# Patient Record
Sex: FEMALE | Race: BLACK OR AFRICAN AMERICAN | Employment: FULL TIME | URBAN - METROPOLITAN AREA
[De-identification: names, ages, dates, MRNs, and addresses within clinical notes are randomized per-mention and may not be internally consistent; named-entity substitution may affect disease eponyms.]

---

## 2017-02-14 ENCOUNTER — OFFICE VISIT (OUTPATIENT)
Dept: INTERNAL MEDICINE CLINIC | Age: 37
End: 2017-02-14

## 2017-02-14 VITALS
OXYGEN SATURATION: 99 % | BODY MASS INDEX: 28.74 KG/M2 | DIASTOLIC BLOOD PRESSURE: 75 MMHG | TEMPERATURE: 97.5 F | HEIGHT: 62 IN | WEIGHT: 156.2 LBS | RESPIRATION RATE: 18 BRPM | HEART RATE: 73 BPM | SYSTOLIC BLOOD PRESSURE: 111 MMHG

## 2017-02-14 DIAGNOSIS — Z20.7 EXPOSURE TO SCABIES: Primary | ICD-10-CM

## 2017-02-14 DIAGNOSIS — L28.2 PRURITIC RASH: ICD-10-CM

## 2017-02-14 DIAGNOSIS — F34.1 DYSTHYMIA: ICD-10-CM

## 2017-02-14 RX ORDER — NORETHINDRONE 0.35 MG/1
TABLET ORAL
Refills: 2 | COMMUNITY
Start: 2017-02-01 | End: 2017-06-01

## 2017-02-14 RX ORDER — HYDROXYZINE 25 MG/1
25 TABLET, FILM COATED ORAL
Qty: 30 TAB | Refills: 0 | Status: SHIPPED | OUTPATIENT
Start: 2017-02-14 | End: 2017-02-24

## 2017-02-14 RX ORDER — FLUTICASONE PROPIONATE 0.05 MG/G
OINTMENT TOPICAL 2 TIMES DAILY
Qty: 15 G | Refills: 0 | Status: SHIPPED | OUTPATIENT
Start: 2017-02-14 | End: 2017-05-01

## 2017-02-14 RX ORDER — PERMETHRIN 50 MG/G
CREAM TOPICAL
Qty: 60 G | Refills: 0 | Status: SHIPPED | OUTPATIENT
Start: 2017-02-14 | End: 2017-05-01

## 2017-02-14 RX ORDER — NAPROXEN 375 MG/1
TABLET ORAL
Refills: 0 | COMMUNITY
Start: 2016-11-16 | End: 2017-02-14

## 2017-02-14 NOTE — MR AVS SNAPSHOT
Visit Information Date & Time Provider Department Dept. Phone Encounter #  
 2/14/2017  8:40 AM Meryle Cheers, NP [address] [facility] [phone] [id number] Follow-up Instructions Return in about 3 months (around 5/14/2017) for depression f/u with Dr. Jennyfer Paula. Upcoming Health Maintenance Date Due Pneumococcal 19-64 Medium Risk (1 of 1 - PPSV23) 2/9/1997 FOOT EXAM Q1 5/18/2016 EYE EXAM RETINAL OR DILATED Q1 5/18/2016 INFLUENZA AGE 9 TO ADULT 8/1/2016 HEMOGLOBIN A1C Q6M 10/5/2016 MICROALBUMIN Q1 12/17/2016 LIPID PANEL Q1 12/17/2016 MEDICARE YEARLY EXAM 4/6/2017 PAP AKA CERVICAL CYTOLOGY 8/27/2017 DTaP/Tdap/Td series (2 - Td) 2/2/2024 COLONOSCOPY 12/17/2025 Allergies as of 2/14/2017  Review Complete On: 2/14/2017 By: Balbina Gaston. VICKEY Hickey Severity Noted Reaction Type Reactions Amoxicillin  12/29/2010    Rash, Other (comments) Vaginal itching Flagyl [Metronidazole]  11/16/2016   Side Effect Nausea and Vomiting Pcn [Penicillins]  09/20/2014   Intolerance Itching Vaginal itching Current Immunizations  Reviewed on 11/15/2011 Name Date Influenza Vaccine Intradermal PF 11/5/2014 PPD 6/20/2012 Tdap 2/2/2014  4:52 AM  
  
 Not reviewed this visit You Were Diagnosed With   
  
 Codes Comments Exposure to scabies    -  Primary ICD-10-CM: Z20.89 ICD-9-CM: V01.89 Dysthymia     ICD-10-CM: F34.1 ICD-9-CM: 300.4 Pruritic rash     ICD-10-CM: L28.2 ICD-9-CM: 698.2 Vitals BP Pulse Temp Resp Height(growth percentile) Weight(growth percentile) 111/75 (BP 1 Location: Left arm, BP Patient Position: Sitting) 73 97.5 °F (36.4 °C) (Oral) 18 5' 2\" (1.575 m) 156 lb 3.2 oz (70.9 kg) LMP SpO2 BMI OB Status Smoking Status 02/01/2017 (Exact Date) 99% 28.57 kg/m2 Having regular periods Current Some Day Smoker Vitals History BMI and BSA Data Body Mass Index Body Surface Area 28.57 kg/m 2 1.76 m 2 Preferred Pharmacy Pharmacy Name Phone [facility]/PHARMACY #[id number]- [place], [place] - [address] [phone] Your Updated Medication List  
  
   
This list is accurate as of: 2/14/17 10:02 AM.  Always use your most recent med list.  
  
  
  
  
 atorvastatin 10 mg tablet Commonly known as:  LIPITOR Take 1 Tab by mouth daily. LESLI 0.35 mg Tab Generic drug:  norethindrone TAKE 1 TABLET BY MOUTH EVERY DAY  
  
 diclofenac EC 75 mg EC tablet Commonly known as:  VOLTAREN Take 1 Tab by mouth two (2) times a day. fluticasone 0.005 % ointment Commonly known as:  Hussain Pitts Apply  to affected area two (2) times a day. apply thin layer as directed  
  
 hydrOXYzine HCl 25 mg tablet Commonly known as:  ATARAX Take 1 Tab by mouth three (3) times daily as needed for Itching for up to 10 days. Indications: PRURITUS OF SKIN  
  
 lisinopril 10 mg tablet Commonly known as:  Wolf Marychuy Take 1 Tab by mouth daily. metFORMIN 500 mg tablet Commonly known as:  GLUCOPHAGE Take 1 Tab by mouth two (2) times daily (with meals). methocarbamol 500 mg tablet Commonly known as:  ROBAXIN Take 1 Tab by mouth four (4) times daily. naproxen 375 mg tablet Commonly known as:  NAPROSYN  
TAKE 1 TABLET BY MOUTH TWO (2) TIMES DAILY (WITH MEALS). OLANZapine 10 mg tablet Commonly known as:  ZyPREXA  
TAKE 1 TABLET AT BEDTIME  
  
 permethrin 5 % topical cream  
Commonly known as:  Elkin Franz Use from neck down x 1 application ZOLOFT 100 mg tablet Generic drug:  sertraline Take  by mouth daily. Prescriptions Sent to Pharmacy Refills  
 permethrin (ELIMITE) 5 % topical cream 0 Sig: Use from neck down x 1 application Class: Normal  
 Pharmacy: [facility]/pharmacy [id number] [address] [facility] [address]  #: [phone] fluticasone (CUTIVASE) 0.005 % ointment 0 Sig: Apply  to affected area two (2) times a day. apply thin layer as directed Class: Normal  
 Pharmacy: [facility]/pharmacy [address] [place] [address] [address] Ph #: [phone] Route: Topical  
 hydrOXYzine HCl (ATARAX) 25 mg tablet 0 Sig: Take 1 Tab by mouth three (3) times daily as needed for Itching for up to 10 days. Indications: PRURITUS OF SKIN Class: Normal  
 Pharmacy: [facility]/pharmacy [address] [place] [address] [address] Ph #: [phone] Route: Oral  
  
We Performed the Following REFERRAL TO PSYCHIATRY [REF91 Custom] Follow-up Instructions Return in about 3 months (around 5/14/2017) for depression f/u with Dr. Juan Crespo. Referral Information Referral ID Referred By Referred To  
  
 [id number] Breonna Peres, CORTNEY   
   [address] [place] [address], [facility] [address] [facility]  Phone: [phone] Fax: [phone] Visits Status Start Date End Date 1 New Request 2/14/17 2/14/18 If your referral has a status of pending review or denied, additional information will be sent to support the outcome of this decision. Patient Instructions Scabies: Care Instructions Your Care Instructions Scabies is a skin problem that can cause intense itching. It is caused by very tiny bugs called mites that dig just under the skin and lay eggs. An allergic reaction to the mites causes the itching. Scabies is usually spread by person-to-person contact. It is also possible, but not common, for scabies to spread through towels, clothes, and bedding. Everyone in your household should be treated. Scabies is treated with medicine. Itching may last for several weeks after treatment. Follow-up care is a key part of your treatment and safety.  Be sure to make and go to all appointments, and call your doctor if you are having problems. It's also a good idea to know your test results and keep a list of the medicines you take. How can you care for yourself at home? · Use the lotion or cream your doctor recommends or prescribes. One treatment usually cures scabies. Do not use the cream again unless your doctor tells you to. · Wash all clothes, bedding, and towels that you used in the 3 days before you started treatment. Use hot water, and use the hot cycle in the dryer. Another option is to dry-clean these items. Or seal them in a plastic bag for 3 to 7 days. · Take an oral antihistamine, such as loratadine (Claritin) or diphenhydramine (Benadryl), to help stop itching. You also can use a nonprescription anti-itch cream. Read and follow all instructions on the label. · Do not have physical contact with other people or let anyone use your personal items until you have finished treatment. Do not use other people's personal items until your treatment is done. Tell people with whom you have close contact that they will need treatment if they have symptoms. · Take an oatmeal bath to help relieve itching. Add a handful of oatmeal (ground to a powder) to your bath. Or you can try an oatmeal bath product, such as Aveeno. When should you call for help? Call your doctor now or seek immediate medical care if: 
· You have signs of infection, such as: 
¨ Increased pain, swelling, warmth, or redness. ¨ Red streaks leading from the mite bites. ¨ Pus draining from a bite area. ¨ A fever. Watch closely for changes in your health, and be sure to contact your doctor if: · Anyone else in your family has itching. · You do not get better within 2 weeks. Where can you learn more? Go to http://star-arvin.info/. Enter G718 in the search box to learn more about \"Scabies: Care Instructions. \" Current as of: February 5, 2016 Content Version: 11.1 © 3405-7080 [employer].  Care instructions adapted under license by [address] (which disclaims liability or warranty for this information). If you have questions about a medical condition or this instruction, always ask your healthcare professional. [address] any warranty or liability for your use of this information. Introducing [facility] & HEALTH SERVICES! Dear Abdias Tyson: Thank you for requesting a [employer] account. Our records indicate that you already have an active [employer] account. You can access your account anytime at [email or web address] Did you know that you can access your hospital and ER discharge instructions at any time in UC[employer]? You can also review all of your test results from your hospital stay or ER visit. Additional Information If you have questions, please visit the Frequently Asked Questions section of the UC[employer] website at [email or web address] Remember, UC[employer] is NOT to be used for urgent needs. For medical emergencies, dial 911. Now available from your iPhone and Android! Please provide this summary of care documentation to your next provider. Your primary care clinician is listed as George Dao. If you have any questions after today's visit, please call [phone].

## 2017-02-14 NOTE — PATIENT INSTRUCTIONS
Scabies: Care Instructions  Your Care Instructions  Scabies is a skin problem that can cause intense itching. It is caused by very tiny bugs called mites that dig just under the skin and lay eggs. An allergic reaction to the mites causes the itching. Scabies is usually spread by person-to-person contact. It is also possible, but not common, for scabies to spread through towels, clothes, and bedding. Everyone in your household should be treated. Scabies is treated with medicine. Itching may last for several weeks after treatment. Follow-up care is a key part of your treatment and safety. Be sure to make and go to all appointments, and call your doctor if you are having problems. It's also a good idea to know your test results and keep a list of the medicines you take. How can you care for yourself at home? · Use the lotion or cream your doctor recommends or prescribes. One treatment usually cures scabies. Do not use the cream again unless your doctor tells you to. · Wash all clothes, bedding, and towels that you used in the 3 days before you started treatment. Use hot water, and use the hot cycle in the dryer. Another option is to dry-clean these items. Or seal them in a plastic bag for 3 to 7 days. · Take an oral antihistamine, such as loratadine (Claritin) or diphenhydramine (Benadryl), to help stop itching. You also can use a nonprescription anti-itch cream. Read and follow all instructions on the label. · Do not have physical contact with other people or let anyone use your personal items until you have finished treatment. Do not use other people's personal items until your treatment is done. Tell people with whom you have close contact that they will need treatment if they have symptoms. · Take an oatmeal bath to help relieve itching. Add a handful of oatmeal (ground to a powder) to your bath. Or you can try an oatmeal bath product, such as Aveeno. When should you call for help?   Call your doctor now or seek immediate medical care if:  · You have signs of infection, such as:  ¨ Increased pain, swelling, warmth, or redness. ¨ Red streaks leading from the mite bites. ¨ Pus draining from a bite area. ¨ A fever. Watch closely for changes in your health, and be sure to contact your doctor if:  · Anyone else in your family has itching. · You do not get better within 2 weeks. Where can you learn more? Go to http://star-arvin.info/. Enter V093 in the search box to learn more about \"Scabies: Care Instructions. \"  Current as of: February 5, 2016  Content Version: 11.1  © 3898-2444 [employer]. Care instructions adapted under license by [employer] (which disclaims liability or warranty for this information). If you have questions about a medical condition or this instruction, always ask your healthcare professional. [address] any warranty or liability for your use of this information.

## 2017-02-14 NOTE — LETTER
NOTIFICATION RETURN TO WORK / SCHOOL 
 
2/14/2017 10:03 AM 
 
Ms. Naif Pham [address] [place] [id number] To Whom It May Concern: 
 
Naif Pham is currently under the care of Daniela Rubin. She will return to work/school on: 2/15/17 If there are questions or concerns please have the patient contact our office. Sincerely, Chapin Manriquez NP

## 2017-02-14 NOTE — PROGRESS NOTES
1. Have you been to the ER, urgent care clinic since your last visit? Hospitalized since your last visit? No    2. Have you seen or consulted any other health care providers outside of the [address] [place] [address] since your last visit? Include any pap smears or colon screening. No     Pt is here for   Chief Complaint   Patient presents with    Skin Problem     pt states she's been in contact with scabies and would like to be checked. .. pt states he son has it. .. pt states she's itching all over and she has little bumps on her     Pt denies pain at this time. ...

## 2017-02-14 NOTE — PROGRESS NOTES
Viviano Curling is a 42 y.o. female and presents with Skin Problem (pt states she's been in contact with scabies and would like to be checked. .. pt states he son has it. .. pt states she's itching all over and she has little bumps on her)    Subjective:  Pt here suspecting she has scabies. Started with itching all over this weekend. Son recently treated for scabies and would like to have skin checked. Itching to neck, arms, abdomen, and right foot. No treatments tried. Denies change in soap, meds, food, or detergent.      Review of Systems  Constitutional: negative for fevers, chills, anorexia and weight loss  Eyes:   negative for visual disturbance, drainage, and irritation  ENT:   negative for tinnitus,sore throat,nasal congestion,ear pain,and hoarseness  Respiratory:  negative for cough, hemoptysis, dyspnea, and wheezing  CV:   negative for chest pain, palpitations, and lower extremity edema  GI:   negative for nausea, vomiting, diarrhea, abdominal pain, and melena  Endo:               negative for polyuria,polydipsia,polyphagia, and heat intolerance  Genitourinary: negative for frequency, urgency, dysuria, retention, and hematuria  Integument:  negative for rash, ulcerations, and pruritus  Hematologic:  negative for easy bruising and bleeding  Musculoskel: negative for arthralgias, muscle weakness,and joint pain/swelling  Neurological:  negative for headaches, dizziness, vertigo,and memory/gait problems  Behavl/Psych: + depression, not taking meds due to sedating SE. negative for feelings of anxiety, suicide, and mood changes    Past Medical History   Diagnosis Date    Anxiety     Bipolar affective ([address]     Depression     Diabetes ([facility] [address]      diet control    Major depression     Other ill-defined conditions(799.89)      bronchitis    Other ill-defined conditions(799.89)      High Cholesterol     Past Surgical History   Procedure Laterality Date    Hx hernia repair       as child    Hx gi Colonoscopy     Social History     Social History    Marital status: SINGLE     Spouse name: N/A    Number of children: N/A    Years of education: N/A     Social History Main Topics    Smoking status: Current Some Day Smoker    Smokeless tobacco: Never Used      Comment: cigars    Alcohol use Yes      Comment: occasional    Drug use: No    Sexual activity: Yes     Partners: Male     Birth control/ protection: Injection     Other Topics Concern    None     Social History Narrative     Family History   Problem Relation Age of Onset    Diabetes Mother     Depression Mother     Asthma Brother     Stroke Maternal Grandmother      aneurysm    Cancer Maternal Grandmother      kidney    Hypertension Maternal Grandmother     Cancer Paternal Grandmother      lung    Diabetes Brother     Bipolar Disorder Brother     Schizophrenia Brother     Other Brother      paranoia     Current Outpatient Prescriptions   Medication Sig Dispense Refill    LESLI 0.35 mg tab TAKE 1 TABLET BY MOUTH EVERY DAY  2    permethrin (ELIMITE) 5 % topical cream Use from neck down x 1 application 60 g 0    fluticasone (CUTIVASE) 0.005 % ointment Apply  to affected area two (2) times a day. apply thin layer as directed 15 g 0    hydrOXYzine HCl (ATARAX) 25 mg tablet Take 1 Tab by mouth three (3) times daily as needed for Itching for up to 10 days. Indications: PRURITUS OF SKIN 30 Tab 0    methocarbamol (ROBAXIN) 500 mg tablet Take 1 Tab by mouth four (4) times daily. 30 Tab 0    OLANZapine (ZYPREXA) 10 mg tablet TAKE 1 TABLET AT BEDTIME  2    sertraline (ZOLOFT) 100 mg tablet Take  by mouth daily.  naproxen (NAPROSYN) 375 mg tablet TAKE 1 TABLET BY MOUTH TWO (2) TIMES DAILY (WITH MEALS). 0    diclofenac EC (VOLTAREN) 75 mg EC tablet Take 1 Tab by mouth two (2) times a day. 30 Tab 0    metFORMIN (GLUCOPHAGE) 500 mg tablet Take 1 Tab by mouth two (2) times daily (with meals).  60 Tab 4    lisinopril (PRINIVIL, ZESTRIL) 10 mg tablet Take 1 Tab by mouth daily. 30 Tab 4    atorvastatin (LIPITOR) 10 mg tablet Take 1 Tab by mouth daily. 30 Tab 4     Allergies   Allergen Reactions    Amoxicillin Rash and Other (comments)     Vaginal itching    Flagyl [Metronidazole] Nausea and Vomiting    Pcn [Penicillins] Itching     Vaginal itching       Objective:  Visit Vitals    /75 (BP 1 Location: Left arm, BP Patient Position: Sitting)    Pulse 73    Temp 97.5 °F (36.4 °C) (Oral)    Resp 18    Ht 5' 2\" (1.575 m)    Wt 156 lb 3.2 oz (70.9 kg)    LMP 02/01/2017 (Exact Date)    SpO2 99%    BMI 28.57 kg/m2     Wt Readings from Last 3 Encounters:   02/14/17 156 lb 3.2 oz (70.9 kg)   11/27/16 157 lb 6.4 oz (71.4 kg)   11/16/16 158 lb 9.6 oz (71.9 kg)     Physical Exam:   General appearance - alert, well appearing, and in no distress. Mental status - A/O x 4, normal mood and affect. Neck -Supple ,normal CSP. FROM, non-tender. No significant adenopathy/thyromegaly. No JVD. Chest - CTA. Symmetric chest rise. No wheezing, rales or rhonchi. Heart - Normal rate, regular rhythm. Normal S1, S2. No MGR or clicks. Abdomen - Soft,non-distended. Normoactive BS in all quadrants. NT, no mass or HSM. Ext- Radial, DP pulses, 2+ bilaterally. No pedal edema, clubbing, or cyanosis. Skin-Warm and dry. No hyperpigmentation, ulcerations, or suspicious lesions. Neuro - Normal speech, no focal findings or movement disorder. Normal strength, gait, and muscle tone.      Results for orders placed or performed in visit on 11/16/16   CULTURE, URINE   Result Value Ref Range    Urine Culture, Routine No growth    NUSWAB VAGINITIS PLUS   Result Value Ref Range    Atopobium vaginae Low - 0 Score    BVAB 2 Low - 0 Score    Megasphaera 1 Low - 0 Score    C. albicans, ROLANDO Positive (A) Negative    C. glabrata, ROLANDO Negative Negative    T. vaginalis, ROLANDO Negative Negative    C. trachomatis, ROLANDO Negative Negative    N. gonorrhoeae, ROLANDO Negative Negative   AMB POC URINALYSIS DIP STICK AUTO W/O MICRO   Result Value Ref Range    Color (UA POC) Yellow     Clarity (UA POC) Clear     Glucose (UA POC) Negative Negative    Bilirubin (UA POC) Negative Negative    Ketones (UA POC) Negative Negative    Specific gravity (UA POC) 1.025 1.001 - 1.035    Blood (UA POC) 3+ Negative    pH (UA POC) 7.0 4.6 - 8.0    Protein (UA POC) Trace Negative mg/dL    Urobilinogen (UA POC) 0.2 mg/dL 0.2 - 1    Nitrites (UA POC) Negative Negative    Leukocyte esterase (UA POC) 2+ Negative       Assessment/Plan:  Work note x 2 days off. elimite treatment x 1 dose. Atarax and cutivate for pruritus. Referred to psych for depression symptoms. Medication Side Effects and Warnings were discussed with patient: yes  Patient Labs were reviewed: no  Patient Past Records were reviewed: no  See below for other orders   Follow-up Disposition:  Return in about 3 months (around 5/14/2017) for depression f/u with Dr. Julia Baldwin. Pt has given consent verbally while in office for [employer]. ICD-10-CM ICD-9-CM    1. Exposure to scabies Z20.89 V01.89 permethrin (ELIMITE) 5 % topical cream      fluticasone (CUTIVASE) 0.005 % ointment      hydrOXYzine HCl (ATARAX) 25 mg tablet   2. Dysthymia F34.1 300.4 REFERRAL TO PSYCHIATRY   3. Pruritic rash L28.2 698. 2 permethrin (ELIMITE) 5 % topical cream      fluticasone (CUTIVASE) 0.005 % ointment      hydrOXYzine HCl (ATARAX) 25 mg tablet     Orders Placed This Encounter    REFERRAL TO PSYCHIATRY     Referral Priority:   Routine     Referral Type:   Behavioral Health     Referral Reason:   Specialty Services Required     Referred to Provider:   Diane Martines NP    LESLI 0.35 mg tab     Sig: TAKE 1 TABLET BY MOUTH EVERY DAY     Refill:  2    naproxen (NAPROSYN) 375 mg tablet     Sig: TAKE 1 TABLET BY MOUTH TWO (2) TIMES DAILY (WITH MEALS).      Refill:  0    permethrin (ELIMITE) 5 % topical cream     Sig: Use from neck down x 1 application     Dispense:  60 g Refill:  0    fluticasone (CUTIVASE) 0.005 % ointment     Sig: Apply  to affected area two (2) times a day. apply thin layer as directed     Dispense:  15 g     Refill:  0    hydrOXYzine HCl (ATARAX) 25 mg tablet     Sig: Take 1 Tab by mouth three (3) times daily as needed for Itching for up to 10 days. Indications: PRURITUS OF SKIN     Dispense:  30 Tab     Refill:  [address] expressed understanding of plan. An After Visit Summary was offered/printed and given to the patient.

## 2017-03-14 ENCOUNTER — HOSPITAL ENCOUNTER (OUTPATIENT)
Dept: LAB | Age: 37
Discharge: HOME | End: 2017-03-14

## 2017-03-14 ENCOUNTER — OFFICE VISIT (OUTPATIENT)
Dept: INTERNAL MEDICINE CLINIC | Age: 37
End: 2017-03-14

## 2017-03-14 VITALS
DIASTOLIC BLOOD PRESSURE: 82 MMHG | SYSTOLIC BLOOD PRESSURE: 130 MMHG | HEART RATE: 73 BPM | OXYGEN SATURATION: 98 % | HEIGHT: 62 IN | WEIGHT: 159.4 LBS | BODY MASS INDEX: 29.33 KG/M2 | RESPIRATION RATE: 18 BRPM | TEMPERATURE: 98.1 F

## 2017-03-14 DIAGNOSIS — K21.9 GASTROESOPHAGEAL REFLUX DISEASE WITHOUT ESOPHAGITIS: ICD-10-CM

## 2017-03-14 DIAGNOSIS — R11.0 NAUSEA: ICD-10-CM

## 2017-03-14 DIAGNOSIS — E11.69 TYPE 2 DIABETES MELLITUS WITH OTHER SPECIFIED COMPLICATION (HCC): Primary | ICD-10-CM

## 2017-03-14 DIAGNOSIS — D64.9 ANEMIA, UNSPECIFIED TYPE: ICD-10-CM

## 2017-03-14 LAB
ALBUMIN UR QL STRIP: 30 MG/L
CHOLEST SERPL-MCNC: 127 MG/DL
CREATININE, URINE POC: 300 MG/DL
GLUCOSE POC: 123 MG/DL
HDLC SERPL-MCNC: 52 MG/DL
LDL CHOLESTEROL POC: 56
MICROALBUMIN/CREAT RATIO POC: 30 MG/G
NON-HDL GOAL (POC): 74
TCHOL/HDL RATIO (POC): 1.1
TRIGL SERPL-MCNC: 91 MG/DL

## 2017-03-14 PROCEDURE — 99001 SPECIMEN HANDLING PT-LAB: CPT | Performed by: FAMILY MEDICINE

## 2017-03-14 RX ORDER — RANITIDINE 150 MG/1
150 TABLET, FILM COATED ORAL 2 TIMES DAILY
Qty: 60 TAB | Refills: 3 | Status: SHIPPED | OUTPATIENT
Start: 2017-03-14 | End: 2017-05-15

## 2017-03-14 RX ORDER — PROMETHAZINE HYDROCHLORIDE 25 MG/1
25 TABLET ORAL
Qty: 15 TAB | Refills: 0 | Status: SHIPPED | OUTPATIENT
Start: 2017-03-14 | End: 2017-03-16

## 2017-03-14 NOTE — MR AVS SNAPSHOT
Visit Information Date & Time Provider Department Dept. Phone Encounter #  
 3/14/2017  1:45 PM George Dao, [address] [id number] Follow-up Instructions Return if symptoms worsen or fail to improve. Your Appointments 5/1/2017  1:30 PM  
New Patient with Alvarado Corners, NP [facility] ([address] [facility]) Appt Note: refer by Dr. iSmi Gonsalves for Major depression and bipolar; refer by Dr. Simi Gonsalves for Major depression and bipolar [address] [facility] [address] [facility] [address]  
  
   
 [address] [facility] [address] [facility] [address] [address] 5/15/2017  9:15 AM  
ROUTINE CARE with George Dao MD  
[address] [facility] [address] [facility]) Appt Note: fu depression [place] [address] [address]  
[phone]  
  
   
 [place] [address] [place] Upcoming Health Maintenance Date Due Pneumococcal 19-64 Medium Risk (1 of 1 - PPSV23) 2/9/1997 FOOT EXAM Q1 5/18/2016 EYE EXAM RETINAL OR DILATED Q1 5/18/2016 HEMOGLOBIN A1C Q6M 10/5/2016 MICROALBUMIN Q1 12/17/2016 LIPID PANEL Q1 12/17/2016 MEDICARE YEARLY EXAM 4/6/2017 PAP AKA CERVICAL CYTOLOGY 8/27/2017 DTaP/Tdap/Td series (2 - Td) 2/2/2024 COLONOSCOPY 12/17/2025 Allergies as of 3/14/2017  Review Complete On: 3/14/2017 By: Geroge Dao MD  
  
 Severity Noted Reaction Type Reactions Amoxicillin  12/29/2010    Rash, Other (comments) Vaginal itching Flagyl [Metronidazole]  11/16/2016   Side Effect Nausea and Vomiting Pcn [Penicillins]  09/20/2014   Intolerance Itching Vaginal itching Current Immunizations  Reviewed on 11/15/2011 Name Date Influenza Vaccine Intradermal PF 11/5/2014 PPD 6/20/2012 Tdap 2/2/2014  4:52 AM  
  
 Not reviewed this visit You Were Diagnosed With   
  
 Codes Comments Type 2 diabetes mellitus with other specified complication (HCC)    -  Primary ICD-10-CM: E11.69 ICD-9-CM: 250.80 Nausea     ICD-10-CM: R11.0 ICD-9-CM: 787.02 Anemia, unspecified type     ICD-10-CM: D64.9 ICD-9-CM: 285.9 Gastroesophageal reflux disease without esophagitis     ICD-10-CM: K21.9 ICD-9-CM: 530.81 Vitals BP Pulse Temp Resp Height(growth percentile) Weight(growth percentile) 130/82 (BP 1 Location: Left arm, BP Patient Position: Sitting) 73 98.1 °F (36.7 °C) (Oral) 18 5' 2\" (1.575 m) 159 lb 6.4 oz (72.3 kg) LMP SpO2 BMI OB Status Smoking Status 02/01/2017 (Exact Date) 98% 29.15 kg/m2 Having regular periods Current Some Day Smoker Vitals History BMI and BSA Data Body Mass Index Body Surface Area  
 29.15 kg/m 2 1.78 m 2 Preferred Pharmacy Pharmacy Name Phone [id number], [place] - [address] [phone] Your Updated Medication List  
  
   
This list is accurate as of: 3/14/17  3:05 PM.  Always use your most recent med list.  
  
  
  
  
 LESLI 0.35 mg Tab Generic drug:  norethindrone TAKE 1 TABLET BY MOUTH EVERY DAY  
  
 fluticasone 0.005 % ointment Commonly known as:  Derenda Remedies Apply  to affected area two (2) times a day. apply thin layer as directed  
  
 methocarbamol 500 mg tablet Commonly known as:  ROBAXIN Take 1 Tab by mouth four (4) times daily. permethrin 5 % topical cream  
Commonly known as:  Mary Fabiana Use from neck down x 1 application  
  
 promethazine 25 mg tablet Commonly known as:  PHENERGAN Take 1 Tab by mouth every eight (8) hours as needed for Nausea. raNITIdine 150 mg tablet Commonly known as:  ZANTAC Take 1 Tab by mouth two (2) times a day. Indications: GASTROESOPHAGEAL REFLUX Prescriptions Sent to Pharmacy  Refills  
 raNITIdine (ZANTAC) 150 mg tablet 3  
 Sig: Take 1 Tab by mouth two (2) times a day. Indications: GASTROESOPHAGEAL REFLUX Class: Normal  
 Pharmacy: [facility] [id number] [facility] [address] Ph #: [phone] Route: Oral  
 promethazine (PHENERGAN) 25 mg tablet 0 Sig: Take 1 Tab by mouth every eight (8) hours as needed for Nausea. Class: Normal  
 Pharmacy: [facility] [id number] [facility] [address] Ph #: [phone] Route: Oral  
  
We Performed the Following AMB POC GLUCOSE BLOOD, BY GLUCOSE MONITORING DEVICE [71690 CPT(R)] AMB POC LIPID PROFILE [00005 CPT(R)] AMB POC URINE PREGNANCY TEST, VISUAL COLOR COMPARISON [59564 CPT(R)] AMB POC URINE, MICROALBUMIN, SEMIQUANT (3 RESULTS) [33290 CPT(R)] HEMOGLOBIN A1C WITH EAG [89533 CPT(R)] VITAMIN B12 & FOLATE [53234 CPT(R)] Follow-up Instructions Return if symptoms worsen or fail to improve. Introducing [facility]! Dear Vicente [place]: Thank you for requesting a Neptune account. Our records indicate that you already have an active Neptune account. You can access your account anytime at [email or web address] Did you know that you can access your hospital and ER discharge instructions at any time in Neptune? You can also review all of your test results from your hospital stay or ER visit. Additional Information If you have questions, please visit the Frequently Asked Questions section of the Neptune website at [email or web address] Remember, Neptune is NOT to be used for urgent needs. For medical emergencies, dial 911. Now available from your iPhone and Android! Please provide this summary of care documentation to your next provider. Your primary care clinician is listed as [place] [facility]. If you have any questions after today's visit, please call [phone].

## 2017-03-14 NOTE — PROGRESS NOTES
Shine Cotto is a 42 y.o. female and presents with Vomiting (B12 check)  . Pt wants her B12 checked b/c \"my complexion has gotten darker\". Been having N/V x 2 wks. Tend to occur mainly at night. No heartburn or increased burping. No increased stress. No h/o PUD or GERD. No diarrhea. Last vomit was Sat am. Worse after eating lunch. Allev saltines and ginger ale. LMP 3/1/2017 and was normal. Stopped OCPs since that period. No BTL. +unprotected sex. Not Compliant with DM diet. No CP, SOB, or edema. Does not check BS. Review of Systems  Constitutional: negative for fevers, chills, anorexia and weight loss  Eyes:   negative for visual disturbance and irritation  ENT:   negative for tinnitus,sore throat,nasal congestion,ear pains. hoarseness  Respiratory:  negative for cough, hemoptysis, dyspnea,wheezing  CV:   negative for chest pain, palpitations, lower extremity edema  GI:   negative for nausea, vomiting, diarrhea, abdominal pain,melena  Endo:               negative for polyuria,polydipsia,polyphagia,heat intolerance  Genitourinary: negative for frequency, dysuria and hematuria  Integument:  negative for rash and pruritus  Hematologic:  negative for easy bruising and gum/nose bleeding  Musculoskel: negative for myalgias, arthralgias, back pain, muscle weakness, joint pain  Neurological:  negative for headaches, dizziness, vertigo, memory problems and gait   Behavl/Psych: negative for feelings of anxiety, depression, mood changes    Past Medical History:   Diagnosis Date    Anxiety     B[facility] affective ([facility]     Depression     Diabetes ([facility]     diet control    Major depression     Other ill-defined conditions(799.89)     bronchitis    Other ill-defined conditions(799.89)     High Cholesterol     Past Surgical History:   Procedure Laterality Date    HX GI      Colonoscopy    HX HERNIA REPAIR      as child     Social History     Social History    Marital status: SINGLE     Spouse name: N/A   [address] [place] [address] Number of children: N/A    Years of education: N/A     Social History Main Topics    Smoking status: Current Some Day Smoker    Smokeless tobacco: Never Used      Comment: cigars    Alcohol use Yes      Comment: occasional    Drug use: No    Sexual activity: Yes     Partners: Male     Birth control/ protection: Injection     Other Topics Concern    None     Social History Narrative     Current Outpatient Prescriptions   Medication Sig Dispense Refill    raNITIdine (ZANTAC) 150 mg tablet Take 1 Tab by mouth two (2) times a day. Indications: GASTROESOPHAGEAL REFLUX 60 Tab 3    promethazine (PHENERGAN) 25 mg tablet Take 1 Tab by mouth every eight (8) hours as needed for Nausea. 15 Tab 0    permethrin (ELIMITE) 5 % topical cream Use from neck down x 1 application 60 g 0    LESLI 0.35 mg tab TAKE 1 TABLET BY MOUTH EVERY DAY  2    fluticasone (CUTIVASE) 0.005 % ointment Apply  to affected area two (2) times a day. apply thin layer as directed 15 g 0    methocarbamol (ROBAXIN) 500 mg tablet Take 1 Tab by mouth four (4) times daily.  30 Tab 0     Allergies   Allergen Reactions    Amoxicillin Rash and Other (comments)     Vaginal itching    Flagyl [Metronidazole] Nausea and Vomiting    Pcn [Penicillins] Itching     Vaginal itching       Objective:  Visit Vitals    /82 (BP 1 Location: Left arm, BP Patient Position: Sitting)    Pulse 73    Temp 98.1 °F (36.7 °C) (Oral)    Resp 18    Ht 5' 2\" (1.575 m)    Wt 159 lb 6.4 oz (72.3 kg)    LMP 02/01/2017 (Exact Date)    SpO2 98%    BMI 29.15 kg/m2     Physical Exam:   General appearance - alert, well appearing, and in no distress  Mental status - alert, oriented to person, place, and time  Chest - clear to auscultation, no wheezes, rales or rhonchi, symmetric air entry   Heart - normal rate, regular rhythm, normal S1, S2, no murmurs, rubs, clicks or gallops   Abdomen - soft, nontender, nondistended, no masses or organomegaly  Lymph- no adenopathy palpable  Ext-peripheral pulses normal, no pedal edema, no clubbing or cyanosis  Skin-Warm and dry. no hyperpigmentation, vitiligo, or suspicious lesions  Neuro -alert, oriented, normal speech, no focal findings or movement disorder noted    Assessment/Plan:    ICD-10-CM ICD-9-CM    1. Type 2 diabetes mellitus with other specified complication ([facility]) G91.74 250.80 AMB POC GLUCOSE BLOOD, BY GLUCOSE MONITORING DEVICE      AMB POC LIPID PROFILE      AMB POC URINE, MICROALBUMIN, SEMIQUANT (3 RESULTS)      HEMOGLOBIN A1C WITH EAG   2. Nausea R11.0 787.02 AMB POC URINE PREGNANCY TEST, VISUAL COLOR COMPARISON   3. Anemia, unspecified type D64.9 285.9 VITAMIN B12 & FOLATE   4. Gastroesophageal reflux disease without esophagitis K21.9 530.81      Orders Placed This Encounter    VITAMIN B12 & FOLATE    HEMOGLOBIN A1C WITH EAG    AMB POC GLUCOSE BLOOD, BY GLUCOSE MONITORING DEVICE    AMB POC LIPID PROFILE    AMB POC URINE, MICROALBUMIN, SEMIQUANT (3 RESULTS)    AMB POC URINE PREGNANCY TEST, VISUAL COLOR COMPARISON    raNITIdine (ZANTAC) 150 mg tablet     Sig: Take 1 Tab by mouth two (2) times a day. Indications: GASTROESOPHAGEAL REFLUX     Dispense:  60 Tab     Refill:  3    promethazine (PHENERGAN) 25 mg tablet     Sig: Take 1 Tab by mouth every eight (8) hours as needed for Nausea. Dispense:  15 Tab     Refill:  0     DM- diet controlled  N/V- GERD? Zantac and phenergan  Vit B12 checked per pt request.  H/o anemia- CBC    Follow-up Disposition:  Return if symptoms worsen or fail to improve.

## 2017-03-14 NOTE — PROGRESS NOTES
1. Have you been to the ER, urgent care clinic since your last visit? Hospitalized since your last visit? No.    2. Have you seen or consulted any other health care providers outside of the [facility] since your last visit? Include any pap smears or colon screening. No.  Blood glucose /lipids verbal order DR. Chacon/ADRIANA Acevedo.

## 2017-03-15 LAB
EST. AVERAGE GLUCOSE BLD GHB EST-MCNC: 117 MG/DL
FOLATE SERPL-MCNC: 16.8 NG/ML
HBA1C MFR BLD: 5.7 % (ref 4.8–5.6)
VIT B12 SERPL-MCNC: 277 PG/ML (ref 211–946)

## 2017-03-16 ENCOUNTER — HOSPITAL ENCOUNTER (EMERGENCY)
Age: 37
Discharge: HOME | End: 2017-03-16
Attending: EMERGENCY MEDICINE
Payer: MEDICARE

## 2017-03-16 VITALS
RESPIRATION RATE: 18 BRPM | TEMPERATURE: 98.5 F | DIASTOLIC BLOOD PRESSURE: 80 MMHG | SYSTOLIC BLOOD PRESSURE: 122 MMHG | WEIGHT: 159 LBS | OXYGEN SATURATION: 100 % | HEIGHT: 62 IN | HEART RATE: 82 BPM | BODY MASS INDEX: 29.26 KG/M2

## 2017-03-16 DIAGNOSIS — S39.012A BACK STRAIN, INITIAL ENCOUNTER: Primary | ICD-10-CM
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
## 2017-03-16 PROCEDURE — 99282 EMERGENCY DEPT VISIT SF MDM: CPT

## 2017-03-16 PROCEDURE — 96372 THER/PROPH/DIAG INJ SC/IM: CPT

## 2017-03-16 PROCEDURE — 74011250636 HC RX REV CODE- 250/636: Performed by: PHYSICIAN ASSISTANT

## 2017-03-16 RX ORDER — KETOROLAC TROMETHAMINE 30 MG/ML
30 INJECTION, SOLUTION INTRAMUSCULAR; INTRAVENOUS
Status: COMPLETED | OUTPATIENT
Start: 2017-03-16 | End: 2017-03-16

## 2017-03-16 RX ORDER — CYCLOBENZAPRINE HCL 10 MG
10 TABLET ORAL
Qty: 15 TAB | Refills: 0 | Status: SHIPPED | OUTPATIENT
Start: 2017-03-16 | End: 2017-05-15

## 2017-03-16 RX ORDER — NAPROXEN 500 MG/1
500 TABLET ORAL
Qty: 20 TAB | Refills: 0 | Status: SHIPPED | OUTPATIENT
Start: 2017-03-16 | End: 2017-03-26

## 2017-03-16 RX ADMIN — KETOROLAC TROMETHAMINE 30 MG: 30 INJECTION, SOLUTION INTRAMUSCULAR at 17:56
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
## 2017-03-16 NOTE — ED NOTES
Emergency Department Nursing Plan of Care       The Nursing Plan of Care is developed from the Nursing assessment and Emergency Department Attending provider initial evaluation. The plan of care may be reviewed in the ED Provider note.     The Plan of Care was developed with the following considerations:   Patient / Family readiness to learn indicated by:verbalized understanding  Persons(s) to be included in education: patient  Barriers to Learning/Limitations:No    [facility]    3/16/2017   5:33 PM

## 2017-03-16 NOTE — ED NOTES
Patient (s) was given copy of dc instructions and no paper script(s) and 2 electronic scripts. Patient (s)  verbalized understanding of instructions and script (s). Patient given a current medication reconciliation form and verbalized understanding of their medications. Patient (s) verbalized understanding of the importance of discussing medications with  his or her physician or clinic they will be following up with. Patient alert and oriented and in no acute distress. Patient offered wheelchair from treatment area to hospital entrance, patient declined wheelchair.
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
## 2017-03-16 NOTE — LETTER
[facility] EMERGENCY DEPT 
[address] [place] [address] 
[phone] Work/School Note Date: 3/16/2017 To Whom It May concern: 
 
Aliyah Alejandre was seen and treated today in the emergency room by the following provider(s): 
Attending Provider: Elio Gar MD 
Physician Assistant: Caity Barnes PA-C. Aliyah Alejandre may return to work on 3/17/17. Sincerely, Caity Barnes PA-C

## 2017-04-04 ENCOUNTER — OFFICE VISIT (OUTPATIENT)
Dept: INTERNAL MEDICINE CLINIC | Age: 37
End: 2017-04-04

## 2017-04-04 VITALS
SYSTOLIC BLOOD PRESSURE: 100 MMHG | HEART RATE: 78 BPM | HEIGHT: 62 IN | RESPIRATION RATE: 16 BRPM | OXYGEN SATURATION: 98 % | BODY MASS INDEX: 29.04 KG/M2 | WEIGHT: 157.8 LBS | TEMPERATURE: 97.9 F | DIASTOLIC BLOOD PRESSURE: 76 MMHG
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
## 2017-04-04 DIAGNOSIS — T14.8XXA MUSCLE STRAIN: ICD-10-CM

## 2017-04-04 RX ORDER — PROMETHAZINE HYDROCHLORIDE 25 MG/1
TABLET ORAL
Refills: 0 | COMMUNITY
Start: 2017-03-14 | End: 2017-05-15

## 2017-04-04 NOTE — PATIENT INSTRUCTIONS
1. Heat for 15 minutes 4 x daily followed by asper cream or bengay  2. Continue naprosyn if needed    Back Stretches: Exercises  Your Care Instructions  Here are some examples of exercises for stretching your back. Start each exercise slowly. Ease off the exercise if you start to have pain. Your doctor or physical therapist will tell you when you can start these exercises and which ones will work best for you. How to do the exercises  Overhead stretch    1. Stand comfortably with your feet shoulder-width apart. 2. Looking straight ahead, raise both arms over your head and reach toward the ceiling. Do not allow your head to tilt back. 3. Hold for 15 to 30 seconds, then lower your arms to your sides. 4. Repeat 2 to 4 times. Side stretch    1. Stand comfortably with your feet shoulder-width apart. 2. Raise one arm over your head, and then lean to the other side. 3. Slide your hand down your leg as you let the weight of your arm gently stretch your side muscles. Hold for 15 to 30 seconds. 4. Repeat 2 to 4 times on each side. Press-up    1. Lie on your stomach, supporting your body with your forearms. 2. Press your elbows down into the floor to raise your upper back. As you do this, relax your stomach muscles and allow your back to arch without using your back muscles. As your press up, do not let your hips or pelvis come off the floor. 3. Hold for 15 to 30 seconds, then relax. 4. Repeat 2 to 4 times. Relax and rest    1. Lie on your back with a rolled towel under your neck and a pillow under your knees. Extend your arms comfortably to your sides. 2. Relax and breathe normally. 3. Remain in this position for about 10 minutes. 4. If you can, do this 2 or 3 times each day. Follow-up care is a key part of your treatment and safety. Be sure to make and go to all appointments, and call your doctor if you are having problems.  It's also a good idea to know your test results and keep a list of the medicines you take. Where can you learn more? Go to http://star-arvin.info/. Enter R252 in the search box to learn more about \"Back Stretches: Exercises. \"  Current as of: May 23, 2016  Content Version: 11.2  © 9085-8353 [employer]. Care instructions adapted under license by [employer] (which disclaims liability or warranty for this information). If you have questions about a medical condition or this instruction, always ask your healthcare professional. [address] any warranty or liability for your use of this information.
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
## 2017-04-12 ENCOUNTER — HOSPITAL ENCOUNTER (OUTPATIENT)
Dept: PHYSICAL THERAPY | Age: 37
Discharge: HOME | End: 2017-04-12
Payer: MEDICARE

## 2017-04-12 PROCEDURE — 97161 PT EVAL LOW COMPLEX 20 MIN: CPT | Performed by: PHYSICAL THERAPIST

## 2017-04-12 PROCEDURE — 97140 MANUAL THERAPY 1/> REGIONS: CPT | Performed by: PHYSICAL THERAPIST

## 2017-04-12 PROCEDURE — 97014 ELECTRIC STIMULATION THERAPY: CPT | Performed by: PHYSICAL THERAPIST

## 2017-04-12 PROCEDURE — G8982 BODY POS GOAL STATUS: HCPCS | Performed by: PHYSICAL THERAPIST

## 2017-04-12 PROCEDURE — 97110 THERAPEUTIC EXERCISES: CPT | Performed by: PHYSICAL THERAPIST

## 2017-04-12 PROCEDURE — G8981 BODY POS CURRENT STATUS: HCPCS | Performed by: PHYSICAL THERAPIST
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
## 2017-04-17 ENCOUNTER — HOSPITAL ENCOUNTER (EMERGENCY)
Age: 37
Discharge: HOME | End: 2017-04-17
Attending: EMERGENCY MEDICINE
Payer: MEDICARE

## 2017-04-17 VITALS
OXYGEN SATURATION: 100 % | BODY MASS INDEX: 29.44 KG/M2 | DIASTOLIC BLOOD PRESSURE: 93 MMHG | HEIGHT: 62 IN | HEART RATE: 91 BPM | TEMPERATURE: 98.3 F | SYSTOLIC BLOOD PRESSURE: 127 MMHG | WEIGHT: 160 LBS | RESPIRATION RATE: 18 BRPM

## 2017-04-17 DIAGNOSIS — R10.13 ABDOMINAL PAIN, EPIGASTRIC: Primary | ICD-10-CM

## 2017-04-17 PROCEDURE — 74011250637 HC RX REV CODE- 250/637: Performed by: EMERGENCY MEDICINE

## 2017-04-17 PROCEDURE — 99283 EMERGENCY DEPT VISIT LOW MDM: CPT

## 2017-04-17 RX ORDER — PANTOPRAZOLE SODIUM 40 MG/1
40 TABLET, DELAYED RELEASE ORAL
Status: COMPLETED | OUTPATIENT
Start: 2017-04-17 | End: 2017-04-17

## 2017-04-17 RX ORDER — MAG HYDROX/ALUMINUM HYD/SIMETH 200-200-20
30 SUSPENSION, ORAL (FINAL DOSE FORM) ORAL
Status: COMPLETED | OUTPATIENT
Start: 2017-04-17 | End: 2017-04-17

## 2017-04-17 RX ADMIN — PANTOPRAZOLE SODIUM 40 MG: 40 TABLET, DELAYED RELEASE ORAL at 22:06

## 2017-04-17 RX ADMIN — ALUMINUM HYDROXIDE, MAGNESIUM HYDROXIDE, AND SIMETHICONE 30 ML: 200; 200; 20 SUSPENSION ORAL at 22:07

## 2017-04-17 NOTE — LETTER
[facility] EMERGENCY DEPT 
[address] [facility] [address] [id number] [phone] Work/School Note Date: 4/17/2017 To Whom It May concern: 
 
Radha Reynoso was seen and treated today in the emergency room by the following provider(s): 
Attending Provider: Tiff Stark MD. Radha Reynoso may return to work on Tuesday (4/18/17).  
 
Sincerely, 
 
 
 
 
Tiff Stark MD

## 2017-04-18 ENCOUNTER — HOSPITAL ENCOUNTER (OUTPATIENT)
Dept: PHYSICAL THERAPY | Age: 37
Discharge: HOME | End: 2017-04-18
Payer: MEDICARE

## 2017-04-18 PROCEDURE — 97014 ELECTRIC STIMULATION THERAPY: CPT

## 2017-04-18 PROCEDURE — 97110 THERAPEUTIC EXERCISES: CPT

## 2017-04-18 NOTE — PROGRESS NOTES
PT DAILY TREATMENT NOTE - [facility] 2-15    Patient Name: Denzel Lesches  Date:2017  : 1980  [x]  Patient  Verified  [place] / Plan: [place] MED[place]ARE PART A & B / Product Type: Medicare /    In time:9:30A  Out time:10:30A  Total Treatment Time (min): 60  Total Timed Codes (min): 45  1:1 Treatment Time ([address] only): 39   Visit #: 2     Treatment Area: Low back pain [M54.5]    SUBJECTIVE  Pain Level (0-10 scale): 6/10  Any medication changes, allergies to medications, adverse drug reactions, diagnosis change, or new procedure performed?: [x] No    [] Yes (see summary sheet for update)  Subjective functional status/changes:   [] No changes reported  \"I felt good after I left here last time. I am doing the HEP and it seems to be helping some. \"    OBJECTIVE    Modality rationale: decrease inflammation, decrease pain and increase tissue extensibility to improve the patients ability to decrease LBP   Min Type Additional Details   15 post [x] Estim: []Att   [x]Unatt        []TENS instruct                  [x]IFC  []Premod   []NMES                     []Other:  []w/US   []w/ice   [x]w/heat  Position:supine  Location: low back    []  Traction: [] Cervical       []Lumbar                       [] Prone          []Supine                       []Intermittent   []Continuous Lbs:  [] before manual  [] after manual  []w/heat    []  Ultrasound: []Continuous   [] Pulsed at:                           []1MHz   []3MHz Location:  W/cm2:    [] Paraffin         Location:   []w/heat    []  Ice     []  Heat  []  Ice massage Position:  Location:    []  Laser  []  Other: Position:  Location:      []  Vasopneumatic Device Pressure:       [] lo [] med [] hi   Temperature:      [x] Skin assessment post-treatment:  [x]intact []redness- no adverse reaction    []redness  adverse reaction:     45 min Therapeutic Exercise:  [x] See flow sheet :   Rationale: increase ROM, increase strength and improve coordination to improve the patients ability to increase pelvic stabilization. With   [] TE   [] TA   [] neuro   [] other: Patient Education: [x] Review HEP    [] Progressed/Changed HEP based on:   [] positioning   [] body mechanics   [] transfers   [] heat/ice application    [] other:      Other Objective/Functional Measures: --     Pain Level (0-10 scale) post treatment: 3/10    ASSESSMENT/Changes in Function:   Pt reported increase in HS pain and tightness with S/L clamshells. Pt reported an overall decrease in pain at the end of today's session. Patient will continue to benefit from skilled PT services to modify and progress therapeutic interventions, address functional mobility deficits, address ROM deficits, address strength deficits, analyze and address soft tissue restrictions and analyze and cue movement patterns to attain remaining goals. [x]  See Plan of Care  []  See progress note/recertification  []  See Discharge Summary         Progress towards goals / Updated goals:     Long Term Goals:  To be accomplished in 4 weeks:  1) Pt will be able to Sit greater than 60 minutes without pain  2) Pt will be able to Ambulate greater than 4 blocks without increase of pain  4) Pt will be able to retrieve item form ground without pain  5) Pt will be able to carry >/= 20 lbs without pain     PLAN  [x]  Upgrade activities as tolerated     [x]  Continue plan of care  []  Update interventions per flow sheet       []  Discharge due to:_  []  Other:_      Christie Palacios, IFEANYI 4/18/2017  9:36 AM

## 2017-04-18 NOTE — ED PROVIDER NOTES
HPI Comments: Koko Brady is a 42 y.o. female with PMhx significant for DM who presents ambulatory to the ED with cc of gradually worsening nausea and vomiting. She also c/o associated epigastric abdominal pain with eating. The pt reports that she was \"recently diagnosed with acid reflux a few weeks ago,\" noting that her current symptoms are similar to a flare up. She notes that her symptoms started after she ate something. The pt notes that she has been taking zantac for her symptoms with no relief. She states that she consumed alcohol Friday night (4/14/17). She reports that she was taken off of DM medication ~3 years ago. The pt denies any h/o HTN, and she specifically denies vaginal discharge or difficulty urinating at this time. SHx: +tobacco, +EtOH, -illicit drug use    PCP: Jaskaran Man MD    There are no other complaints, changes or physical findings at this time. The history is provided by the patient. No  was used. Past Medical History:   Diagnosis Date    Anxiety     [facility] affective [facility]     Depression     Diabetes ([facility] [facility]     diet control    Major depression     Other ill-defined conditions     bronchitis    Other ill-defined conditions     High Cholesterol       Past Surgical History:   Procedure Laterality Date    HX GI      Colonoscopy    HX HERNIA REPAIR      as child         Family History:   Problem Relation Age of Onset    Diabetes Mother     Depression Mother     Asthma Brother     Stroke Maternal Grandmother      aneurysm    Cancer Maternal Grandmother      kidney    Hypertension Maternal Grandmother     Cancer Paternal Grandmother      lung    Diabetes Brother     Bipolar Disorder Brother     Schizophrenia Brother     Other Brother      paranoia       Social History     Social History    Marital status: SINGLE     Spouse name: N/A    Number of children: N/A    Years of education: N/A     Occupational History    Not on file. Social History Main Topics    Smoking status: Former Smoker    Smokeless tobacco: Never Used      Comment: cigars    Alcohol use Yes      Comment: occasional    Drug use: No    Sexual activity: Yes     Partners: Male     Birth control/ protection: Injection     Other Topics Concern    Not on file     Social History Narrative         ALLERGIES: Amoxicillin; Flagyl [metronidazole]; and Pcn [penicillins]    Review of Systems   Constitutional: Negative for appetite change, chills, diaphoresis, fatigue and fever. HENT: Negative for sore throat and trouble swallowing. Respiratory: Negative for cough and shortness of breath. Cardiovascular: Negative for chest pain. Gastrointestinal: Positive for abdominal pain (epigastric), nausea and vomiting. Negative for diarrhea. Genitourinary: Negative for difficulty urinating, dysuria, frequency and vaginal discharge. Musculoskeletal: Negative for arthralgias, back pain and myalgias. Skin: Negative for color change and rash. Neurological: Negative for weakness and numbness. Hematological: Does not bruise/bleed easily. All other systems reviewed and are negative. Patient Vitals for the past 12 hrs:   Temp Pulse Resp BP SpO2   04/17/17 2134 98.3 °F (36.8 °C) 91 18 (!) 127/93 100 %            Physical Exam   Constitutional: She is oriented to person, place, and time. She appears well-developed and well-nourished. No distress. HENT:   Head: Normocephalic and atraumatic. Mouth/Throat: Oropharynx is clear and moist. No oropharyngeal exudate or posterior oropharyngeal erythema. Neck: Normal range of motion and full passive range of motion without pain. Neck supple. Cardiovascular: Normal rate, regular rhythm, normal heart sounds, intact distal pulses and normal pulses. Exam reveals no gallop and no friction rub. No murmur heard. Pulmonary/Chest: Effort normal and breath sounds normal. No accessory muscle usage. No respiratory distress.  She has no decreased breath sounds. She has no wheezes. She has no rhonchi. She has no rales. Abdominal: Soft. Bowel sounds are normal. She exhibits no distension. There is tenderness in the epigastric area. There is no rebound, no guarding and no CVA tenderness. Musculoskeletal: Normal range of motion. She exhibits no edema or tenderness. Thoracic back: She exhibits no tenderness and no bony tenderness. Lumbar back: She exhibits no tenderness and no bony tenderness. Lymphadenopathy:     She has no cervical adenopathy. Neurological: She is alert and oriented to person, place, and time. She has normal strength. She is not disoriented. No cranial nerve deficit or sensory deficit. No focal deficits; 5/5 muscle strength in all extremities   Skin: Skin is warm. No lesion and no rash noted. Rash is not nodular. She is not diaphoretic. Nursing note and vitals reviewed. MDM  Number of Diagnoses or Management Options  Diagnosis management comments:   DDx: gastritis, GERD, gastric ulcers, duodenal ulcers, hiatal hernia. Amount and/or Complexity of Data Reviewed  Review and summarize past medical records: yes    Patient Progress  Patient progress: stable    ED Course       Procedures    MEDICATIONS GIVEN:  Medications   pantoprazole (PROTONIX) tablet 40 mg (40 mg Oral Given 4/17/17 2206)   alum-mag hydroxide-simeth (MYLANTA) oral suspension 30 mL (30 mL Oral Given 4/17/17 2207)       IMPRESSION:  1. Abdominal pain, epigastric        PLAN:  1. Follow-up Information     Follow up With Details Comments 5010 Raya Rojas MD   [address]  [phone]          Return to ED if worse       DISCHARGE NOTE:  10:02 PM  The patient has been re-evaluated and is ready for discharge. Reviewed available results with patient. Counseled patient on diagnosis and care plan. Patient has expressed understanding, and all questions have been answered. Patient agrees with plan and agrees to follow up as recommended, or return to the ED if their symptoms worsen. Discharge instructions have been provided and explained to the patient, along with reasons to return to the ED. This note is prepared by Lilian Ricardo, acting as Scribe for Michelle Kwon MD.    Michelle Kwon MD: The scribe's documentation has been prepared under my direction and personally reviewed by me in its entirety. I confirm that the note above accurately reflects all work, treatment, procedures, and medical decision making performed by me.

## 2017-04-18 NOTE — ED NOTES
Patient educated on discharge instructions  . Patient verbalized understanding of eduction. Patient given discharge instructions and excuse note. Patient ambulated out of ED. No acute distress noted. Emergency Department Nursing Plan of Care       The Nursing Plan of Care is developed from the Nursing assessment and Emergency Department Attending provider initial evaluation. The plan of care may be reviewed in the ED Provider note.     The Plan of Care was developed with the following considerations:   Patient / Family readiness to learn indicated by:verbalized understanding  Persons(s) to be included in education: patient  Barriers to Learning/Limitations:No    Signed     Aniyah Mathias RN    4/17/2017   10:53 PM\

## 2017-04-18 NOTE — ED NOTES
Patient presented to the ED today for complaints of vomiting. Respirations are even and unlabored. Skin is dry,warm, and intact. Patient says her zantac is not working. Patient says she believes her acid reflux is bothering her.

## 2017-04-21 ENCOUNTER — OFFICE VISIT (OUTPATIENT)
Dept: INTERNAL MEDICINE CLINIC | Age: 37
End: 2017-04-21

## 2017-04-21 ENCOUNTER — APPOINTMENT (OUTPATIENT)
Dept: PHYSICAL THERAPY | Age: 37
End: 2017-04-21
Payer: MEDICARE

## 2017-04-21 VITALS
TEMPERATURE: 98 F | RESPIRATION RATE: 16 BRPM | HEIGHT: 62 IN | DIASTOLIC BLOOD PRESSURE: 77 MMHG | BODY MASS INDEX: 28.93 KG/M2 | WEIGHT: 157.2 LBS | HEART RATE: 89 BPM | SYSTOLIC BLOOD PRESSURE: 113 MMHG

## 2017-04-21 DIAGNOSIS — N76.0 ACUTE VAGINITIS: Primary | ICD-10-CM

## 2017-04-21 RX ORDER — FLUCONAZOLE 150 MG/1
150 TABLET ORAL DAILY
Qty: 1 TAB | Refills: 0 | Status: SHIPPED | OUTPATIENT
Start: 2017-04-21 | End: 2017-04-22

## 2017-04-21 NOTE — PROGRESS NOTES
Jonnie Alonzo is a 42 y.o. female and presents with Vaginal Itching and Vaginal Discharge (white cottes cheese looking )  . C/o white cottage cheese vaginal discharge x 5 days. +itchy. Has used rx yeast cream. No odor. Review of Systems  Constitutional: negative for fevers, chills, anorexia and weight loss  Eyes:   negative for visual disturbance and irritation  ENT:   negative for tinnitus,sore throat,nasal congestion,ear pains. hoarseness  Respiratory:  negative for cough, hemoptysis, dyspnea,wheezing  CV:   negative for chest pain, palpitations, lower extremity edema  GI:   negative for nausea, vomiting, diarrhea, abdominal pain,melena  Endo:               negative for polyuria,polydipsia,polyphagia,heat intolerance  Genitourinary: negative for frequency, dysuria and hematuria  Integument:  negative for rash and pruritus  Hematologic:  negative for easy bruising and gum/nose bleeding  Musculoskel: negative for myalgias, arthralgias, back pain, muscle weakness, joint pain  Neurological:  negative for headaches, dizziness, vertigo, memory problems and gait   Behavl/Psych: negative for feelings of anxiety, depression, mood changes    Past Medical History:   Diagnosis Date    Anxiety     Bipolar affective ([facility] [address] [facility]     Depression     Diabetes ([facility] [address]     diet control    Major depression     Other ill-defined conditions     bronchitis    Other ill-defined conditions     High Cholesterol     Past Surgical History:   Procedure Laterality Date    HX GI      Colonoscopy    HX HERNIA REPAIR      as child     Social History     Social History    Marital status: SINGLE     Spouse name: N/A    Number of children: N/A    Years of education: N/A     Social History Main Topics    Smoking status: Former Smoker    Smokeless tobacco: Never Used      Comment: cigars    Alcohol use Yes      Comment: occasional    Drug use: No    Sexual activity: Yes     Partners: Male     Birth control/ protection: Injection Other Topics Concern    None     Social History Narrative     Current Outpatient Prescriptions   Medication Sig Dispense Refill    fluconazole (DIFLUCAN) 150 mg tablet Take 1 Tab by mouth daily for 1 day. FDA advises cautious prescribing of oral fluconazole in pregnancy. 1 Tab 0    NAPROXEN PO Take  by mouth.  promethazine (PHENERGAN) 25 mg tablet TAKE 1 TABLET BY MOUTH EVERY 8 HOURS AS NEEDED FOR NAUSEA  0    cyclobenzaprine (FLEXERIL) 10 mg tablet Take 1 Tab by mouth three (3) times daily as needed for Muscle Spasm(s). 15 Tab 0    raNITIdine (ZANTAC) 150 mg tablet Take 1 Tab by mouth two (2) times a day. Indications: GASTROESOPHAGEAL REFLUX 60 Tab 3    LESLI 0.35 mg tab TAKE 1 TABLET BY MOUTH EVERY DAY  2    permethrin (ELIMITE) 5 % topical cream Use from neck down x 1 application 60 g 0    fluticasone (CUTIVASE) 0.005 % ointment Apply  to affected area two (2) times a day.  apply thin layer as directed 15 g 0     Allergies   Allergen Reactions    Amoxicillin Rash and Other (comments)     Vaginal itching    Flagyl [Metronidazole] Nausea and Vomiting    Pcn [Penicillins] Itching     Vaginal itching       Objective:  Visit Vitals    /77 (BP 1 Location: Left arm, BP Patient Position: Sitting)    Pulse 89    Temp 98 °F (36.7 °C)    Resp 16    Ht 5' 2\" (1.575 m)    Wt 157 lb 3.2 oz (71.3 kg)    LMP 03/28/2017 (Exact Date)    BMI 28.75 kg/m2     Physical Exam:   General appearance - alert, well appearing, and in no distress  Mental status - alert, oriented to person, place, and time  Chest - clear to auscultation, no wheezes, rales or rhonchi, symmetric air entry   Heart - normal rate, regular rhythm, normal S1, S2, no murmurs, rubs, clicks or gallops   Abdomen - soft, nontender, nondistended, no masses or organomegaly  Lymph- no adenopathy palpable  Ext-peripheral pulses normal, no pedal edema, no clubbing or cyanosis  Gyn- Nl ext fem genitalia, mod amt of thick white vaginal discharge    Assessment/Plan:    ICD-10-CM ICD-9-CM    1. Acute vaginitis N76.0 616.10 NUSWAB VAGINITIS     Orders Placed This Encounter    NUSWAB VAGINITIS    fluconazole (DIFLUCAN) 150 mg tablet     Sig: Take 1 Tab by mouth daily for 1 day. FDA advises cautious prescribing of oral fluconazole in pregnancy.      Dispense:  1 Tab     Refill:  0     Vaginitis- Nuswab, Diflucan    Follow-up Disposition: Not on File

## 2017-04-21 NOTE — MR AVS SNAPSHOT
Visit Information Date & Time Provider Department Dept. Phone Encounter #  
 4/21/2017  3:15 PM Olga Chin, [address] [id number] Your Appointments 5/1/2017  1:30 PM  
New Patient with Margot Ureña NP [facility] Appt Note: refer by Dr. Dejuan Tyson for Major depression and bipolar; refer by Dr. Dejuan Tyson for Major depression and bipolar [address] [facility] [address] [facility] [address]  
  
   
 [address] [facility] [address] [address] 5/15/2017  2:45 PM  
ROUTINE CARE with Olga Chin MD  
[address] [facility] Appt Note: fu depression; NEED MEDICARE WELLNESS; left voicemail to r/s appointment 4/11/17 ; resched per provider [facility] 4/21  
 [place] [address] [address]  
[phone]  
  
   
 [place] [address] Upcoming Health Maintenance Date Due Pneumococcal 19-64 Medium Risk (1 of 1 - PPSV23) 2/9/1997 FOOT EXAM Q1 5/18/2016 EYE EXAM RETINAL OR DILATED Q1 5/18/2016 MEDICARE YEARLY EXAM 4/6/2017 PAP AKA CERVICAL CYTOLOGY 8/27/2017 HEMOGLOBIN A1C Q6M 9/14/2017 MICROALBUMIN Q1 3/14/2018 LIPID PANEL Q1 3/14/2018 DTaP/Tdap/Td series (2 - Td) 2/2/2024 COLONOSCOPY 12/17/2025 Allergies as of 4/21/2017  Review Complete On: 4/21/2017 By: Olga Chin MD  
  
 Severity Noted Reaction Type Reactions Amoxicillin  12/29/2010    Rash, Other (comments) Vaginal itching Flagyl [Metronidazole]  11/16/2016   Side Effect Nausea and Vomiting Pcn [Penicillins]  09/20/2014   Intolerance Itching Vaginal itching Current Immunizations  Reviewed on 11/15/2011 Name Date Influenza Vaccine Intradermal PF 11/5/2014 PPD 6/20/2012 Tdap 2/2/2014  4:52 AM  
  
 Not reviewed this visit You Were Diagnosed With   
  
 Codes Comments Acute vaginitis    -  Primary ICD-10-CM: N76.0 ICD-9-CM: 616.10 Vitals BP Pulse Temp Resp Height(growth percentile) Weight(growth percentile) 113/77 (BP 1 Location: Left arm, BP Patient Position: Sitting) 89 98 °F (36.7 °C) 16 5' 2\" (1.575 m) 157 lb 3.2 oz (71.3 kg) LMP BMI OB Status Smoking Status 03/28/2017 (Exact Date) 28.75 kg/m2 Having regular periods Former Smoker BMI and BSA Data Body Mass Index Body Surface Area 28.75 kg/m 2 1.77 m 2 Preferred Pharmacy Pharmacy Name Phone [id number]- [place] - [address] [phone] Your Updated Medication List  
  
   
This list is accurate as of: 4/21/17  3:31 PM.  Always use your most recent med list.  
  
  
  
  
 LESLI 0.35 mg Tab Generic drug:  norethindrone TAKE 1 TABLET BY MOUTH EVERY DAY  
  
 cyclobenzaprine 10 mg tablet Commonly known as:  FLEXERIL Take 1 Tab by mouth three (3) times daily as needed for Muscle Spasm(s). fluconazole 150 mg tablet Commonly known as:  DIFLUCAN Take 1 Tab by mouth daily for 1 day. FDA advises cautious prescribing of oral fluconazole in pregnancy. fluticasone 0.005 % ointment Commonly known as:  Ezra Porch Apply  to affected area two (2) times a day. apply thin layer as directed NAPROXEN PO Take  by mouth.  
  
 permethrin 5 % topical cream  
Commonly known as:  Laila Nickels Use from neck down x 1 application  
  
 promethazine 25 mg tablet Commonly known as:  PHENERGAN  
TAKE 1 TABLET BY MOUTH EVERY 8 HOURS AS NEEDED FOR NAUSEA  
  
 raNITIdine 150 mg tablet Commonly known as:  ZANTAC Take 1 Tab by mouth two (2) times a day. Indications: GASTROESOPHAGEAL REFLUX Prescriptions Sent to Pharmacy Refills  
 fluconazole (DIFLUCAN) 150 mg tablet 0 Sig: Take 1 Tab by mouth daily for 1 day. FDA advises cautious prescribing of oral fluconazole in pregnancy. Class: Normal  
 Pharmacy: [facility]/pharmacy [id number] [facility] [address]  #: [phone] Route: Oral  
  
We Performed the Following NUSWAB VAGINITIS [[id number] Custom] To-Do List   
 04/26/2017 12:00 PM  
  Appointment with Zuleika Abraham at [facility] [address] ([phone]) Please remember to arrive at the hospital at least 30 minutes prior to your scheduled appointment time. When you come in for your appointment, please be sure to bring the therapy prescription the doctor gave you, your insurance card, and a list of the medicines you are taking. Also, please remember to wear comfortable, loose- fitting clothes. We look forward to seeing you. 04/28/2017 9:30 AM  
  Appointment with Zuleika Abraham at [facility] [address] ([phone]) Please remember to arrive at the hospital at least 30 minutes prior to your scheduled appointment time. When you come in for your appointment, please be sure to bring the therapy prescription the doctor gave you, your insurance card, and a list of the medicines you are taking. Also, please remember to wear comfortable, loose- fitting clothes. We look forward to seeing you. Introducing \[facility] [place] [facility]\"" & HEALTH SERVICES! Dear Belem Butler: Thank you for requesting a [employer] account. Our records indicate that you already have an active [employer] account. You can access your account anytime at [email or web address] [email or web address] Desktop/[employer] Clear Did you know that you can access your hospital and ER discharge instructions at any time in [employer]? You can also review all of your test results from your hospital stay or ER visit. Additional Information If you have questions, please visit the Frequently Asked Questions section of the [employer] website at [email or web address] [email or web address] Remember, [employer] is NOT to be used for urgent needs. For medical emergencies, dial 911. Now available from your iPhone and Android! Please provide this summary of care documentation to your next provider. Your primary care clinician is listed as Edith Simon. If you have any questions after today's visit, please call [phone].

## 2017-04-21 NOTE — PROGRESS NOTES
Pt here for   Chief Complaint   Patient presents with    Vaginal Itching    Vaginal Discharge     white cottes cheese looking      1. Have you been to the ER, urgent care clinic since your last visit? Hospitalized since your last visit? Yes When: [facility] Monday acid reflux    2. Have you seen or consulted any other health care providers outside of the [address] [place] [address] since your last visit? Include any pap smears or colon screening.  No     Pt denies pain at this time

## 2017-04-24 ENCOUNTER — TELEPHONE (OUTPATIENT)
Dept: INTERNAL MEDICINE CLINIC | Age: 37
End: 2017-04-24

## 2017-04-24 RX ORDER — METRONIDAZOLE 500 MG/1
500 TABLET ORAL 2 TIMES DAILY
Qty: 14 TAB | Refills: 0 | Status: SHIPPED | OUTPATIENT
Start: 2017-04-24 | End: 2017-05-01 | Stop reason: ALTCHOICE

## 2017-04-24 NOTE — TELEPHONE ENCOUNTER
Pt was seen 4/21/17 and states you gve her rx for vaginal itching and discharge. The itching is really bad.  Pt # [id number]

## 2017-04-25 LAB
A VAGINAE DNA VAG QL NAA+PROBE: NORMAL SCORE
BVAB2 DNA VAG QL NAA+PROBE: NORMAL SCORE
C ALBICANS DNA VAG QL NAA+PROBE: NEGATIVE
C GLABRATA DNA VAG QL NAA+PROBE: NEGATIVE
MEGA1 DNA VAG QL NAA+PROBE: NORMAL SCORE
T VAGINALIS RRNA SPEC QL NAA+PROBE: NEGATIVE

## 2017-04-26 ENCOUNTER — APPOINTMENT (OUTPATIENT)
Dept: PHYSICAL THERAPY | Age: 37
End: 2017-04-26
Payer: MEDICARE

## 2017-05-01 ENCOUNTER — OFFICE VISIT (OUTPATIENT)
Dept: BEHAVIORAL/MENTAL HEALTH CLINIC | Age: 37
End: 2017-05-01

## 2017-05-01 VITALS
WEIGHT: 156 LBS | SYSTOLIC BLOOD PRESSURE: 126 MMHG | HEART RATE: 73 BPM | OXYGEN SATURATION: 98 % | BODY MASS INDEX: 28.71 KG/M2 | HEIGHT: 62 IN | DIASTOLIC BLOOD PRESSURE: 86 MMHG

## 2017-05-01 DIAGNOSIS — F32.1 MODERATE SINGLE CURRENT EPISODE OF MAJOR DEPRESSIVE DISORDER (HCC): Primary | ICD-10-CM

## 2017-05-01 DIAGNOSIS — F41.9 ANXIETY: ICD-10-CM

## 2017-05-01 RX ORDER — MIRTAZAPINE 7.5 MG/1
7.5 TABLET, FILM COATED ORAL
Qty: 30 TAB | Refills: 0 | Status: SHIPPED | OUTPATIENT
Start: 2017-05-01 | End: 2017-05-24 | Stop reason: SDUPTHER

## 2017-05-01 RX ORDER — BUSPIRONE HYDROCHLORIDE 7.5 MG/1
7.5 TABLET ORAL 2 TIMES DAILY
Qty: 60 TAB | Refills: 0 | Status: SHIPPED | OUTPATIENT
Start: 2017-05-01 | End: 2017-05-24 | Stop reason: SDUPTHER
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
## 2017-05-01 NOTE — PATIENT INSTRUCTIONS
Sleep Tips    What to avoid    · Do not have drinks with caffeine, such as coffee or black tea, for 8 hours before bed. · Do not smoke or use other types of tobacco near bedtime. Nicotine is a stimulant and can keep you awake. · Avoid drinking alcohol late in the evening, because it can cause you to wake in the middle of the night. · Do not eat a big meal close to bedtime. If you are hungry, eat a light snack. · Do not drink a lot of water close to bedtime, because the need to urinate may wake you up during the night. · Do not read or watch TV in bed. Use the bed only for sleeping and sexual activity. What to try    · Go to bed at the same time every night, and wake up at the same time every morning. Do not take naps during the day. · Keep your bedroom quiet, dark, and cool. · Get regular exercise, but not within 3 to 4 hours of your bedtime. · Sleep on a comfortable pillow and mattress. · If watching the clock makes you anxious, turn it facing away from you so you cannot see the time. · If you worry when you lie down, start a worry book. Well before bedtime, write down your worries, and then set the book and your concerns aside. · Try meditation or other relaxation techniques before you go to bed. · If you cannot fall asleep, get up and go to another room until you feel sleepy. Do something relaxing. Repeat your bedtime routine before you go to bed again. Make your house quiet and calm about an hour before bedtime. Turn down the lights, turn off the TV, log off the computer, and turn down the volume on music. This can help you relax after a busy day. For reliable dietary information, go to www. EATRIGHT.org.      Recovering From Depression: Care Instructions  Your Care Instructions  Taking good care of yourself is important as you recover from depression. In time, your symptoms will fade as your treatment takes hold. Do not give up. Instead, focus your energy on getting better. Your mood will improve. It just takes some time. Focus on things that can help you feel better, such as being with friends and family, eating well, and getting enough rest. But take things slowly. Do not do too much too soon. You will begin to feel better gradually. Follow-up care is a key part of your treatment and safety. Be sure to make and go to all appointments, and call your doctor if you are having problems. It's also a good idea to know your test results and keep a list of the medicines you take. How can you care for yourself at home? Be realistic  If you have a large task to do, break it up into smaller steps you can handle, and just do what you can. You may want to put off important decisions until your depression has lifted. If you have plans that will have a major impact on your life, such as marriage, divorce, or a job change, try to wait a bit. Talk it over with friends and loved ones who can help you look at the overall picture first.  Reaching out to people for help is important. Do not isolate yourself. Let your family and friends help you. Find someone you can trust and confide in, and talk to that person. Be patient, and be kind to yourself. Remember that depression is not your fault and is not something you can overcome with willpower alone. Treatment is necessary for depression, just like for any other illness. Feeling better takes time, and your mood will improve little by little. Stay active  Stay busy and get outside. Take a walk, or try some other light exercise. Talk with your doctor about an exercise program. Exercise can help with mild depression. Go to a movie or concert. Take part in a Denominational activity or other social gathering. Go to a ball game. Ask a friend to have dinner with you. Take care of yourself  Eat a balanced diet with plenty of fresh fruits and vegetables, whole grains, and lean protein. If you have lost your appetite, eat small snacks rather than large meals.   Avoid drinking alcohol or using illegal drugs. Do not take medicines that have not been prescribed for you. They may interfere with medicines you may be taking for depression, or they may make your depression worse. Take your medicines exactly as they are prescribed. You may start to feel better within 1 to 3 weeks of taking antidepressant medicine. But it can take as many as 6 to 8 weeks to see more improvement. If you have questions or concerns about your medicines, or if you do not notice any improvement by 3 weeks, talk to your doctor. If you have any side effects from your medicine, tell your doctor. Antidepressants can make you feel tired, dizzy, or nervous. Some people have dry mouth, constipation, headaches, sexual problems, or diarrhea. Many of these side effects are mild and will go away on their own after you have been taking the medicine for a few weeks. Some may last longer. Talk to your doctor if side effects are bothering you too much. You might be able to try a different medicine. Get enough sleep. If you have problems sleeping:  Go to bed at the same time every night, and get up at the same time every morning. Keep your bedroom dark and quiet. Do not exercise after 5:00 p.m. Avoid drinks with caffeine after 5:00 p.m. Avoid sleeping pills unless they are prescribed by the doctor treating your depression. Sleeping pills may make you groggy during the day, and they may interact with other medicine you are taking. If you have any other illnesses, such as diabetes, heart disease, or high blood pressure, make sure to continue with your treatment. Tell your doctor about all of the medicines you take, including those with or without a prescription. Keep the numbers for these national suicide hotlines: 9-481-923-TALK (4-575.528.8595) and 5-210-XESFDHW (5-294.409.5064). If you or someone you know talks about suicide or feeling hopeless, get help right away. When should you call for help?   Call 911 anytime you think you may need emergency care. For example, call if:  You feel like hurting yourself or someone else. Someone you know has depression and is about to attempt or is attempting suicide. Call your doctor now or seek immediate medical care if:  You hear voices. Someone you know has depression and:  Starts to give away his or her possessions. Uses illegal drugs or drinks alcohol heavily. Talks or writes about death, including writing suicide notes or talking about guns, knives, or pills. Starts to spend a lot of time alone. Acts very aggressively or suddenly appears calm. Watch closely for changes in your health, and be sure to contact your doctor if:  You do not get better as expected. Where can you learn more? Go to http://star-arvin.info/. Enter V745 in the search box to learn more about \"Recovering From Depression: Care Instructions. \"  Current as of: July 26, 2016  Content Version: 11.2  © 9067-3298 [employer], [email or web address]. Care instructions adapted under license by [employer] (which disclaims liability or warranty for this information). If you have questions about a medical condition or this instruction, always ask your healthcare professional. [place] [address] any warranty or liability for your use of this information.   ·

## 2017-05-01 NOTE — MR AVS SNAPSHOT
Visit Information Date & Time Provider Department Dept. Phone Encounter #  
 5/1/2017  1:30 PM Barbara Bruno, [address] [facility] [phone] [id number] Follow-up Instructions Return in about 4 weeks (around 5/29/2017) for med check and follow up. Your Appointments 5/15/2017  2:45 PM  
ROUTINE CARE with Nesha Islas MD  
[address] [facility] [address] [place] [address]) Appt Note: fu depression; NEED MEDICARE WELLNESS; left voicemail to r/s appointment 4/11/17 ; resched per provider [facility] 4/21  
 [place] [address] [facility] [address] [address] [phone]  
  
   
 [place] [address] [facility] [address] Upcoming Health Maintenance Date Due Pneumococcal 19-64 Medium Risk (1 of 1 - PPSV23) 2/9/1997 FOOT EXAM Q1 5/18/2016 EYE EXAM RETINAL OR DILATED Q1 5/18/2016 MEDICARE YEARLY EXAM 4/6/2017 PAP AKA CERVICAL CYTOLOGY 8/27/2017 INFLUENZA AGE 9 TO ADULT 8/1/2017 HEMOGLOBIN A1C Q6M 9/14/2017 MICROALBUMIN Q1 3/14/2018 LIPID PANEL Q1 3/14/2018 DTaP/Tdap/Td series (2 - Td) 2/2/2024 COLONOSCOPY 12/17/2025 Allergies as of 5/1/2017  Review Complete On: 5/1/2017 By: Barbara Bruno NP Severity Noted Reaction Type Reactions Amoxicillin  12/29/2010    Rash, Other (comments) Vaginal itching Flagyl [Metronidazole]  11/16/2016   Side Effect Nausea and Vomiting Pcn [Penicillins]  09/20/2014   Intolerance Itching Vaginal itching Current Immunizations  Reviewed on 11/15/2011 Name Date Influenza Vaccine Intradermal PF 11/5/2014 PPD 6/20/2012 Tdap 2/2/2014  4:52 AM  
  
 Not reviewed this visit You Were Diagnosed With   
  
 Codes Comments Moderate single current episode of major depressive disorder ([facility] [address]    -  Primary ICD-10-CM: F32.1 ICD-9-CM: 296.22 Anxiety     ICD-10-CM: F41.9 ICD-9-CM: 300.00 Vitals BP Pulse Height(growth percentile) Weight(growth percentile) LMP SpO2  
 126/86 (BP 1 Location: Left arm, BP Patient Position: Sitting) 73 5' 2\" (1.575 m) 156 lb (70.8 kg) 04/16/2017 98% BMI OB Status Smoking Status 28.53 kg/m2 Having regular periods Former Smoker Vitals History BMI and BSA Data Body Mass Index Body Surface Area 28.53 kg/m 2 1.76 m 2 Preferred Pharmacy Pharmacy Name Phone [id number]- [place], [place] - [address] [phone] Your Updated Medication List  
  
   
This list is accurate as of: 5/1/17  2:29 PM.  Always use your most recent med list.  
  
  
  
  
 busPIRone 7.5 mg tablet Commonly known as:  BUSPAR Take 1 Tab by mouth two (2) times a day. LESLI 0.35 mg Tab Generic drug:  norethindrone TAKE 1 TABLET BY MOUTH EVERY DAY  
  
 cyclobenzaprine 10 mg tablet Commonly known as:  FLEXERIL Take 1 Tab by mouth three (3) times daily as needed for Muscle Spasm(s). mirtazapine 7.5 mg tablet Commonly known as:  Lafrances Escobar Take 1 Tab by mouth nightly. NAPROXEN PO Take  by mouth.  
  
 promethazine 25 mg tablet Commonly known as:  PHENERGAN  
TAKE 1 TABLET BY MOUTH EVERY 8 HOURS AS NEEDED FOR NAUSEA  
  
 raNITIdine 150 mg tablet Commonly known as:  ZANTAC Take 1 Tab by mouth two (2) times a day. Indications: GASTROESOPHAGEAL REFLUX Prescriptions Sent to Pharmacy Refills  
 busPIRone (BUSPAR) 7.5 mg tablet 0 Sig: Take 1 Tab by mouth two (2) times a day. Class: Normal  
 Pharmacy: [facility]  [employer] [address] Ph #: [phone] Route: Oral  
 mirtazapine (REMERON) 7.5 mg tablet 0 Sig: Take 1 Tab by mouth nightly. Class: Normal  
 Pharmacy: [facility] [id number] [email or web address]  [facility] [address] Ph #: [phone]  Route: Oral  
  
 Follow-up Instructions Return in about 4 weeks (around 5/29/2017) for med check and follow up. Patient Instructions Sleep Tips What to avoid   
· Do not have drinks with caffeine, such as coffee or black tea, for 8 hours before bed. · Do not smoke or use other types of tobacco near bedtime. Nicotine is a stimulant and can keep you awake. · Avoid drinking alcohol late in the evening, because it can cause you to wake in the middle of the night. · Do not eat a big meal close to bedtime. If you are hungry, eat a light snack. · Do not drink a lot of water close to bedtime, because the need to urinate may wake you up during the night. · Do not read or watch TV in bed. Use the bed only for sleeping and sexual activity. What to try   
· Go to bed at the same time every night, and wake up at the same time every morning. Do not take naps during the day. · Keep your bedroom quiet, dark, and cool. · Get regular exercise, but not within 3 to 4 hours of your bedtime. · Sleep on a comfortable pillow and mattress. · If watching the clock makes you anxious, turn it facing away from you so you cannot see the time. · If you worry when you lie down, start a worry book. Well before bedtime, write down your worries, and then set the book and your concerns aside. · Try meditation or other relaxation techniques before you go to bed. · If you cannot fall asleep, get up and go to another room until you feel sleepy. Do something relaxing. Repeat your bedtime routine before you go to bed again. Make your house quiet and calm about an hour before bedtime. Turn down the lights, turn off the TV, log off the computer, and turn down the volume on music. This can help you relax after a busy day. For reliable dietary information, go to www. EATRIGHT.org.  
  
Recovering From Depression: Care Instructions Your Care Instructions Taking good care of yourself is important as you recover from depression. In time, your symptoms will fade as your treatment takes hold. Do not give up. Instead, focus your energy on getting better. Your mood will improve. It just takes some time. Focus on things that can help you feel better, such as being with friends and family, eating well, and getting enough rest. But take things slowly. Do not do too much too soon. You will begin to feel better gradually. Follow-up care is a key part of your treatment and safety. Be sure to make and go to all appointments, and call your doctor if you are having problems. It's also a good idea to know your test results and keep a list of the medicines you take. How can you care for yourself at home? Be realistic If you have a large task to do, break it up into smaller steps you can handle, and just do what you can. You may want to put off important decisions until your depression has lifted. If you have plans that will have a major impact on your life, such as marriage, divorce, or a job change, try to wait a bit. Talk it over with friends and loved ones who can help you look at the overall picture first. 
Reaching out to people for help is important. Do not isolate yourself. Let your family and friends help you. Find someone you can trust and confide in, and talk to that person. Be patient, and be kind to yourself. Remember that depression is not your fault and is not something you can overcome with willpower alone. Treatment is necessary for depression, just like for any other illness. Feeling better takes time, and your mood will improve little by little. Stay active Stay busy and get outside. Take a walk, or try some other light exercise. Talk with your doctor about an exercise program. Exercise can help with mild depression. Go to a movie or concert. Take part in a Islam activity or other social gathering. Go to a ball game. Ask a friend to have dinner with you. Take care of yourself Eat a balanced diet with plenty of fresh fruits and vegetables, whole grains, and lean protein. If you have lost your appetite, eat small snacks rather than large meals. Avoid drinking alcohol or using illegal drugs. Do not take medicines that have not been prescribed for you. They may interfere with medicines you may be taking for depression, or they may make your depression worse. Take your medicines exactly as they are prescribed. You may start to feel better within 1 to 3 weeks of taking antidepressant medicine. But it can take as many as 6 to 8 weeks to see more improvement. If you have questions or concerns about your medicines, or if you do not notice any improvement by 3 weeks, talk to your doctor. If you have any side effects from your medicine, tell your doctor. Antidepressants can make you feel tired, dizzy, or nervous. Some people have dry mouth, constipation, headaches, sexual problems, or diarrhea. Many of these side effects are mild and will go away on their own after you have been taking the medicine for a few weeks. Some may last longer. Talk to your doctor if side effects are bothering you too much. You might be able to try a different medicine. Get enough sleep. If you have problems sleeping: Go to bed at the same time every night, and get up at the same time every morning. Keep your bedroom dark and quiet. Do not exercise after 5:00 p.m. Avoid drinks with caffeine after 5:00 p.m. Avoid sleeping pills unless they are prescribed by the doctor treating your depression. Sleeping pills may make you groggy during the day, and they may interact with other medicine you are taking. If you have any other illnesses, such as diabetes, heart disease, or high blood pressure, make sure to continue with your treatment. Tell your doctor about all of the medicines you take, including those with or without a prescription.  
Keep the numbers for these national suicide hotlines: 0-269-635-TALK (7-584.175.6316) and 7-444-QNCMCQY (3-760-216-231-228-1803). If you or someone you know talks about suicide or feeling hopeless, get help right away. When should you call for help? Call 911 anytime you think you may need emergency care. For example, call if: You feel like hurting yourself or someone else. Someone you know has depression and is about to attempt or is attempting suicide. Call your doctor now or seek immediate medical care if: 
You hear voices. Someone you know has depression and: 
Starts to give away his or her possessions. Uses illegal drugs or drinks alcohol heavily. Talks or writes about death, including writing suicide notes or talking about guns, knives, or pills. Starts to spend a lot of time alone. Acts very aggressively or suddenly appears calm. Watch closely for changes in your health, and be sure to contact your doctor if: You do not get better as expected. Where can you learn more? Go to http://star-arvin.info/. Enter T122 in the search box to learn more about \"Recovering From Depression: Care Instructions. \" Current as of: July 26, 2016 Content Version: 11.2 © 2263-7850 [employer], [employer]. Care instructions adapted under license by [employer] (which disclaims liability or warranty for this information). If you have questions about a medical condition or this instruction, always ask your healthcare professional. [place] [address] any warranty or liability for your use of this information. · Introducing [facility]! Dear Liliana Gordon: Thank you for requesting a [employer] account. Our records indicate that you already have an active [employer] account. You can access your account anytime at [email or web address] Friendly Score/Smart Museum Did you know that you can access your hospital and ER discharge instructions at any time in [employer]? You can also review all of your test results from your hospital stay or ER visit. Additional Information If you have questions, please visit the Frequently Asked Questions section of the [employer] website at [email or web address] Remember, Belly is NOT to be used for urgent needs. For medical emergencies, dial 911. Now available from your iPhone and Android! Please provide this summary of care documentation to your next provider. Your primary care clinician is listed as [address]. If you have any questions after today's visit, please call [phone].

## 2017-05-15 ENCOUNTER — HOSPITAL ENCOUNTER (EMERGENCY)
Age: 37
Discharge: HOME | End: 2017-05-16
Attending: EMERGENCY MEDICINE
Payer: MEDICARE

## 2017-05-15 VITALS
RESPIRATION RATE: 17 BRPM | BODY MASS INDEX: 28.36 KG/M2 | SYSTOLIC BLOOD PRESSURE: 127 MMHG | HEART RATE: 92 BPM | WEIGHT: 154.13 LBS | HEIGHT: 62 IN | OXYGEN SATURATION: 100 % | TEMPERATURE: 98.3 F | DIASTOLIC BLOOD PRESSURE: 96 MMHG

## 2017-05-15 DIAGNOSIS — K59.00 CONSTIPATION, UNSPECIFIED CONSTIPATION TYPE: Primary | ICD-10-CM

## 2017-05-15 PROCEDURE — 99282 EMERGENCY DEPT VISIT SF MDM: CPT

## 2017-05-15 NOTE — LETTER
[facility] EMERGENCY DEPT 
[address] [facility] [address] [address] [phone] Work/School Note Date: 5/15/2017 To Whom It May concern: 
 
Andres Cortes was seen and treated today in the emergency room by the following provider(s): 
Attending Provider: Sabina Lo MD. Andres Cortes may return to work on 5/16/2017.  
 
Sincerely, 
 
 
 
 
Sabina Lo MD

## 2017-05-16 RX ORDER — DOCUSATE SODIUM 100 MG/1
100 CAPSULE, LIQUID FILLED ORAL 2 TIMES DAILY
Qty: 30 CAP | Refills: 2 | Status: SHIPPED | OUTPATIENT
Start: 2017-05-16 | End: 2017-08-14

## 2017-05-16 NOTE — ED PROVIDER NOTES
HPI Comments: Vikas Hoffman is a 42 y.o. female with hx DM, constipation, depression, who presents ambulatory to the ED c/o B/L lower abdominal pain x 1 day. She notes hx of similar pain when she had a vaginal \"bacteria infection\". She reports a 5 month hx of intermittent constipation, primarily while at work, and states her last bowel movement was one hour ago. Her LMP was 4/23/2017. She admits to light pink vaginal discharge upon wiping in the bathroom. She specifically denies urinary frequency, nausea, vomiting, diarrhea. PCP: Rhiannon Arroyo MD  Soc Hx: +tobacco, +EtOH, +marijuana    There are no other complaints, changes or physical findings at this time. The history is provided by the patient. Past Medical History:   Diagnosis Date    Anxiety     Bipolar affective ([address]     Depression     Diabetes ([facility] [address]     diet control    Major depression     Other ill-defined conditions     bronchitis    Other ill-defined conditions     High Cholesterol       Past Surgical History:   Procedure Laterality Date    HX GI      Colonoscopy    HX HERNIA REPAIR      as child         Family History:   Problem Relation Age of Onset    Diabetes Mother     Depression Mother     Asthma Brother     Stroke Maternal Grandmother      aneurysm    Cancer Maternal Grandmother      kidney    Hypertension Maternal Grandmother     Cancer Paternal Grandmother      lung    Diabetes Brother     Bipolar Disorder Brother     Schizophrenia Brother     Other Brother      paranoia       Social History     Social History    Marital status: SINGLE     Spouse name: N/A    Number of children: N/A    Years of education: N/A     Occupational History    Not on file.      Social History Main Topics    Smoking status: Former Smoker    Smokeless tobacco: Never Used      Comment: cigars    Alcohol use Yes      Comment: occasional    Drug use: No    Sexual activity: Yes     Partners: Male     Birth control/ protection: Injection     Other Topics Concern    Not on file     Social History Narrative         ALLERGIES: Amoxicillin; Flagyl [metronidazole]; and Pcn [penicillins]    Review of Systems    Gen: Feeling well, No fevers, chills, diaphoresis  HEENT: No sore throat or difficulty swallowing  Cardiac: No chest pain or MEZA  Pulm: No SOB or coughing  GI: +Abdominal pain, no nausea/vomiting/diarrhea  : +Vaginal discharge. No urinary frequency or dysuria  Back: No back pain  Musc: No myalgia or arthralgia  Neuro: No generalized weakness or focal weakness   Heme: No easy bruising or bleeding    Vitals:    05/15/17 2329   BP: (!) 127/96   Pulse: 92   Resp: 17   Temp: 98.3 °F (36.8 °C)   SpO2: 100%   Weight: 69.9 kg (154 lb 2 oz)   Height: 5' 2\" (1.575 m)            Physical Exam     Gen: Well in NAD, no diaphoresis  HEENT: Posterior oropharynx normal, no exudate  Neck: Supple, no LAD  Heart: RRR, no murmurs/gallops/rubs  Lungs: CTA, no wheezes/rhonchi/rales  Abdomen: Soft, NT/ND, BS normal  Back: No bony tenderness or point tenderness  : No CVAT  Ext: Warm, no edema  Skin: No rash, lesions, or notable nodules  Neuro: No focal deficits, strength 5/5 in all extremities    MDM  Number of Diagnoses or Management Options  Constipation, unspecified constipation type:   Diagnosis management comments: Constipation    ED Course       Procedures    IMPRESSION:  1. Constipation, unspecified constipation type        PLAN:  1. Discharge Medication List as of 5/16/2017 12:07 AM      START taking these medications    Details   docusate sodium (DULCOLAX STOOL SOFTENER, DSS,) 100 mg capsule Take 1 Cap by mouth two (2) times a day for 90 days. , Print, Disp-30 Cap, R-2         CONTINUE these medications which have NOT CHANGED    Details   busPIRone (BUSPAR) 7.5 mg tablet Take 1 Tab by mouth two (2) times a day., Normal, Disp-60 Tab, R-0      mirtazapine (REMERON) 7.5 mg tablet Take 1 Tab by mouth nightly., Normal, Disp-30 Tab, R-0      LESLI 0.35 mg tab TAKE 1 TABLET BY MOUTH EVERY DAY, Historical Med, R-2, MICHELLE           2. Follow-up Information     Follow up With Details Comments [id number] Raya Rojas MD   [facility] [place]  [address]  [address] [place] [address]  [phone]          Return to ED if worse     DISCHARGE NOTE  12:10 AM  The patient has been re-evaluated and is ready for discharge. Reviewed available results, diagnosis, and discharge instructions with patient. Pt has conveyed understanding and agreement with the diagnosis and plan. Pt agrees to F/U as recommended, or return to the ED if their sxs worsen. Written by Khari Ferrer, ED Scribe, as dictated by Arabella Lezama MD.    This note is prepared by Khari Ferrer acting as Scribe for Arabella Lezama MD.    Arabella Lezama MD: The scribe's documentation has been prepared under my direction and personally reviewed by me in its entirety. I confirm that the note above accurately reflects all work, treatment, procedures, and medical decision making performed by me.

## 2017-05-16 NOTE — DISCHARGE INSTRUCTIONS
Constipation: Care Instructions  Your Care Instructions  Constipation means that you have a hard time passing stools (bowel movements). People pass stools from 3 times a day to once every 3 days. What is normal for you may be different. Constipation may occur with pain in the rectum and cramping. The pain may get worse when you try to pass stools. Sometimes there are small amounts of bright red blood on toilet paper or the surface of stools. This is because of enlarged veins near the rectum (hemorrhoids). A few changes in your diet and lifestyle may help you avoid ongoing constipation. Your doctor may also prescribe medicine to help loosen your stool. Some medicines can cause constipation. These include pain medicines and antidepressants. Tell your doctor about all the medicines you take. Your doctor may want to make a medicine change to ease your symptoms. Follow-up care is a key part of your treatment and safety. Be sure to make and go to all appointments, and call your doctor if you are having problems. It's also a good idea to know your test results and keep a list of the medicines you take. How can you care for yourself at home? · Drink plenty of fluids, enough so that your urine is light yellow or clear like water. If you have kidney, heart, or liver disease and have to limit fluids, talk with your doctor before you increase the amount of fluids you drink. · Include high-fiber foods in your diet each day. These include fruits, vegetables, beans, and whole grains. · Get at least 30 minutes of exercise on most days of the week. Walking is a good choice. You also may want to do other activities, such as running, swimming, cycling, or playing tennis or team sports. · Take a fiber supplement, such as Citrucel or Metamucil, every day. Read and follow all instructions on the label. · Schedule time each day for a bowel movement. A daily routine may help.  Take your time having your bowel movement. · Support your feet with a small step stool when you sit on the toilet. This helps flex your hips and places your pelvis in a squatting position. · Your doctor may recommend an over-the-counter laxative to relieve your constipation. Examples are Milk of Magnesia and MiraLax. Read and follow all instructions on the label. Do not use laxatives on a long-term basis. When should you call for help? Call your doctor now or seek immediate medical care if:  · You have new or worse belly pain. · You have new or worse nausea or vomiting. · You have blood in your stools. Watch closely for changes in your health, and be sure to contact your doctor if:  · Your constipation is getting worse. · You do not get better as expected. Where can you learn more? Go to http://star-arvin.info/. Enter 21 [phone] in the search box to learn more about \"Constipation: Care Instructions. \"  Current as of: May 27, 2016  Content Version: 11.2  © 2994-6413 [employer]. Care instructions adapted under license by [employer] (which disclaims liability or warranty for this information). If you have questions about a medical condition or this instruction, always ask your healthcare professional. [place] [address] any warranty or liability for your use of this information.

## 2017-05-16 NOTE — ED NOTES
Discharge instructions were given to the patient by Beto oSng RN. The patient left the Emergency Department ambulatory, alert and oriented and in no acute distress with 1 prescription. The patient was encouraged to call or return to the ED for worsening issues or problems and was encouraged to schedule a follow up appointment for continuing care. The patient verbalized understanding of discharge instructions and prescriptions, all questions were answered. The patient has no further concerns at this time.

## 2017-05-24 ENCOUNTER — OFFICE VISIT (OUTPATIENT)
Dept: INTERNAL MEDICINE CLINIC | Age: 37
End: 2017-05-24

## 2017-05-24 VITALS
DIASTOLIC BLOOD PRESSURE: 84 MMHG | HEIGHT: 62 IN | HEART RATE: 85 BPM | TEMPERATURE: 98.9 F | RESPIRATION RATE: 18 BRPM | BODY MASS INDEX: 27.81 KG/M2 | SYSTOLIC BLOOD PRESSURE: 120 MMHG | OXYGEN SATURATION: 100 % | WEIGHT: 151.1 LBS

## 2017-05-24 DIAGNOSIS — F32.1 MODERATE SINGLE CURRENT EPISODE OF MAJOR DEPRESSIVE DISORDER (HCC): ICD-10-CM

## 2017-05-24 DIAGNOSIS — F41.9 ANXIETY: ICD-10-CM

## 2017-05-24 DIAGNOSIS — K21.9 GERD WITHOUT ESOPHAGITIS: Primary | ICD-10-CM

## 2017-05-24 RX ORDER — MIRTAZAPINE 7.5 MG/1
7.5 TABLET, FILM COATED ORAL
Qty: 30 TAB | Refills: 0 | Status: SHIPPED | OUTPATIENT
Start: 2017-05-24 | End: 2017-05-30 | Stop reason: SDUPTHER

## 2017-05-24 RX ORDER — RANITIDINE 150 MG/1
150 TABLET, FILM COATED ORAL 2 TIMES DAILY
Qty: 60 TAB | Refills: 11 | Status: SHIPPED | OUTPATIENT
Start: 2017-05-24 | End: 2018-05-30

## 2017-05-24 RX ORDER — BUSPIRONE HYDROCHLORIDE 7.5 MG/1
7.5 TABLET ORAL 2 TIMES DAILY
Qty: 60 TAB | Refills: 0 | Status: SHIPPED | OUTPATIENT
Start: 2017-05-24 | End: 2017-05-30 | Stop reason: SDUPTHER

## 2017-05-24 NOTE — PROGRESS NOTES
1. Have you been to the ER, urgent care clinic since your last visit? Hospitalized since your last visit? Yes When: 5/15/17 [facility] abdominal pain. 2. Have you seen or consulted any other health care providers outside of the [facility] since your last visit? Include any pap smears or colon screening. No.  Have not had anything for pain.

## 2017-05-24 NOTE — PROGRESS NOTES
Rayna Gomez is a 42 y.o. female and presents with Abdominal Pain and Follow-up (mental health )  . Pt c/o sharp burning in pain in her epigastric area that started last night and has decreased since then. No N/V/diarrhea. +Chronic constipation. Allev with burping. No OTC meds. +h/o GERD but not on meds currently. No increased stress. Next psych appt is on 5/30. Review of Systems  Constitutional: negative for fevers, chills, anorexia and weight loss  Eyes:   negative for visual disturbance and irritation  ENT:   negative for tinnitus,sore throat,nasal congestion,ear pains. hoarseness  Respiratory:  negative for cough, hemoptysis, dyspnea,wheezing  CV:   negative for chest pain, palpitations, lower extremity edema  GI:   negative for nausea, vomiting, diarrhea, melena  Endo:               negative for polyuria,polydipsia,polyphagia,heat intolerance  Genitourinary: negative for frequency, dysuria and hematuria  Integument:  negative for rash and pruritus  Hematologic:  negative for easy bruising and gum/nose bleeding  Musculoskel: negative for myalgias, arthralgias, back pain, muscle weakness, joint pain  Neurological:  negative for headaches, dizziness, vertigo, memory problems and gait   Behavl/Psych: negative for feelings of anxiety, depression, mood changes    Past Medical History:   Diagnosis Date    Anxiety     Bipolar affective ([facility] [address] [facility]     Depression     Diabetes ([facility] [address] [facility]     diet control    Hyperlipidemia     High Cholesterol    Hyperlipidemia     Major depression      Past Surgical History:   Procedure Laterality Date    HX GI      Colonoscopy    HX HERNIA REPAIR      as child     Social History     Social History    Marital status: SINGLE     Spouse name: N/A    Number of children: N/A    Years of education: N/A     Social History Main Topics    Smoking status: Former Smoker    Smokeless tobacco: Never Used      Comment: cigars    Alcohol use Yes      Comment: occasional    Drug use: No    Sexual activity: Yes     Partners: Male     Birth control/ protection: Injection     Other Topics Concern    None     Social History Narrative     Current Outpatient Prescriptions   Medication Sig Dispense Refill    busPIRone (BUSPAR) 7.5 mg tablet Take 1 Tab by mouth two (2) times a day. 60 Tab 0    mirtazapine (REMERON) 7.5 mg tablet Take 1 Tab by mouth nightly. 30 Tab 0    raNITIdine (ZANTAC) 150 mg tablet Take 1 Tab by mouth two (2) times a day. Indications: HEARTBURN 60 Tab 11    docusate sodium (DULCOLAX STOOL SOFTENER, DSS,) 100 mg capsule Take 1 Cap by mouth two (2) times a day for 90 days. 30 Cap 2    LESLI 0.35 mg tab TAKE 1 TABLET BY MOUTH EVERY DAY  2     Allergies   Allergen Reactions    Amoxicillin Rash and Other (comments)     Vaginal itching    Flagyl [Metronidazole] Nausea and Vomiting    Pcn [Penicillins] Itching     Vaginal itching       Objective:  Visit Vitals    /84 (BP 1 Location: Left arm, BP Patient Position: Sitting)    Pulse 85    Temp 98.9 °F (37.2 °C) (Oral)    Resp 18    Ht 5' 2\" (1.575 m)    Wt 151 lb 1.6 oz (68.5 kg)    LMP 04/16/2017    SpO2 100%    BMI 27.64 kg/m2     Physical Exam:   General appearance - alert, well appearing, and in no distress  Mental status - alert, oriented to person, place, and time  Chest - clear to auscultation, no wheezes, rales or rhonchi, symmetric air entry   Heart - normal rate, regular rhythm, normal S1, S2, no murmurs, rubs, clicks or gallops   Abdomen - soft,mild TTP in epigastric area, nondistended, no masses or organomegaly  Lymph- no adenopathy palpable  Ext-peripheral pulses normal, no pedal edema, no clubbing or cyanosis  Skin-Warm and dry. no hyperpigmentation, vitiligo, or suspicious lesions  Neuro -alert, oriented, normal speech, no focal findings or movement disorder noted    Assessment/Plan:    ICD-10-CM ICD-9-CM    1. GERD without esophagitis K21.9 530.81    2.  Moderate single current episode of major depressive disorder (HCC) F32.1 296.22 busPIRone (BUSPAR) 7.5 mg tablet      mirtazapine (REMERON) 7.5 mg tablet   3. Anxiety F41.9 300.00 busPIRone (BUSPAR) 7.5 mg tablet     Orders Placed This Encounter    busPIRone (BUSPAR) 7.5 mg tablet     Sig: Take 1 Tab by mouth two (2) times a day. Dispense:  60 Tab     Refill:  0    mirtazapine (REMERON) 7.5 mg tablet     Sig: Take 1 Tab by mouth nightly. Dispense:  30 Tab     Refill:  0    raNITIdine (ZANTAC) 150 mg tablet     Sig: Take 1 Tab by mouth two (2) times a day. Indications: HEARTBURN     Dispense:  60 Tab     Refill:  11     GERD w/o esophagitis- Zantac 150mg bid  Bipolar affective disorder-refilled psych meds x 1 month. Pt is to f/u w/ psych      Follow-up Disposition:  Return if symptoms worsen or fail to improve.

## 2017-05-24 NOTE — MR AVS SNAPSHOT
Visit Information Date & Time Provider Department Dept. Phone Encounter #  
 5/24/2017 11:00 AM Lindsey Hadley, [address] [id number] Follow-up Instructions Return if symptoms worsen or fail to improve. Your Appointments 5/30/2017  1:00 PM  
ESTABLISHED PATIENT with Yoselin Anders NP [facility] ([facility]) Appt Note: 4 week follow-up [address] [facility] [address]  
  
   
 [address] [facility] [address] [id number] Upcoming Health Maintenance Date Due Pneumococcal 19-64 Medium Risk (1 of 1 - PPSV23) 2/9/1997 FOOT EXAM Q1 5/18/2016 EYE EXAM RETINAL OR DILATED Q1 5/18/2016 MEDICARE YEARLY EXAM 4/6/2017 PAP AKA CERVICAL CYTOLOGY 8/27/2017 INFLUENZA AGE 9 TO ADULT 8/1/2017 HEMOGLOBIN A1C Q6M 9/14/2017 MICROALBUMIN Q1 3/14/2018 LIPID PANEL Q1 3/14/2018 DTaP/Tdap/Td series (2 - Td) 2/2/2024 COLONOSCOPY 12/17/2025 Allergies as of 5/24/2017  Review Complete On: 5/24/2017 By: Lindsey Hadley MD  
  
 Severity Noted Reaction Type Reactions Amoxicillin  12/29/2010    Rash, Other (comments) Vaginal itching Flagyl [Metronidazole]  11/16/2016   Side Effect Nausea and Vomiting Pcn [Penicillins]  09/20/2014   Intolerance Itching Vaginal itching Current Immunizations  Reviewed on 11/15/2011 Name Date Influenza Vaccine Intradermal PF 11/5/2014 PPD 6/20/2012 Tdap 2/2/2014  4:52 AM  
  
 Not reviewed this visit You Were Diagnosed With   
  
 Codes Comments GERD without esophagitis    -  Primary ICD-10-CM: K21.9 ICD-9-CM: 530.81 Moderate single current episode of major depressive disorder (HCC)     ICD-10-CM: F32.1 ICD-9-CM: 296.22 Anxiety     ICD-10-CM: F41.9 ICD-9-CM: 300.00 Vitals BP Pulse Temp Resp Height(growth percentile) Weight(growth percentile) 120/84 (BP 1 Location: Left arm, BP Patient Position: Sitting) 85 98.9 °F (37.2 °C) (Oral) 18 5' 2\" (1.575 m) 151 lb 1.6 oz (68.5 kg) LMP SpO2 BMI OB Status Smoking Status 04/16/2017 100% 27.64 kg/m2 Having regular periods Former Smoker Vitals History BMI and BSA Data Body Mass Index Body Surface Area  
 27.64 kg/m 2 1.73 m 2 Preferred Pharmacy Pharmacy Name Phone [id number]- [facility] [address] [phone] Your Updated Medication List  
  
   
This list is accurate as of: 5/24/17 12:04 PM.  Always use your most recent med list.  
  
  
  
  
 busPIRone 7.5 mg tablet Commonly known as:  BUSPAR Take 1 Tab by mouth two (2) times a day. LESLI 0.35 mg Tab Generic drug:  norethindrone TAKE 1 TABLET BY MOUTH EVERY DAY  
  
 docusate sodium 100 mg capsule Commonly known as:  DULCOLAX STOOL SOFTENER (DSS) Take 1 Cap by mouth two (2) times a day for 90 days. mirtazapine 7.5 mg tablet Commonly known as:  Cathy Sly Take 1 Tab by mouth nightly. raNITIdine 150 mg tablet Commonly known as:  ZANTAC Take 1 Tab by mouth two (2) times a day. Indications: HEARTBURN Prescriptions Sent to Pharmacy Refills  
 busPIRone (BUSPAR) 7.5 mg tablet 0 Sig: Take 1 Tab by mouth two (2) times a day. Class: Normal  
 Pharmacy: [facility]/pharmacy [id number] [place] [address] [facility] [address] Ph #: [phone] Route: Oral  
 mirtazapine (REMERON) 7.5 mg tablet 0 Sig: Take 1 Tab by mouth nightly. Class: Normal  
 Pharmacy: [facility]/pharmacy [id number] [place] [address] [facility] [address] Ph #: [phone] Route: Oral  
 raNITIdine (ZANTAC) 150 mg tablet 11 Sig: Take 1 Tab by mouth two (2) times a day. Indications: HEARTBURN Class: Normal  
 Pharmacy: [facility]/pharmacy [address] [place] [address]  Laurent Rocha AT [address] #: [phone] Route: Oral  
  
Follow-up Instructions Return if symptoms worsen or fail to improve. Introducing [facility] SERVICES! Dear Kahlil Cotto: Thank you for requesting a [employer] account. Our records indicate that you already have an active [employer] account. You can access your account anytime at [email or web address]. [email or web address] Did you know that you can access your hospital and ER discharge instructions at any time in [employer]? You can also review all of your test results from your hospital stay or ER visit. Additional Information If you have questions, please visit the Frequently Asked Questions section of the [employer] website at [email or web address] Remember, [employer] is NOT to be used for urgent needs. For medical emergencies, dial 911. Now available from your iPhone and Android! Please provide this summary of care documentation to your next provider. Your primary care clinician is listed as Yajaira Chin. If you have any questions after today's visit, please call [phone].

## 2017-05-30 ENCOUNTER — OFFICE VISIT (OUTPATIENT)
Dept: BEHAVIORAL/MENTAL HEALTH CLINIC | Age: 37
End: 2017-05-30

## 2017-05-30 VITALS
HEART RATE: 77 BPM | DIASTOLIC BLOOD PRESSURE: 77 MMHG | OXYGEN SATURATION: 99 % | WEIGHT: 155.6 LBS | SYSTOLIC BLOOD PRESSURE: 121 MMHG | HEIGHT: 62 IN | BODY MASS INDEX: 28.63 KG/M2

## 2017-05-30 DIAGNOSIS — F32.1 MODERATE SINGLE CURRENT EPISODE OF MAJOR DEPRESSIVE DISORDER (HCC): ICD-10-CM

## 2017-05-30 DIAGNOSIS — F41.9 ANXIETY: ICD-10-CM

## 2017-05-30 RX ORDER — MIRTAZAPINE 15 MG/1
7.5 TABLET, FILM COATED ORAL
Qty: 30 TAB | Refills: 0 | Status: SHIPPED | OUTPATIENT
Start: 2017-05-30 | End: 2017-08-10 | Stop reason: SDDI

## 2017-05-30 RX ORDER — BUSPIRONE HYDROCHLORIDE 10 MG/1
10 TABLET ORAL 2 TIMES DAILY
Qty: 60 TAB | Refills: 0 | Status: SHIPPED | OUTPATIENT
Start: 2017-05-30 | End: 2017-08-10 | Stop reason: SDUPTHER

## 2017-05-30 NOTE — PATIENT INSTRUCTIONS
For reliable dietary information, go to www. EATRIGHT.org. Sleep Tips    What to avoid    · Do not have drinks with caffeine, such as coffee or black tea, for 8 hours before bed. · Do not smoke or use other types of tobacco near bedtime. Nicotine is a stimulant and can keep you awake. · Avoid drinking alcohol late in the evening, because it can cause you to wake in the middle of the night. · Do not eat a big meal close to bedtime. If you are hungry, eat a light snack. · Do not drink a lot of water close to bedtime, because the need to urinate may wake you up during the night. · Do not read or watch TV in bed. Use the bed only for sleeping and sexual activity. What to try    · Go to bed at the same time every night, and wake up at the same time every morning. Do not take naps during the day. · Keep your bedroom quiet, dark, and cool. · Get regular exercise, but not within 3 to 4 hours of your bedtime. · Sleep on a comfortable pillow and mattress. · If watching the clock makes you anxious, turn it facing away from you so you cannot see the time. · If you worry when you lie down, start a worry book. Well before bedtime, write down your worries, and then set the book and your concerns aside. · Try meditation or other relaxation techniques before you go to bed. · If you cannot fall asleep, get up and go to another room until you feel sleepy. Do something relaxing. Repeat your bedtime routine before you go to bed again. Make your house quiet and calm about an hour before bedtime. Turn down the lights, turn off the TV, log off the computer, and turn down the volume on music. This can help you relax after a busy day. Depression and Chronic Disease: Care Instructions  Your Care Instructions  A chronic disease is one that you have for a long time. Some chronic diseases can be controlled, but they usually cannot be cured. Depression is common in people with chronic diseases, but it often goes unnoticed.   Many people have concerns about seeking treatment for a mental health problem. You may think it's a sign of weakness, or you don't want people to know about it. It's important to overcome these reasons for not seeking treatment. Treating depression or anxiety is good for your health. Follow-up care is a key part of your treatment and safety. Be sure to make and go to all appointments, and call your doctor if you are having problems. It's also a good idea to know your test results and keep a list of the medicines you take. How can you care for yourself at home? Watch for symptoms of depression  The symptoms of depression are often subtle at first. You may think they are caused by your disease rather than depression. Or you may think it is normal to be depressed when you have a chronic disease. If you are depressed you may:  · Feel sad or hopeless. · Feel guilty or worthless. · Not enjoy the things you used to enjoy. · Feel hopeless, as though life is not worth living. · Have trouble thinking or remembering. · Have low energy, and you may not eat or sleep well. · Pull away from others. · Think often about death or killing yourself. (Keep the numbers for these national suicide hotlines: 5-547-284-TALK [1-217.210.8584] and 1-638-EMTZGCY [1-402.363.6052]. )  Get treatment  By treating your depression, you can feel more hopeful and have more energy. If you feel better, you may take better care of yourself, so your health may improve. · Talk to your doctor if you have any changes in mood during treatment for your disease. · Ask your doctor for help. Counseling, antidepressant medicine, or a combination of the two can help most people with depression. Often a combination works best. Counseling can also help you cope with having a chronic disease. When should you call for help? Call 911 anytime you think you may need emergency care. For example, call if:  · You feel like hurting yourself or someone else.   · Someone you know has depression and is about to attempt or is attempting suicide. Call your doctor now or seek immediate medical care if:  · You hear voices. · Someone you know has depression and:  ¨ Starts to give away his or her possessions. ¨ Uses illegal drugs or drinks alcohol heavily. ¨ Talks or writes about death, including writing suicide notes or talking about guns, knives, or pills. ¨ Starts to spend a lot of time alone. ¨ Acts very aggressively or suddenly appears calm. Watch closely for changes in your health, and be sure to contact your doctor if:  · You do not get better as expected. Where can you learn more? Go to http://starRedbootharvin.info/. Enter I711 in the search box to learn more about \"Depression and Chronic Disease: Care Instructions. \"  Current as of: July 26, 2016  Content Version: 11.2  © 5582-0301 [employer]. Care instructions adapted under license by [employer] (which disclaims liability or warranty for this information). If you have questions about a medical condition or this instruction, always ask your healthcare professional. [address] any warranty or liability for your use of this information. Depression and Chronic Disease: Care Instructions  Your Care Instructions  A chronic disease is one that you have for a long time. Some chronic diseases can be controlled, but they usually cannot be cured. Depression is common in people with chronic diseases, but it often goes unnoticed. Many people have concerns about seeking treatment for a mental health problem. You may think it's a sign of weakness, or you don't want people to know about it. It's important to overcome these reasons for not seeking treatment. Treating depression or anxiety is good for your health. Follow-up care is a key part of your treatment and safety. Be sure to make and go to all appointments, and call your doctor if you are having problems. It's also a good idea to know your test results and keep a list of the medicines you take. How can you care for yourself at home? Watch for symptoms of depression  The symptoms of depression are often subtle at first. You may think they are caused by your disease rather than depression. Or you may think it is normal to be depressed when you have a chronic disease. If you are depressed you may:  · Feel sad or hopeless. · Feel guilty or worthless. · Not enjoy the things you used to enjoy. · Feel hopeless, as though life is not worth living. · Have trouble thinking or remembering. · Have low energy, and you may not eat or sleep well. · Pull away from others. · Think often about death or killing yourself. (Keep the numbers for these national suicide hotlines: 4-173-684-TALK [1-374.813.6121] and 9-714-FVVPIBN [1-180.454.6961]. )  Get treatment  By treating your depression, you can feel more hopeful and have more energy. If you feel better, you may take better care of yourself, so your health may improve. · Talk to your doctor if you have any changes in mood during treatment for your disease. · Ask your doctor for help. Counseling, antidepressant medicine, or a combination of the two can help most people with depression. Often a combination works best. Counseling can also help you cope with having a chronic disease. When should you call for help? Call 911 anytime you think you may need emergency care. For example, call if:  · You feel like hurting yourself or someone else. · Someone you know has depression and is about to attempt or is attempting suicide. Call your doctor now or seek immediate medical care if:  · You hear voices. · Someone you know has depression and:  ¨ Starts to give away his or her possessions. ¨ Uses illegal drugs or drinks alcohol heavily. ¨ Talks or writes about death, including writing suicide notes or talking about guns, knives, or pills.   ¨ Starts to spend a lot of time alone.  ¨ Acts very aggressively or suddenly appears calm. Watch closely for changes in your health, and be sure to contact your doctor if:  · You do not get better as expected. Where can you learn more? Go to http://star-arvin.info/. Enter A378 in the search box to learn more about \"Depression and Chronic Disease: Care Instructions. \"  Current as of: July 26, 2016  Content Version: 11.2  © 9182-8262 [employer]. Care instructions adapted under license by [employer] (which disclaims liability or warranty for this information). If you have questions about a medical condition or this instruction, always ask your healthcare professional. [place] [address] any warranty or liability for your use of this information.
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
## 2017-06-01 ENCOUNTER — OFFICE VISIT (OUTPATIENT)
Dept: INTERNAL MEDICINE CLINIC | Age: 37
End: 2017-06-01

## 2017-06-01 ENCOUNTER — HOSPITAL ENCOUNTER (OUTPATIENT)
Dept: LAB | Age: 37
Discharge: HOME | End: 2017-06-01
Payer: MEDICARE

## 2017-06-01 VITALS
WEIGHT: 155.8 LBS | DIASTOLIC BLOOD PRESSURE: 75 MMHG | RESPIRATION RATE: 20 BRPM | HEART RATE: 81 BPM | SYSTOLIC BLOOD PRESSURE: 106 MMHG | OXYGEN SATURATION: 99 % | TEMPERATURE: 98.6 F | BODY MASS INDEX: 28.67 KG/M2 | HEIGHT: 62 IN

## 2017-06-01 DIAGNOSIS — Z12.4 CERVICAL CANCER SCREENING: ICD-10-CM

## 2017-06-01 DIAGNOSIS — N76.0 ACUTE VAGINITIS: ICD-10-CM

## 2017-06-01 DIAGNOSIS — N89.8 VAGINAL DISCHARGE: Primary | ICD-10-CM

## 2017-06-01 PROCEDURE — 88175 CYTOPATH C/V AUTO FLUID REDO: CPT | Performed by: NURSE PRACTITIONER

## 2017-06-01 PROCEDURE — 87624 HPV HI-RISK TYP POOLED RSLT: CPT | Performed by: NURSE PRACTITIONER

## 2017-06-01 NOTE — MR AVS SNAPSHOT
Visit Information Date & Time Provider Department Dept. Phone Encounter #  
 6/1/2017  3:30 PM Emerald Phelan, [address] [id number] Follow-up Instructions Return if symptoms worsen or fail to improve. Your Appointments 6/27/2017  2:00 PM  
ESTABLISHED PATIENT with [facility] ([address] [facility]) Appt Note: 4 week follow-up [address] [facility] [address] [facility] [address]  
  
   
 [address] [facility] [address] [facility] [address] [address] Upcoming Health Maintenance Date Due Pneumococcal 19-64 Medium Risk (1 of 1 - PPSV23) 2/9/1997 FOOT EXAM Q1 5/18/2016 EYE EXAM RETINAL OR DILATED Q1 5/18/2016 MEDICARE YEARLY EXAM 4/6/2017 PAP AKA CERVICAL CYTOLOGY 8/27/2017 INFLUENZA AGE 9 TO ADULT 8/1/2017 HEMOGLOBIN A1C Q6M 9/14/2017 MICROALBUMIN Q1 3/14/2018 LIPID PANEL Q1 3/14/2018 DTaP/Tdap/Td series (2 - Td) 2/2/2024 COLONOSCOPY 12/17/2025 Allergies as of 6/1/2017  Review Complete On: 6/1/2017 By: Fani Stephens LPN Severity Noted Reaction Type Reactions Amoxicillin  12/29/2010    Rash, Other (comments) Vaginal itching Flagyl [Metronidazole]  11/16/2016   Side Effect Nausea and Vomiting Pcn [Penicillins]  09/20/2014   Intolerance Itching Vaginal itching Current Immunizations  Reviewed on 11/15/2011 Name Date Influenza Vaccine Intradermal PF 11/5/2014 PPD 6/20/2012 Tdap 2/2/2014  4:52 AM  
  
 Not reviewed this visit You Were Diagnosed With   
  
 Codes Comments Vaginal discharge    -  Primary ICD-10-CM: N89.8 ICD-9-CM: 623.5 Acute vaginitis     ICD-10-CM: N76.0 ICD-9-CM: 616.10 Cervical cancer screening     ICD-10-CM: Z12.4 ICD-9-CM: V76.2 Vitals BP Pulse Temp Resp Height(growth percentile) Weight(growth percentile) 106/75 (BP 1 Location: Left arm, BP Patient Position: Sitting) 81 98.6 °F (37 °C) (Oral) 20 5' 2\" (1.575 m) 155 lb 12.8 oz (70.7 kg) LMP SpO2 BMI OB Status Smoking Status 05/15/2017 99% 28.5 kg/m2 Having regular periods Former Smoker Vitals History BMI and BSA Data Body Mass Index Body Surface Area 28.5 kg/m 2 1.76 m 2 Preferred Pharmacy Pharmacy Name Phone [id number]- [place], [place] - [address] [phone] Your Updated Medication List  
  
   
This list is accurate as of: 6/1/17  3:36 PM.  Always use your most recent med list.  
  
  
  
  
 busPIRone 10 mg tablet Commonly known as:  BUSPAR Take 1 Tab by mouth two (2) times a day. docusate sodium 100 mg capsule Commonly known as:  DULCOLAX STOOL SOFTENER (DSS) Take 1 Cap by mouth two (2) times a day for 90 days. mirtazapine 15 mg tablet Commonly known as:  Jordon Jenny Take 0.5 Tabs by mouth nightly. raNITIdine 150 mg tablet Commonly known as:  ZANTAC Take 1 Tab by mouth two (2) times a day. Indications: HEARTBURN We Performed the Following [address] [id number] Custom] Follow-up Instructions Return if symptoms worsen or fail to improve. Introducing [facility]! Dear Rebecca Catherine: Thank you for requesting a "[employer] account. Our records indicate that you already have an active "[employer] account. You can access your account anytime at [email or web address] Did you know that you can access your hospital and ER discharge instructions at any time in "[employer]? You can also review all of your test results from your hospital stay or ER visit. Additional Information If you have questions, please visit the Frequently Asked Questions section of the "[employer] website at [email or web address] Remember, "[employer] is NOT to be used for urgent needs.  For medical emergencies, dial 911. Now available from your iPhone and Android! Please provide this summary of care documentation to your next provider. Your primary care clinician is listed as Rhiannon Arroyo. If you have any questions after today's visit, please call [phone].

## 2017-06-01 NOTE — PROGRESS NOTES
1. Have you been to the ER, urgent care clinic since your last visit? Hospitalized since your last visit? No.    2. Have you seen or consulted any other health care providers outside of the [address] [place] [address] since your last visit? Include any pap smears or colon screening.  No.

## 2017-06-05 ENCOUNTER — TELEPHONE (OUTPATIENT)
Dept: INTERNAL MEDICINE CLINIC | Age: 37
End: 2017-06-05

## 2017-06-08 NOTE — TELEPHONE ENCOUNTER
Spoke to patient she had a questions regarding lab results she wanted to know what was the BV reading , I advise her that her new swab was normal no BV or yeast was present on swab. Pt understood instructions.  Katharina Taylor LPN

## 2017-06-09 NOTE — ANCILLARY DISCHARGE INSTRUCTIONS
[facility]   [address] [address], [address] [place] [address]  Phone: [phone] Fax: [phone]      Discharge Summary 2-15      Patient name: Kishan Quiroga  : 1980  Provider#: [id number]  Referral source: Doris Boucher, *      Medical/Treatment Diagnosis: Low back pain [M54.5]     Prior Hospitalization: see medical history     Comorbidities: DM  Prior Level of Function:Pt does not exercise  Medications: Verified on Patient Summary List    Start of Care: 17      Onset Date:3/16/17  Visits from Start of Care: 2     Missed Visits: 4  Reporting Period : 17 to 17    Long Term Goals: To be accomplished in 4 weeks: NOT MET  1) Pt will be able to Sit greater than 60 minutes without pain  2) Pt will be able to Ambulate greater than 4 blocks without increase of pain  4) Pt will be able to retrieve item form ground without pain  5) Pt will be able to carry >/= 20 lbs without pain      Assessment/Summary of care: The pt was seen for 2 sessions at [place] [address]. She cancelled/no showed to several appointments following her last one on 17 and therefore will be discharged at this time. No goals were MET. G-Codes (GP)  Mobility  [id number] Current  CJ= 20-39%   Goal  CJ= 20-39%  D/C  CJ= 20-39%      The severity rating is based on clinical judgment and the FOTO Score.     RECOMMENDATIONS:  [x]Discontinue therapy: []Patient has reached or is progressing toward set goals     [x]Patient is non-compliant or has abdicated     []Due to lack of appreciable progress towards set goals    Kassie Solorio 2017 7:18 AM

## 2017-07-10 ENCOUNTER — HOSPITAL ENCOUNTER (EMERGENCY)
Age: 37
Discharge: HOME | End: 2017-07-10
Attending: INTERNAL MEDICINE
Payer: MEDICARE

## 2017-07-10 VITALS
HEIGHT: 62 IN | TEMPERATURE: 98.2 F | RESPIRATION RATE: 18 BRPM | DIASTOLIC BLOOD PRESSURE: 108 MMHG | OXYGEN SATURATION: 100 % | HEART RATE: 74 BPM | SYSTOLIC BLOOD PRESSURE: 136 MMHG

## 2017-07-10 DIAGNOSIS — N76.0 BV (BACTERIAL VAGINOSIS): ICD-10-CM

## 2017-07-10 DIAGNOSIS — N73.0 PID (ACUTE PELVIC INFLAMMATORY DISEASE): Primary | ICD-10-CM

## 2017-07-10 DIAGNOSIS — B96.89 BV (BACTERIAL VAGINOSIS): ICD-10-CM

## 2017-07-10 LAB
APPEARANCE UR: CLEAR
BACTERIA URNS QL MICRO: ABNORMAL /HPF
BILIRUB UR QL: NEGATIVE
CLUE CELLS VAG QL WET PREP: NORMAL
COLOR UR: ABNORMAL
EPITH CASTS URNS QL MICRO: ABNORMAL /LPF
GLUCOSE BLD STRIP.AUTO-MCNC: 80 MG/DL (ref 65–100)
GLUCOSE UR STRIP.AUTO-MCNC: NEGATIVE MG/DL
HCG UR QL: NEGATIVE
HGB UR QL STRIP: ABNORMAL
KETONES UR QL STRIP.AUTO: NEGATIVE MG/DL
KOH PREP SPEC: NORMAL
LEUKOCYTE ESTERASE UR QL STRIP.AUTO: NEGATIVE
NITRITE UR QL STRIP.AUTO: NEGATIVE
PH UR STRIP: 6 [PH] (ref 5–8)
PROT UR STRIP-MCNC: NEGATIVE MG/DL
RBC #/AREA URNS HPF: ABNORMAL /HPF (ref 0–5)
SERVICE CMNT-IMP: NORMAL
SERVICE CMNT-IMP: NORMAL
SP GR UR REFRACTOMETRY: 1.02 (ref 1–1.03)
T VAGINALIS VAG QL WET PREP: NORMAL
UA: UC IF INDICATED,UAUC: ABNORMAL
UROBILINOGEN UR QL STRIP.AUTO: 0.2 EU/DL (ref 0.2–1)
WBC URNS QL MICRO: ABNORMAL /HPF (ref 0–4)

## 2017-07-10 PROCEDURE — 81025 URINE PREGNANCY TEST: CPT

## 2017-07-10 PROCEDURE — 74011250636 HC RX REV CODE- 250/636: Performed by: INTERNAL MEDICINE

## 2017-07-10 PROCEDURE — 87491 CHLMYD TRACH DNA AMP PROBE: CPT | Performed by: INTERNAL MEDICINE

## 2017-07-10 PROCEDURE — 99284 EMERGENCY DEPT VISIT MOD MDM: CPT

## 2017-07-10 PROCEDURE — 87210 SMEAR WET MOUNT SALINE/INK: CPT | Performed by: INTERNAL MEDICINE

## 2017-07-10 PROCEDURE — 74011000250 HC RX REV CODE- 250: Performed by: INTERNAL MEDICINE

## 2017-07-10 PROCEDURE — 96372 THER/PROPH/DIAG INJ SC/IM: CPT

## 2017-07-10 PROCEDURE — 82962 GLUCOSE BLOOD TEST: CPT

## 2017-07-10 PROCEDURE — 81001 URINALYSIS AUTO W/SCOPE: CPT | Performed by: INTERNAL MEDICINE

## 2017-07-10 PROCEDURE — 74011250637 HC RX REV CODE- 250/637: Performed by: INTERNAL MEDICINE

## 2017-07-10 PROCEDURE — 87086 URINE CULTURE/COLONY COUNT: CPT | Performed by: INTERNAL MEDICINE

## 2017-07-10 RX ORDER — ETODOLAC 300 MG/1
300 CAPSULE ORAL 2 TIMES DAILY
Qty: 6 CAP | Refills: 0 | Status: SHIPPED | OUTPATIENT
Start: 2017-07-10 | End: 2017-07-13 | Stop reason: SDUPTHER

## 2017-07-10 RX ORDER — METRONIDAZOLE 7.5 MG/G
1 GEL VAGINAL 2 TIMES DAILY
Qty: 5 TUBE | Refills: 0 | Status: SHIPPED | OUTPATIENT
Start: 2017-07-10 | End: 2017-07-13

## 2017-07-10 RX ORDER — AZITHROMYCIN 250 MG/1
1000 TABLET, FILM COATED ORAL
Status: COMPLETED | OUTPATIENT
Start: 2017-07-10 | End: 2017-07-10

## 2017-07-10 RX ORDER — KETOROLAC TROMETHAMINE 30 MG/ML
30 INJECTION, SOLUTION INTRAMUSCULAR; INTRAVENOUS
Status: COMPLETED | OUTPATIENT
Start: 2017-07-10 | End: 2017-07-10

## 2017-07-10 RX ADMIN — AZITHROMYCIN 1000 MG: 250 TABLET, FILM COATED ORAL at 22:51

## 2017-07-10 RX ADMIN — LIDOCAINE HYDROCHLORIDE 250 MG: 10 INJECTION, SOLUTION EPIDURAL; INFILTRATION; INTRACAUDAL; PERINEURAL at 22:50

## 2017-07-10 RX ADMIN — KETOROLAC TROMETHAMINE 30 MG: 30 INJECTION, SOLUTION INTRAMUSCULAR at 22:50

## 2017-07-10 NOTE — LETTER
[facility] EMERGENCY DEPT 
[address] [facility] [address] [id number] [phone] Work/School Note Date: 7/10/2017 To Whom It May concern: 
 
Bladimir Rdz was seen and treated today in the emergency room by the following provider(s): 
No providers found. Bladimir Rdz may return to work on 7/11/17. Sincerely, Sonal Cee RN

## 2017-07-11 NOTE — ED PROVIDER NOTES
HPI Comments: Jennifer Maharaj is a 42 y.o. female with PMHx of DM, and PSHx of hernia repair and colonoscopy, presenting ambulatory to ED c/o intermittent sharp LLQ abdominal pain radiating to her lower mid abdomen since 2000 this evening. Pt rates current pain 10/10 and denies modifying factors. She notes associated nausea, urinary frequency, and urinary urgency. Pt reports she has been vomiting off and on for the past few weeks, denies emesis today. She notes her most recent menstrual period ended 6/30/17. Pt states her menstrual period was late and lighter than baseline. She reports she is unsure whether she could be pregnant. Pt notes she has been having abnormal vaginal discharge prior to her menstrual cycle, denies current. She specifically denies current vaginal discharge, vaginal bleeding, fever, chills, and current vomiting. PCP: Amie Nelson MD  Social Hx: former smoker; + EtOH (occasional); - drug use. There are no other complaints, changes, or physical findings at this time. Written by Rizwana Fleming ED Scribe, as dictated by Sharri Brito MD.          The history is provided by the patient.         Past Medical History:   Diagnosis Date    Anxiety     Bipolar affective ([address]     Depression     Diabetes ([address]     diet control    Hyperlipidemia     High Cholesterol    Hyperlipidemia     Major depression        Past Surgical History:   Procedure Laterality Date    HX GI      Colonoscopy    HX HERNIA REPAIR      as child         Family History:   Problem Relation Age of Onset    Diabetes Mother     Depression Mother     Asthma Brother     Stroke Maternal Grandmother      aneurysm    Cancer Maternal Grandmother      kidney    Hypertension Maternal Grandmother     Cancer Paternal Grandmother      lung    Diabetes Brother     Bipolar Disorder Brother     Schizophrenia Brother     Other Brother      paranoia       Social History     Social History    Marital status: SINGLE Spouse name: N/A    Number of children: N/A    Years of education: N/A     Occupational History    Not on file. Social History Main Topics    Smoking status: Former Smoker    Smokeless tobacco: Never Used      Comment: cigars    Alcohol use Yes      Comment: occasional    Drug use: No    Sexual activity: Yes     Partners: Male     Birth control/ protection: Injection     Other Topics Concern    Not on file     Social History Narrative         ALLERGIES: Amoxicillin; Flagyl [metronidazole]; and Pcn [penicillins]    Review of Systems   Constitutional: Negative for chills and fever. HENT: Negative for congestion and rhinorrhea. Eyes: Negative for visual disturbance. Respiratory: Negative for cough and shortness of breath. Cardiovascular: Negative for chest pain. Gastrointestinal: Positive for abdominal pain (LLQ, radiating to mid lower) and nausea. Negative for vomiting. Genitourinary: Positive for frequency and urgency. Negative for difficulty urinating, dysuria, vaginal bleeding and vaginal discharge. Musculoskeletal: Negative for arthralgias, back pain and neck pain. Skin: Negative for color change and rash. Neurological: Negative for dizziness, weakness and headaches. Vitals:    07/10/17 2159   BP: (!) 136/108   Pulse: 74   Resp: 18   Temp: 98.2 °F (36.8 °C)   SpO2: 100%   Height: 5' 2\" (1.575 m)            Physical Exam   Constitutional: She is oriented to person, place, and time. She appears well-developed and well-nourished. Neck: Normal range of motion. Cardiovascular: Normal rate, regular rhythm, normal heart sounds and intact distal pulses. Pulmonary/Chest: Effort normal and breath sounds normal.   Abdominal: Soft. Bowel sounds are normal.   Genitourinary:   Genitourinary Comments: Pelvic: copious white discharge; marked CMT and biadnexal tenderness; no masses; Neurological: She is alert and oriented to person, place, and time. Skin: Skin is warm and dry. Nursing note and vitals reviewed. MDM  Number of Diagnoses or Management Options  BV (bacterial vaginosis):   PID (acute pelvic inflammatory disease):   Diagnosis management comments: DDx: PID, vaginitis, ovarian cyst.       Amount and/or Complexity of Data Reviewed  Clinical lab tests: ordered and reviewed  Review and summarize past medical records: yes    Patient Progress  Patient progress: stable    Procedures    Procedure Note - Pelvic Exam:    10:37 PM  Performed by: Arlyne Jeans, MD  Chaperoned by: ED Tech  Pelvic exam was performed using bimanual and speculum. Further findings noted in physical exam.   The procedure took 1-15 minutes, and pt tolerated well.   Written by Willian Peña ED Scribe, as dictated by Arlyne Jeans, MD.     LABORATORY TESTS:  Recent Results (from the past 12 hour(s))   URINALYSIS W/ REFLEX CULTURE    Collection Time: 07/10/17 10:21 PM   Result Value Ref Range    Color YELLOW/STRAW      Appearance CLEAR CLEAR      Specific gravity 1.025 1.003 - 1.030      pH (UA) 6.0 5.0 - 8.0      Protein NEGATIVE  NEG mg/dL    Glucose NEGATIVE  NEG mg/dL    Ketone NEGATIVE  NEG mg/dL    Bilirubin NEGATIVE  NEG      Blood MODERATE (A) NEG      Urobilinogen 0.2 0.2 - 1.0 EU/dL    Nitrites NEGATIVE  NEG      Leukocyte Esterase NEGATIVE  NEG      WBC 0-4 0 - 4 /hpf    RBC 0-5 0 - 5 /hpf    Epithelial cells FEW FEW /lpf    Bacteria 1+ (A) NEG /hpf    UA:UC IF INDICATED URINE CULTURE ORDERED (A) CNI     HCG URINE, QL. - POC    Collection Time: 07/10/17 10:21 PM   Result Value Ref Range    Pregnancy test,urine (POC) NEGATIVE  NEG     KOH, OTHER SOURCES    Collection Time: 07/10/17 10:28 PM   Result Value Ref Range    Special Requests: NO SPECIAL REQUESTS      KOH NO YEAST SEEN     WET PREP    Collection Time: 07/10/17 10:28 PM   Result Value Ref Range    Clue cells CLUE CELLS PRESENT      Wet prep NO TRICHOMONAS SEEN     GLUCOSE, POC    Collection Time: 07/10/17 10:34 PM   Result Value Ref Range Glucose (POC) 80 65 - 100 mg/dL    Performed by [address] [place] [address]:  Medications   cefTRIAXone (ROCEPHIN) 250 mg in lidocaine (PF) (XYLOCAINE) 10 mg/mL (1 %) IM injection (250 mg IntraMUSCular Given 7/10/17 2250)   ketorolac (TORADOL) injection 30 mg (30 mg IntraMUSCular Given 7/10/17 2250)   azithromycin (ZITHROMAX) tablet 1,000 mg (1,000 mg Oral Given 7/10/17 2251)       IMPRESSION:  1. PID (acute pelvic inflammatory disease)    2. BV (bacterial vaginosis)        PLAN:  1. Current Discharge Medication List        2. Follow-up Information     Follow up With Details Comments [address] Raya Rojas MD In 2 days return to ED if more ill [place]  [address]  [phone]          Return to ED if worse     DISCHARGE NOTE  11:19 PM  The patient has been re-evaluated and is ready for discharge. Reviewed available results with patient. Counseled pt on diagnosis and care plan. Pt has expressed understanding, and all questions have been answered. Pt agrees with plan and agrees to F/U as recommended, or return to the ED if their sxs worsen. Discharge instructions have been provided and explained to the pt, along with reasons to return to the ED. Written by Liam Beckwith, ED Scribe, as dictated by Koko Diaz MD.    This note is prepared by Liam Beckwith, acting as Scribe for Koko Diaz MD.    Koko Diaz MD: The scribe's documentation has been prepared under my direction and personally reviewed by me in its entirety. I confirm that the note above accurately reflects all work, treatment, procedures, and medical decision making performed by me.

## 2017-07-11 NOTE — DISCHARGE INSTRUCTIONS
Bacterial Vaginosis: Care Instructions  Your Care Instructions    Bacterial vaginosis is a type of vaginal infection. It is caused by excess growth of certain bacteria that are normally found in the vagina. Symptoms can include itching, swelling, pain when you urinate or have sex, and a gray or yellow discharge with a \"fishy\" odor. It is not considered an infection that is spread through sexual contact. Although symptoms can be annoying and uncomfortable, bacterial vaginosis does not usually cause other health problems. However, if you have it while you are pregnant, it can cause complications. While the infection may go away on its own, most doctors use antibiotics to treat it. You may have been prescribed pills or vaginal cream. With treatment, bacterial vaginosis usually clears up in 5 to 7 days. Follow-up care is a key part of your treatment and safety. Be sure to make and go to all appointments, and call your doctor if you are having problems. It's also a good idea to know your test results and keep a list of the medicines you take. How can you care for yourself at home? · Take your antibiotics as directed. Do not stop taking them just because you feel better. You need to take the full course of antibiotics. · Do not eat or drink anything that contains alcohol if you are taking metronidazole (Flagyl). · Keep using your medicine if you start your period. Use pads instead of tampons while using a vaginal cream or suppository. Tampons can absorb the medicine. · Wear loose cotton clothing. Do not wear nylon and other materials that hold body heat and moisture close to the skin. · Do not scratch. Relieve itching with a cold pack or a cool bath. · Do not wash your vaginal area more than once a day. Use plain water or a mild, unscented soap. Do not douche. When should you call for help?   Watch closely for changes in your health, and be sure to contact your doctor if:  · You have unexpected vaginal bleeding. · You have a fever. · You have new or increased pain in your vagina or pelvis. · You are not getting better after 1 week. · Your symptoms return after you finish the course of your medicine. Where can you learn more? Go to http://star-arvin.info/. Moises Hernandez in the search box to learn more about \"Bacterial Vaginosis: Care Instructions. \"  Current as of: October 13, 2016  Content Version: 11.3  © 4075-8771 [employer]. Care instructions adapted under license by [employer] (which disclaims liability or warranty for this information). If you have questions about a medical condition or this instruction, always ask your healthcare professional. [address] any warranty or liability for your use of this information. Bacterial Vaginosis: Care Instructions  Your Care Instructions    Bacterial vaginosis is a type of vaginal infection. It is caused by excess growth of certain bacteria that are normally found in the vagina. Symptoms can include itching, swelling, pain when you urinate or have sex, and a gray or yellow discharge with a \"fishy\" odor. It is not considered an infection that is spread through sexual contact. Although symptoms can be annoying and uncomfortable, bacterial vaginosis does not usually cause other health problems. However, if you have it while you are pregnant, it can cause complications. While the infection may go away on its own, most doctors use antibiotics to treat it. You may have been prescribed pills or vaginal cream. With treatment, bacterial vaginosis usually clears up in 5 to 7 days. Follow-up care is a key part of your treatment and safety. Be sure to make and go to all appointments, and call your doctor if you are having problems. It's also a good idea to know your test results and keep a list of the medicines you take. How can you care for yourself at home? · Take your antibiotics as directed. Do not stop taking them just because you feel better. You need to take the full course of antibiotics. · Do not eat or drink anything that contains alcohol if you are taking metronidazole (Flagyl). · Keep using your medicine if you start your period. Use pads instead of tampons while using a vaginal cream or suppository. Tampons can absorb the medicine. · Wear loose cotton clothing. Do not wear nylon and other materials that hold body heat and moisture close to the skin. · Do not scratch. Relieve itching with a cold pack or a cool bath. · Do not wash your vaginal area more than once a day. Use plain water or a mild, unscented soap. Do not douche. When should you call for help? Watch closely for changes in your health, and be sure to contact your doctor if:  · You have unexpected vaginal bleeding. · You have a fever. · You have new or increased pain in your vagina or pelvis. · You are not getting better after 1 week. · Your symptoms return after you finish the course of your medicine. Where can you learn more? Go to http://star-arvin.info/. Tammi Brittle in the search box to learn more about \"Bacterial Vaginosis: Care Instructions. \"  Current as of: October 13, 2016  Content Version: 11.3  © 2441-1135 [employer]. Care instructions adapted under license by [employer] (which disclaims liability or warranty for this information). If you have questions about a medical condition or this instruction, always ask your healthcare professional. [place] [address] any warranty or liability for your use of this information. Bacterial Vaginosis: Care Instructions  Your Care Instructions    Bacterial vaginosis is a type of vaginal infection. It is caused by excess growth of certain bacteria that are normally found in the vagina.  Symptoms can include itching, swelling, pain when you urinate or have sex, and a gray or yellow discharge with a \"fishy\" odor. It is not considered an infection that is spread through sexual contact. Although symptoms can be annoying and uncomfortable, bacterial vaginosis does not usually cause other health problems. However, if you have it while you are pregnant, it can cause complications. While the infection may go away on its own, most doctors use antibiotics to treat it. You may have been prescribed pills or vaginal cream. With treatment, bacterial vaginosis usually clears up in 5 to 7 days. Follow-up care is a key part of your treatment and safety. Be sure to make and go to all appointments, and call your doctor if you are having problems. It's also a good idea to know your test results and keep a list of the medicines you take. How can you care for yourself at home? · Take your antibiotics as directed. Do not stop taking them just because you feel better. You need to take the full course of antibiotics. · Do not eat or drink anything that contains alcohol if you are taking metronidazole (Flagyl). · Keep using your medicine if you start your period. Use pads instead of tampons while using a vaginal cream or suppository. Tampons can absorb the medicine. · Wear loose cotton clothing. Do not wear nylon and other materials that hold body heat and moisture close to the skin. · Do not scratch. Relieve itching with a cold pack or a cool bath. · Do not wash your vaginal area more than once a day. Use plain water or a mild, unscented soap. Do not douche. When should you call for help? Watch closely for changes in your health, and be sure to contact your doctor if:  · You have unexpected vaginal bleeding. · You have a fever. · You have new or increased pain in your vagina or pelvis. · You are not getting better after 1 week. · Your symptoms return after you finish the course of your medicine. Where can you learn more? Go to http://star-arvin.info/.   Jerrye Every in the search box to learn more about \"Bacterial Vaginosis: Care Instructions. \"  Current as of: October 13, 2016  Content Version: 11.3  © 6120-9134 [employer]. Care instructions adapted under license by [employer] (which disclaims liability or warranty for this information). If you have questions about a medical condition or this instruction, always ask your healthcare professional. [address] any warranty or liability for your use of this information. Bacterial Vaginosis: Care Instructions  Your Care Instructions    Bacterial vaginosis is a type of vaginal infection. It is caused by excess growth of certain bacteria that are normally found in the vagina. Symptoms can include itching, swelling, pain when you urinate or have sex, and a gray or yellow discharge with a \"fishy\" odor. It is not considered an infection that is spread through sexual contact. Although symptoms can be annoying and uncomfortable, bacterial vaginosis does not usually cause other health problems. However, if you have it while you are pregnant, it can cause complications. While the infection may go away on its own, most doctors use antibiotics to treat it. You may have been prescribed pills or vaginal cream. With treatment, bacterial vaginosis usually clears up in 5 to 7 days. Follow-up care is a key part of your treatment and safety. Be sure to make and go to all appointments, and call your doctor if you are having problems. It's also a good idea to know your test results and keep a list of the medicines you take. How can you care for yourself at home? · Take your antibiotics as directed. Do not stop taking them just because you feel better. You need to take the full course of antibiotics. · Do not eat or drink anything that contains alcohol if you are taking metronidazole (Flagyl). · Keep using your medicine if you start your period. Use pads instead of tampons while using a vaginal cream or suppository.  Tampons can absorb the medicine. · Wear loose cotton clothing. Do not wear nylon and other materials that hold body heat and moisture close to the skin. · Do not scratch. Relieve itching with a cold pack or a cool bath. · Do not wash your vaginal area more than once a day. Use plain water or a mild, unscented soap. Do not douche. When should you call for help? Watch closely for changes in your health, and be sure to contact your doctor if:  · You have unexpected vaginal bleeding. · You have a fever. · You have new or increased pain in your vagina or pelvis. · You are not getting better after 1 week. · Your symptoms return after you finish the course of your medicine. Where can you learn more? Go to http://star-arvin.info/. Rivas Galloway in the search box to learn more about \"Bacterial Vaginosis: Care Instructions. \"  Current as of: October 13, 2016  Content Version: 11.3  © 6630-4946 [employer]. Care instructions adapted under license by [employer] (which disclaims liability or warranty for this information). If you have questions about a medical condition or this instruction, always ask your healthcare professional. [address] any warranty or liability for your use of this information.

## 2017-07-12 LAB
BACTERIA SPEC CULT: NORMAL
C TRACH DNA SPEC QL NAA+PROBE: NEGATIVE
CC UR VC: NORMAL
N GONORRHOEA DNA SPEC QL NAA+PROBE: NEGATIVE
SAMPLE TYPE: NORMAL
SERVICE CMNT-IMP: NORMAL
SERVICE CMNT-IMP: NORMAL
SPECIMEN SOURCE: NORMAL

## 2017-07-13 ENCOUNTER — TELEPHONE (OUTPATIENT)
Dept: INTERNAL MEDICINE CLINIC | Age: 37
End: 2017-07-13

## 2017-07-13 LAB
A VAGINAE DNA VAG QL NAA+PROBE: NORMAL SCORE
BVAB2 DNA VAG QL NAA+PROBE: NORMAL SCORE
C ALBICANS DNA VAG QL NAA+PROBE: NEGATIVE
C GLABRATA DNA VAG QL NAA+PROBE: NEGATIVE
C TRACH RRNA SPEC QL NAA+PROBE: NEGATIVE
MEGA1 DNA VAG QL NAA+PROBE: NORMAL SCORE
N GONORRHOEA RRNA SPEC QL NAA+PROBE: NEGATIVE
T VAGINALIS RRNA SPEC QL NAA+PROBE: NEGATIVE

## 2017-07-13 RX ORDER — ETODOLAC 300 MG/1
300 CAPSULE ORAL 2 TIMES DAILY
Qty: 4 CAP | Refills: 0 | Status: SHIPPED | OUTPATIENT
Start: 2017-07-13 | End: 2017-07-15

## 2017-07-13 NOTE — TELEPHONE ENCOUNTER
Pt informed not to take anti inflammatory drugs with the medication you sent to the pharmacy and she understood

## 2017-07-13 NOTE — TELEPHONE ENCOUNTER
Pt states she was seen in [facility] 7/10/17 and was given rx for etodolac (Lodine) 6 pills. She had taken 2 pills and accidentally threw the pills away. She wants to know if she can get rx for the other 4 pills sent to the pharmacy please.  Pt # [id number]

## 2017-07-19 DIAGNOSIS — B37.9 ANTIBIOTIC-INDUCED YEAST INFECTION: Primary | ICD-10-CM

## 2017-07-19 DIAGNOSIS — T36.95XA ANTIBIOTIC-INDUCED YEAST INFECTION: Primary | ICD-10-CM

## 2017-07-19 RX ORDER — FLUCONAZOLE 150 MG/1
150 TABLET ORAL DAILY
Qty: 1 TAB | Refills: 0 | Status: SHIPPED | OUTPATIENT
Start: 2017-07-19 | End: 2017-07-20

## 2017-08-10 ENCOUNTER — OFFICE VISIT (OUTPATIENT)
Dept: BEHAVIORAL/MENTAL HEALTH CLINIC | Age: 37
End: 2017-08-10

## 2017-08-10 VITALS
BODY MASS INDEX: 27.6 KG/M2 | HEART RATE: 68 BPM | DIASTOLIC BLOOD PRESSURE: 100 MMHG | OXYGEN SATURATION: 98 % | SYSTOLIC BLOOD PRESSURE: 144 MMHG | WEIGHT: 150 LBS | HEIGHT: 62 IN

## 2017-08-10 DIAGNOSIS — F32.1 MODERATE SINGLE CURRENT EPISODE OF MAJOR DEPRESSIVE DISORDER (HCC): ICD-10-CM

## 2017-08-10 DIAGNOSIS — F44.9: ICD-10-CM

## 2017-08-10 DIAGNOSIS — Z91.14 NON COMPLIANCE W MEDICATION REGIMEN: ICD-10-CM

## 2017-08-10 DIAGNOSIS — F60.3 BORDERLINE PERSONALITY DISORDER (HCC): Primary | ICD-10-CM

## 2017-08-10 DIAGNOSIS — F41.9 ANXIETY: ICD-10-CM

## 2017-08-10 DIAGNOSIS — F43.10 PTSD (POST-TRAUMATIC STRESS DISORDER): ICD-10-CM

## 2017-08-10 RX ORDER — OLANZAPINE 5 MG/1
TABLET ORAL
Qty: 30 TAB | Refills: 0 | Status: SHIPPED | OUTPATIENT
Start: 2017-08-10 | End: 2017-09-14 | Stop reason: SDUPTHER

## 2017-08-10 RX ORDER — BUSPIRONE HYDROCHLORIDE 10 MG/1
10 TABLET ORAL 2 TIMES DAILY
Qty: 60 TAB | Refills: 0 | Status: SHIPPED | OUTPATIENT
Start: 2017-08-10 | End: 2017-09-14 | Stop reason: SDUPTHER

## 2017-08-10 RX ORDER — SERTRALINE HYDROCHLORIDE 100 MG/1
TABLET, FILM COATED ORAL
Qty: 30 TAB | Refills: 0 | Status: SHIPPED | OUTPATIENT
Start: 2017-08-10 | End: 2017-09-14 | Stop reason: SDUPTHER
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
## 2017-08-10 NOTE — PATIENT INSTRUCTIONS
For reliable dietary information, go to www. EATRIGHT.org. Sleep Tips    What to avoid    · Do not have drinks with caffeine, such as coffee or black tea, for 8 hours before bed. · Do not smoke or use other types of tobacco near bedtime. Nicotine is a stimulant and can keep you awake. · Avoid drinking alcohol late in the evening, because it can cause you to wake in the middle of the night. · Do not eat a big meal close to bedtime. If you are hungry, eat a light snack. · Do not drink a lot of water close to bedtime, because the need to urinate may wake you up during the night. · Do not read or watch TV in bed. Use the bed only for sleeping and sexual activity. What to try    · Go to bed at the same time every night, and wake up at the same time every morning. Do not take naps during the day. · Keep your bedroom quiet, dark, and cool. · Get regular exercise, but not within 3 to 4 hours of your bedtime. · Sleep on a comfortable pillow and mattress. · If watching the clock makes you anxious, turn it facing away from you so you cannot see the time. · If you worry when you lie down, start a worry book. Well before bedtime, write down your worries, and then set the book and your concerns aside. · Try meditation or other relaxation techniques before you go to bed. · If you cannot fall asleep, get up and go to another room until you feel sleepy. Do something relaxing. Repeat your bedtime routine before you go to bed again. Make your house quiet and calm about an hour before bedtime. Turn down the lights, turn off the TV, log off the computer, and turn down the volume on music. This can help you relax after a busy day. Learning About Borderline Personality Disorder  What is borderline personality disorder? Borderline personality disorder is a mental health condition. It causes intense mood swings, impulsive behaviors, and severe problems with self-worth.  It can lead to troubled relationships in every area of your life. Experts don't know exactly what causes the disorder. It may be linked to a problem with the parts of the brain that control reactions to emotions. The disorder also seems to run in families. Often, people who get it faced some kind of childhood trauma such as abuse, neglect, or the death of a parent. Most of the time, signs of the disorder first appear in childhood. But problems often don't start until the early adult years. It's important to know that the disorder can be treated. Treatment can be hard, and getting better can take years. But with treatment, most people with borderline personality disorder do get better over time. What are the symptoms? Everyone has problems with emotions or behaviors sometimes. But if you have borderline personality disorder, the problems are severe. They repeat over a long time and disrupt your life. The most common symptoms include:  · Intense emotions and mood swings. · Harmful, impulsive behaviors. These may include substance abuse, binge eating, out-of-control spending, risky sexual behavior, and reckless driving. · Hurting yourself. This may include cutting or burning yourself or attempting suicide. · Trouble with relationships. You may see others as either \"good\" or \"bad. \" And your view may suddenly shift from one to the other, for minor reasons. · Feeling worthless or empty inside. · A frantic fear of being left alone (abandoned). This fear may lead to desperate attempts to hold on to those around you. Or it may cause you to reject others before they can reject you. · Problems with anger. These may include violent temper tantrums and aggressive behavior. How is it treated? It may seem that there is no one on your side. You might have been told that there is no hope. But just because you've heard this, that doesn't mean it's true. Getting medical treatment and taking care of yourself can really help.   Medical treatment  When you start treatment, you will find health professionals who will help you. You will also meet others who have gone through what you are going through. Long-term treatment can reduce symptoms and harmful behaviors. It can also help you manage your emotions better. Treatment may include:  · Counseling and therapy. It's important to find a counselor you can build a stable relationship with. This can be hard. Your condition may cause you to see your counselor as caring one minute and cruel the next. This can happen especially when he or she asks you to try to change a behavior. Try to find a counselor who has special training in dialectical behavioral therapy. It's a type of therapy that is often used to treat people with this disorder. · Medicines. Examples are antidepressants, mood stabilizers, and antipsychotics. They may be helpful when you use them along with therapy. Self-care  There are things you can do to help yourself. Here are some tips:  · Keep a regular daily schedule. · Do not drink alcohol or use drugs. If your doctor prescribed medicines for you, take them exactly as directed. · Get a healthy amount of sleep. Try to be active during daylight hours, and go to sleep and wake up at the same time each day. · Eat healthy foods like fruits and vegetables; whole-grain breads and cereals; and lean meats, fish, and poultry. · Practice mindfulness or other meditation. To be mindful means to pay attention to and accept the things that are happening right now, in the present moment. · Keep to your treatment plan, even when it's hard. Keep the numbers for these national suicide hotlines: 9-292-600-TALK (0-421.704.8663) and 4-720-DHPSADJ (2-255.670.5979). If you or someone you know talks about suicide or about feeling hopeless, get help right away. Follow-up care is a key part of your treatment and safety. Be sure to make and go to all appointments, and call your doctor if you are having problems.  It's also a good idea to know your test results and keep a list of the medicines you take. When should you call for help? Call 911 anytime you think you may need emergency care. For example, call if:  · You feel you cannot stop from hurting yourself or someone else. Call your doctor now or seek immediate medical care if:  · You feel much more depressed. · You hear voices. Watch closely for changes in your health, and be sure to contact your doctor if:  · You have a new crisis you can't handle. Where can you learn more? Go to http://star-arvin.info/. Enter W215 in the search box to learn more about \"Learning About Borderline Personality Disorder. \"  Current as of: February 17, 2017  Content Version: 11.3  © 7443-2401 [employer]. Care instructions adapted under license by [employer] (which disclaims liability or warranty for this information). If you have questions about a medical condition or this instruction, always ask your healthcare professional. Dialectical Behavior Therapy ([address])    [facility]  Renita Cohen, [facility]  [phone]  [email or web address]    Therapist:  Titus Young      [facility]  (Dr Farooq Miramontes, Dr Sharilyn Merlin)  [place] [address], [facility] [employer] disclaims any warranty or liability for your use of this information.

## 2017-09-08 ENCOUNTER — OFFICE VISIT (OUTPATIENT)
Dept: INTERNAL MEDICINE CLINIC | Age: 37
End: 2017-09-08

## 2017-09-08 VITALS
RESPIRATION RATE: 18 BRPM | HEART RATE: 86 BPM | TEMPERATURE: 98 F | SYSTOLIC BLOOD PRESSURE: 133 MMHG | OXYGEN SATURATION: 98 % | WEIGHT: 153.3 LBS | BODY MASS INDEX: 28.21 KG/M2 | HEIGHT: 62 IN | DIASTOLIC BLOOD PRESSURE: 85 MMHG

## 2017-09-08 DIAGNOSIS — Z23 ENCOUNTER FOR IMMUNIZATION: Primary | ICD-10-CM

## 2017-09-08 DIAGNOSIS — N64.4 BILATERAL MASTODYNIA: ICD-10-CM

## 2017-09-08 DIAGNOSIS — N76.1 SUBACUTE VAGINITIS: ICD-10-CM

## 2017-09-08 PROBLEM — R73.03 PREDIABETES: Status: ACTIVE | Noted: 2017-09-08

## 2017-09-08 RX ORDER — VITAMIN A ACETATE, BETA CAROTENE, ASCORBIC ACID, CHOLECALCIFEROL, .ALPHA.-TOCOPHEROL ACETATE, DL-, THIAMINE MONONITRATE, RIBOFLAVIN, NIACINAMIDE, PYRIDOXINE HYDROCHLORIDE, FOLIC ACID, CYANOCOBALAMIN, CALCIUM CARBONATE, FERROUS FUMARATE, ZINC OXIDE, CUPRIC OXIDE 3080; 12; 120; 400; 1; 1.84; 3; 20; 22; 920; 25; 200; 27; 10; 2 [IU]/1; UG/1; MG/1; [IU]/1; MG/1; MG/1; MG/1; MG/1; MG/1; [IU]/1; MG/1; MG/1; MG/1; MG/1; MG/1
TABLET, FILM COATED ORAL
Refills: 4 | COMMUNITY
Start: 2017-08-23 | End: 2018-05-30

## 2017-09-08 RX ORDER — MIRTAZAPINE 15 MG/1
TABLET, FILM COATED ORAL
Refills: 0 | COMMUNITY
Start: 2017-06-28 | End: 2017-09-08 | Stop reason: SDUPTHER

## 2017-09-08 NOTE — MR AVS SNAPSHOT
Visit Information Date & Time Provider Department Dept. Phone Encounter #  
 9/8/2017  8:30 AM Boby Hernandez, [address]  [id number] Your Appointments 9/14/2017  1:00 PM  
ESTABLISHED PATIENT with Bong Taylor [facility] ([address] [facility]) Appt Note: 4 week follow-up; 4 week follow-up [address] [facility] [address] [place] [address] [address] [facility] [address]  
  
   
 [address] [facility] [address] [facility] [address] [id number] Upcoming Health Maintenance Date Due  
 FOOT EXAM Q1 5/18/2016 MEDICARE YEARLY EXAM 4/6/2017 HEMOGLOBIN A1C Q6M 9/14/2017 MICROALBUMIN Q1 3/14/2018 LIPID PANEL Q1 3/14/2018 EYE EXAM RETINAL OR DILATED Q1 4/12/2018 PAP AKA CERVICAL CYTOLOGY 6/1/2022 DTaP/Tdap/Td series (2 - Td) 2/2/2024 COLONOSCOPY 12/17/2025 Allergies as of 9/8/2017  Review Complete On: 9/8/2017 By: Boby Hernandez MD  
  
 Severity Noted Reaction Type Reactions Amoxicillin  12/29/2010    Rash, Other (comments) Vaginal itching Flagyl [Metronidazole]  11/16/2016   Side Effect Nausea and Vomiting Pcn [Penicillins]  09/20/2014   Intolerance Itching Vaginal itching Current Immunizations  Reviewed on 11/15/2011 Name Date Influenza Vaccine (Quad) PF 9/8/2017 Influenza Vaccine Intradermal PF 11/5/2014 PPD 6/20/2012 Tdap 2/2/2014  4:52 AM  
  
 Not reviewed this visit You Were Diagnosed With   
  
 Codes Comments Encounter for immunization    -  Primary ICD-10-CM: Q54 ICD-9-CM: V03.89 Bilateral mastodynia     ICD-10-CM: N64.4 ICD-9-CM: 611.71 Subacute vaginitis     ICD-10-CM: N76.1 ICD-9-CM: 616.10 Vitals BP Pulse Temp Resp Height(growth percentile) Weight(growth percentile) 133/85 (BP 1 Location: Right arm, BP Patient Position: Sitting) 86 98 °F (36.7 °C) (Oral) 18 5' 2\" (1.575 m) 153 lb 4.8 oz (69.5 kg) LMP SpO2 BMI OB Status Smoking Status 08/20/2017 98% 28.04 kg/m2 Having regular periods Former Smoker Vitals History BMI and BSA Data Body Mass Index Body Surface Area 28.04 kg/m 2 1.74 m 2 Preferred Pharmacy Pharmacy Name Phone [id number]- [place] - [address] [phone] Your Updated Medication List  
  
   
This list is accurate as of: 9/8/17  9:19 AM.  Always use your most recent med list.  
  
  
  
  
 busPIRone 10 mg tablet Commonly known as:  BUSPAR Take 1 Tab by mouth two (2) times a day. OLANZapine 5 mg tablet Commonly known as:  ZyPREXA Please take half tablet at bed time x 5 days and then take one tablet at bed time PRENATAL PLUS (CALCIUM CARB) 27 mg iron- 1 mg Tab Generic drug:  prenatal vit-calcium-iron-fa TAKE 1 TABLET EVERY DAY AS DIRECTED  
  
 raNITIdine 150 mg tablet Commonly known as:  ZANTAC Take 1 Tab by mouth two (2) times a day. Indications: HEARTBURN  
  
 sertraline 100 mg tablet Commonly known as:  ZOLOFT Please take half tablet daily for 5 days and then take one tablet daily. We Performed the Following ADMIN INFLUENZA VIRUS VAC [ [facility]] INFLUENZA VIRUS VAC QUAD,SPLIT,PRESV FREE SYRINGE IM [id number] CPT(R)] [address] [id number] Custom] Patient Instructions Breast Pain: Care Instructions Your Care Instructions Breast tenderness and pain may come and go with your monthly periods (cyclic), or it may not follow any pattern (noncyclic). Breast pain is rarely caused by a serious health problem. You may need tests to find the cause. Follow-up care is a key part of your treatment and safety. Be sure to make and go to all appointments, and call your doctor if you are having problems. Its also a good idea to know your test results and keep a list of the medicines you take. How can you care for yourself at home? · If your doctor gave you medicine, take it exactly as prescribed. Call your doctor if you think you are having a problem with your medicine. · Take an over-the-counter pain medicine, such as acetaminophen (Tylenol), ibuprofen (Advil, Motrin), or naproxen (Aleve), to relieve pain and swelling. Read and follow all instructions on the label. · Do not take two or more pain medicines at the same time unless the doctor told you to. Many pain medicines have acetaminophen, which is Tylenol. Too much acetaminophen (Tylenol) can be harmful. · Wear a supportive bra, such as a sports bra or a jog bra. · Cut down on the amount of fat in your diet. If you need help planning healthy meals, see a dietitian. · Get at least 30 minutes of exercise on most days of the week. Walking is a good choice. You also may want to do other activities, such as running, swimming, cycling, or playing tennis or team sports. · Keep a healthy sleep pattern. Go to bed at the same time every night, and get up at the same time every day. When should you call for help? Call your doctor now or seek immediate medical care if: 
· You have new changes in a breast, such as: ¨ A lump or thickening in your breast or armpit. ¨ A change in the breast's size or shape. ¨ Skin changes, such as dimples or puckers. ¨ Nipple discharge. ¨ A change in the color or feel of the skin of your breast or the darker area around the nipple (areola). ¨ A change in the shape of the nipple (it may look like it's being pulled into the breast). · You have symptoms of a breast infection, such as: 
¨ Increased pain, swelling, redness, or warmth around a breast. 
¨ Red streaks extending from the breast. 
¨ Pus draining from a breast. 
¨ A fever. Watch closely for changes in your health, and be sure to contact your doctor if: 
· Your breast pain does not get better after 1 week. · You have a lump or thickening in your breast or armpit. Where can you learn more? Go to http://star-arvin.info/. Enter Q665 in the search box to learn more about \"Breast Pain: Care Instructions. \" Current as of: March 20, 2017 Content Version: 11.3 © 9181-6488 [employer]m. Care instructions adapted under license by [employer] (which disclaims liability or warranty for this information). If you have questions about a medical condition or this instruction, always ask your healthcare professional. [address] any warranty or liability for your use of this information. Introducing [facility]! Dear Minh Canales: Thank you for requesting a Ember account. Our records indicate that you already have an active Ember account. You can access your account anytime at [email or web address] [employer] Magic Did you know that you can access your hospital and ER discharge instructions at any time in Ember? You can also review all of your test results from your hospital stay or ER visit. Additional Information If you have questions, please visit the Frequently Asked Questions section of the Ember website at [email or web address] Remember, Ember is NOT to be used for urgent needs. For medical emergencies, dial 911. Now available from your iPhone and Android! Please provide this summary of care documentation to your next provider. Your primary care clinician is listed as Frances Bobby. If you have any questions after today's visit, please call [phone].

## 2017-09-08 NOTE — PATIENT INSTRUCTIONS
Breast Pain: Care Instructions  Your Care Instructions  Breast tenderness and pain may come and go with your monthly periods (cyclic), or it may not follow any pattern (noncyclic). Breast pain is rarely caused by a serious health problem. You may need tests to find the cause. Follow-up care is a key part of your treatment and safety. Be sure to make and go to all appointments, and call your doctor if you are having problems. Its also a good idea to know your test results and keep a list of the medicines you take. How can you care for yourself at home? · If your doctor gave you medicine, take it exactly as prescribed. Call your doctor if you think you are having a problem with your medicine. · Take an over-the-counter pain medicine, such as acetaminophen (Tylenol), ibuprofen (Advil, Motrin), or naproxen (Aleve), to relieve pain and swelling. Read and follow all instructions on the label. · Do not take two or more pain medicines at the same time unless the doctor told you to. Many pain medicines have acetaminophen, which is Tylenol. Too much acetaminophen (Tylenol) can be harmful. · Wear a supportive bra, such as a sports bra or a jog bra. · Cut down on the amount of fat in your diet. If you need help planning healthy meals, see a dietitian. · Get at least 30 minutes of exercise on most days of the week. Walking is a good choice. You also may want to do other activities, such as running, swimming, cycling, or playing tennis or team sports. · Keep a healthy sleep pattern. Go to bed at the same time every night, and get up at the same time every day. When should you call for help? Call your doctor now or seek immediate medical care if:  · You have new changes in a breast, such as:  ¨ A lump or thickening in your breast or armpit. ¨ A change in the breast's size or shape. ¨ Skin changes, such as dimples or puckers. ¨ Nipple discharge.   ¨ A change in the color or feel of the skin of your breast or the darker area around the nipple (areola). ¨ A change in the shape of the nipple (it may look like it's being pulled into the breast). · You have symptoms of a breast infection, such as:  ¨ Increased pain, swelling, redness, or warmth around a breast.  ¨ Red streaks extending from the breast.  ¨ Pus draining from a breast.  ¨ A fever. Watch closely for changes in your health, and be sure to contact your doctor if:  · Your breast pain does not get better after 1 week. · You have a lump or thickening in your breast or armpit. Where can you learn more? Go to http://star-arvin.info/. Enter H095 in the search box to learn more about \"Breast Pain: Care Instructions. \"  Current as of: March 20, 2017  Content Version: 11.3  © 0930-8530 Quick[employer]. Care instructions adapted under license by [employer] (which disclaims liability or warranty for this information). If you have questions about a medical condition or this instruction, always ask your healthcare professional. [address] any warranty or liability for your use of this information.

## 2017-09-08 NOTE — PROGRESS NOTES
1. Have you been to the ER, urgent care clinic since your last visit? Hospitalized since your last visit? Yes When: 7/10/2017 [facility]    2. Have you seen or consulted any other health care providers outside of the [address] [place] [address] since your last visit? Include any pap smears or colon screening. Yes When: GYN Dr. Gertrude Mercado      Patient denies pain at this time. Patient c/o breast soreness since last menstrual cycle. C/o vaginal discharge about 1 week with no odor. PHQ over the last two weeks 9/8/2017   PHQ Not Done -   Little interest or pleasure in doing things Not at all   Feeling down, depressed or hopeless Several days   Total Score PHQ 2 1   Trouble falling or staying asleep, or sleeping too much -   Feeling tired or having little energy -   Poor appetite or overeating -   Feeling bad about yourself - or that you are a failure or have let yourself or your family down -   Trouble concentrating on things such as school, work, reading or watching TV -   Moving or speaking so slowly that other people could have noticed; or the opposite being so fidgety that others notice -   Thoughts of being better off dead, or hurting yourself in some way -   PHQ 9 Score -   How difficult have these problems made it for you to do your work, take care of your home and get along with others -         Erich Moe is a 42 y.o. female who presents for routine immunizations. She denies any symptoms , reactions or allergies that would exclude them from being immunized today. Risks and adverse reactions were discussed and the VIS was given to them. All questions were addressed. She was observed for 15 min post injection. There were no reactions observed.     Caroline Cruz LPN

## 2017-09-08 NOTE — PROGRESS NOTES
Kacy Prado is a 42 y.o. female and presents with Breast pain and Vaginal Discharge  . Subjective:    Paul breast tenderness x 2 weeks. Pt denies excess caffeine consumption, denies change/start of OCPs. Pt has a h/o same in the past, was evaluated by her gyn MD w breast exam and told she was nml. Pt comes-in to be reexamined. No new meds/trauma. Pt denies discrete masses or nipple d/c. Lmp ~ 2 weeks ago    Abn vaginal d/c-pt relays she normally has no vaginal d/c and recently has scanty amt. No abn smell or color. Non-copious. No new sexually partners. No abd pain or discomfort currently. No fever. Pt was dx'ed w BV @ ED visit in July and wishes to be rechecked.       Review of Systems  Constitutional: negative for fevers, chills, anorexia and weight loss  CV:   negative for chest pain, palpitations, lower extremity edema  GI:   negative for nausea, vomiting, diarrhea, abdominal pain,melena  Endo:               negative for polyuria,polydipsia,polyphagia,heat intolerance  Genitourinary: negative for frequency, dysuria and hematuria  Musculoskel: negative for myalgias, arthralgias, back pain, muscle weakness, joint pain  Neurological:  negative for headaches, dizziness, vertigo, memory problems and gait   Behavl/Psych: negative for feelings of anxiety, depression, mood changes    Past Medical History:   Diagnosis Date    Anxiety     Bipolar affective ([facility] [address]     Depression     Diabetes ([facility] [address]     diet control    Hyperlipidemia     High Cholesterol    Hyperlipidemia     Major depression      Past Surgical History:   Procedure Laterality Date    HX GI      Colonoscopy    HX HERNIA REPAIR      as child     Social History     Social History    Marital status: SINGLE     Spouse name: N/A    Number of children: N/A    Years of education: N/A     Social History Main Topics    Smoking status: Former Smoker    Smokeless tobacco: Never Used      Comment: cigars    Alcohol use Yes      Comment: occasional  Drug use: No    Sexual activity: Yes     Partners: Male     Birth control/ protection: Injection     Other Topics Concern    None     Social History Narrative     Family History   Problem Relation Age of Onset    Diabetes Mother     Depression Mother     Asthma Brother     Stroke Maternal Grandmother      aneurysm    Cancer Maternal Grandmother      kidney    Hypertension Maternal Grandmother     Cancer Paternal Grandmother      lung    Diabetes Brother     Bipolar Disorder Brother     Schizophrenia Brother     Other Brother      paranoia     Current Outpatient Prescriptions   Medication Sig Dispense Refill    PRENATAL PLUS, CALCIUM CARB, 27 mg iron- 1 mg tab TAKE 1 TABLET EVERY DAY AS DIRECTED  4    busPIRone (BUSPAR) 10 mg tablet Take 1 Tab by mouth two (2) times a day. 60 Tab 0    OLANZapine (ZYPREXA) 5 mg tablet Please take half tablet at bed time x 5 days and then take one tablet at bed time 30 Tab 0    sertraline (ZOLOFT) 100 mg tablet Please take half tablet daily for 5 days and then take one tablet daily. 30 Tab 0    raNITIdine (ZANTAC) 150 mg tablet Take 1 Tab by mouth two (2) times a day.  Indications: HEARTBURN 60 Tab 11     Allergies   Allergen Reactions    Amoxicillin Rash and Other (comments)     Vaginal itching    Flagyl [Metronidazole] Nausea and Vomiting    Pcn [Penicillins] Itching     Vaginal itching       Objective:  Visit Vitals    /85 (BP 1 Location: Right arm, BP Patient Position: Sitting)    Pulse 86    Temp 98 °F (36.7 °C) (Oral)    Resp 18    Ht 5' 2\" (1.575 m)    Wt 153 lb 4.8 oz (69.5 kg)    LMP 08/20/2017    SpO2 98%    BMI 28.04 kg/m2     Physical Exam:   General appearance - alert, well appearing, and in no distress  Mental status - alert, oriented to person, place, and time  Neck - supple, no significant adenopathy   Chest - clear to auscultation, no wheezes, rales or rhonchi, symmetric air entry   Breast- no architectural distortion +kevin nipple pierced, no discrete masses appreciated  Heart - normal rate, regular rhythm, normal S1, S2, no murmurs, rubs, clicks or gallops   Abdomen - soft, nontender, nondistended, no masses or organomegaly  Ext-peripheral pulses normal, no pedal edema, no clubbing or cyanosis  Skin-Warm and dry. no hyperpigmentation, vitiligo, or suspicious lesions  Neuro -alert, oriented, normal speech, no focal findings or movement disorder noted      Results for orders placed or performed during the hospital encounter of 07/10/17   AURELIO, OTHER SOURCES   Result Value Ref Range    Special Requests: NO SPECIAL REQUESTS      KOH NO YEAST SEEN     WET PREP   Result Value Ref Range    Clue cells CLUE CELLS PRESENT      Wet prep NO TRICHOMONAS SEEN     CULTURE, URINE   Result Value Ref Range    Special Requests: NO SPECIAL REQUESTS  Reflexed from [id number]        [place] Count [id number]  COLONIES/mL        Culture result: MIXED UROGENITAL ADDY ISOLATED     URINALYSIS W/ REFLEX CULTURE   Result Value Ref Range    Color YELLOW/STRAW      Appearance CLEAR CLEAR      Specific gravity 1.025 1.003 - 1.030      pH (UA) 6.0 5.0 - 8.0      Protein NEGATIVE  NEG mg/dL    Glucose NEGATIVE  NEG mg/dL    Ketone NEGATIVE  NEG mg/dL    Bilirubin NEGATIVE  NEG      Blood MODERATE (A) NEG      Urobilinogen 0.2 0.2 - 1.0 EU/dL    Nitrites NEGATIVE  NEG      Leukocyte Esterase NEGATIVE  NEG      WBC 0-4 0 - 4 /hpf    RBC 0-5 0 - 5 /hpf    Epithelial cells FEW FEW /lpf    Bacteria 1+ (A) NEG /hpf    UA:UC IF INDICATED URINE CULTURE ORDERED (A) CNI     CHLAMYDIA/GC PCR   Result Value Ref Range    Sample type SWAB      Source ENDOCERVICAL      Chlamydia amplified NEGATIVE  NEG      N. gonorrhea, amplified NEGATIVE  NEG      Comment        Testing performed by the [employer] [place] CT/NG method, utilizing PCR amplification to identify DNA of the pathogens.   This method is not recommended as the sole method of evaluation of cases of sexual abuse nor for other medico-legal indications. HCG URINE, QL. - POC   Result Value Ref Range    Pregnancy test,urine (POC) NEGATIVE  NEG     GLUCOSE, POC   Result Value Ref Range    Glucose (POC) 80 65 - 100 mg/dL    Performed by Amy Hatfield        Assessment/Plan:    ICD-10-CM ICD-9-CM    1. Encounter for immunization Z23 V03.89 ADMIN INFLUENZA VIRUS VAC      INFLUENZA VIRUS VAC QUAD,SPLIT,PRESV FREE SYRINGE IM   2. Bilateral mastodynia N64.4 611.71    3. Subacute vaginitis N76.1 616.10 NUSWAB VAGINITIS PLUS     Orders Placed This Encounter    Influenza virus vaccine (QUADRIVALENT PRES FREE SYRINGE) IM ([id number])  [address] fee () for Medicare insured patients    DISCONTD: mirtazapine (REMERON) 15 mg tablet     Sig: TAKE 1/2 TABLET BY MOUTH NIGHTLY. Refill:  0    PRENATAL PLUS, CALCIUM CARB, 27 mg iron- 1 mg tab     Sig: TAKE 1 TABLET EVERY DAY AS DIRECTED     Refill:  4     nml exam  Reassurance  Nuswab sample sent  F/u routin eprn    There are no Patient Instructions on file for this visit. Follow-up Disposition: Not on File      I have reviewed with the patient details of the assessment and plan and all questions were answered. Relevent patient education was performed. The most recent lab findings were reviewed with the patient. An After Visit Summary was printed and given to the patient.

## 2017-09-10 DIAGNOSIS — F60.3 BORDERLINE PERSONALITY DISORDER (HCC): ICD-10-CM

## 2017-09-10 DIAGNOSIS — F41.9 ANXIETY: ICD-10-CM

## 2017-09-10 DIAGNOSIS — F32.1 MODERATE SINGLE CURRENT EPISODE OF MAJOR DEPRESSIVE DISORDER (HCC): ICD-10-CM

## 2017-09-10 DIAGNOSIS — F44.9: ICD-10-CM

## 2017-09-10 DIAGNOSIS — F43.10 PTSD (POST-TRAUMATIC STRESS DISORDER): ICD-10-CM

## 2017-09-11 RX ORDER — OLANZAPINE 5 MG/1
TABLET ORAL
Qty: 30 TAB | Refills: 0 | OUTPATIENT
Start: 2017-09-11

## 2017-09-11 RX ORDER — BUSPIRONE HYDROCHLORIDE 10 MG/1
TABLET ORAL
Qty: 60 TAB | Refills: 0 | OUTPATIENT
Start: 2017-09-11

## 2017-09-11 RX ORDER — SERTRALINE HYDROCHLORIDE 100 MG/1
TABLET, FILM COATED ORAL
Qty: 30 TAB | Refills: 0 | OUTPATIENT
Start: 2017-09-11

## 2017-09-13 LAB
A VAGINAE DNA VAG QL NAA+PROBE: ABNORMAL SCORE
BVAB2 DNA VAG QL NAA+PROBE: ABNORMAL SCORE
C ALBICANS DNA VAG QL NAA+PROBE: POSITIVE
C GLABRATA DNA VAG QL NAA+PROBE: NEGATIVE
C TRACH RRNA SPEC QL NAA+PROBE: NEGATIVE
MEGA1 DNA VAG QL NAA+PROBE: ABNORMAL SCORE
N GONORRHOEA RRNA SPEC QL NAA+PROBE: NEGATIVE
T VAGINALIS RRNA SPEC QL NAA+PROBE: NEGATIVE

## 2017-09-14 ENCOUNTER — OFFICE VISIT (OUTPATIENT)
Dept: BEHAVIORAL/MENTAL HEALTH CLINIC | Age: 37
End: 2017-09-14

## 2017-09-14 VITALS
HEIGHT: 62 IN | OXYGEN SATURATION: 98 % | BODY MASS INDEX: 28.16 KG/M2 | DIASTOLIC BLOOD PRESSURE: 83 MMHG | WEIGHT: 153 LBS | HEART RATE: 70 BPM | SYSTOLIC BLOOD PRESSURE: 120 MMHG

## 2017-09-14 DIAGNOSIS — F43.10 PTSD (POST-TRAUMATIC STRESS DISORDER): ICD-10-CM

## 2017-09-14 DIAGNOSIS — F44.9: ICD-10-CM

## 2017-09-14 DIAGNOSIS — F60.3 BORDERLINE PERSONALITY DISORDER (HCC): ICD-10-CM

## 2017-09-14 DIAGNOSIS — F41.9 ANXIETY: ICD-10-CM

## 2017-09-14 DIAGNOSIS — F32.1 MODERATE SINGLE CURRENT EPISODE OF MAJOR DEPRESSIVE DISORDER (HCC): ICD-10-CM

## 2017-09-14 RX ORDER — OLANZAPINE 5 MG/1
5 TABLET ORAL
Qty: 30 TAB | Refills: 2 | Status: SHIPPED | OUTPATIENT
Start: 2017-09-14 | End: 2018-01-03 | Stop reason: SDUPTHER

## 2017-09-14 RX ORDER — BUSPIRONE HYDROCHLORIDE 10 MG/1
10 TABLET ORAL 2 TIMES DAILY
Qty: 60 TAB | Refills: 2 | Status: SHIPPED | OUTPATIENT
Start: 2017-09-14 | End: 2018-01-03 | Stop reason: SDUPTHER

## 2017-09-14 RX ORDER — SERTRALINE HYDROCHLORIDE 50 MG/1
50 TABLET, FILM COATED ORAL DAILY
Qty: 30 TAB | Refills: 2 | Status: SHIPPED | OUTPATIENT
Start: 2017-09-14 | End: 2018-01-03 | Stop reason: SDUPTHER
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
## 2017-09-14 NOTE — PATIENT INSTRUCTIONS
For reliable dietary information, go to www. EATRIGHT.org. For reliable dietary information, go to www. EATRIGHT.org. Sleep Tips    What to avoid    · Do not have drinks with caffeine, such as coffee or black tea, for 8 hours before bed. · Do not smoke or use other types of tobacco near bedtime. Nicotine is a stimulant and can keep you awake. · Avoid drinking alcohol late in the evening, because it can cause you to wake in the middle of the night. · Do not eat a big meal close to bedtime. If you are hungry, eat a light snack. · Do not drink a lot of water close to bedtime, because the need to urinate may wake you up during the night. · Do not read or watch TV in bed. Use the bed only for sleeping and sexual activity. What to try    · Go to bed at the same time every night, and wake up at the same time every morning. Do not take naps during the day. · Keep your bedroom quiet, dark, and cool. · Get regular exercise, but not within 3 to 4 hours of your bedtime. · Sleep on a comfortable pillow and mattress. · If watching the clock makes you anxious, turn it facing away from you so you cannot see the time. · If you worry when you lie down, start a worry book. Well before bedtime, write down your worries, and then set the book and your concerns aside. · Try meditation or other relaxation techniques before you go to bed. · If you cannot fall asleep, get up and go to another room until you feel sleepy. Do something relaxing. Repeat your bedtime routine before you go to bed again. Make your house quiet and calm about an hour before bedtime. Turn down the lights, turn off the TV, log off the computer, and turn down the volume on music. This can help you relax after a busy day. Learning About Borderline Personality Disorder  What is borderline personality disorder? Borderline personality disorder is a mental health condition.  It causes intense mood swings, impulsive behaviors, and severe problems with self-worth. It can lead to troubled relationships in every area of your life. Experts don't know exactly what causes the disorder. It may be linked to a problem with the parts of the brain that control reactions to emotions. The disorder also seems to run in families. Often, people who get it faced some kind of childhood trauma such as abuse, neglect, or the death of a parent. Most of the time, signs of the disorder first appear in childhood. But problems often don't start until the early adult years. It's important to know that the disorder can be treated. Treatment can be hard, and getting better can take years. But with treatment, most people with borderline personality disorder do get better over time. What are the symptoms? Everyone has problems with emotions or behaviors sometimes. But if you have borderline personality disorder, the problems are severe. They repeat over a long time and disrupt your life. The most common symptoms include:  · Intense emotions and mood swings. · Harmful, impulsive behaviors. These may include substance abuse, binge eating, out-of-control spending, risky sexual behavior, and reckless driving. · Hurting yourself. This may include cutting or burning yourself or attempting suicide. · Trouble with relationships. You may see others as either \"good\" or \"bad. \" And your view may suddenly shift from one to the other, for minor reasons. · Feeling worthless or empty inside. · A frantic fear of being left alone (abandoned). This fear may lead to desperate attempts to hold on to those around you. Or it may cause you to reject others before they can reject you. · Problems with anger. These may include violent temper tantrums and aggressive behavior. How is it treated? It may seem that there is no one on your side. You might have been told that there is no hope. But just because you've heard this, that doesn't mean it's true.  Getting medical treatment and taking care of yourself can really help. Medical treatment  When you start treatment, you will find health professionals who will help you. You will also meet others who have gone through what you are going through. Long-term treatment can reduce symptoms and harmful behaviors. It can also help you manage your emotions better. Treatment may include:  · Counseling and therapy. It's important to find a counselor you can build a stable relationship with. This can be hard. Your condition may cause you to see your counselor as caring one minute and cruel the next. This can happen especially when he or she asks you to try to change a behavior. Try to find a counselor who has special training in dialectical behavioral therapy. It's a type of therapy that is often used to treat people with this disorder. · Medicines. Examples are antidepressants, mood stabilizers, and antipsychotics. They may be helpful when you use them along with therapy. Self-care  There are things you can do to help yourself. Here are some tips:  · Keep a regular daily schedule. · Do not drink alcohol or use drugs. If your doctor prescribed medicines for you, take them exactly as directed. · Get a healthy amount of sleep. Try to be active during daylight hours, and go to sleep and wake up at the same time each day. · Eat healthy foods like fruits and vegetables; whole-grain breads and cereals; and lean meats, fish, and poultry. · Practice mindfulness or other meditation. To be mindful means to pay attention to and accept the things that are happening right now, in the present moment. · Keep to your treatment plan, even when it's hard. Keep the numbers for these national suicide hotlines: 5-814-899-TALK (4-314.949.2534) and 8-168-UDHZBEO (8-838.904.6408). If you or someone you know talks about suicide or about feeling hopeless, get help right away. Follow-up care is a key part of your treatment and safety.  Be sure to make and go to all appointments, and call your doctor if you are having problems. It's also a good idea to know your test results and keep a list of the medicines you take. When should you call for help? Call 911 anytime you think you may need emergency care. For example, call if:  · You feel you cannot stop from hurting yourself or someone else. Call your doctor now or seek immediate medical care if:  · You feel much more depressed. · You hear voices. Watch closely for changes in your health, and be sure to contact your doctor if:  · You have a new crisis you can't handle. Where can you learn more? Go to http://star-arvin.info/. Enter V141 in the search box to learn more about \"Learning About Borderline Personality Disorder. \"  Current as of: February 17, 2017  Content Version: 11.3  © 1146-7765 [employer]. Care instructions adapted under license by [employer] (which disclaims liability or warranty for this information). If you have questions about a medical condition or this instruction, always ask your healthcare professional. [address] any warranty or liability for your use of this information.

## 2017-09-28 ENCOUNTER — OFFICE VISIT (OUTPATIENT)
Dept: INTERNAL MEDICINE CLINIC | Age: 37
End: 2017-09-28

## 2017-09-28 VITALS
OXYGEN SATURATION: 100 % | BODY MASS INDEX: 28.23 KG/M2 | SYSTOLIC BLOOD PRESSURE: 128 MMHG | TEMPERATURE: 97.5 F | RESPIRATION RATE: 18 BRPM | HEART RATE: 69 BPM | DIASTOLIC BLOOD PRESSURE: 83 MMHG | HEIGHT: 62 IN | WEIGHT: 153.4 LBS

## 2017-09-28 DIAGNOSIS — N89.8 VAGINAL DISCHARGE: ICD-10-CM

## 2017-09-28 DIAGNOSIS — Z00.00 MEDICARE ANNUAL WELLNESS VISIT, SUBSEQUENT: ICD-10-CM

## 2017-09-28 DIAGNOSIS — N34.1 NONSPECIFIC URETHRITIS: ICD-10-CM

## 2017-09-28 DIAGNOSIS — R30.9 URINARY PAIN: Primary | ICD-10-CM

## 2017-09-28 DIAGNOSIS — R31.29 MICROSCOPIC HEMATURIA: ICD-10-CM

## 2017-09-28 DIAGNOSIS — R10.9 LEFT FLANK PAIN: ICD-10-CM

## 2017-09-28 DIAGNOSIS — Z71.89 ADVANCE CARE PLANNING: ICD-10-CM

## 2017-09-28 LAB
BILIRUB UR QL STRIP: NEGATIVE
GLUCOSE UR-MCNC: NEGATIVE MG/DL
KETONES P FAST UR STRIP-MCNC: NEGATIVE MG/DL
PH UR STRIP: 7 [PH] (ref 4.6–8)
PROT UR QL STRIP: NEGATIVE MG/DL
SP GR UR STRIP: 1.02 (ref 1–1.03)
UA UROBILINOGEN AMB POC: NORMAL (ref 0.2–1)
URINALYSIS CLARITY POC: CLEAR
URINALYSIS COLOR POC: YELLOW
URINE BLOOD POC: NORMAL
URINE LEUKOCYTES POC: NEGATIVE
URINE NITRITES POC: NEGATIVE

## 2017-09-28 RX ORDER — FLUCONAZOLE 150 MG/1
150 TABLET ORAL AS NEEDED
Qty: 2 TAB | Refills: 0 | Status: SHIPPED | OUTPATIENT
Start: 2017-09-28 | End: 2018-01-31 | Stop reason: ALTCHOICE

## 2017-09-28 RX ORDER — SERTRALINE HYDROCHLORIDE 100 MG/1
TABLET, FILM COATED ORAL
Refills: 0 | COMMUNITY
Start: 2017-08-10 | End: 2018-01-03 | Stop reason: SDUPTHER

## 2017-09-28 NOTE — ACP (ADVANCE CARE PLANNING)
Advanced care planning- discussed Advance directive, Medical POA, and life sustaining options. Given/Mailing honoring [employer] paper work and contact info for [place] to complete paperwork in office. Advance Care Planning (ACP) Provider Conversation Snapshot    Date of ACP Conversation: 09/28/17  Persons included in Conversation:  Patient/family  Length of ACP Conversation in minutes:  5-10 minutes    Authorized Decision Maker (if patient is incapable of making informed decisions): This person is:   Healthcare Agent/Medical Power of  under Advance Directive  Amanda Fitzpatrick, primary  Jef Ramos, christina          For Patients with Decision Making Capacity:   Values/Goals: Exploration of values, goals, and preferences if recovery is not expected, even with continued medical treatment in the event of:  Severe, permanent brain injury  \"In these circumstances, what matters most to you? \"  Care focused more on comfort or quality of life.     Conversation Outcomes / Follow-Up Plan: FULL CODE  Recommended completion of Advance Directive form after review of ACP materials and conversation with prospective healthcare agent   Referral made for ACP follow-up assistance to:  Nurse navigator

## 2017-09-28 NOTE — PROGRESS NOTES
Bladimir Rdz is a 42 y.o. female and presents with Annual Wellness Visit (medicare wellness); Abdominal Pain (pt states started on Sunday after eating [employer]); and Vaginal Itching (pt states the irritation started Sunday, no odor, no discharge, no color)    Subjective:  Patient presents with a complaint of vaginal discharge. Onset of symptoms was 3 days ago and has been unchanged since that time. The patient describes the discharge as white and odorless and does not experience abdominal discomfort with the discharge. She does not complain of burning with urination. She denies genital lesions at this time. The patient does not  have a history of STD's or PID and is sexually active, with last sexual intercourse 1 week  ago.         Review of Systems  Constitutional: negative for fevers, chills, anorexia and weight loss  Eyes:   negative for visual disturbance, drainage, and irritation  ENT:   negative for tinnitus,sore throat,nasal congestion,ear pain,and hoarseness  Respiratory:  negative for cough, hemoptysis, dyspnea, and wheezing  CV:   negative for chest pain, palpitations, and lower extremity edema  GI:   negative for nausea, vomiting, diarrhea, abdominal pain, and melena  Endo:               negative for polyuria,polydipsia,polyphagia, and heat intolerance  Genitourinary: negative for frequency, urgency, dysuria, retention, and hematuria  Integument:  negative for rash, ulcerations, and pruritus  Hematologic:  negative for easy bruising and bleeding  Musculoskel: negative for arthralgias, muscle weakness,and joint pain/swelling  Neurological:  negative for headaches, dizziness, vertigo,and memory/gait problems  Behavl/Psych: negative for feelings of anxiety, depression, suicide, and mood changes    Past Medical History:   Diagnosis Date    Anxiety     [facility] affective [facility]     Depression     Diabetes ([facility]     diet control    Hyperlipidemia     High Cholesterol    Hyperlipidemia     Major depression      Past Surgical History:   Procedure Laterality Date    HX GI      Colonoscopy    HX HERNIA REPAIR      as child     Social History     Social History    Marital status: SINGLE     Spouse name: N/A    Number of children: N/A    Years of education: N/A     Social History Main Topics    Smoking status: Former Smoker    Smokeless tobacco: Never Used      Comment: cigars    Alcohol use Yes      Comment: occasional    Drug use: No    Sexual activity: Yes     Partners: Male     Birth control/ protection: Injection     Other Topics Concern    None     Social History Narrative     Family History   Problem Relation Age of Onset    Diabetes Mother     Depression Mother     Asthma Brother     Stroke Maternal Grandmother      aneurysm    Cancer Maternal Grandmother      kidney    Hypertension Maternal Grandmother     Cancer Paternal Grandmother      lung    Diabetes Brother     Bipolar Disorder Brother     Schizophrenia Brother     Other Brother      paranoia     Current Outpatient Prescriptions   Medication Sig Dispense Refill    fluconazole (DIFLUCAN) 150 mg tablet Take 1 Tab by mouth as needed (vaginal discharge and itching). 2 Tab 0    busPIRone (BUSPAR) 10 mg tablet Take 1 Tab by mouth two (2) times a day. 60 Tab 2    OLANZapine (ZYPREXA) 5 mg tablet Take 1 Tab by mouth nightly. 30 Tab 2    sertraline (ZOLOFT) 50 mg tablet Take 1 Tab by mouth daily. 30 Tab 2    PRENATAL PLUS, CALCIUM CARB, 27 mg iron- 1 mg tab TAKE 1 TABLET EVERY DAY AS DIRECTED  4    raNITIdine (ZANTAC) 150 mg tablet Take 1 Tab by mouth two (2) times a day. Indications: HEARTBURN 60 Tab 11    sertraline (ZOLOFT) 100 mg tablet PLEASE TAKE 1/2 TABLET DAILY FOR 5 DAYS AND THEN TAKE ONE TABLET DAILY.   0     Allergies   Allergen Reactions    Amoxicillin Rash and Other (comments)     Vaginal itching    Flagyl [Metronidazole] Nausea and Vomiting    Pcn [Penicillins] Itching     Vaginal itching       Objective:  Visit Vitals    /83 (BP 1 Location: Left arm, BP Patient Position: Sitting)    Pulse 69    Temp 97.5 °F (36.4 °C) (Oral)    Resp 18    Ht 5' 2\" (1.575 m)    Wt 153 lb 6.4 oz (69.6 kg)    LMP 09/13/2017    SpO2 100%    BMI 28.06 kg/m2     Wt Readings from Last 3 Encounters:   09/28/17 153 lb 6.4 oz (69.6 kg)   09/14/17 153 lb (69.4 kg)   09/08/17 153 lb 4.8 oz (69.5 kg)     Physical Exam:   General appearance - alert, well appearing, and in no distress. Mental status - A/O x 4, normal mood and affect. Neck -Supple ,normal CSP. FROM, non-tender. No significant adenopathy/thyromegaly. No JVD. Chest - CTA. Symmetric chest rise. No wheezing, rales or rhonchi. Heart - Normal rate, regular rhythm. Normal S1, S2. No MGR or clicks. Abdomen - Soft,non-distended. Normoactive BS in all quadrants. NT, no mass or HSM. No CVAT. Female Genitalia: normal, no lesions; urethra, nontender. Vagina: healthy pink mucosa without any lesions, + abnormal discharge. Cervix: normal appearance, no lesions or bleeding; abnormal vaginal discharge present: grey-tinged. Ext- Radial, DP pulses, 2+ bilaterally. No pedal edema, clubbing, or cyanosis. Skin-Warm and dry. No hyperpigmentation, ulcerations, or suspicious lesions. Neuro - Normal speech, no focal findings or movement disorder. Normal strength, gait, and muscle tone.      Assessment of cognitive impairment: Alert and oriented x 4    Depression Screen:   PHQ over the last two weeks 9/28/2017   PHQ Not Done Active Diagnosis of Depression or Bipolar Disorder   Little interest or pleasure in doing things Not at all   Feeling down, depressed or hopeless Several days   Total Score PHQ 2 1   Trouble falling or staying asleep, or sleeping too much -   Feeling tired or having little energy -   Poor appetite or overeating -   Feeling bad about yourself - or that you are a failure or have let yourself or your family down -   Trouble concentrating on things such as school, work, reading or watching TV -   Moving or speaking so slowly that other people could have noticed; or the opposite being so fidgety that others notice -   Thoughts of being better off dead, or hurting yourself in some way -   PHQ 9 Score -   How difficult have these problems made it for you to do your work, take care of your home and get along with others -       Fall Risk Assessment:    Fall Risk Assessment, last 12 mths 4/21/2017   Able to walk? Yes   Fall in past 12 months? No       Abuse Assessment: Patient NOT domesticly abuse (verbal, physical, and financial)    Current Alcohol use: 5 times monthly when out, about 2-3 per sitting   reports that she drinks alcohol. Functional Ability:   Does the patient exhibit a steady gait? Yes   How long did it take the patient to get up and walk from a sitting position? 4 seconds   Is the patient self reliant?  (ie can do own laundry, meals, household chores) yes     Does the patient handle his/her own medications? yes     Does the patient handle his/her own money? Yes     Is the patients home safe (ie good lighting, handrails on stairs and bath, etc.)? Yes   Did you notice or did patient express any hearing difficulties? no   Did you notice of did patient express any vision difficulties?  no   Were distance and reading eye charts used? n/a     Advance Care Planning:   Patient was offered the opportunity to discuss advance care planning:  Yes   Does patient have an Advance Directive: No   If no, did you provide information and forms?   yes     Health Maintenance   Topic Date Due    FOOT EXAM Q1  05/18/2016    MEDICARE YEARLY EXAM  04/06/2017    HEMOGLOBIN A1C Q6M  09/14/2017    MICROALBUMIN Q1  03/14/2018    LIPID PANEL Q1  03/14/2018    EYE EXAM RETINAL OR DILATED Q1  04/12/2018    PAP AKA CERVICAL CYTOLOGY  06/01/2022    DTaP/Tdap/Td series (2 - Td) 02/02/2024    COLONOSCOPY  12/17/2025    Pneumococcal 19-64 Medium Risk  Addressed    INFLUENZA AGE 9 TO ADULT Addressed     Assessment/Plan:  Diflucan prescribed since with yeast on last labs not treated and still symptomatic. Medication Side Effects and Warnings were discussed with patient: yes   Patient Labs were reviewed: yes  Patient Past Records were reviewed: yes    See below for other orders   Follow-up Disposition:  Return if symptoms worsen or fail to improve. ICD-10-CM ICD-9-CM    1. Urinary pain R30.9 788.1 sertraline (ZOLOFT) 100 mg tablet      AMB POC URINALYSIS DIP STICK AUTO W/O MICRO      CULTURE, URINE      REFERRAL TO UROLOGY   2. Left flank pain R10.9 789.09 sertraline (ZOLOFT) 100 mg tablet      AMB POC URINALYSIS DIP STICK AUTO W/O MICRO      CULTURE, URINE      REFERRAL TO UROLOGY   3. Microscopic hematuria R31.29 599.72 sertraline (ZOLOFT) 100 mg tablet      AMB POC URINALYSIS DIP STICK AUTO W/O MICRO      CULTURE, URINE      REFERRAL TO UROLOGY   4. Vaginal discharge N89.8 623.5 fluconazole (DIFLUCAN) 150 mg tablet      NUSWAB VAGINITIS PLUS   5. Nonspecific urethritis  N34.1 099.40 NUSWAB VAGINITIS PLUS   6. Advance care planning Z71.89 V65.49    7. Medicare annual wellness visit, subsequent Z00.00 V70.0      Orders Placed This Encounter    CULTURE, URINE    [facility]     Referral Priority:   Routine     Referral Type:   Consultation     Referral Reason:   Specialty Services Required     Referral Location:   [facility]     Referred to Provider:   Maricarmen Gotti MD    AMB POC URINALYSIS DIP STICK AUTO W/O MICRO    sertraline (ZOLOFT) 100 mg tablet     Sig: PLEASE TAKE 1/2 TABLET DAILY FOR 5 DAYS AND THEN TAKE ONE TABLET DAILY. Refill:  0    fluconazole (DIFLUCAN) 150 mg tablet     Sig: Take 1 Tab by mouth as needed (vaginal discharge and itching). Dispense:  2 Tab     Refill:  [facility] [address] expressed understanding of plan. An After Visit Summary was offered/printed and given to the patient.

## 2017-09-28 NOTE — PROGRESS NOTES
Pt states that she's been having lower back pain, pt states started for the last 2 days, pt states that it hurts when she goes to the restroom. Pt is here for   Chief Complaint   Patient presents with    Annual Wellness Visit     medicare wellness    Abdominal Pain     pt states started on Sunday after eating [employer]    Vaginal Itching     pt states the irritation started Sunday, no odor, no discharge, no color     Pt denies pain at this time. 1. Have you been to the ER, urgent care clinic since your last visit? Hospitalized since your last visit? No    2. Have you seen or consulted any other health care providers outside of the [address] [place] [address] since your last visit? Include any pap smears or colon screening.  No    Verbal Order given for UA per RAFAEL Phan

## 2017-09-28 NOTE — MR AVS SNAPSHOT
Visit Information Date & Time Provider Department Dept. Phone Encounter #  
 9/28/2017  8:00 AM Elida Villanueva NP [address] [facility] at [phone] [id number] Follow-up Instructions Return if symptoms worsen or fail to improve. Your Appointments 12/18/2017  8:30 AM  
ESTABLISHED PATIENT with Jarocho Billy NP [facility] ([facility]) Appt Note: 3m f.u  
 [address] [facility] [place] [address]  
  
   
 [address] [facility] [place] [address] [address] Upcoming Health Maintenance Date Due  
 FOOT EXAM Q1 5/18/2016 MEDICARE YEARLY EXAM 4/6/2017 HEMOGLOBIN A1C Q6M 9/14/2017 MICROALBUMIN Q1 3/14/2018 LIPID PANEL Q1 3/14/2018 EYE EXAM RETINAL OR DILATED Q1 4/12/2018 PAP AKA CERVICAL CYTOLOGY 6/1/2022 DTaP/Tdap/Td series (2 - Td) 2/2/2024 COLONOSCOPY 12/17/2025 Allergies as of 9/28/2017  Review Complete On: 9/28/2017 By: Laura Hickey LPN Severity Noted Reaction Type Reactions Amoxicillin  12/29/2010    Rash, Other (comments) Vaginal itching Flagyl [Metronidazole]  11/16/2016   Side Effect Nausea and Vomiting Pcn [Penicillins]  09/20/2014   Intolerance Itching Vaginal itching Current Immunizations  Reviewed on 11/15/2011 Name Date Influenza Vaccine (Quad) PF 9/8/2017 Influenza Vaccine Intradermal PF 11/5/2014 PPD 6/20/2012 Tdap 2/2/2014  4:52 AM  
  
 Not reviewed this visit You Were Diagnosed With   
  
 Codes Comments Urinary pain    -  Primary ICD-10-CM: R30.9 ICD-9-CM: 746. 1 Left flank pain     ICD-10-CM: R10.9 ICD-9-CM: 789.09 Microscopic hematuria     ICD-10-CM: R31.29 ICD-9-CM: 599.72 Vaginal discharge     ICD-10-CM: N89.8 ICD-9-CM: 623.5 Nonspecific urethritis     ICD-10-CM: N34.1 ICD-9-CM: 099.40 Advance care planning     ICD-10-CM: Z71.89 ICD-9-CM: V65.49 Medicare annual wellness visit, subsequent     ICD-10-CM: Z00.00 ICD-9-CM: V70.0 Vitals BP Pulse Temp Resp Height(growth percentile) Weight(growth percentile) 128/83 (BP 1 Location: Left arm, BP Patient Position: Sitting) 69 97.5 °F (36.4 °C) (Oral) 18 5' 2\" (1.575 m) 153 lb 6.4 oz (69.6 kg) LMP SpO2 BMI OB Status Smoking Status 09/13/2017 100% 28.06 kg/m2 Having regular periods Former Smoker Vitals History BMI and BSA Data Body Mass Index Body Surface Area 28.06 kg/m 2 1.74 m 2 Preferred Pharmacy Pharmacy Name Phone [facility]/PH[facility]Y #[id number]- [facility] [address] [phone] Your Updated Medication List  
  
   
This list is accurate as of: 9/28/17 10:04 AM.  Always use your most recent med list.  
  
  
  
  
 busPIRone 10 mg tablet Commonly known as:  BUSPAR Take 1 Tab by mouth two (2) times a day. fluconazole 150 mg tablet Commonly known as:  DIFLUCAN Take 1 Tab by mouth as needed (vaginal discharge and itching). OLANZapine 5 mg tablet Commonly known as:  ZyPREXA Take 1 Tab by mouth nightly. PRENATAL PLUS (CALCIUM CARB) 27 mg iron- 1 mg Tab Generic drug:  prenatal vit-calcium-iron-fa TAKE 1 TABLET EVERY DAY AS DIRECTED  
  
 raNITIdine 150 mg tablet Commonly known as:  ZANTAC Take 1 Tab by mouth two (2) times a day. Indications: HEARTBURN  
  
 * sertraline 100 mg tablet Commonly known as:  ZOLOFT  
PLEASE TAKE 1/2 TABLET DAILY FOR 5 DAYS AND THEN TAKE ONE TABLET DAILY. * sertraline 50 mg tablet Commonly known as:  ZOLOFT Take 1 Tab by mouth daily. * Notice: This list has 2 medication(s) that are the same as other medications prescribed for you. Read the directions carefully, and ask your doctor or other care provider to review them with you. Prescriptions Sent to Pharmacy  Refills  
 fluconazole (DIFLUCAN) 150 mg tablet 0  
 Sig: Take 1 Tab by mouth as needed (vaginal discharge and itching). Class: Normal  
 Pharmacy: [facility] [address]  #: [phone] Route: Oral  
  
We Performed the Following AMB POC URINALYSIS DIP STICK AUTO W/O MICRO [00763 CPT(R)] CULTURE, URINE [id number] CPT(R)] [address] [id number] Custom] REFERRAL TO UROLOGY [[id number] Custom] Comments:  
 Please evaluate patient for persistent microscopic hematuria and possible renal colic. Follow-up Instructions Return if symptoms worsen or fail to improve. Referral Information Referral ID Referred By Referred To  
  
 [id number] [address], [address] [place] [address]   
   [place], [address] Visits Status Start Date End Date 1 New Request 9/28/17 9/28/18 If your referral has a status of pending review or denied, additional information will be sent to support the outcome of this decision. Patient Instructions Blood in the Urine: Care Instructions Your Care Instructions Blood in the urine, or hematuria, may make the urine look red, brown, or pink. There may be blood every time you urinate or just from time to time. You cannot always see blood in the urine, but it will show up in a urine test. 
Blood in the urine may be serious. It should always be checked by a doctor. Your doctor may recommend more tests, including an X-ray, a CT scan, or a cystoscopy (which lets a doctor look inside the urethra and bladder). Blood in the urine can be a sign of another problem. Common causes are bladder infections and kidney stones. An injury to your groin or your genital area can also cause bleeding in the urinary tract. Very hard exercisesuch as running a marathoncan cause blood in the urine.  Blood in the urine can also be a sign of kidney disease or cancer in the bladder or kidney. Many cases of blood in the urine are caused by a harmless condition that runs in families. This is called benign familial hematuria. It does not need any treatment. Sometimes your urine may look red or brown even though it does not contain blood. For example, not getting enough fluids (dehydration), taking certain medicines, or having a liver problem can change the color of your urine. Eating foods such as beets, rhubarb, or blackberries or foods with red food coloring can make your urine look red or pink. Follow-up care is a key part of your treatment and safety. Be sure to make and go to all appointments, and call your doctor if you are having problems. It's also a good idea to know your test results and keep a list of the medicines you take. When should you call for help? Call your doctor now or seek immediate medical care if: 
· You have symptoms of a urinary infection. For example: ¨ You have pus in your urine. ¨ You have pain in your back just below your rib cage. This is called flank pain. ¨ You have a fever, chills, or body aches. ¨ It hurts to urinate. ¨ You have groin or belly pain. · You have more blood in your urine. Watch closely for changes in your health, and be sure to contact your doctor if: 
· You have new urination problems. · You do not get better as expected. Where can you learn more? Go to http://star-arvin.info/. Enter S330 in the search box to learn more about \"Blood in the Urine: Care Instructions. \" Current as of: March 20, 2017 Content Version: 11.3 © 0376-4187 Mandoyo. Care instructions adapted under license by [employer] (which disclaims liability or warranty for this information). If you have questions about a medical condition or this instruction, always ask your healthcare professional. [address] any warranty or liability for your use of this information. Cystoscopy: Before Your Procedure What is a cystoscopy? A cystoscopy is a procedure that lets a doctor look inside your bladder and urethra. The urethra is the tube that carries urine from the bladder to outside the body. The doctor uses a thin, lighted tool called a cystoscope. With this tool, he or she can look for kidney or bladder stones. The doctor can also look for tumors, bleeding, or infection. If you are in a clinic and you are awake, you will get gel to numb your urethra. This makes the procedure more comfortable. Then the doctor puts the tube into your urethra and moves it into your bladder. Next, the doctor fills your bladder with liquid. This helps him or her see better. It may cause you to feel pressure in your bladder area for a short time. If you are in the hospital, you may get medicine to make you sleep during the procedure. While you are asleep, the doctor can take samples of tissue. These will be checked for cancer and other problems. This is called a biopsy. If you have a biopsy, you may have a small amount of blood in your urine for several days. You may also need a catheter. It's a tube that drains urine from your bladder. Your doctor will take it out at your follow-up visit. Follow-up care is a key part of your treatment and safety. Be sure to make and go to all appointments, and call your doctor if you are having problems. It's also a good idea to know your test results and keep a list of the medicines you take. What happens before the procedure? Procedures can be stressful. This information will help you understand what you can expect. And it will help you safely prepare for your procedure. Preparing for the procedure · Understand exactly what procedure is planned, along with the risks, benefits, and other options. · Tell your doctors ALL the medicines, vitamins, supplements, and herbal remedies you take.  Some of these can increase the risk of bleeding or interact with anesthesia. · If you take blood thinners, such as warfarin (Coumadin), clopidogrel (Plavix), or aspirin, be sure to talk to your doctor. He or she will tell you if you should stop taking these medicines before your procedure. Make sure that you understand exactly what your doctor wants you to do. · Your doctor will tell you which medicines to take or stop before your procedure. You may need to stop taking certain medicines a week or more before the procedure. So talk to your doctor as soon as you can. · If you have an advance directive, let your doctor know. It may include a living will and a durable power of  for health care. Bring a copy to the hospital. If you don't have one, you may want to prepare one. It lets your doctor and loved ones know your health care wishes. Doctors advise that everyone prepare these papers before any type of surgery or procedure. What happens on the day of the procedure? · Follow the instructions exactly about when to stop eating and drinking. If you don't, your procedure may be canceled. If your doctor told you to take your medicines on the day of your procedure, take them with only a sip of water. · Take a bath or shower before you come in for your procedure. Do not apply lotions, perfumes, deodorants, or nail polish. · Take off all jewelry and piercings. And take out contact lenses, if you wear them. At the hospital or surgery center · Bring a picture ID. · You will be asked to empty your bladder just before the procedure. · You will be kept comfortable and safe by your anesthesia provider. The anesthesia may make you sleep. Or it may just numb the area being worked on. 
· In most cases, the cystoscope is in the bladder for less than 10 minutes. But the entire test may take up to 45 minutes or longer. Going home · Be sure you have someone to drive you home. Anesthesia and pain medicine make it unsafe for you to drive. · You will be given more specific instructions about recovering from your procedure. They will cover things like diet, wound care, follow-up care, driving, and getting back to your normal routine. When should you call your doctor? · You have questions or concerns. · You don't understand how to prepare for your procedure. · You become ill before the procedure (such as fever, flu, or a cold). · You need to reschedule or have changed your mind about having the procedure. Where can you learn more? Go to http://star-arvin.info/. Enter K245 in the search box to learn more about \"Cystoscopy: Before Your Procedure. \" Current as of: November 11, 2016 Content Version: 11.3 © 4849-0597 NexSteppe. Care instructions adapted under license by bMobilized (which disclaims liability or warranty for this information). If you have questions about a medical condition or this instruction, always ask your healthcare professional. [place] [address] any warranty or liability for your use of this information. Learning About Durable Power of  for Health Care What is a durable power of  for health care? A durable power of  for health care is one type of the legal forms called advance directives. It lets you decide who you want to make treatment decisions for you if you cannot speak or decide for yourself. The person you choose is called your health care agent. Another type of advance directive is a living will. It lets you write down what kinds of treatment or life support you want or do not want. What should you think about when choosing a health care agent? Choose your health care agent carefully. This person may or may not be a family member. Talk to the person before you make your final decision. Make sure he or she is comfortable with this responsibility. It's a good idea to choose someone who: · Is at least 25years old. · Knows you well and understands what makes life meaningful for you. · Understands your Shinto and moral values. · Will do what you want, not what he or she wants. · Will be able to make difficult choices at a stressful time. · Will be able to refuse or stop treatment, if that is what you would want, even if you could die. · Will be firm and confident with health professionals if needed. · Will ask questions to get necessary information. · Lives near you or agrees to travel to you if needed. Your family may help you make medical decisions while you can still be part of that process. But it is important to choose one person to be your health care agent in case you are not able to make decisions for yourself. If you don't fill out the legal form and name a health care agent, the decisions your family can make may be limited. Who will make decisions for you if you do not have a health care agent? If you don't have a health care agent or a living will, your family members may disagree about your medical care. And then some medical professionals who may not know you as well might have to make decisions for you. In some cases, a  makes the decisions. When you name a health care agent, it is very clear who has the power to make health decisions for you. How do you name a health care agent? You name your health care agent on a legal form. It is usually called a durable power of  for health care. Ask your hospital, state bar association, or office on aging where to find these forms. You must sign the form to make it legal. Some states require you to get the form notarized. This means that a person called a  watches you sign the form and then he or she signs the form. Some states also require that two or more witnesses sign the form. Be sure to tell your family members and doctors who your health care agent is. Keep your forms in a safe place. But make sure that your loved ones know where the forms are. This could be in your desk where you keep other important papers. Make sure your doctor has a copy of your forms. Where can you learn more? Go to http://star-arvin.info/. Enter [id number] in the search box to learn more about \"Learning About Durable Power of  for [facility]. \" Current as of: November 17, 2016 Content Version: 11.3 © 6503-2625 EstatesDirect.com. Care instructions adapted under license by FREEjit (which disclaims liability or warranty for this information). If you have questions about a medical condition or this instruction, always ask your healthcare professional. [address] any warranty or liability for your use of this information. Advance Directives: Care Instructions Your Care Instructions An advance directive is a legal way to state your wishes at the end of your life. It tells your family and your doctor what to do if you can no longer say what you want. There are two main types of advance directives. You can change them any time that your wishes change. · A living will tells your family and your doctor your wishes about life support and other treatment. · A durable power of  for health care lets you name a person to make treatment decisions for you when you can't speak for yourself. This person is called a health care agent. If you do not have an advance directive, decisions about your medical care may be made by a doctor or a  who doesn't know you. It may help to think of an advance directive as a gift to the people who care for you. If you have one, they won't have to make tough decisions by themselves. Follow-up care is a key part of your treatment and safety.  Be sure to make and go to all appointments, and call your doctor if you are having problems. It's also a good idea to know your test results and keep a list of the medicines you take. How can you care for yourself at home? · Discuss your wishes with your loved ones and your doctor. This way, there are no surprises. · Many states have a unique form. Or you might use a universal form that has been approved by many states. This kind of form can sometimes be completed and stored online. Your electronic copy will then be available wherever you have a connection to the Internet. In most cases, doctors will respect your wishes even if you have a form from a different state. · You don't need a  to do an advance directive. But you may want to get legal advice. · Think about these questions when you prepare an advance directive: ¨ Who do you want to make decisions about your medical care if you are not able to? Many people choose a family member or close friend. ¨ Do you know enough about life support methods that might be used? If not, talk to your doctor so you understand. ¨ What are you most afraid of that might happen? You might be afraid of having pain, losing your independence, or being kept alive by machines. ¨ Where would you prefer to die? Choices include your home, a hospital, or a nursing home. ¨ Would you like to have information about hospice care to support you and your family? ¨ Do you want to donate organs when you die? ¨ Do you want certain Adventism practices performed before you die? If so, put your wishes in the advance directive. · Read your advance directive every year, and make changes as needed. When should you call for help? Be sure to contact your doctor if you have any questions. Where can you learn more? Go to http://star-arvin.info/. Enter R264 in the search box to learn more about \"Advance Directives: Care Instructions. \" Current as of: November 17, 2016 Content Version: 11.3 © 4973-5692 Healthwise, Incorporated. Care instructions adapted under license by [employer] Experience (which disclaims liability or warranty for this information). If you have questions about a medical condition or this instruction, always ask your healthcare professional. [address] any warranty or liability for your use of this information. Introducing [facility] & HEALTH SERVICES! Dear Ant Cronin: Thank you for requesting a Toutiao account. Our records indicate that you already have an active Toutiao account. You can access your account anytime at [email or web address] Did you know that you can access your hospital and ER discharge instructions at any time in Toutiao? You can also review all of your test results from your hospital stay or ER visit. Additional Information If you have questions, please visit the Frequently Asked Questions section of the Toutiao website at [email or web address] Remember, Toutiao is NOT to be used for urgent needs. For medical emergencies, dial 911. Now available from your iPhone and Android! Please provide this summary of care documentation to your next provider. Your primary care clinician is listed as Trever Borrego. If you have any questions after today's visit, please call [phone].

## 2017-09-28 NOTE — PATIENT INSTRUCTIONS
Blood in the Urine: Care Instructions  Your Care Instructions  Blood in the urine, or hematuria, may make the urine look red, brown, or pink. There may be blood every time you urinate or just from time to time. You cannot always see blood in the urine, but it will show up in a urine test.  Blood in the urine may be serious. It should always be checked by a doctor. Your doctor may recommend more tests, including an X-ray, a CT scan, or a cystoscopy (which lets a doctor look inside the urethra and bladder). Blood in the urine can be a sign of another problem. Common causes are bladder infections and kidney stones. An injury to your groin or your genital area can also cause bleeding in the urinary tract. Very hard exercisesuch as running a marathoncan cause blood in the urine. Blood in the urine can also be a sign of kidney disease or cancer in the bladder or kidney. Many cases of blood in the urine are caused by a harmless condition that runs in families. This is called benign familial hematuria. It does not need any treatment. Sometimes your urine may look red or brown even though it does not contain blood. For example, not getting enough fluids (dehydration), taking certain medicines, or having a liver problem can change the color of your urine. Eating foods such as beets, rhubarb, or blackberries or foods with red food coloring can make your urine look red or pink. Follow-up care is a key part of your treatment and safety. Be sure to make and go to all appointments, and call your doctor if you are having problems. It's also a good idea to know your test results and keep a list of the medicines you take. When should you call for help? Call your doctor now or seek immediate medical care if:  · You have symptoms of a urinary infection. For example:  ¨ You have pus in your urine. ¨ You have pain in your back just below your rib cage. This is called flank pain.   ¨ You have a fever, chills, or body aches.  ¨ It hurts to urinate. ¨ You have groin or belly pain. · You have more blood in your urine. Watch closely for changes in your health, and be sure to contact your doctor if:  · You have new urination problems. · You do not get better as expected. Where can you learn more? Go to http://star-arvin.info/. Enter J644 in the search box to learn more about \"Blood in the Urine: Care Instructions. \"  Current as of: March 20, 2017  Content Version: 11.3  © 4680-6360 [employer]. Care instructions adapted under license by [employer] (which disclaims liability or warranty for this information). If you have questions about a medical condition or this instruction, always ask your healthcare professional. [address] any warranty or liability for your use of this information. Cystoscopy: Before Your Procedure  What is a cystoscopy? A cystoscopy is a procedure that lets a doctor look inside your bladder and urethra. The urethra is the tube that carries urine from the bladder to outside the body. The doctor uses a thin, lighted tool called a cystoscope. With this tool, he or she can look for kidney or bladder stones. The doctor can also look for tumors, bleeding, or infection. If you are in a clinic and you are awake, you will get gel to numb your urethra. This makes the procedure more comfortable. Then the doctor puts the tube into your urethra and moves it into your bladder. Next, the doctor fills your bladder with liquid. This helps him or her see better. It may cause you to feel pressure in your bladder area for a short time. If you are in the hospital, you may get medicine to make you sleep during the procedure. While you are asleep, the doctor can take samples of tissue. These will be checked for cancer and other problems. This is called a biopsy.  If you have a biopsy, you may have a small amount of blood in your urine for several days. You may also need a catheter. It's a tube that drains urine from your bladder. Your doctor will take it out at your follow-up visit. Follow-up care is a key part of your treatment and safety. Be sure to make and go to all appointments, and call your doctor if you are having problems. It's also a good idea to know your test results and keep a list of the medicines you take. What happens before the procedure? Procedures can be stressful. This information will help you understand what you can expect. And it will help you safely prepare for your procedure. Preparing for the procedure  · Understand exactly what procedure is planned, along with the risks, benefits, and other options. · Tell your doctors ALL the medicines, vitamins, supplements, and herbal remedies you take. Some of these can increase the risk of bleeding or interact with anesthesia. · If you take blood thinners, such as warfarin (Coumadin), clopidogrel (Plavix), or aspirin, be sure to talk to your doctor. He or she will tell you if you should stop taking these medicines before your procedure. Make sure that you understand exactly what your doctor wants you to do. · Your doctor will tell you which medicines to take or stop before your procedure. You may need to stop taking certain medicines a week or more before the procedure. So talk to your doctor as soon as you can. · If you have an advance directive, let your doctor know. It may include a living will and a durable power of  for health care. Bring a copy to the hospital. If you don't have one, you may want to prepare one. It lets your doctor and loved ones know your health care wishes. Doctors advise that everyone prepare these papers before any type of surgery or procedure. What happens on the day of the procedure? · Follow the instructions exactly about when to stop eating and drinking. If you don't, your procedure may be canceled.  If your doctor told you to take your medicines on the day of your procedure, take them with only a sip of water. · Take a bath or shower before you come in for your procedure. Do not apply lotions, perfumes, deodorants, or nail polish. · Take off all jewelry and piercings. And take out contact lenses, if you wear them. At the hospital or surgery center  · Bring a picture ID. · You will be asked to empty your bladder just before the procedure. · You will be kept comfortable and safe by your anesthesia provider. The anesthesia may make you sleep. Or it may just numb the area being worked on.  · In most cases, the cystoscope is in the bladder for less than 10 minutes. But the entire test may take up to 45 minutes or longer. Going home  · Be sure you have someone to drive you home. Anesthesia and pain medicine make it unsafe for you to drive. · You will be given more specific instructions about recovering from your procedure. They will cover things like diet, wound care, follow-up care, driving, and getting back to your normal routine. When should you call your doctor? · You have questions or concerns. · You don't understand how to prepare for your procedure. · You become ill before the procedure (such as fever, flu, or a cold). · You need to reschedule or have changed your mind about having the procedure. Where can you learn more? Go to http://star-arvin.info/. Enter B694 in the search box to learn more about \"Cystoscopy: Before Your Procedure. \"  Current as of: November 11, 2016  Content Version: 11.3  © 6533-3220 [employer]. Care instructions adapted under license by [employer] (which disclaims liability or warranty for this information). If you have questions about a medical condition or this instruction, always ask your healthcare professional. [place] [address] any warranty or liability for your use of this information.        Learning About Durable Power of  for [facility]  What is a durable power of  for health care? A durable power of  for health care is one type of the legal forms called advance directives. It lets you decide who you want to make treatment decisions for you if you cannot speak or decide for yourself. The person you choose is called your health care agent. Another type of advance directive is a living will. It lets you write down what kinds of treatment or life support you want or do not want. What should you think about when choosing a health care agent? Choose your health care agent carefully. This person may or may not be a family member. Talk to the person before you make your final decision. Make sure he or she is comfortable with this responsibility. It's a good idea to choose someone who:  · Is at least 25years old. · Knows you well and understands what makes life meaningful for you. · Understands your Moravian and moral values. · Will do what you want, not what he or she wants. · Will be able to make difficult choices at a stressful time. · Will be able to refuse or stop treatment, if that is what you would want, even if you could die. · Will be firm and confident with health professionals if needed. · Will ask questions to get necessary information. · Lives near you or agrees to travel to you if needed. Your family may help you make medical decisions while you can still be part of that process. But it is important to choose one person to be your health care agent in case you are not able to make decisions for yourself. If you don't fill out the legal form and name a health care agent, the decisions your family can make may be limited. Who will make decisions for you if you do not have a health care agent? If you don't have a health care agent or a living will, your family members may disagree about your medical care.  And then some medical professionals who may not know you as well might have to make decisions for you. In some cases, a  makes the decisions. When you name a health care agent, it is very clear who has the power to make health decisions for you. How do you name a health care agent? You name your health care agent on a legal form. It is usually called a durable power of  for health care. Ask your hospital, state bar association, or office on aging where to find these forms. You must sign the form to make it legal. Some states require you to get the form notarized. This means that a person called a  watches you sign the form and then he or she signs the form. Some states also require that two or more witnesses sign the form. Be sure to tell your family members and doctors who your health care agent is. Keep your forms in a safe place. But make sure that your loved ones know where the forms are. This could be in your desk where you keep other important papers. Make sure your doctor has a copy of your forms. Where can you learn more? Go to http://star-arvin.info/. Enter [id number] in the search box to learn more about \"Learning About Durable Power of  for [facility]. \"  Current as of: November 17, 2016  Content Version: 11.3  © 2615-8108 [employer]. Care instructions adapted under license by [employer] (which disclaims liability or warranty for this information). If you have questions about a medical condition or this instruction, always ask your healthcare professional. [place] [address] any warranty or liability for your use of this information. Advance Directives: Care Instructions  Your Care Instructions  An advance directive is a legal way to state your wishes at the end of your life. It tells your family and your doctor what to do if you can no longer say what you want. There are two main types of advance directives. You can change them any time that your wishes change.   · A living will tells your family and your doctor your wishes about life support and other treatment. · A durable power of  for health care lets you name a person to make treatment decisions for you when you can't speak for yourself. This person is called a health care agent. If you do not have an advance directive, decisions about your medical care may be made by a doctor or a  who doesn't know you. It may help to think of an advance directive as a gift to the people who care for you. If you have one, they won't have to make tough decisions by themselves. Follow-up care is a key part of your treatment and safety. Be sure to make and go to all appointments, and call your doctor if you are having problems. It's also a good idea to know your test results and keep a list of the medicines you take. How can you care for yourself at home? · Discuss your wishes with your loved ones and your doctor. This way, there are no surprises. · Many states have a unique form. Or you might use a universal form that has been approved by many states. This kind of form can sometimes be completed and stored online. Your electronic copy will then be available wherever you have a connection to the Internet. In most cases, doctors will respect your wishes even if you have a form from a different state. · You don't need a  to do an advance directive. But you may want to get legal advice. · Think about these questions when you prepare an advance directive:  ¨ Who do you want to make decisions about your medical care if you are not able to? Many people choose a family member or close friend. ¨ Do you know enough about life support methods that might be used? If not, talk to your doctor so you understand. ¨ What are you most afraid of that might happen? You might be afraid of having pain, losing your independence, or being kept alive by machines. ¨ Where would you prefer to die? Choices include your home, a hospital, or a nursing home.   ¨ Would you like to have information about hospice care to support you and your family? ¨ Do you want to donate organs when you die? ¨ Do you want certain Voodoo practices performed before you die? If so, put your wishes in the advance directive. · Read your advance directive every year, and make changes as needed. When should you call for help? Be sure to contact your doctor if you have any questions. Where can you learn more? Go to http://star-arvin.info/. Enter R264 in the search box to learn more about \"Advance Directives: Care Instructions. \"  Current as of: November 17, 2016  Content Version: 11.3  © 9596-2208 [employer]. Care instructions adapted under license by [employer] (which disclaims liability or warranty for this information). If you have questions about a medical condition or this instruction, always ask your healthcare professional. [address] any warranty or liability for your use of this information.

## 2017-09-30 LAB — BACTERIA UR CULT: NORMAL

## 2017-10-03 ENCOUNTER — HOSPITAL ENCOUNTER (EMERGENCY)
Age: 37
Discharge: HOME | End: 2017-10-03
Attending: EMERGENCY MEDICINE
Payer: MEDICARE

## 2017-10-03 VITALS
DIASTOLIC BLOOD PRESSURE: 68 MMHG | SYSTOLIC BLOOD PRESSURE: 128 MMHG | OXYGEN SATURATION: 99 % | RESPIRATION RATE: 20 BRPM | TEMPERATURE: 98.1 F | HEART RATE: 87 BPM

## 2017-10-03 DIAGNOSIS — R19.7 NAUSEA VOMITING AND DIARRHEA: Primary | ICD-10-CM

## 2017-10-03 DIAGNOSIS — R11.2 NAUSEA VOMITING AND DIARRHEA: Primary | ICD-10-CM

## 2017-10-03 PROCEDURE — 74011250637 HC RX REV CODE- 250/637: Performed by: EMERGENCY MEDICINE

## 2017-10-03 PROCEDURE — 99283 EMERGENCY DEPT VISIT LOW MDM: CPT

## 2017-10-03 RX ORDER — ONDANSETRON 8 MG/1
8 TABLET, ORALLY DISINTEGRATING ORAL
Qty: 20 TAB | Refills: 0 | Status: SHIPPED | OUTPATIENT
Start: 2017-10-03 | End: 2018-05-30 | Stop reason: SDUPTHER

## 2017-10-03 RX ORDER — LOPERAMIDE HYDROCHLORIDE 2 MG/1
2 CAPSULE ORAL
Qty: 15 CAP | Refills: 0 | Status: SHIPPED | OUTPATIENT
Start: 2017-10-03 | End: 2018-01-31

## 2017-10-03 RX ORDER — ONDANSETRON 4 MG/1
8 TABLET, ORALLY DISINTEGRATING ORAL
Status: COMPLETED | OUTPATIENT
Start: 2017-10-03 | End: 2017-10-03

## 2017-10-03 RX ORDER — LOPERAMIDE HYDROCHLORIDE 2 MG/1
4 CAPSULE ORAL
Status: COMPLETED | OUTPATIENT
Start: 2017-10-03 | End: 2017-10-03

## 2017-10-03 RX ADMIN — LOPERAMIDE HYDROCHLORIDE 4 MG: 2 CAPSULE ORAL at 03:45

## 2017-10-03 RX ADMIN — ONDANSETRON 8 MG: 4 TABLET, ORALLY DISINTEGRATING ORAL at 03:45

## 2017-10-03 NOTE — LETTER
[facility] [place] EMERGENCY DEPT 
[address] [id number] [phone] Work/School Note Date: 10/3/2017 To Whom It May concern: 
 
Kana Abrams was seen and treated today in the emergency room by the following provider(s): 
Attending Provider: Alvino Arnold MD. Kana Abrams may return to work on 10/5/17. Sincerely, Alvino Arnold MD

## 2017-10-03 NOTE — ED NOTES
Pt in ED w/ complaint of N/V/D and \"a burning sensation in abdomen\" X 1 day. Pt is A&O X 4 and appears in no distress. Emergency Department Nursing Plan of Care       The Nursing Plan of Care is developed from the Nursing assessment and Emergency Department Attending provider initial evaluation. The plan of care may be reviewed in the ED Provider note.     The Plan of Care was developed with the following considerations:   Patient / Family readiness to learn indicated by:verbalized understanding  Persons(s) to be included in education: patient  Barriers to Learning/Limitations:No    Signed     Sweta Carrillo RN    10/3/2017   4:02 AM

## 2017-10-03 NOTE — DISCHARGE INSTRUCTIONS
Diarrhea: Care Instructions  Your Care Instructions    Diarrhea is loose, watery stools (bowel movements). The exact cause is often hard to find. Sometimes diarrhea is your body's way of getting rid of what caused an upset stomach. Viruses, food poisoning, and many medicines can cause diarrhea. Some people get diarrhea in response to emotional stress, anxiety, or certain foods. Almost everyone has diarrhea now and then. It usually isn't serious, and your stools will return to normal soon. The important thing to do is replace the fluids you have lost, so you can prevent dehydration. The doctor has checked you carefully, but problems can develop later. If you notice any problems or new symptoms, get medical treatment right away. Follow-up care is a key part of your treatment and safety. Be sure to make and go to all appointments, and call your doctor if you are having problems. It's also a good idea to know your test results and keep a list of the medicines you take. How can you care for yourself at home? · Watch for signs of dehydration, which means your body has lost too much water. Dehydration is a serious condition and should be treated right away. Signs of dehydration are:  ¨ Increasing thirst and dry eyes and mouth. ¨ Feeling faint or lightheaded. ¨ Darker urine, and a smaller amount of urine than normal.  · To prevent dehydration, drink plenty of fluids, enough so that your urine is light yellow or clear like water. Choose water and other caffeine-free clear liquids until you feel better. If you have kidney, heart, or liver disease and have to limit fluids, talk with your doctor before you increase the amount of fluids you drink. · Begin eating small amounts of mild foods the next day, if you feel like it. ¨ Try yogurt that has live cultures of Lactobacillus. (Check the label.)  ¨ Avoid spicy foods, fruits, alcohol, and caffeine until 48 hours after all symptoms are gone.   ¨ Avoid chewing gum that contains sorbitol. ¨ Avoid dairy products (except for yogurt with Lactobacillus) while you have diarrhea and for 3 days after symptoms are gone. · The doctor may recommend that you take over-the-counter medicine, such as loperamide (Imodium), if you still have diarrhea after 6 hours. Read and follow all instructions on the label. Do not use this medicine if you have bloody diarrhea, a high fever, or other signs of serious illness. Call your doctor if you think you are having a problem with your medicine. When should you call for help? Call 911 anytime you think you may need emergency care. For example, call if:  · You passed out (lost consciousness). · Your stools are maroon or very bloody. Call your doctor now or seek immediate medical care if:  · You are dizzy or lightheaded, or you feel like you may faint. · Your stools are black and look like tar, or they have streaks of blood. · You have new or worse belly pain. · You have symptoms of dehydration, such as:  ¨ Dry eyes and a dry mouth. ¨ Passing only a little dark urine. ¨ Feeling thirstier than usual.  · You have a new or higher fever. Watch closely for changes in your health, and be sure to contact your doctor if:  · Your diarrhea is getting worse. · You see pus in the diarrhea. · You are not getting better after 2 days (48 hours). Where can you learn more? Go to http://star-arvin.info/. Enter I319 in the search box to learn more about \"Diarrhea: Care Instructions. \"  Current as of: March 20, 2017  Content Version: 11.3  © 6394-9259 [employer]. Care instructions adapted under license by [employer] (which disclaims liability or warranty for this information). If you have questions about a medical condition or this instruction, always ask your healthcare professional. [address] any warranty or liability for your use of this information.        Gastroenteritis: Care Instructions  Your Care Instructions  Gastroenteritis is an illness that may cause nausea, vomiting, and diarrhea. It is sometimes called \"stomach flu. \" It can be caused by bacteria or a virus. You will probably begin to feel better in 1 to 2 days. In the meantime, get plenty of rest and make sure you do not become dehydrated. Dehydration occurs when your body loses too much fluid. Follow-up care is a key part of your treatment and safety. Be sure to make and go to all appointments, and call your doctor if you are having problems. Its also a good idea to know your test results and keep a list of the medicines you take. How can you care for yourself at home? · If your doctor prescribed antibiotics, take them as directed. Do not stop taking them just because you feel better. You need to take the full course of antibiotics. · Drink plenty of fluids to prevent dehydration, enough so that your urine is light yellow or clear like water. Choose water and other caffeine-free clear liquids until you feel better. If you have kidney, heart, or liver disease and have to limit fluids, talk with your doctor before you increase your fluid intake. · Drink fluids slowly, in frequent, small amounts, because drinking too much too fast can cause vomiting. · Begin eating mild foods, such as dry toast, yogurt, applesauce, bananas, and rice. Avoid spicy, hot, or high-fat foods, and do not drink alcohol or caffeine for a day or two. Do not drink milk or eat ice cream until you are feeling better. How to prevent gastroenteritis  · Keep hot foods hot and cold foods cold. · Do not eat meats, dressings, salads, or other foods that have been kept at room temperature for more than 2 hours. · Use a thermometer to check your refrigerator. It should be between 34°F and 40°F.  · Defrost meats in the refrigerator or microwave, not on the kitchen counter. · Keep your hands and your kitchen clean.  Wash your hands, cutting boards, and countertops with hot soapy water frequently. · Cook meat until it is well done. · Do not eat raw eggs or uncooked sauces made with raw eggs. · Do not take chances. If food looks or tastes spoiled, throw it out. When should you call for help? Call 911 anytime you think you may need emergency care. For example, call if:  · You vomit blood or what looks like coffee grounds. · You passed out (lost consciousness). · You pass maroon or very bloody stools. Call your doctor now or seek immediate medical care if:  · You have severe belly pain. · You have signs of needing more fluids. You have sunken eyes, a dry mouth, and pass only a little dark urine. · You feel like you are going to faint. · You have increased belly pain that does not go away in 1 to 2 days. · You have new or increased nausea, or you are vomiting. · You have a new or higher fever. · Your stools are black and tarlike or have streaks of blood. Watch closely for changes in your health, and be sure to contact your doctor if:  · You are dizzy or lightheaded. · You urinate less than usual, or your urine is dark yellow or brown. · You do not feel better with each day that goes by. Where can you learn more? Go to http://star-arvin.info/. Enter N142 in the search box to learn more about \"Gastroenteritis: Care Instructions. \"  Current as of: March 3, 2017  Content Version: 11.3  © 6808-8465 [employer]. Care instructions adapted under license by [employer] (which disclaims liability or warranty for this information). If you have questions about a medical condition or this instruction, always ask your healthcare professional. [address] any warranty or liability for your use of this information. Nausea and Vomiting: Care Instructions  Your Care Instructions    When you are nauseated, you may feel weak and sweaty and notice a lot of saliva in your mouth.  Nausea often leads to vomiting. Most of the time you do not need to worry about nausea and vomiting, but they can be signs of other illnesses. Two common causes of nausea and vomiting are stomach flu and food poisoning. Nausea and vomiting from viral stomach flu will usually start to improve within 24 hours. Nausea and vomiting from food poisoning may last from 12 to 48 hours. The doctor has checked you carefully, but problems can develop later. If you notice any problems or new symptoms, get medical treatment right away. Follow-up care is a key part of your treatment and safety. Be sure to make and go to all appointments, and call your doctor if you are having problems. It's also a good idea to know your test results and keep a list of the medicines you take. How can you care for yourself at home? · To prevent dehydration, drink plenty of fluids, enough so that your urine is light yellow or clear like water. Choose water and other caffeine-free clear liquids until you feel better. If you have kidney, heart, or liver disease and have to limit fluids, talk with your doctor before you increase the amount of fluids you drink. · Rest in bed until you feel better. · When you are able to eat, try clear soups, mild foods, and liquids until all symptoms are gone for 12 to 48 hours. Other good choices include dry toast, crackers, cooked cereal, and gelatin dessert, such as Jell-O. When should you call for help? Call 911 anytime you think you may need emergency care. For example, call if:  · You passed out (lost consciousness). Call your doctor now or seek immediate medical care if:  · You have symptoms of dehydration, such as:  ¨ Dry eyes and a dry mouth. ¨ Passing only a little dark urine. ¨ Feeling thirstier than usual.  · You have new or worsening belly pain. · You have a new or higher fever. · You vomit blood or what looks like coffee grounds.   Watch closely for changes in your health, and be sure to contact your doctor if:  · You have ongoing nausea and vomiting. · Your vomiting is getting worse. · Your vomiting lasts longer than 2 days. · You are not getting better as expected. Where can you learn more? Go to http://star-arvin.info/. Enter 25 [id number] in the search box to learn more about \"Nausea and Vomiting: Care Instructions. \"  Current as of: March 20, 2017  Content Version: 11.3  © 9215-4644 [employer]. Care instructions adapted under license by [employer] (which disclaims liability or warranty for this information). If you have questions about a medical condition or this instruction, always ask your healthcare professional. [address] any warranty or liability for your use of this information.

## 2017-11-04 ENCOUNTER — HOSPITAL ENCOUNTER (EMERGENCY)
Age: 37
Discharge: HOME | End: 2017-11-04
Attending: EMERGENCY MEDICINE
Payer: MEDICARE

## 2017-11-04 VITALS
HEART RATE: 102 BPM | TEMPERATURE: 99.1 F | OXYGEN SATURATION: 100 % | RESPIRATION RATE: 18 BRPM | BODY MASS INDEX: 28.71 KG/M2 | HEIGHT: 62 IN | WEIGHT: 156 LBS | SYSTOLIC BLOOD PRESSURE: 126 MMHG | DIASTOLIC BLOOD PRESSURE: 80 MMHG

## 2017-11-04 DIAGNOSIS — R31.9 URINARY TRACT INFECTION WITH HEMATURIA, SITE UNSPECIFIED: Primary | ICD-10-CM

## 2017-11-04 DIAGNOSIS — N39.0 URINARY TRACT INFECTION WITH HEMATURIA, SITE UNSPECIFIED: Primary | ICD-10-CM

## 2017-11-04 LAB
APPEARANCE UR: ABNORMAL
BACTERIA URNS QL MICRO: ABNORMAL /HPF
BILIRUB UR QL: NEGATIVE
COLOR UR: ABNORMAL
EPITH CASTS URNS QL MICRO: ABNORMAL /LPF
GLUCOSE UR STRIP.AUTO-MCNC: NEGATIVE MG/DL
HCG UR QL: NEGATIVE
HGB UR QL STRIP: ABNORMAL
KETONES UR QL STRIP.AUTO: ABNORMAL MG/DL
LEUKOCYTE ESTERASE UR QL STRIP.AUTO: ABNORMAL
NITRITE UR QL STRIP.AUTO: POSITIVE
PH UR STRIP: 6 [PH] (ref 5–8)
PROT UR STRIP-MCNC: >300 MG/DL
RBC #/AREA URNS HPF: >100 /HPF (ref 0–5)
SP GR UR REFRACTOMETRY: 1.02 (ref 1–1.03)
UA: UC IF INDICATED,UAUC: ABNORMAL
UROBILINOGEN UR QL STRIP.AUTO: 1 EU/DL (ref 0.2–1)
WBC URNS QL MICRO: >100 /HPF (ref 0–4)

## 2017-11-04 PROCEDURE — 87086 URINE CULTURE/COLONY COUNT: CPT | Performed by: PHYSICIAN ASSISTANT

## 2017-11-04 PROCEDURE — 87186 SC STD MICRODIL/AGAR DIL: CPT | Performed by: PHYSICIAN ASSISTANT

## 2017-11-04 PROCEDURE — 81025 URINE PREGNANCY TEST: CPT

## 2017-11-04 PROCEDURE — 81001 URINALYSIS AUTO W/SCOPE: CPT | Performed by: PHYSICIAN ASSISTANT

## 2017-11-04 PROCEDURE — 87077 CULTURE AEROBIC IDENTIFY: CPT | Performed by: PHYSICIAN ASSISTANT

## 2017-11-04 PROCEDURE — 99283 EMERGENCY DEPT VISIT LOW MDM: CPT

## 2017-11-04 RX ORDER — ACETAMINOPHEN 325 MG/1
650 TABLET ORAL
Qty: 20 TAB | Refills: 0 | Status: SHIPPED | OUTPATIENT
Start: 2017-11-04 | End: 2018-01-31

## 2017-11-04 RX ORDER — NITROFURANTOIN 25; 75 MG/1; MG/1
100 CAPSULE ORAL 2 TIMES DAILY
Qty: 14 CAP | Refills: 0 | Status: SHIPPED | OUTPATIENT
Start: 2017-11-04 | End: 2017-11-11

## 2017-11-04 NOTE — ED PROVIDER NOTES
Patient is a 42 y.o. female presenting with hematuria. The history is provided by the patient. Blood in Urine    This is a new problem. The current episode started yesterday. The problem occurs every urination. The problem has been gradually worsening. The quality of the pain is described as burning. The pain is at a severity of 10/10. The pain is moderate. There has been no fever. She is sexually active. Associated symptoms include hematuria, urgency and abdominal pain (suprapubic pain). Pertinent negatives include no chills, no sweats, no nausea, no vomiting, no discharge, no frequency, no hesitancy, no flank pain, no vaginal discharge and no back pain. It is unknown if the patient is pregnant. She has tried nothing for the symptoms. Past Medical History:   Diagnosis Date    Anxiety     Bipolar affective ([facility] [address]     Depression     Diabetes ([facility] [address]     diet control    Hyperlipidemia     High Cholesterol    Hyperlipidemia     Major depression        Past Surgical History:   Procedure Laterality Date    HX GI      Colonoscopy    HX HERNIA REPAIR      as child         Family History:   Problem Relation Age of Onset    Diabetes Mother     Depression Mother     Asthma Brother     Stroke Maternal Grandmother      aneurysm    Cancer Maternal Grandmother      kidney    Hypertension Maternal Grandmother     Cancer Paternal Grandmother      lung    Diabetes Brother     Bipolar Disorder Brother     Schizophrenia Brother     Other Brother      paranoia       Social History     Social History    Marital status: SINGLE     Spouse name: N/A    Number of children: N/A    Years of education: N/A     Occupational History    Not on file.      Social History Main Topics    Smoking status: Former Smoker    Smokeless tobacco: Never Used      Comment: cigars    Alcohol use Yes      Comment: occasional    Drug use: No    Sexual activity: Yes     Partners: Male     Birth control/ protection: Injection Other Topics Concern    Not on file     Social History Narrative         ALLERGIES: Amoxicillin; Flagyl [metronidazole]; and Pcn [penicillins]    Review of Systems   Constitutional: Negative for activity change, chills, fatigue and fever. HENT: Negative. Respiratory: Negative. Negative for cough and shortness of breath. Cardiovascular: Negative. Negative for chest pain. Gastrointestinal: Positive for abdominal pain (suprapubic pain). Negative for constipation, diarrhea, nausea and vomiting. Genitourinary: Positive for dysuria, hematuria and urgency. Negative for difficulty urinating, flank pain, frequency, hesitancy, pelvic pain, vaginal bleeding, vaginal discharge and vaginal pain. Musculoskeletal: Negative. Negative for back pain. Skin: Negative. Negative for rash. Neurological: Negative. Negative for headaches. Psychiatric/Behavioral: Negative. Vitals:    11/04/17 1432   BP: 126/80   Pulse: (!) 102   Resp: 18   Temp: 99.1 °F (37.3 °C)   SpO2: 100%   Weight: 70.8 kg (156 lb)   Height: 5' 2\" (1.575 m)            Physical Exam   Constitutional: She is oriented to person, place, and time. She appears well-developed and well-nourished. No distress. HENT:   Head: Normocephalic and atraumatic. Right Ear: Hearing and external ear normal.   Left Ear: Hearing and external ear normal.   Nose: Nose normal.   Eyes: Conjunctivae and EOM are normal. Pupils are equal, round, and reactive to light. Neck: Normal range of motion. Cardiovascular: Intact distal pulses. Pulmonary/Chest: Effort normal. No respiratory distress. Abdominal: Soft. Normal appearance and bowel sounds are normal. She exhibits no distension and no mass. There is tenderness (\"pressure\") in the suprapubic area. There is no rigidity, no rebound, no guarding, no CVA tenderness, no tenderness at McBurney's point and negative Burnette's sign. Musculoskeletal: Normal range of motion.    Neurological: She is alert and oriented to person, place, and time. Skin: Skin is warm and dry. She is not diaphoretic. Psychiatric: She has a normal mood and affect. Her behavior is normal. Judgment and thought content normal.   Nursing note and vitals reviewed.        MDM  Number of Diagnoses or Management Options  Urinary tract infection with hematuria, site unspecified:   Diagnosis management comments: DDx: uti, pregnancy, pyelo, nephrolithiasis, urolithiasis    LABORATORY TESTS:  Recent Results (from the past 12 hour(s))  -URINALYSIS W/ REFLEX CULTURE  Collection Time: 11/04/17  2:59 PM       Result                                            Value                         Ref Range                       Color                                             DARK YELLOW                                                   Appearance                                        TURBID (A)                    CLEAR                           Specific gravity                                  1.025                         1.003 - 1.030                   pH (UA)                                           6.0                           5.0 - 8.0                       Protein                                           >300 (A)                      NEG mg/dL                       Glucose                                           NEGATIVE                      NEG mg/dL                       Ketone                                            TRACE (A)                     NEG mg/dL                       Bilirubin                                         NEGATIVE                      NEG                             Blood                                             LARGE (A)                     NEG                             Urobilinogen                                      1.0                           0.2 - 1.0 EU/dL                 Nitrites                                          POSITIVE (A)                  NEG                             Leukocyte Esterase LARGE (A)                     NEG                             WBC                                               >100 (H)                      0 - 4 /hpf                      RBC                                               >100 (H)                      0 - 5 /hpf                      Epithelial cells                                  FEW                           FEW /lpf                        Bacteria                                          4+ (A)                        NEG /hpf                        UA:UC IF INDICATED                                URINE CULTURE ORDERED (A)     CNI                        -HCG URINE, QL. - POC  Collection Time: 11/04/17  3:02 PM       Result                                            Value                         Ref Range                       Pregnancy test,urine (POC)                        NEGATIVE                      NEG                          IMAGING RESULTS:  No orders to display    MEDICATIONS GIVEN:  Medications - No data to display    IMPRESSION:  Urinary tract infection with hematuria, site unspecified  (primary encounter diagnosis)    PLAN:  1. Current Discharge Medication List    START taking these medications    nitrofurantoin, macrocrystal-monohydrate, (MACROBID) 100 mg capsule  Take 1 Cap by mouth two (2) times a day for 7 days. Qty: 14 Cap Refills: 0    acetaminophen (TYLENOL) 325 mg tablet  Take 2 Tabs by mouth every four (4) hours as needed for Pain. Qty: 20 Tab Refills: 0      CONTINUE these medications which have NOT CHANGED    ondansetron (ZOFRAN ODT) 8 mg disintegrating tablet  Take 1 Tab by mouth every eight (8) hours as needed for Nausea. Qty: 20 Tab Refills: 0    loperamide (IMODIUM) 2 mg capsule  Take 1 Cap by mouth four (4) times daily as needed for Diarrhea. Qty: 15 Cap Refills: 0    !! sertraline (ZOLOFT) 100 mg tablet  PLEASE TAKE 1/2 TABLET DAILY FOR 5 DAYS AND THEN TAKE ONE TABLET DAILY.   Refills: 0  Associated Diagnoses:Urinary pain; Left flank pain; Microscopic hematuria    fluconazole (DIFLUCAN) 150 mg tablet  Take 1 Tab by mouth as needed (vaginal discharge and itching). Qty: 2 Tab Refills: 0  Associated Diagnoses:Vaginal discharge    busPIRone (BUSPAR) 10 mg tablet  Take 1 Tab by mouth two (2) times a day. Qty: 60 Tab Refills: 2  Associated Diagnoses: Moderate single current episode of major depressive disorder ([facility] [address] Anxiety; PTSD (post-traumatic stress disorder)    OLANZapine (ZYPREXA) 5 mg tablet  Take 1 Tab by mouth nightly. Qty: 30 Tab Refills: 2  Associated Diagnoses:Borderline personality disorder; Dissociative disorder or reaction, unspecified    !! sertraline (ZOLOFT) 50 mg tablet  Take 1 Tab by mouth daily. Qty: 30 Tab Refills: 2  Associated Diagnoses:PTSD (post-traumatic stress disorder); Moderate single current episode of major depressive disorder ([facility] [address] Dissociative disorder or reaction, unspecified    PRENATAL PLUS, CALCIUM CARB, 27 mg iron- 1 mg tab  TAKE 1 TABLET EVERY DAY AS DIRECTED  Refills: 4    raNITIdine (ZANTAC) 150 mg tablet  Take 1 Tab by mouth two (2) times a day. Indications: HEARTBURN  Qty: 60 Tab Refills: 11    !! - Potential duplicate medications found. Please discuss with provider. 2. Follow-up Information     Follow up With Details Comments [address], [place] Schedule an appointment as soon as possible for a   visit in 1 week As needed, If symptoms worsen [address] [place] [address] Andreas Lowevelt  [phone]        Return to ED if worse                  Amount and/or Complexity of Data Reviewed  Clinical lab tests: ordered and reviewed    Patient Progress  Patient progress: stable    ED Course       Procedures    3:19 PM  I have discussed with patient their diagnosis, treatment, and follow up plan. The patient agrees to follow up as outlined in discharge paperwork and also to return to the ED with any worsening.  Shayy Broderick PA-C

## 2017-11-04 NOTE — ED NOTES
Emergency Department Nursing Plan of Care       The Nursing Plan of Care is developed from the Nursing assessment and Emergency Department Attending provider initial evaluation. The plan of care may be reviewed in the ED Provider note.     The Plan of Care was developed with the following considerations:   Patient / Family readiness to learn indicated by:verbalized understanding  Persons(s) to be included in education: patient  Barriers to Learning/Limitations:No    [address], RN    11/4/2017   2:50 PM    See Assessment

## 2017-11-04 NOTE — DISCHARGE INSTRUCTIONS

## 2017-11-04 NOTE — LETTER
[facility] EMERGENCY DEPT 
[address] [facility] [address] [id number] 
[phone] Work/School Note Date: 11/4/2017 To Whom It May concern: 
 
Yesica Mariscal was seen and treated today in the emergency room by the following provider(s): 
Attending Provider: Ronaldo Castellon MD 
Physician Assistant: Janell Leyden, PA-C. Yesica Mariscal may return to work on 11/6/2017. Sincerely, Janell Leyden, PA-C

## 2017-11-06 LAB
BACTERIA SPEC CULT: ABNORMAL
CC UR VC: ABNORMAL
SERVICE CMNT-IMP: ABNORMAL

## 2017-12-20 ENCOUNTER — HOSPITAL ENCOUNTER (EMERGENCY)
Age: 37
Discharge: HOME | End: 2017-12-20
Attending: EMERGENCY MEDICINE
Payer: MEDICARE

## 2017-12-20 DIAGNOSIS — K21.9 GASTROESOPHAGEAL REFLUX DISEASE WITHOUT ESOPHAGITIS: Primary | ICD-10-CM

## 2017-12-20 PROCEDURE — 99282 EMERGENCY DEPT VISIT SF MDM: CPT

## 2017-12-20 NOTE — ED TRIAGE NOTES
The documentation for this period is being entered following the guidelines as defined in the [facility] downtime policy by Tete Prakash RN. Please see paper chart and paper MAR.

## 2017-12-20 NOTE — LETTER
[facility] EMERGENCY DEPT 
[address] [facility] [address] [id number] [phone] Work/School Note Date: 12/20/2017 To Whom It May concern: 
 
Juwan Bowles was seen and treated today in the emergency room by the following provider(s): 
No providers found. Juwan Bowles may return to work on 12/21/17. Sincerely, Estrellita Martinez RN

## 2017-12-20 NOTE — ED PROVIDER NOTES
EMERGENCY DEPARTMENT HISTORY AND PHYSICAL EXAM      Date: 12/20/2017  Patient Name: Mary Zepeda    History of Presenting Illness     Chief Complaint   Patient presents with    Abdominal Pain     upper with nausea, reports \"It feels like burning. \"       History Provided By: Patient    HPI: Mary Zepeda, 42 y.o. female with PMHx significant for Bipolar affective, DM, depression, anxiety, HLD who presents ambulatory to the ED with cc of sudden onset of 7-8/10 burning epigastric abdominal pain that onset 2 hours PTA. She reports associated nausea and vomiting as well as mild dizziness. Pt denies use of medication to modify her symptoms. She reports her symptoms onset after she had dinner noting that she had friend chicken with ketchup and hot sauce. Pt states that her symptoms are exacerbated with supine position. She reports a hx of acid reflux and states that she is rx'd zantac but is not compliant secondary to needing a refill. Pt denies any fevers, chills, diarrhea, CP, SOB, weakness, fevers, or chills. PCP: Cory Delgado MD    There are no other complaints, changes, or physical findings at this time. Current Outpatient Prescriptions   Medication Sig Dispense Refill    acetaminophen (TYLENOL) 325 mg tablet Take 2 Tabs by mouth every four (4) hours as needed for Pain. 20 Tab 0    ondansetron (ZOFRAN ODT) 8 mg disintegrating tablet Take 1 Tab by mouth every eight (8) hours as needed for Nausea. 20 Tab 0    loperamide (IMODIUM) 2 mg capsule Take 1 Cap by mouth four (4) times daily as needed for Diarrhea. 15 Cap 0    sertraline (ZOLOFT) 100 mg tablet PLEASE TAKE 1/2 TABLET DAILY FOR 5 DAYS AND THEN TAKE ONE TABLET DAILY. 0    fluconazole (DIFLUCAN) 150 mg tablet Take 1 Tab by mouth as needed (vaginal discharge and itching). 2 Tab 0    busPIRone (BUSPAR) 10 mg tablet Take 1 Tab by mouth two (2) times a day. 60 Tab 2    OLANZapine (ZYPREXA) 5 mg tablet Take 1 Tab by mouth nightly.  30 Tab 2  sertraline (ZOLOFT) 50 mg tablet Take 1 Tab by mouth daily. 30 Tab 2    PRENATAL PLUS, CALCIUM CARB, 27 mg iron- 1 mg tab TAKE 1 TABLET EVERY DAY AS DIRECTED  4    raNITIdine (ZANTAC) 150 mg tablet Take 1 Tab by mouth two (2) times a day. Indications: HEARTBURN 61 Tab 11       Past History     Past Medical History:  Past Medical History:   Diagnosis Date    Anxiety     Bipolar affective ([facility]     Depression     Diabetes ([facility]     diet control    Hyperlipidemia     High Cholesterol    Hyperlipidemia     Major depression        Past Surgical History:  Past Surgical History:   Procedure Laterality Date    HX GI      Colonoscopy    HX HERNIA REPAIR      as child       Family History:  Family History   Problem Relation Age of Onset    Diabetes Mother     Depression Mother     Asthma Brother     Stroke Maternal Grandmother      aneurysm    Cancer Maternal Grandmother      kidney    Hypertension Maternal Grandmother     Cancer Paternal Grandmother      lung    Diabetes Brother     Bipolar Disorder Brother     Schizophrenia Brother     Other Brother      paranoia       Social History:  Social History   Substance Use Topics    Smoking status: Former Smoker    Smokeless tobacco: Never Used      Comment: cigars    Alcohol use Yes      Comment: occasional       Allergies: Allergies   Allergen Reactions    Amoxicillin Rash and Other (comments)     Vaginal itching    Flagyl [Metronidazole] Nausea and Vomiting    Pcn [Penicillins] Itching     Vaginal itching         Review of Systems   Review of Systems   Constitutional: Negative. Negative for chills, fever and unexpected weight change. HENT: Negative. Negative for congestion and trouble swallowing. Eyes: Negative for discharge. Respiratory: Negative. Negative for cough, chest tightness and shortness of breath. Cardiovascular: Negative. Negative for chest pain.    Gastrointestinal: Positive for abdominal pain (epigastric), nausea and vomiting. Negative for abdominal distention, constipation and diarrhea. Endocrine: Negative. Genitourinary: Negative. Negative for difficulty urinating, dysuria, frequency and urgency. Musculoskeletal: Negative. Negative for arthralgias and myalgias. Skin: Negative. Negative for color change. Allergic/Immunologic: Negative. Neurological: Positive for dizziness. Negative for speech difficulty, weakness and headaches. Hematological: Negative. Psychiatric/Behavioral: Negative. Negative for agitation and confusion. All other systems reviewed and are negative. Physical Exam   Physical Exam   Constitutional: She is oriented to person, place, and time. She appears well-developed and well-nourished. HENT:   Head: Normocephalic and atraumatic. Eyes: Conjunctivae and EOM are normal.   Neck: Neck supple. Cardiovascular: Normal rate, regular rhythm and intact distal pulses. Pulmonary/Chest: Effort normal. No respiratory distress. Abdominal: Soft. There is no tenderness. Musculoskeletal: Normal range of motion. She exhibits no deformity. Neurological: She is alert and oriented to person, place, and time. Skin: Skin is warm and dry. Psychiatric: She has a normal mood and affect. Her behavior is normal. Thought content normal.   Vitals reviewed. Medical Decision Making   I am the first provider for this patient. I reviewed the vital signs, available nursing notes, past medical history, past surgical history, family history and social history. Vital Signs-Reviewed the patient's vital signs. No data found. Records Reviewed: Nursing Notes and Old Medical Records    Provider Notes (Medical Decision Making):     Acid reflux, gastritis, epigastric pain, pancreatitis, biliary colic, PUD, ACS. Pt denies CP. Pt states that she has a hx of reflux and that she thinks her symptoms are reflux. Will treat accordingly.  If GI cocktail improves pt's symptoms we have discussed discharge and resupply rx for zantac. Will not pursue work up for ACS at this time as I do not believe pt's symptoms are cardiac. Pt educated on signs that should prompt return visit. ED Course:   Initial assessment performed. The patients presenting problems have been discussed, and they are in agreement with the care plan formulated and outlined with them. I have encouraged them to ask questions as they arise throughout their visit. Disposition:    DISCHARGE NOTE  3:36 AM  The patient has been re-evaluated and is ready for discharge. Reviewed available results with patient. Counseled patient on diagnosis and care plan. Patient has expressed understanding, and all questions have been answered. Patient agrees with plan and agrees to follow up as recommended, or return to the ED if their symptoms worsen. Discharge instructions have been provided and explained to the patient, along with reasons to return to the ED. PLAN:  1. Current Discharge Medication List        2. Follow-up Information     None        Return to ED if worse     Diagnosis     Clinical Impression:   1. Gastroesophageal reflux disease without esophagitis        Attestations: This note is prepared by Mirta Rebolledo, acting as Scribe for Luisa Marrero MD.    Luisa Marrero MD: The scribe's documentation has been prepared under my direction and personally reviewed by me in its entirety. I confirm that the note above accurately reflects all work, treatment, procedures, and medical decision making performed by me.

## 2018-01-03 ENCOUNTER — OFFICE VISIT (OUTPATIENT)
Dept: BEHAVIORAL/MENTAL HEALTH CLINIC | Age: 38
End: 2018-01-03

## 2018-01-03 VITALS
OXYGEN SATURATION: 98 % | WEIGHT: 161 LBS | SYSTOLIC BLOOD PRESSURE: 133 MMHG | BODY MASS INDEX: 29.63 KG/M2 | HEART RATE: 68 BPM | HEIGHT: 62 IN | DIASTOLIC BLOOD PRESSURE: 74 MMHG

## 2018-01-03 DIAGNOSIS — F41.9 ANXIETY: ICD-10-CM

## 2018-01-03 DIAGNOSIS — F44.9 DISSOCIATIVE DISORDER OR REACTION: ICD-10-CM

## 2018-01-03 DIAGNOSIS — F32.1 MODERATE SINGLE CURRENT EPISODE OF MAJOR DEPRESSIVE DISORDER (HCC): ICD-10-CM

## 2018-01-03 DIAGNOSIS — F43.10 PTSD (POST-TRAUMATIC STRESS DISORDER): ICD-10-CM

## 2018-01-03 DIAGNOSIS — F60.3 BORDERLINE PERSONALITY DISORDER (HCC): ICD-10-CM

## 2018-01-03 RX ORDER — SERTRALINE HYDROCHLORIDE 50 MG/1
50 TABLET, FILM COATED ORAL DAILY
Qty: 30 TAB | Refills: 1 | Status: SHIPPED | OUTPATIENT
Start: 2018-01-03 | End: 2018-03-02 | Stop reason: SDUPTHER

## 2018-01-03 RX ORDER — BUSPIRONE HYDROCHLORIDE 10 MG/1
10 TABLET ORAL 2 TIMES DAILY
Qty: 60 TAB | Refills: 1 | Status: SHIPPED | OUTPATIENT
Start: 2018-01-03 | End: 2018-03-02 | Stop reason: SDUPTHER

## 2018-01-03 RX ORDER — OLANZAPINE 5 MG/1
5 TABLET ORAL
Qty: 30 TAB | Refills: 1 | Status: SHIPPED | OUTPATIENT
Start: 2018-01-03 | End: 2018-03-02 | Stop reason: SDUPTHER

## 2018-01-03 NOTE — MR AVS SNAPSHOT
Visit Information Date & Time Provider Department Dept. Phone Encounter #  
 1/3/2018  9:30 AM Eliana Weaver, [facility] [phone] [id number] Follow-up Instructions Return in about 2 months (around 3/3/2018) for med check and follow up. Follow-up and Disposition History Your Appointments 3/2/2018  9:00 AM  
ESTABLISHED PATIENT with Eliana Weaver NP [facility] Appt Note: 2 month follow-up [address] [facility] [address] [facility] [address] [address]  
  
   
 [address] [facility] [address] [facility] [phone] Upcoming Health Maintenance Date Due  
 FOOT EXAM Q1 5/18/2016 HEMOGLOBIN A1C Q6M 9/14/2017 MICROALBUMIN Q1 3/14/2018 LIPID PANEL Q1 3/14/2018 EYE EXAM RETINAL OR DILATED Q1 4/12/2018 MEDICARE YEARLY EXAM 9/29/2018 PAP AKA CERVICAL CYTOLOGY 6/1/2022 DTaP/Tdap/Td series (2 - Td) 2/2/2024 COLONOSCOPY 12/17/2025 Allergies as of 1/3/2018  Review Complete On: 1/3/2018 By: Eliana Weaver NP Severity Noted Reaction Type Reactions Amoxicillin  12/29/2010    Rash, Other (comments) Vaginal itching Flagyl [Metronidazole]  11/16/2016   Side Effect Nausea and Vomiting Pcn [Penicillins]  09/20/2014   Intolerance Itching Vaginal itching Current Immunizations  Reviewed on 11/15/2011 Name Date Influenza Vaccine (Quad) PF 9/8/2017 Influenza Vaccine Intradermal PF 11/5/2014 PPD 6/20/2012 Tdap 2/2/2014  4:52 AM  
  
 Not reviewed this visit You Were Diagnosed With   
  
 Codes Comments Moderate single current episode of major depressive disorder (HCC)     ICD-10-CM: F32.1 ICD-9-CM: 296.22 Anxiety     ICD-10-CM: F41.9 ICD-9-CM: 300.00 PTSD (post-traumatic stress disorder)     ICD-10-CM: F43.10 ICD-9-CM: 309.81 Borderline personality disorder     ICD-10-CM: F60.3 ICD-9-CM: 429.65   
 Dissociative disorder or reaction     ICD-10-CM: F44.9 ICD-9-CM: 300.15 Vitals BP Pulse Height(growth percentile) Weight(growth percentile) SpO2 BMI  
 133/74 (BP 1 Location: Left arm, BP Patient Position: Sitting) 68 5' 2\" (1.575 m) 161 lb (73 kg) 98% 29.45 kg/m2 OB Status Smoking Status Having regular periods Former Smoker Vitals History BMI and BSA Data Body Mass Index Body Surface Area  
 29.45 kg/m 2 1.79 m 2 Preferred Pharmacy Pharmacy Name Phone [facility]Y #[id number], [place] - [address] [phone] Your Updated Medication List  
  
   
This list is accurate as of: 1/3/18 10:17 AM.  Always use your most recent med list.  
  
  
  
  
 acetaminophen 325 mg tablet Commonly known as:  TYLENOL Take 2 Tabs by mouth every four (4) hours as needed for Pain. busPIRone 10 mg tablet Commonly known as:  BUSPAR Take 1 Tab by mouth two (2) times a day. fluconazole 150 mg tablet Commonly known as:  DIFLUCAN Take 1 Tab by mouth as needed (vaginal discharge and itching). loperamide 2 mg capsule Commonly known as:  IMODIUM Take 1 Cap by mouth four (4) times daily as needed for Diarrhea. OLANZapine 5 mg tablet Commonly known as:  ZyPREXA Take 1 Tab by mouth nightly. ondansetron 8 mg disintegrating tablet Commonly known as:  ZOFRAN ODT Take 1 Tab by mouth every eight (8) hours as needed for Nausea. PRENATAL PLUS (CALCIUM CARB) 27 mg iron- 1 mg Tab Generic drug:  prenatal vit-calcium-iron-fa TAKE 1 TABLET EVERY DAY AS DIRECTED  
  
 raNITIdine 150 mg tablet Commonly known as:  ZANTAC Take 1 Tab by mouth two (2) times a day. Indications: HEARTBURN  
  
 sertraline 50 mg tablet Commonly known as:  ZOLOFT Take 1 Tab by mouth daily. Prescriptions Sent to Pharmacy  Refills  
 busPIRone (BUSPAR) 10 mg tablet 1  
 Sig: Take 1 Tab by mouth two (2) times a day. Class: Normal  
 Pharmacy: [facility]/pharmacy [address] [facility] [address] Ph #: [phone] Route: Oral  
 OLANZapine (ZYPREXA) 5 mg tablet 1 Sig: Take 1 Tab by mouth nightly. Class: Normal  
 Pharmacy: [facility]/pharmacy [address] [facility] [address] Ph #: [phone] Route: Oral  
 sertraline (ZOLOFT) 50 mg tablet 1 Sig: Take 1 Tab by mouth daily. Class: Normal  
 Pharmacy: [facility]/pharmacy [address] [place] [address] [address] Ph #: [phone] Route: Oral  
  
We Performed the Following REFERRAL TO PSYCHOLOGY [IYQ10 Custom] Follow-up Instructions Return in about 2 months (around 3/3/2018) for med check and follow up. Referral Information Referral ID Referred By Referred To  
  
 [id number] [facility], [address] [facility], [address] [address] [address] [facility], [address] [facility] Phone: [phone] Fax: [phone] Visits Status Start Date End Date 1 New Request 1/3/18 1/3/19 If your referral has a status of pending review or denied, additional information will be sent to support the outcome of this decision. Patient Instructions Sleep Tips What to avoid   
· Do not have drinks with caffeine, such as coffee or black tea, for 8 hours before bed. · Do not smoke or use other types of tobacco near bedtime. Nicotine is a stimulant and can keep you awake. · Avoid drinking alcohol late in the evening, because it can cause you to wake in the middle of the night. · Do not eat a big meal close to bedtime. If you are hungry, eat a light snack. · Do not drink a lot of water close to bedtime, because the need to urinate may wake you up during the night. · Do not read or watch TV in bed. Use the bed only for sleeping and sexual activity. What to try   
· Go to bed at the same time every night, and wake up at the same time every morning. Do not take naps during the day. · Keep your bedroom quiet, dark, and cool. · Get regular exercise, but not within 3 to 4 hours of your bedtime. · Sleep on a comfortable pillow and mattress. · If watching the clock makes you anxious, turn it facing away from you so you cannot see the time. · If you worry when you lie down, start a worry book. Well before bedtime, write down your worries, and then set the book and your concerns aside. · Try meditation or other relaxation techniques before you go to bed. · If you cannot fall asleep, get up and go to another room until you feel sleepy. Do something relaxing. Repeat your bedtime routine before you go to bed again. Make your house quiet and calm about an hour before bedtime. Turn down the lights, turn off the TV, log off the computer, and turn down the volume on music. This can help you relax after a busy day. Learning About Borderline Personality Disorder What is borderline personality disorder? Borderline personality disorder is a mental health condition. It causes intense mood swings, impulsive behaviors, and severe problems with self-worth. It can lead to troubled relationships in every area of your life. Experts don't know exactly what causes the disorder. It may be linked to a problem with the parts of the brain that control reactions to emotions. The disorder also seems to run in families. Often, people who get it faced some kind of childhood trauma such as abuse, neglect, or the death of a parent. Most of the time, signs of the disorder first appear in childhood. But problems often don't start until the early adult years. It's important to know that the disorder can be treated.  Treatment can be hard, and getting better can take years. But with treatment, most people with borderline personality disorder do get better over time. What are the symptoms? Everyone has problems with emotions or behaviors sometimes. But if you have borderline personality disorder, the problems are severe. They repeat over a long time and disrupt your life. The most common symptoms include: 
Intense emotions and mood swings. Harmful, impulsive behaviors. These may include substance abuse, binge eating, out-of-control spending, risky sexual behavior, and reckless driving. Hurting yourself. This may include cutting or burning yourself or attempting suicide. Trouble with relationships. You may see others as either \"good\" or \"bad. \" And your view may suddenly shift from one to the other, for minor reasons. Feeling worthless or empty inside. A frantic fear of being left alone (abandoned). This fear may lead to desperate attempts to hold on to those around you. Or it may cause you to reject others before they can reject you. Problems with anger. These may include violent temper tantrums and aggressive behavior. How is it treated? It may seem that there is no one on your side. You might have been told that there is no hope. But just because you've heard this, that doesn't mean it's true. Getting medical treatment and taking care of yourself can really help. Medical treatment When you start treatment, you will find health professionals who will help you. You will also meet others who have gone through what you are going through. Long-term treatment can reduce symptoms and harmful behaviors. It can also help you manage your emotions better. Treatment may include: 
Counseling and therapy. It's important to find a counselor you can build a stable relationship with. This can be hard. Your condition may cause you to see your counselor as caring one minute and cruel the next.  This can happen especially when he or she asks you to try to change a behavior. Try to find a counselor who has special training in dialectical behavioral therapy. It's a type of therapy that is often used to treat people with this disorder. Medicines. Examples are antidepressants, mood stabilizers, and antipsychotics. They may be helpful when you use them along with therapy. Self-care There are things you can do to help yourself. Here are some tips: 
Keep a regular daily schedule. Do not drink alcohol or use drugs. If your doctor prescribed medicines for you, take them exactly as directed. Get a healthy amount of sleep. Try to be active during daylight hours, and go to sleep and wake up at the same time each day. Eat healthy foods like fruits and vegetables; whole-grain breads and cereals; and lean meats, fish, and poultry. Practice mindfulness or other meditation. To be mindful means to pay attention to and accept the things that are happening right now, in the present moment. Keep to your treatment plan, even when it's hard. Keep the numbers for these national suicide hotlines: 4-098-583-TALK (0-507.565.2557) and 0-911-FZLLBMA (8-189.140.3261). If you or someone you know talks about suicide or about feeling hopeless, get help right away. When should you call for help? Call 911 anytime you think you may need emergency care. For example, call if: You feel you cannot stop from hurting yourself or someone else. Call your doctor now or seek immediate medical care if: You feel much more depressed. You hear voices. Watch closely for changes in your health, and be sure to contact your doctor if: 
You have a new crisis you can't handle. Follow-up care is a key part of your treatment and safety. Be sure to make and go to all appointments, and call your doctor if you are having problems. It's also a good idea to know your test results and keep a list of the medicines you take. Where can you learn more? Go to http://star-arvin.info/. Enter F029 in the search box to learn more about \"Learning About Borderline Personality Disorder. \" Current as of: May 12, 2017 Content Version: 11.4 © 7029-5287 Healthwise, [employer]. Care instructions adapted under license by [employer] (which disclaims liability or warranty for this information). If you have questions about a medical condition or this instruction, always ask your healthcare professional. [address] any warranty or liability for your use of this information. ·  
 
 
 Patient Instructions History Introducing [facility] HEALTH SERVICES! Dear ELVIE: Thank you for requesting a [employer] account. Our records indicate that you already have an active [employer] account. You can access your account anytime at [email or web address] Did you know that you can access your hospital and ER discharge instructions at any time in [employer]? You can also review all of your test results from your hospital stay or ER visit. Additional Information If you have questions, please visit the Frequently Asked Questions section of the [employer] website at [email or web address] Remember, [employer] is NOT to be used for urgent needs. For medical emergencies, dial 911. Now available from your iPhone and Android! Please provide this summary of care documentation to your next provider. Your primary care clinician is listed as Jimy Jordan. If you have any questions after today's visit, please call [phone].

## 2018-01-03 NOTE — PATIENT INSTRUCTIONS
Sleep Tips    What to avoid    · Do not have drinks with caffeine, such as coffee or black tea, for 8 hours before bed. · Do not smoke or use other types of tobacco near bedtime. Nicotine is a stimulant and can keep you awake. · Avoid drinking alcohol late in the evening, because it can cause you to wake in the middle of the night. · Do not eat a big meal close to bedtime. If you are hungry, eat a light snack. · Do not drink a lot of water close to bedtime, because the need to urinate may wake you up during the night. · Do not read or watch TV in bed. Use the bed only for sleeping and sexual activity. What to try    · Go to bed at the same time every night, and wake up at the same time every morning. Do not take naps during the day. · Keep your bedroom quiet, dark, and cool. · Get regular exercise, but not within 3 to 4 hours of your bedtime. · Sleep on a comfortable pillow and mattress. · If watching the clock makes you anxious, turn it facing away from you so you cannot see the time. · If you worry when you lie down, start a worry book. Well before bedtime, write down your worries, and then set the book and your concerns aside. · Try meditation or other relaxation techniques before you go to bed. · If you cannot fall asleep, get up and go to another room until you feel sleepy. Do something relaxing. Repeat your bedtime routine before you go to bed again. Make your house quiet and calm about an hour before bedtime. Turn down the lights, turn off the TV, log off the computer, and turn down the volume on music. This can help you relax after a busy day. Learning About Borderline Personality Disorder  What is borderline personality disorder? Borderline personality disorder is a mental health condition. It causes intense mood swings, impulsive behaviors, and severe problems with self-worth. It can lead to troubled relationships in every area of your life.   Experts don't know exactly what causes the disorder. It may be linked to a problem with the parts of the brain that control reactions to emotions. The disorder also seems to run in families. Often, people who get it faced some kind of childhood trauma such as abuse, neglect, or the death of a parent. Most of the time, signs of the disorder first appear in childhood. But problems often don't start until the early adult years. It's important to know that the disorder can be treated. Treatment can be hard, and getting better can take years. But with treatment, most people with borderline personality disorder do get better over time. What are the symptoms? Everyone has problems with emotions or behaviors sometimes. But if you have borderline personality disorder, the problems are severe. They repeat over a long time and disrupt your life. The most common symptoms include:  Intense emotions and mood swings. Harmful, impulsive behaviors. These may include substance abuse, binge eating, out-of-control spending, risky sexual behavior, and reckless driving. Hurting yourself. This may include cutting or burning yourself or attempting suicide. Trouble with relationships. You may see others as either \"good\" or \"bad. \" And your view may suddenly shift from one to the other, for minor reasons. Feeling worthless or empty inside. A frantic fear of being left alone (abandoned). This fear may lead to desperate attempts to hold on to those around you. Or it may cause you to reject others before they can reject you. Problems with anger. These may include violent temper tantrums and aggressive behavior. How is it treated? It may seem that there is no one on your side. You might have been told that there is no hope. But just because you've heard this, that doesn't mean it's true. Getting medical treatment and taking care of yourself can really help. Medical treatment  When you start treatment, you will find health professionals who will help you.  You will also meet others who have gone through what you are going through. Long-term treatment can reduce symptoms and harmful behaviors. It can also help you manage your emotions better. Treatment may include:  Counseling and therapy. It's important to find a counselor you can build a stable relationship with. This can be hard. Your condition may cause you to see your counselor as caring one minute and cruel the next. This can happen especially when he or she asks you to try to change a behavior. Try to find a counselor who has special training in dialectical behavioral therapy. It's a type of therapy that is often used to treat people with this disorder. Medicines. Examples are antidepressants, mood stabilizers, and antipsychotics. They may be helpful when you use them along with therapy. Self-care  There are things you can do to help yourself. Here are some tips:  Keep a regular daily schedule. Do not drink alcohol or use drugs. If your doctor prescribed medicines for you, take them exactly as directed. Get a healthy amount of sleep. Try to be active during daylight hours, and go to sleep and wake up at the same time each day. Eat healthy foods like fruits and vegetables; whole-grain breads and cereals; and lean meats, fish, and poultry. Practice mindfulness or other meditation. To be mindful means to pay attention to and accept the things that are happening right now, in the present moment. Keep to your treatment plan, even when it's hard. Keep the numbers for these national suicide hotlines: 2-817-455-TALK (6-484.408.7919) and 6-874-CVOJARI (0-954.169.9574). If you or someone you know talks about suicide or about feeling hopeless, get help right away. When should you call for help? Call 911 anytime you think you may need emergency care. For example, call if:  You feel you cannot stop from hurting yourself or someone else. Call your doctor now or seek immediate medical care if:  You feel much more depressed.   You hear voices. Watch closely for changes in your health, and be sure to contact your doctor if:  You have a new crisis you can't handle. Follow-up care is a key part of your treatment and safety. Be sure to make and go to all appointments, and call your doctor if you are having problems. It's also a good idea to know your test results and keep a list of the medicines you take. Where can you learn more? Go to http://star-arvin.info/. Enter O193 in the search box to learn more about \"Learning About Borderline Personality Disorder. \"  Current as of: May 12, 2017  Content Version: 11.4  © 5492-0874 Healthwise, [facility]. Care instructions adapted under license by [employer] (which disclaims liability or warranty for this information). If you have questions about a medical condition or this instruction, always ask your healthcare professional. [place] [address] any warranty or liability for your use of this information.   ·
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
## 2018-01-31 ENCOUNTER — OFFICE VISIT (OUTPATIENT)
Dept: INTERNAL MEDICINE CLINIC | Age: 38
End: 2018-01-31

## 2018-01-31 VITALS
DIASTOLIC BLOOD PRESSURE: 88 MMHG | BODY MASS INDEX: 30.18 KG/M2 | TEMPERATURE: 97 F | HEIGHT: 62 IN | HEART RATE: 88 BPM | SYSTOLIC BLOOD PRESSURE: 127 MMHG | RESPIRATION RATE: 18 BRPM | WEIGHT: 164 LBS | OXYGEN SATURATION: 100 %

## 2018-01-31 DIAGNOSIS — R10.31 RLQ ABDOMINAL PAIN: Primary | ICD-10-CM

## 2018-01-31 DIAGNOSIS — N92.6 IRREGULAR MENSES: ICD-10-CM

## 2018-01-31 LAB
BILIRUB UR QL STRIP: NEGATIVE
GLUCOSE UR-MCNC: NEGATIVE MG/DL
HCG URINE, QL. (POC): NEGATIVE
KETONES P FAST UR STRIP-MCNC: NEGATIVE MG/DL
PH UR STRIP: 5 [PH] (ref 4.6–8)
PROT UR QL STRIP: NEGATIVE
SP GR UR STRIP: 1.02 (ref 1–1.03)
UA UROBILINOGEN AMB POC: NORMAL (ref 0.2–1)
URINALYSIS CLARITY POC: CLEAR
URINALYSIS COLOR POC: YELLOW
URINE BLOOD POC: NORMAL
URINE LEUKOCYTES POC: NEGATIVE
URINE NITRITES POC: NEGATIVE
VALID INTERNAL CONTROL?: YES

## 2018-01-31 RX ORDER — CIPROFLOXACIN 500 MG/1
500 TABLET ORAL 2 TIMES DAILY
Qty: 6 TAB | Refills: 0 | Status: SHIPPED | OUTPATIENT
Start: 2018-01-31 | End: 2018-02-03

## 2018-01-31 RX ORDER — NAPROXEN 500 MG/1
500 TABLET ORAL
Qty: 20 TAB | Refills: 0 | Status: SHIPPED | OUTPATIENT
Start: 2018-01-31 | End: 2018-02-28 | Stop reason: SDUPTHER

## 2018-01-31 NOTE — PROGRESS NOTES
HISTORY  Minal Cool is a 42 y.o. female. Abdominal Pain   The history is provided by the patient. This is a new problem. The current episode started 2 days ago. The problem occurs constantly. The problem has not changed since onset. Associated symptoms include abdominal pain (right lower). Pertinent negatives include no headaches. She has tried nothing for the symptoms. Review of Systems   Constitutional: Negative for chills, fever and weight loss. Gastrointestinal: Positive for abdominal pain (right lower). Negative for constipation, diarrhea, nausea and vomiting. Genitourinary: Negative for dysuria, flank pain, hematuria and urgency. Musculoskeletal: Negative for back pain. Neurological: Negative for headaches. Physical Exam   Constitutional: She is oriented to person, place, and time. She appears well-developed and well-nourished. No distress. HENT:   Head: Normocephalic and atraumatic. Right Ear: External ear normal.   Left Ear: External ear normal.   Eyes: Pupils are equal, round, and reactive to light. Cardiovascular: Normal rate and intact distal pulses. Pulmonary/Chest: Effort normal. No respiratory distress. Abdominal: Soft. Bowel sounds are normal. She exhibits no distension. There is no tenderness. There is no rebound and no CVA tenderness. Musculoskeletal: She exhibits no edema. Neurological: She is alert and oriented to person, place, and time. Skin: Skin is warm and dry. She is not diaphoretic. Psychiatric: She has a normal mood and affect. Her behavior is normal. Judgment normal.   Nursing note and vitals reviewed. ASSESSMENT and PLAN  UA + BLOOD, sent for culture. Cipro x 3 days and NSAIDs for probable ovarian cyst. Referred to GYN for irregular menses for past year and difficulty conceiving. ICD-10-CM ICD-9-CM    1.  RLQ abdominal pain R10.31 789.03 AMB POC URINALYSIS DIP STICK AUTO W/O MICRO      CULTURE, URINE ciprofloxacin HCl (CIPRO) 500 mg tablet      naproxen (NAPROSYN) 500 mg tablet      AMB POC URINE PREGNANCY TEST, VISUAL COLOR COMPARISON      REFERRAL TO GYNECOLOGY   2.  Irregular menses N92.6 626.4 AMB POC URINALYSIS DIP STICK AUTO W/O MICRO      CULTURE, URINE      ciprofloxacin HCl (CIPRO) 500 mg tablet      naproxen (NAPROSYN) 500 mg tablet      AMB POC URINE PREGNANCY TEST, VISUAL COLOR COMPARISON      REFERRAL TO GYNECOLOGY

## 2018-01-31 NOTE — PROGRESS NOTES
Pt is here for   Chief Complaint   Patient presents with    Abdominal Pain     pt states x 2 days         Pt states pain level is a 6 abdomen    1. Have you been to the ER, urgent care clinic since your last visit? Hospitalized since your last visit? No    2. Have you seen or consulted any other health care providers outside of the [facility] since your last visit? Include any pap smears or colon screening.  No

## 2018-01-31 NOTE — PATIENT INSTRUCTIONS
Possible Appendicitis: Care Instructions  Your Care Instructions    Your doctor thinks you may have appendicitis. This means that your appendix may be infected. The appendix is a small sac that is shaped like a finger. It's attached to your large intestine. Sometimes it's hard to tell if a person has appendicitis. It is especially hard to tell in children, pregnant women, and older adults. If your doctor thinks it's possible that you have appendicitis, he or she may want to order more tests. Or your doctor may want to wait to see if your symptoms change. Your doctor thinks it's okay for you to go home right now. But you will need to watch for symptoms of appendicitis at home. If your symptoms continue or get worse, it's important to call your doctor or get medical care right away. Appendicitis can get serious very quickly. The main treatment is surgery to remove your appendix. Follow-up care is a key part of your treatment and safety. Be sure to make and go to all appointments, and call your doctor if you are having problems. It's also a good idea to know your test results and keep a list of the medicines you take. How can you care for yourself at home? · Do not eat or drink, unless your doctor says it is okay. If you need surgery, it's best to have an empty stomach. If you're thirsty, you can rinse your mouth with water. Or you can suck on hard candy. · Do not take laxatives. If you have appendicitis, they can make the appendix burst.  · Follow your doctor's instructions about taking medicines. Your doctor may tell you not to take antibiotics or pain pills. These medicines can make it harder to tell if you have appendicitis. · Watch for symptoms of appendicitis. See the When should you call for help section below. It is very important to follow your doctor's instructions about getting treatment if you have these symptoms. When should you call for help?   Call your doctor now or seek immediate medical care if:  ? · You have pain that becomes focused on one area of your belly. ? · You have new or worse belly pain. ? · You are vomiting. ? · You have a fever. ? · You cannot pass stools or gas. ? Watch closely for changes in your health, and be sure to contact your doctor if:  ? · You do not get better as expected. Where can you learn more? Go to http://star-arvin.info/. Enter P790 in the search box to learn more about \"Possible Appendicitis: Care Instructions. \"  Current as of: May 12, 2017  Content Version: 11.4  © 2602-0970 [employer]. Care instructions adapted under license by [employer] (which disclaims liability or warranty for this information). If you have questions about a medical condition or this instruction, always ask your healthcare professional. [address] any warranty or liability for your use of this information. Functional Ovarian Cyst: Care Instructions  Your Care Instructions    A functional ovarian cyst is a sac that forms on the surface of a woman's ovary during ovulation. The sac holds a maturing egg. Usually the sac goes away after the egg is released. But if the egg is not released, or if the sac closes up after the egg is released, the sac can swell up with fluid and form a cyst.  Functional ovarian cysts are different than ovarian growths caused by other problems, such as cancer. Most functional ovarian cysts cause no symptoms and go away on their own. Some cause mild pain. Others can cause severe pain when they rupture or bleed. Follow-up care is a key part of your treatment and safety. Be sure to make and go to all appointments, and call your doctor if you are having problems. It's also a good idea to know your test results and keep a list of the medicines you take. How can you care for yourself at home?   · Use heat, such as a hot water bottle, a heating pad set on low, or a warm bath, to relax tense muscles and relieve cramping. · Be safe with medicines. Take pain medicines exactly as directed. ¨ If the doctor gave you a prescription medicine for pain, take it as prescribed. ¨ If you are not taking a prescription pain medicine, ask your doctor if you can take an over-the-counter medicine. · Avoid constipation. Make sure you drink enough fluids and include fruits, vegetables, and fiber in your diet each day. Constipation does not cause ovarian cysts, but it may make your pelvic pain worse. When should you call for help? Call your doctor now or seek immediate medical care if:  ? · You have severe vaginal bleeding. ? · You have new or worse belly or pelvic pain. ? Watch closely for changes in your health, and be sure to contact your doctor if:  ? · You have unusual vaginal bleeding. ? · You do not get better as expected. Where can you learn more? Go to http://star-arvin.info/. Enter T159 in the search box to learn more about \"Functional Ovarian Cyst: Care Instructions. \"  Current as of: October 13, 2016  Content Version: 11.4  © 0784-7624 [employer]. Care instructions adapted under license by [employer] (which disclaims liability or warranty for this information). If you have questions about a medical condition or this instruction, always ask your healthcare professional. [address] any warranty or liability for your use of this information.

## 2018-01-31 NOTE — MR AVS SNAPSHOT
[address] [address] 
[phone] Patient: Shiloh Sanchez MRN: [id number] XFB:7/7/2009 Visit Information Date & Time Provider Department Dept. Phone Encounter #  
 1/31/2018  9:20 AM Teresa Jose NP 2796 [facility] [phone] [id number] Follow-up Instructions Return in about 4 weeks (around 2/28/2018) for annual with labs. Your Appointments 3/2/2018  9:00 AM  
ESTABLISHED PATIENT with Alyx Wolf NP [facility] ([address]) Appt Note: 2 month follow-up [address] [facility] [address] [facility] [address]  
  
   
 [address] [facility] 316 [facility] [address] [place], [place] [address] [address] Upcoming Health Maintenance Date Due  
 FOOT EXAM Q1 5/18/2016 HEMOGLOBIN A1C Q6M 9/14/2017 MICROALBUMIN Q1 3/14/2018 LIPID PANEL Q1 3/14/2018 EYE EXAM RETINAL OR DILATED Q1 4/12/2018 MEDICARE YEARLY EXAM 9/29/2018 PAP AKA CERVICAL CYTOLOGY 6/1/2022 DTaP/Tdap/Td series (2 - Td) 2/2/2024 COLONOSCOPY 12/17/2025 Allergies as of 1/31/2018  Review Complete On: 1/31/2018 By: Teresa Jose NP Severity Noted Reaction Type Reactions Amoxicillin  12/29/2010    Rash, Other (comments) Vaginal itching Flagyl [Metronidazole]  11/16/2016   Side Effect Nausea and Vomiting Pcn [Penicillins]  09/20/2014   Intolerance Itching Vaginal itching Current Immunizations  Reviewed on 11/15/2011 Name Date Influenza Vaccine (Quad) PF 9/8/2017 Influenza Vaccine Intradermal PF 11/5/2014 PPD 6/20/2012 Tdap 2/2/2014  4:52 AM  
  
 Not reviewed this visit You Were Diagnosed With   
  
 Codes Comments RLQ abdominal pain    -  Primary ICD-10-CM: R10.31 ICD-9-CM: 789.03 Irregular menses     ICD-10-CM: N92.6 ICD-9-CM: 626.4 Vitals BP Pulse Temp Resp Height(growth percentile) Weight(growth percentile) 127/88 (BP 1 Location: Left arm, BP Patient Position: Sitting) 88 97 °F (36.1 °C) (Oral) 18 5' 2\" (1.575 m) 164 lb (74.4 kg) LMP SpO2 BMI OB Status Smoking Status 01/22/2018 100% 30 kg/m2 Having regular periods Former Smoker Vitals History BMI and BSA Data Body Mass Index Body Surface Area  
 30 kg/m 2 1.8 m 2 Preferred Pharmacy Pharmacy Name Phone [id number] [address] [phone] Your Updated Medication List  
  
   
This list is accurate as of: 1/31/18 10:46 AM.  Always use your most recent med list.  
  
  
  
  
 busPIRone 10 mg tablet Commonly known as:  BUSPAR Take 1 Tab by mouth two (2) times a day. ciprofloxacin HCl 500 mg tablet Commonly known as:  CIPRO Take 1 Tab by mouth two (2) times a day for 3 days. naproxen 500 mg tablet Commonly known as:  NAPROSYN Take 1 Tab by mouth two (2) times daily as needed for Pain for up to 10 days. With food OLANZapine 5 mg tablet Commonly known as:  ZyPREXA Take 1 Tab by mouth nightly. ondansetron 8 mg disintegrating tablet Commonly known as:  ZOFRAN ODT Take 1 Tab by mouth every eight (8) hours as needed for Nausea. PRENATAL PLUS (CALCIUM CARB) 27 mg iron- 1 mg Tab Generic drug:  prenatal vit-calcium-iron-fa TAKE 1 TABLET EVERY DAY AS DIRECTED  
  
 raNITIdine 150 mg tablet Commonly known as:  ZANTAC Take 1 Tab by mouth two (2) times a day. Indications: HEARTBURN  
  
 sertraline 50 mg tablet Commonly known as:  ZOLOFT Take 1 Tab by mouth daily. Prescriptions Sent to Pharmacy Refills  
 ciprofloxacin HCl (CIPRO) 500 mg tablet 0 Sig: Take 1 Tab by mouth two (2) times a day for 3 days. Class: Normal  
 Pharmacy: [facility]pharmacy [address] [facility] [address]  #: [phone]  Route: Oral  
 naproxen (NAPROSYN) 500 mg tablet 0  
 Sig: Take 1 Tab by mouth two (2) times daily as needed for Pain for up to 10 days. With food Class: Normal  
 Pharmacy: [facility] [address] [facility] [address]  #: [phone] Route: Oral  
  
We Performed the Following AMB POC URINALYSIS DIP STICK AUTO W/O MICRO [85616 CPT(R)] AMB POC URINE PREGNANCY TEST, VISUAL COLOR COMPARISON [72507 CPT(R)] CULTURE, URINE [id number] CPT(R)] REFERRAL TO GYNECOLOGY [REF30 Custom] Comments:  
 Please evaluate pt for irregular menses Follow-up Instructions Return in about 4 weeks (around 2/28/2018) for annual with labs. Referral Information Referral ID Referred By Referred To [id number] Jennifer Herndon, [address], NP   
   [address] [address], [facility] [address] [place] Phone: [phone] Fax: [phone] Visits Status Start Date End Date 1 New Request 1/31/18 1/31/19 If your referral has a status of pending review or denied, additional information will be sent to support the outcome of this decision. Patient Instructions Possible Appendicitis: Care Instructions Your Care Instructions Your doctor thinks you may have appendicitis. This means that your appendix may be infected. The appendix is a small sac that is shaped like a finger. It's attached to your large intestine. Sometimes it's hard to tell if a person has appendicitis. It is especially hard to tell in children, pregnant women, and older adults. If your doctor thinks it's possible that you have appendicitis, he or she may want to order more tests. Or your doctor may want to wait to see if your symptoms change. Your doctor thinks it's okay for you to go home right now. But you will need to watch for symptoms of appendicitis at home.  If your symptoms continue or get worse, it's important to call your doctor or get medical care right away. Appendicitis can get serious very quickly. The main treatment is surgery to remove your appendix. Follow-up care is a key part of your treatment and safety. Be sure to make and go to all appointments, and call your doctor if you are having problems. It's also a good idea to know your test results and keep a list of the medicines you take. How can you care for yourself at home? · Do not eat or drink, unless your doctor says it is okay. If you need surgery, it's best to have an empty stomach. If you're thirsty, you can rinse your mouth with water. Or you can suck on hard candy. · Do not take laxatives. If you have appendicitis, they can make the appendix burst. 
· Follow your doctor's instructions about taking medicines. Your doctor may tell you not to take antibiotics or pain pills. These medicines can make it harder to tell if you have appendicitis. · Watch for symptoms of appendicitis. See the When should you call for help section below. It is very important to follow your doctor's instructions about getting treatment if you have these symptoms. When should you call for help? Call your doctor now or seek immediate medical care if: 
? · You have pain that becomes focused on one area of your belly. ? · You have new or worse belly pain. ? · You are vomiting. ? · You have a fever. ? · You cannot pass stools or gas. ? Watch closely for changes in your health, and be sure to contact your doctor if: 
? · You do not get better as expected. Where can you learn more? Go to http://star-arvin.info/. Enter R103 in the search box to learn more about \"Possible Appendicitis: Care Instructions. \" Current as of: May 12, 2017 Content Version: 11.4 © 7212-1315 [employer]. Care instructions adapted under license by [employer] (which disclaims liability or warranty for this information).  If you have questions about a medical condition or this instruction, always ask your healthcare professional. [address] any warranty or liability for your use of this information. Functional Ovarian Cyst: Care Instructions Your Care Instructions A functional ovarian cyst is a sac that forms on the surface of a woman's ovary during ovulation. The sac holds a maturing egg. Usually the sac goes away after the egg is released. But if the egg is not released, or if the sac closes up after the egg is released, the sac can swell up with fluid and form a cyst. 
Functional ovarian cysts are different than ovarian growths caused by other problems, such as cancer. Most functional ovarian cysts cause no symptoms and go away on their own. Some cause mild pain. Others can cause severe pain when they rupture or bleed. Follow-up care is a key part of your treatment and safety. Be sure to make and go to all appointments, and call your doctor if you are having problems. It's also a good idea to know your test results and keep a list of the medicines you take. How can you care for yourself at home? · Use heat, such as a hot water bottle, a heating pad set on low, or a warm bath, to relax tense muscles and relieve cramping. · Be safe with medicines. Take pain medicines exactly as directed. ¨ If the doctor gave you a prescription medicine for pain, take it as prescribed. ¨ If you are not taking a prescription pain medicine, ask your doctor if you can take an over-the-counter medicine. · Avoid constipation. Make sure you drink enough fluids and include fruits, vegetables, and fiber in your diet each day. Constipation does not cause ovarian cysts, but it may make your pelvic pain worse. When should you call for help? Call your doctor now or seek immediate medical care if: 
? · You have severe vaginal bleeding. ? · You have new or worse belly or pelvic pain. ? Watch closely for changes in your health, and be sure to contact your doctor if: ? · You have unusual vaginal bleeding. ? · You do not get better as expected. Where can you learn more? Go to http://star-arvin.info/. Enter O268 in the search box to learn more about \"Functional Ovarian Cyst: Care Instructions. \" Current as of: October 13, 2016 Content Version: 11.4 © 1624-0299 [employer]. Care instructions adapted under license by [employer] (which disclaims liability or warranty for this information). If you have questions about a medical condition or this instruction, always ask your healthcare professional. [address] any warranty or liability for your use of this information. Introducing [facility] & HEALTH SERVICES! Dear Chitra Doing: Thank you for requesting a "[employer]" account. Our records indicate that you already have an active "[employer]" account. You can access your account anytime at [email or web address]. [employer] Did you know that you can access your hospital and ER discharge instructions at any time in "[employer]"? You can also review all of your test results from your hospital stay or ER visit. Additional Information If you have questions, please visit the Frequently Asked Questions section of the "[employer]" website at [email or web address] [employer]/. Remember, "[employer]" is NOT to be used for urgent needs. For medical emergencies, dial 911. Now available from your iPhone and Android! Please provide this summary of care documentation to your next provider. Your primary care clinician is listed as PIPO Moya. If you have any questions after today's visit, please call [phone].

## 2018-02-01 LAB — BACTERIA UR CULT: NORMAL

## 2018-02-05 ENCOUNTER — OFFICE VISIT (OUTPATIENT)
Dept: OBGYN CLINIC | Age: 38
End: 2018-02-05

## 2018-02-05 VITALS
DIASTOLIC BLOOD PRESSURE: 90 MMHG | HEART RATE: 78 BPM | WEIGHT: 161.3 LBS | HEIGHT: 62 IN | RESPIRATION RATE: 18 BRPM | BODY MASS INDEX: 29.68 KG/M2 | SYSTOLIC BLOOD PRESSURE: 126 MMHG | TEMPERATURE: 97 F

## 2018-02-05 DIAGNOSIS — N91.2 AMENORRHEA: Primary | ICD-10-CM

## 2018-02-05 DIAGNOSIS — N89.8 VAGINAL DISCHARGE: ICD-10-CM

## 2018-02-05 DIAGNOSIS — R10.2 PELVIC PAIN: ICD-10-CM

## 2018-02-05 RX ORDER — ACETAMINOPHEN 325 MG/1
TABLET ORAL
Refills: 0 | COMMUNITY
Start: 2017-11-04 | End: 2018-05-30

## 2018-02-05 NOTE — PROGRESS NOTES
42 y.o. with recent irregular menses. Started becoming irregular around last June/July. Initially coming about twice/month, has slowed down since then. In December, patient bled for 1.5 days then spotted. This also occurred in January. Prior to this, patient was on Depo, which she has been off for about 1.5 years. Prior to Depo, her menses were regular. Occasional pain with periods. Has premenstrual symptoms, about a week prior to period. Weight fluctuates. No thyroid symptoms. No nipple discharge. Trying to get pregnant for about a year. [place] 2-3x/week. Hard to time due to irregular periods. [id number], vaginal delivery x3, tested to [id number]. Current partner does not have any children. Hx of GC, Chl, and trichamonas (all prior to her pregnancies). Patient is smoking a lot recently due to stress. Patient with recent right sided pain. Crampy pain, similar to coming on period. Patient also with recent discharge. Was diagnosed with infection and started on Cipro. Stopped symptoms for a while until Saturday. +odor  No itching. Past Medical History:   Diagnosis Date    Anxiety     Bipolar affective ([facility] [facility]     Depression     Diabetes ([facility] [facility]     diet control    Hyperlipidemia     High Cholesterol    Hyperlipidemia     Major depression      Current Outpatient Prescriptions on File Prior to Visit   Medication Sig Dispense Refill    acetaminophen (TYLENOL) 325 mg tablet TAKE 2 TABS BY MOUTH EVERY FOUR (4) HOURS AS NEEDED FOR PAIN.  0    naproxen (NAPROSYN) 500 mg tablet Take 1 Tab by mouth two (2) times daily as needed for Pain for up to 10 days. With food 20 Tab 0    busPIRone (BUSPAR) 10 mg tablet Take 1 Tab by mouth two (2) times a day. 60 Tab 1    OLANZapine (ZYPREXA) 5 mg tablet Take 1 Tab by mouth nightly. 30 Tab 1    sertraline (ZOLOFT) 50 mg tablet Take 1 Tab by mouth daily.  30 Tab 1    ondansetron (ZOFRAN ODT) 8 mg disintegrating tablet Take 1 Tab by mouth every eight (8) hours as needed for Nausea. 20 Tab 0    PRENATAL PLUS, CALCIUM CARB, 27 mg iron- 1 mg tab TAKE 1 TABLET EVERY DAY AS DIRECTED  4    raNITIdine (ZANTAC) 150 mg tablet Take 1 Tab by mouth two (2) times a day. Indications: HEARTBURN 60 Tab 11     No current facility-administered medications on file prior to visit. Social History     Social History    Marital status: SINGLE     Spouse name: N/A    Number of children: N/A    Years of education: N/A     Occupational History    Not on file. Social History Main Topics    Smoking status: Current Every Day Smoker    Smokeless tobacco: Never Used      Comment: cigars    Alcohol use Yes      Comment: occasional    Drug use: No    Sexual activity: Yes     Partners: Male     Birth control/ protection: None     Other Topics Concern    Not on file     Social History Narrative       Review of symptoms:  Constitutional: negative  Urinary: negative  CV: negative    Neuro: negative  Resp: negative   Psych: negative  GI: negative    Musculoskeletal: negative  GYN: per HPI    Integumentary: negative    Physical exam:  General: AOx3, NAD  Respiratory: No respiratory distress  Abd: Soft, mildly tender to palpation in lower quadrants  GYN:   Normal external genitalia  No vaginal lesions. Moderate amount of whitish discharge in vaginal vault. No blood  No cervicitis or CMT  Uterus small nontender mobile  No adnexal masses or tenderness    A/P  42 y.o. with irregular menses, pelvic pain and vaginal discharge  - Will get cultures and OneSwab for discharge  - Will get ultrasound for pelvic pain  - Will check TSH/free T4, LH/FSH, estradiol, prolactin for irregular menses  - Discussed the possibility of semen analysis. Will discuss with her partner. - will consider clomid pending labs and ultrasound results. Return to clinic in 2 weeks to discuss results.     Spent over 25 minutes with the patient, over 50% of which was spent counselling

## 2018-02-06 LAB
ESTRADIOL SERPL-MCNC: 132.8 PG/ML
FSH SERPL-ACNC: 4.3 MIU/ML
LH SERPL-ACNC: 9.4 MIU/ML
PROLACTIN SERPL-MCNC: 14 NG/ML (ref 4.8–23.3)
T4 FREE SERPL-MCNC: 0.92 NG/DL (ref 0.82–1.77)
TESTOST FREE SERPL-MCNC: 3.7 PG/ML (ref 0–4.2)
TESTOST SERPL-MCNC: 77 NG/DL (ref 8–48)
TSH SERPL DL<=0.005 MIU/L-ACNC: 0.42 UIU/ML (ref 0.45–4.5)

## 2018-02-08 LAB
A VAGINAE DNA VAG QL NAA+PROBE: ABNORMAL SCORE
BVAB2 DNA VAG QL NAA+PROBE: ABNORMAL SCORE
C ALBICANS DNA VAG QL NAA+PROBE: NEGATIVE
C GLABRATA DNA VAG QL NAA+PROBE: NEGATIVE
C TRACH RRNA SPEC QL NAA+PROBE: ABNORMAL
MEGA1 DNA VAG QL NAA+PROBE: ABNORMAL SCORE
N GONORRHOEA RRNA SPEC QL NAA+PROBE: NEGATIVE
T VAGINALIS RRNA SPEC QL NAA+PROBE: NEGATIVE

## 2018-02-12 ENCOUNTER — OFFICE VISIT (OUTPATIENT)
Dept: OBGYN CLINIC | Age: 38
End: 2018-02-12

## 2018-02-12 VITALS
DIASTOLIC BLOOD PRESSURE: 82 MMHG | RESPIRATION RATE: 18 BRPM | TEMPERATURE: 97.6 F | HEART RATE: 91 BPM | SYSTOLIC BLOOD PRESSURE: 123 MMHG | BODY MASS INDEX: 29.89 KG/M2 | WEIGHT: 163.4 LBS

## 2018-02-12 DIAGNOSIS — E28.2 PCOS (POLYCYSTIC OVARIAN SYNDROME): Primary | ICD-10-CM

## 2018-02-12 DIAGNOSIS — N76.0 BV (BACTERIAL VAGINOSIS): ICD-10-CM

## 2018-02-12 DIAGNOSIS — Z12.31 SCREENING MAMMOGRAM, ENCOUNTER FOR: ICD-10-CM

## 2018-02-12 DIAGNOSIS — R10.2 PELVIC PAIN: ICD-10-CM

## 2018-02-12 DIAGNOSIS — B96.89 BV (BACTERIAL VAGINOSIS): ICD-10-CM

## 2018-02-12 DIAGNOSIS — N97.0 INFERTILITY ASSOCIATED WITH ANOVULATION: ICD-10-CM

## 2018-02-12 RX ORDER — CLOMIPHENE CITRATE 50 MG/1
50 TABLET ORAL DAILY
Qty: 5 TAB | Refills: 3 | Status: SHIPPED | OUTPATIENT
Start: 2018-02-12 | End: 2018-02-17

## 2018-02-12 RX ORDER — CLINDAMYCIN PHOSPHATE 20 MG/G
1 CREAM VAGINAL
Qty: 40 G | Refills: 0 | Status: SHIPPED | OUTPATIENT
Start: 2018-02-12 | End: 2018-02-19

## 2018-02-12 NOTE — PROGRESS NOTES
Chief Complaint   Patient presents with    Follow-up     Patient presents in stable condition for a follow up; patient currently complains of vaginal discharge with a foul odor, denies itching.

## 2018-02-12 NOTE — PROGRESS NOTES
34 y.o. who presents for follow up of multiple complaints. Pt with infertility. Last menstrual period was end of January.    - Blood testing showed slightly low TSH with normal T4  - Elevated testosterone  - normal prolactin, estradiol, FSH, LH    Continues to have foul smelling discharge. - pap showed BV  - Oneswab equivocal    Continues to have intermittent pelvic pain. - ultrasound pending. Review of symptoms:  Constitutional: negative  Urinary: negative  CV: negative    Neuro: negative  Resp: negative   Psych: negative  GI: negative    Musculoskeletal: negative  GYN: per HPI    Integumentary: negative    Physical exam:    Gen: AOx3, NAD  Resp: No respiratory distress  GYN: Deferred      A/P  34 y.o. with PCOS causing infertility, BV causing vaginal discharge, and pelvic pain. Patient also recently turned 34.  - Will treat with clindamycin cream x 7 days  for BV   - ultrasound pending for pelvic pain. Will call with results  - Discussed the diagnosis of PCOS with patient. Will start her on cyclic clomid 50 mg days 3-7 of cycle. Patient to start at the end of the month. If no period by the end of the month, patient to call and will cycle with Provera to start. If regular periods and no pregnancy after 3 cycles, patient advised that semen analysis would be warranted. - Will set up Mammogram for screening    Return to clinic in 3 months if not pregnant    Spent greater than 25 minutes with patient, over 50% of which was spent counselling.

## 2018-02-20 ENCOUNTER — HOSPITAL ENCOUNTER (OUTPATIENT)
Dept: MAMMOGRAPHY | Age: 38
Discharge: HOME | End: 2018-02-20
Attending: NURSE PRACTITIONER
Payer: MEDICARE

## 2018-02-20 DIAGNOSIS — Z12.31 SCREENING MAMMOGRAM, ENCOUNTER FOR: ICD-10-CM

## 2018-02-20 PROCEDURE — 77067 SCR MAMMO BI INCL CAD: CPT

## 2018-02-21 ENCOUNTER — HOSPITAL ENCOUNTER (OUTPATIENT)
Dept: ULTRASOUND IMAGING | Age: 38
Discharge: HOME | End: 2018-02-21
Attending: OBSTETRICS & GYNECOLOGY
Payer: MEDICARE

## 2018-02-21 DIAGNOSIS — N91.2 AMENORRHEA: ICD-10-CM

## 2018-02-21 DIAGNOSIS — R10.2 PELVIC PAIN: ICD-10-CM

## 2018-02-21 PROCEDURE — 76830 TRANSVAGINAL US NON-OB: CPT

## 2018-02-21 PROCEDURE — 76856 US EXAM PELVIC COMPLETE: CPT

## 2018-02-26 NOTE — PROGRESS NOTES
Please let patient know that her ultrasound was completely normal.  There does not appear to be a GYN cause to her intermittent pain. Thank you.

## 2018-02-27 ENCOUNTER — TELEPHONE (OUTPATIENT)
Dept: OBGYN CLINIC | Age: 38
End: 2018-02-27

## 2018-02-27 NOTE — TELEPHONE ENCOUNTER
Please let patient know that her ultrasound was completely normal.  There does not appear to be a GYN cause to her intermittent pain. Pt given all information per Dr. Alejandrina Martinez, understanding was voiced by pt.

## 2018-02-28 ENCOUNTER — OFFICE VISIT (OUTPATIENT)
Dept: INTERNAL MEDICINE CLINIC | Age: 38
End: 2018-02-28

## 2018-02-28 VITALS
TEMPERATURE: 97.7 F | OXYGEN SATURATION: 98 % | DIASTOLIC BLOOD PRESSURE: 76 MMHG | SYSTOLIC BLOOD PRESSURE: 114 MMHG | HEART RATE: 84 BPM | HEIGHT: 62 IN | RESPIRATION RATE: 18 BRPM | WEIGHT: 163.2 LBS | BODY MASS INDEX: 30.03 KG/M2

## 2018-02-28 DIAGNOSIS — N92.6 IRREGULAR MENSES: ICD-10-CM

## 2018-02-28 DIAGNOSIS — R10.30 LOWER ABDOMINAL PAIN: Primary | ICD-10-CM

## 2018-02-28 DIAGNOSIS — R10.9 ABDOMINAL CRAMPING: ICD-10-CM

## 2018-02-28 DIAGNOSIS — R05.9 COUGHING: ICD-10-CM

## 2018-02-28 RX ORDER — CETIRIZINE HCL 10 MG
10 TABLET ORAL DAILY
Qty: 30 TAB | Refills: 0 | Status: SHIPPED | OUTPATIENT
Start: 2018-02-28 | End: 2018-03-27 | Stop reason: SDUPTHER

## 2018-02-28 RX ORDER — NAPROXEN 500 MG/1
500 TABLET ORAL
Qty: 20 TAB | Refills: 0 | Status: SHIPPED | OUTPATIENT
Start: 2018-02-28 | End: 2018-03-10

## 2018-02-28 RX ORDER — PROMETHAZINE HYDROCHLORIDE AND CODEINE PHOSPHATE 6.25; 1 MG/5ML; MG/5ML
5 SOLUTION ORAL
Qty: 120 ML | Refills: 0 | Status: SHIPPED | OUTPATIENT
Start: 2018-02-28 | End: 2018-05-02

## 2018-02-28 NOTE — PATIENT INSTRUCTIONS
May use  Benadryl, Phenylephrine, or Chlor-Trimetron for continued cough and sore throat. Claritin, allegra, or Zyrtec may also help. Also try Breathe-Rite (or similar) nose strips for nasal congestion and difficulty sleeping. Try 2 teaspoons of 100% pure honey at bedtime to help with nighttime coughing/sore throat. Vitamin C (5711-5148 mg) may also be helpful for your symptoms. *Frequent handwashing*, rest, and plenty of fluids are most important. Abdominal Hernia Repair: Before Your Surgery  What is abdominal hernia repair surgery? An abdominal hernia repair is a type of surgery. It fixes a problem called a hernia. A hernia is a bulge under the skin in your belly. It happens when you have a weak spot in your belly muscles and a piece of your intestines or tissues pokes through your muscles. This can cause pain. You may notice the pain most when you lift something heavy. You can have a hernia near your belly button. Or it may be in a scar from an earlier surgery. To fix it, the doctor will do one of two kinds of surgery. In open surgery, the doctor makes one cut near the hernia. This cut is called an incision. In laparoscopic surgery, the doctor makes several very small incisions and uses a thin, lighted scope and small tools. In either type of surgery, the doctor pushes the bulge back in place, if needed. Then the doctor sews the healthy tissue back together. Often the doctor patches the weak spot with a piece of material.  Laparoscopic surgery leaves several small scars. Open surgery leaves one long scar. The scars fade with time. You will probably need to take 1 to 2 weeks off from work. But if your job requires heavy lifting or other physical work, you may need to take 4 to 6 weeks off. Follow-up care is a key part of your treatment and safety. Be sure to make and go to all appointments, and call your doctor if you are having problems.  It's also a good idea to know your test results and keep a list of the medicines you take. What happens before surgery? ?Surgery can be stressful. This information will help you understand what you can expect. And it will help you safely prepare for surgery. ? Preparing for surgery  ? · Understand exactly what surgery is planned, along with the risks, benefits, and other options. · Tell your doctors ALL the medicines, vitamins, supplements, and herbal remedies you take. Some of these can increase the risk of bleeding or interact with anesthesia. ? · If you take blood thinners, such as warfarin (Coumadin), clopidogrel (Plavix), or aspirin, be sure to talk to your doctor. He or she will tell you if you should stop taking these medicines before your surgery. Make sure that you understand exactly what your doctor wants you to do.   ? · Your doctor will tell you which medicines to take or stop before your surgery. You may need to stop taking certain medicines a week or more before surgery. So talk to your doctor as soon as you can.   ? · If you have an advance directive, let your doctor know. It may include a living will and a durable power of  for health care. Bring a copy to the hospital. If you don't have one, you may want to prepare one. It lets your doctor and loved ones know your health care wishes. Doctors advise that everyone prepare these papers before any type of surgery or procedure. What happens on the day of surgery? · Follow the instructions exactly about when to stop eating and drinking. If you don't, your surgery may be canceled. If your doctor told you to take your medicines on the day of surgery, take them with only a sip of water. ? · Take a bath or shower before you come in for your surgery. Do not apply lotions, perfumes, deodorants, or nail polish. ? · Do not shave the surgical site yourself. ? · Take off all jewelry and piercings. And take out contact lenses, if you wear them. ? At the hospital or surgery center   · Bring a picture ID. ? · The area for surgery is often marked to make sure there are no errors. ? · You will be kept comfortable and safe by your anesthesia provider. You will be asleep during the surgery. ? · The surgery will take 30 minutes to 2 hours. It depends on how large the hernia is and where it is. Going home   · Be sure you have someone to drive you home. Anesthesia and pain medicine make it unsafe for you to drive. ? · You will be given more specific instructions about recovering from your surgery. They will cover things like diet, wound care, follow-up care, driving, and getting back to your normal routine. When should you call your doctor? · You have questions or concerns. ? · You don't understand how to prepare for your surgery. ? · You become ill before the surgery (such as fever, flu, or a cold). ? · You need to reschedule or have changed your mind about having the surgery. Where can you learn more? Go to http://star-arvin.info/. Enter U288 in the search box to learn more about \"Abdominal Hernia Repair: Before Your Surgery. \"  Current as of: May 12, 2017  Content Version: 11.4  © 4226-9746 [employer]. Care instructions adapted under license by [employer] (which disclaims liability or warranty for this information). If you have questions about a medical condition or this instruction, always ask your healthcare professional. [address] any warranty or liability for your use of this information.

## 2018-02-28 NOTE — MR AVS SNAPSHOT
[address] [place] [address] [address] [address] 
[phone] Patient: Carlos Wilkinson MRN: [id number] WNS:[birth year] Visit Information Date & Time Provider Department Dept. Phone Encounter #  
 2/28/2018  9:20 AM Emerson Guerra NP [address] [facility] [phone] [id number] Follow-up Instructions Return in about 3 months (around 5/28/2018) for annual with labs following surgery. Your Appointments 3/2/2018  9:00 AM  
ESTABLISHED PATIENT with Sri Mendez NP [facility] ([facility]) Appt Note: 2 month follow-up [address] [facility] [address] [facility] Rugabi Patel [address]  
  
   
 [address] [place] [address] [address] [address] Upcoming Health Maintenance Date Due  
 FOOT EXAM Q1 5/18/2016 HEMOGLOBIN A1C Q6M 9/14/2017 MICROALBUMIN Q1 3/14/2018 LIPID PANEL Q1 3/14/2018 EYE EXAM RETINAL OR DILATED Q1 4/12/2018 MEDICARE YEARLY EXAM 9/29/2018 PAP AKA CERVICAL CYTOLOGY 6/1/2022 DTaP/Tdap/Td series (2 - Td) 2/2/2024 COLONOSCOPY 12/17/2025 Allergies as of 2/28/2018  Review Complete On: 2/28/2018 By: Alia Aquino. VICKEY Hickey Severity Noted Reaction Type Reactions Amoxicillin  12/29/2010    Rash, Other (comments) Vaginal itching Flagyl [Metronidazole]  11/16/2016   Side Effect Nausea and Vomiting Pcn [Penicillins]  09/20/2014   Intolerance Itching Vaginal itching Current Immunizations  Reviewed on 11/15/2011 Name Date Influenza Vaccine (Quad) PF 9/8/2017 Influenza Vaccine Intradermal PF 11/5/2014 PPD 6/20/2012 Tdap 2/2/2014  4:52 AM  
  
 Not reviewed this visit You Were Diagnosed With   
  
 Codes Comments Lower abdominal pain    -  Primary ICD-10-CM: R10.30 ICD-9-CM: 789.09 Abdominal cramping     ICD-10-CM: R10.9 ICD-9-CM: 789.00 Coughing     ICD-10-CM: L06 ICD-9-CM: 786.2 Irregular menses     ICD-10-CM: N92.6 ICD-9-CM: 626.4 Vitals BP Pulse Temp Resp Height(growth percentile) Weight(growth percentile) 114/76 (BP 1 Location: Left arm, BP Patient Position: Sitting) 84 97.7 °F (36.5 °C) (Oral) 18 5' 2\" (1.575 m) 163 lb 3.2 oz (74 kg) LMP SpO2 BMI OB Status Smoking Status 02/16/2018 (Exact Date) 98% 29.85 kg/m2 Having regular periods Current Every Day Smoker Vitals History BMI and BSA Data Body Mass Index Body Surface Area  
 29.85 kg/m 2 1.8 m 2 Preferred Pharmacy Pharmacy Name Phone [facility] #[id number]- [place], [place] - [address] [phone] Your Updated Medication List  
  
   
This list is accurate as of 2/28/18 10:32 AM.  Always use your most recent med list.  
  
  
  
  
 acetaminophen 325 mg tablet Commonly known as:  TYLENOL  
TAKE 2 TABS BY MOUTH EVERY FOUR (4) HOURS AS NEEDED FOR PAIN. busPIRone 10 mg tablet Commonly known as:  BUSPAR Take 1 Tab by mouth two (2) times a day. cetirizine 10 mg tablet Commonly known as:  ZYRTEC Take 1 Tab by mouth daily. Indications: Allergic Rhinitis  
  
 naproxen 500 mg tablet Commonly known as:  NAPROSYN Take 1 Tab by mouth two (2) times daily as needed for Pain for up to 10 days. With food OLANZapine 5 mg tablet Commonly known as:  ZyPREXA Take 1 Tab by mouth nightly. ondansetron 8 mg disintegrating tablet Commonly known as:  ZOFRAN ODT Take 1 Tab by mouth every eight (8) hours as needed for Nausea. PRENATAL PLUS (CALCIUM CARB) 27 mg iron- 1 mg Tab Generic drug:  prenatal vit-calcium-iron-fa TAKE 1 TABLET EVERY DAY AS DIRECTED  
  
 promethazine-codeine 6.25-10 mg/5 mL syrup Commonly known as:  PHENERGAN with CODEINE Take 5 mL by mouth four (4) times daily as needed for Cough. Max Daily Amount: 20 mL. raNITIdine 150 mg tablet Commonly known as:  ZANTAC Take 1 Tab by mouth two (2) times a day. Indications: HEARTBURN  
  
 sertraline 50 mg tablet Commonly known as:  ZOLOFT Take 1 Tab by mouth daily. Prescriptions Printed Refills  
 promethazine-codeine (PHENERGAN WITH CODEINE) 6.25-10 mg/5 mL syrup 0 Sig: Take 5 mL by mouth four (4) times daily as needed for Cough. Max Daily Amount: 20 mL. Class: Print Route: Oral  
  
Prescriptions Sent to Pharmacy Refills  
 naproxen (NAPROSYN) 500 mg tablet 0 Sig: Take 1 Tab by mouth two (2) times daily as needed for Pain for up to 10 days. With food Class: Normal  
 Pharmacy: [facility]/pharmacy [id number] [address]  [facility] [address] Ph #: [phone] Route: Oral  
 cetirizine (ZYRTEC) 10 mg tablet 0 Sig: Take 1 Tab by mouth daily. Indications: Allergic Rhinitis Class: Normal  
 Pharmacy: [facility]/pharmacy [id number] [address]  [facility] [address] Ph #: [phone] Route: Oral  
  
Follow-up Instructions Return in about 3 months (around 5/28/2018) for annual with labs following surgery. To-Do List   
 02/28/2018 Imaging:  XR CHEST PA LAT Patient Instructions May use  Benadryl, Phenylephrine, or Chlor-Trimetron for continued cough and sore throat. Claritin, allegra, or Zyrtec may also help. Also try Breathe-Rite (or similar) nose strips for nasal congestion and difficulty sleeping. Try 2 teaspoons of 100% pure honey at bedtime to help with nighttime coughing/sore throat. Vitamin C (1428-6141 mg) may also be helpful for your symptoms. *Frequent handwashing*, rest, and plenty of fluids are most important. Abdominal Hernia Repair: Before Your Surgery What is abdominal hernia repair surgery? An abdominal hernia repair is a type of surgery. It fixes a problem called a hernia. A hernia is a bulge under the skin in your belly.  It happens when you have a weak spot in your belly muscles and a piece of your intestines or tissues pokes through your muscles. This can cause pain. You may notice the pain most when you lift something heavy. You can have a hernia near your belly button. Or it may be in a scar from an earlier surgery. To fix it, the doctor will do one of two kinds of surgery. In open surgery, the doctor makes one cut near the hernia. This cut is called an incision. In laparoscopic surgery, the doctor makes several very small incisions and uses a thin, lighted scope and small tools. In either type of surgery, the doctor pushes the bulge back in place, if needed. Then the doctor sews the healthy tissue back together. Often the doctor patches the weak spot with a piece of material. 
Laparoscopic surgery leaves several small scars. Open surgery leaves one long scar. The scars fade with time. You will probably need to take 1 to 2 weeks off from work. But if your job requires heavy lifting or other physical work, you may need to take 4 to 6 weeks off. Follow-up care is a key part of your treatment and safety. Be sure to make and go to all appointments, and call your doctor if you are having problems. It's also a good idea to know your test results and keep a list of the medicines you take. What happens before surgery? ?Surgery can be stressful. This information will help you understand what you can expect. And it will help you safely prepare for surgery. ? Preparing for surgery ? · Understand exactly what surgery is planned, along with the risks, benefits, and other options. · Tell your doctors ALL the medicines, vitamins, supplements, and herbal remedies you take. Some of these can increase the risk of bleeding or interact with anesthesia. ? · If you take blood thinners, such as warfarin (Coumadin), clopidogrel (Plavix), or aspirin, be sure to talk to your doctor.  He or she will tell you if you should stop taking these medicines before your surgery. Make sure that you understand exactly what your doctor wants you to do.  
? · Your doctor will tell you which medicines to take or stop before your surgery. You may need to stop taking certain medicines a week or more before surgery. So talk to your doctor as soon as you can.  
? · If you have an advance directive, let your doctor know. It may include a living will and a durable power of  for health care. Bring a copy to the hospital. If you don't have one, you may want to prepare one. It lets your doctor and loved ones know your health care wishes. Doctors advise that everyone prepare these papers before any type of surgery or procedure. What happens on the day of surgery? · Follow the instructions exactly about when to stop eating and drinking. If you don't, your surgery may be canceled. If your doctor told you to take your medicines on the day of surgery, take them with only a sip of water. ? · Take a bath or shower before you come in for your surgery. Do not apply lotions, perfumes, deodorants, or nail polish. ? · Do not shave the surgical site yourself. ? · Take off all jewelry and piercings. And take out contact lenses, if you wear them. ? At the hospital or surgery center · Bring a picture ID. ? · The area for surgery is often marked to make sure there are no errors. ? · You will be kept comfortable and safe by your anesthesia provider. You will be asleep during the surgery. ? · The surgery will take 30 minutes to 2 hours. It depends on how large the hernia is and where it is. Going home · Be sure you have someone to drive you home. Anesthesia and pain medicine make it unsafe for you to drive. ? · You will be given more specific instructions about recovering from your surgery. They will cover things like diet, wound care, follow-up care, driving, and getting back to your normal routine. When should you call your doctor? · You have questions or concerns. ? · You don't understand how to prepare for your surgery. ? · You become ill before the surgery (such as fever, flu, or a cold). ? · You need to reschedule or have changed your mind about having the surgery. Where can you learn more? Go to http://star-arvin.info/. Enter U288 in the search box to learn more about \"Abdominal Hernia Repair: Before Your Surgery. \" Current as of: May 12, 2017 Content Version: 11.4 © 1696-5215 [employer]. Care instructions adapted under license by [employer] (which disclaims liability or warranty for this information). If you have questions about a medical condition or this instruction, always ask your healthcare professional. [address] any warranty or liability for your use of this information. Introducing [facility]! Dear Jaylon Cho: Thank you for requesting a AppBrick account. Our records indicate that you already have an active AppBrick account. You can access your account anytime at [email or web address] Did you know that you can access your hospital and ER discharge instructions at any time in AppBrick? You can also review all of your test results from your hospital stay or ER visit. Additional Information If you have questions, please visit the Frequently Asked Questions section of the AppBrick website at [email or web address] Remember, AppBrick is NOT to be used for urgent needs. For medical emergencies, dial 911. Now available from your iPhone and Android! Please provide this summary of care documentation to your next provider. Your primary care clinician is listed as PIPO Perez. If you have any questions after today's visit, please call [phone].

## 2018-02-28 NOTE — PROGRESS NOTES
1. Have you been to the ER, urgent care clinic since your last visit? Hospitalized since your last visit? Yes When: 2 weeks ago [facility] ED for Abdominal pains    2. Have you seen or consulted any other health care providers outside of the [address] [place] [address] since your last visit? Include any pap smears or colon screening.  No    Pt is here for   Chief Complaint   Patient presents with    Annual Exam     with labs    Cough     x 2 weeks and coughing up greenish brown mucus     Pt states pain level is a 7 abdomen

## 2018-02-28 NOTE — PROGRESS NOTES
Ольга Maxwell is a 34 y.o. female and presents with Annual Exam (with labs) and Cough (x 2 weeks and coughing up greenish brown mucus)    Subjective:  Pt is here for ER follow-up on 2/14 for abdominal pain. Diagnosed with hernia. Was given Tramadol. Instructed to f/u with PCP/specialty, will see surgeon tomorrow Dr. Mandi Ramirez (GI at [place] [address]). Reports feeling SAME AS when in ER, Pain Scale: 7/10. Has NOT started Tramadol yet, taking Naproxen without relief. Upper respiratory infection Review:  Ольга Maxwell is a 34 y.o. female who complains of productive cough for the past 2 weeks, gradually worsening since that time. She denies a history of shortness of breath. Evaluation to date: none. Treatment to date: decongestants, antihistamines, cough suppressants, OTC products.   Relevant PMH: No pertinent additional PMH, asthma, and pneumonia    Review of Systems  Constitutional: negative for fevers, chills, anorexia and weight loss  Eyes:   negative for visual disturbance, drainage, and irritation  ENT:   negative for tinnitus,sore throat,nasal congestion,ear pain,and hoarseness  Respiratory:  negative for hemoptysis, dyspnea, and wheezing  CV:   negative for chest pain, palpitations, and lower extremity edema  GI:   negative for nausea, vomiting, diarrhea, and melena  Musculoskel: negative for muscle weakness,and joint pain/swelling  Neurological:  negative for headaches, dizziness, vertigo,and memory/gait problems  Behavl/Psych: negative for feelings of anxiety, depression, suicide, and mood changes    Past Medical History:   Diagnosis Date    Anxiety     Bipolar affective ([facility] [address]     Depression     Diabetes ([facility] [address]     diet control    Hyperlipidemia     High Cholesterol    Hyperlipidemia     Major depression      Past Surgical History:   Procedure Laterality Date    HX GI      Colonoscopy    HX HERNIA REPAIR      as child     Social History     Social History    Marital status: SINGLE     Spouse name: N/A  Number of children: N/A    Years of education: N/A     Social History Main Topics    Smoking status: Current Every Day Smoker    Smokeless tobacco: Current User      Comment: cigars    Alcohol use Yes      Comment: occasional    Drug use: No    Sexual activity: Yes     Partners: Male     Birth control/ protection: None     Other Topics Concern    None     Social History Narrative     Family History   Problem Relation Age of Onset    Diabetes Mother     Depression Mother     Asthma Brother     Stroke Maternal Grandmother      aneurysm    Cancer Maternal Grandmother      kidney    Hypertension Maternal Grandmother     Cancer Paternal Grandmother      lung    Diabetes Brother     Bipolar Disorder Brother     Schizophrenia Brother     Other Brother      paranoia    Breast Cancer Other     Breast Cancer Other      Current Outpatient Prescriptions   Medication Sig Dispense Refill    naproxen (NAPROSYN) 500 mg tablet Take 1 Tab by mouth two (2) times daily as needed for Pain for up to 10 days. With food 20 Tab 0    promethazine-codeine (PHENERGAN WITH CODEINE) 6.25-10 mg/5 mL syrup Take 5 mL by mouth four (4) times daily as needed for Cough. Max Daily Amount: 20 mL. 120 mL 0    cetirizine (ZYRTEC) 10 mg tablet Take 1 Tab by mouth daily. Indications: Allergic Rhinitis 30 Tab 0    busPIRone (BUSPAR) 10 mg tablet Take 1 Tab by mouth two (2) times a day. 60 Tab 1    OLANZapine (ZYPREXA) 5 mg tablet Take 1 Tab by mouth nightly. 30 Tab 1    sertraline (ZOLOFT) 50 mg tablet Take 1 Tab by mouth daily. 30 Tab 1    ondansetron (ZOFRAN ODT) 8 mg disintegrating tablet Take 1 Tab by mouth every eight (8) hours as needed for Nausea. 20 Tab 0    PRENATAL PLUS, CALCIUM CARB, 27 mg iron- 1 mg tab TAKE 1 TABLET EVERY DAY AS DIRECTED  4    raNITIdine (ZANTAC) 150 mg tablet Take 1 Tab by mouth two (2) times a day.  Indications: HEARTBURN 60 Tab 11    acetaminophen (TYLENOL) 325 mg tablet TAKE 2 TABS BY MOUTH EVERY FOUR (4) HOURS AS NEEDED FOR PAIN.  0     Allergies   Allergen Reactions    Amoxicillin Rash and Other (comments)     Vaginal itching    Flagyl [Metronidazole] Nausea and Vomiting    Pcn [Penicillins] Itching     Vaginal itching       Objective:  Visit Vitals    /76 (BP 1 Location: Left arm, BP Patient Position: Sitting)    Pulse 84    Temp 97.7 °F (36.5 °C) (Oral)    Resp 18    Ht 5' 2\" (1.575 m)    Wt 163 lb 3.2 oz (74 kg)    LMP 02/16/2018 (Exact Date)    SpO2 98%    BMI 29.85 kg/m2     Wt Readings from Last 3 Encounters:   02/28/18 163 lb 3.2 oz (74 kg)   02/12/18 163 lb 6.4 oz (74.1 kg)   02/05/18 161 lb 4.8 oz (73.2 kg)     Physical Exam:   General appearance - alert, well appearing, and in moderate distress. Lying on treatment table in fetal position  Mental status - A/O x 4, irritable mood and flat affect. Nose- Turbinates boggy and erythematous. Septum midline, no mucoid drainage. Mouth- pharynx reddened, no tonsillar swelling or exudate. Neck -Supple ,normal CSP. FROM, non-tender. No significant adenopathy/thyromegaly. No JVD. Chest - CTA. Symmetric chest rise. No wheezing, rales or rhonchi. Heart - Normal rate, regular rhythm. Normal S1, S2. No MGR or clicks. Abdomen - Soft, distended. Normoactive BS in all quadrants. TTP in RLQ, no mass or HSM. No CVAT. Ext- Radial, DP pulses, 2+ bilaterally. No pedal edema, clubbing, or cyanosis. Skin-Warm and dry. No hyperpigmentation, ulcerations, or suspicious lesions. Neuro - Normal speech, no focal findings or movement disorder. Normal strength, gait, and muscle tone. Assessment/Plan:  Deferred abd pain mgt to SURG since will see tomorrow and has NOT started tramadol at this time. CXR to r/o PNA otherwise and treating symptomatically.    Medication Side Effects and Warnings were discussed with patient: yes   Patient Labs were reviewed: yes  Patient Past Records were reviewed: yes    See below for other orders   Follow-up Disposition:  Return in about 3 months (around 5/28/2018) for annual with labs following surgery. ICD-10-CM ICD-9-CM    1. Lower abdominal pain R10.30 789.09    2. Abdominal cramping R10.9 789.00    3. Coughing R05 786.2 XR CHEST PA LAT      promethazine-codeine (PHENERGAN WITH CODEINE) 6.25-10 mg/5 mL syrup      cetirizine (ZYRTEC) 10 mg tablet   4. Irregular menses N92.6 626.4 naproxen (NAPROSYN) 500 mg tablet     Orders Placed This Encounter    XR CHEST PA LAT     Standing Status:   Future     Standing Expiration Date:   4/1/2019     Order Specific Question:   Reason for Exam     Answer:   coughing for pst 2 weeks, productive     Order Specific Question:   Is Patient Allergic to Contrast Dye? Answer:   No     Order Specific Question:   Is Patient Pregnant? Answer:   No    naproxen (NAPROSYN) 500 mg tablet     Sig: Take 1 Tab by mouth two (2) times daily as needed for Pain for up to 10 days. With food     Dispense:  20 Tab     Refill:  0    promethazine-codeine (PHENERGAN WITH CODEINE) 6.25-10 mg/5 mL syrup     Sig: Take 5 mL by mouth four (4) times daily as needed for Cough. Max Daily Amount: 20 mL. Dispense:  120 mL     Refill:  0    cetirizine (ZYRTEC) 10 mg tablet     Sig: Take 1 Tab by mouth daily. Indications: Allergic Rhinitis     Dispense:  30 Tab     Refill:  [address] expressed understanding of plan. An After Visit Summary was offered/printed and given to the patient.

## 2018-03-02 ENCOUNTER — OFFICE VISIT (OUTPATIENT)
Dept: BEHAVIORAL/MENTAL HEALTH CLINIC | Age: 38
End: 2018-03-02

## 2018-03-02 ENCOUNTER — OFFICE VISIT (OUTPATIENT)
Dept: INTERNAL MEDICINE CLINIC | Age: 38
End: 2018-03-02

## 2018-03-02 VITALS
WEIGHT: 162.1 LBS | DIASTOLIC BLOOD PRESSURE: 88 MMHG | HEIGHT: 62 IN | BODY MASS INDEX: 29.83 KG/M2 | OXYGEN SATURATION: 100 % | HEART RATE: 89 BPM | TEMPERATURE: 97.9 F | RESPIRATION RATE: 18 BRPM | SYSTOLIC BLOOD PRESSURE: 136 MMHG

## 2018-03-02 VITALS
BODY MASS INDEX: 29.81 KG/M2 | WEIGHT: 162 LBS | SYSTOLIC BLOOD PRESSURE: 147 MMHG | HEIGHT: 62 IN | DIASTOLIC BLOOD PRESSURE: 97 MMHG | HEART RATE: 81 BPM

## 2018-03-02 DIAGNOSIS — F44.9 DISSOCIATIVE DISORDER OR REACTION: ICD-10-CM

## 2018-03-02 DIAGNOSIS — N76.0 ACUTE VAGINITIS: Primary | ICD-10-CM

## 2018-03-02 DIAGNOSIS — F41.9 ANXIETY: ICD-10-CM

## 2018-03-02 DIAGNOSIS — F43.10 PTSD (POST-TRAUMATIC STRESS DISORDER): ICD-10-CM

## 2018-03-02 DIAGNOSIS — F32.1 MODERATE SINGLE CURRENT EPISODE OF MAJOR DEPRESSIVE DISORDER (HCC): Primary | ICD-10-CM

## 2018-03-02 DIAGNOSIS — Z91.52 H/O SELF MUTILATION: ICD-10-CM

## 2018-03-02 DIAGNOSIS — F60.3 BORDERLINE PERSONALITY DISORDER (HCC): ICD-10-CM

## 2018-03-02 RX ORDER — OLANZAPINE 5 MG/1
5 TABLET ORAL
Qty: 30 TAB | Refills: 1 | Status: SHIPPED | OUTPATIENT
Start: 2018-03-02 | End: 2018-05-02 | Stop reason: SDUPTHER

## 2018-03-02 RX ORDER — SERTRALINE HYDROCHLORIDE 50 MG/1
50 TABLET, FILM COATED ORAL DAILY
Qty: 30 TAB | Refills: 1 | Status: SHIPPED | OUTPATIENT
Start: 2018-03-02 | End: 2018-05-02 | Stop reason: SDUPTHER

## 2018-03-02 RX ORDER — FLUCONAZOLE 150 MG/1
150 TABLET ORAL DAILY
Qty: 1 TAB | Refills: 0 | Status: SHIPPED | OUTPATIENT
Start: 2018-03-02 | End: 2018-03-03

## 2018-03-02 RX ORDER — BUSPIRONE HYDROCHLORIDE 10 MG/1
10 TABLET ORAL 2 TIMES DAILY
Qty: 60 TAB | Refills: 1 | Status: SHIPPED | OUTPATIENT
Start: 2018-03-02 | End: 2018-05-02 | Stop reason: SDUPTHER
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
## 2018-03-02 NOTE — PATIENT INSTRUCTIONS
Sleep Tips    What to avoid    · Do not have drinks with caffeine, such as coffee or black tea, for 8 hours before bed. · Do not smoke or use other types of tobacco near bedtime. Nicotine is a stimulant and can keep you awake. · Avoid drinking alcohol late in the evening, because it can cause you to wake in the middle of the night. · Do not eat a big meal close to bedtime. If you are hungry, eat a light snack. · Do not drink a lot of water close to bedtime, because the need to urinate may wake you up during the night. · Do not read or watch TV in bed. Use the bed only for sleeping and sexual activity. What to try    · Go to bed at the same time every night, and wake up at the same time every morning. Do not take naps during the day. · Keep your bedroom quiet, dark, and cool. · Get regular exercise, but not within 3 to 4 hours of your bedtime. · Sleep on a comfortable pillow and mattress. · If watching the clock makes you anxious, turn it facing away from you so you cannot see the time. · If you worry when you lie down, start a worry book. Well before bedtime, write down your worries, and then set the book and your concerns aside. · Try meditation or other relaxation techniques before you go to bed. · If you cannot fall asleep, get up and go to another room until you feel sleepy. Do something relaxing. Repeat your bedtime routine before you go to bed again. Make your house quiet and calm about an hour before bedtime. Turn down the lights, turn off the TV, log off the computer, and turn down the volume on music. This can help you relax after a busy day. Learning About Borderline Personality Disorder  What is borderline personality disorder? Borderline personality disorder is a mental health condition. It causes intense mood swings, impulsive behaviors, and severe problems with self-worth. It can lead to troubled relationships in every area of your life.   Experts don't know exactly what causes the disorder. It may be linked to a problem with the parts of the brain that control reactions to emotions. The disorder also seems to run in families. Often, people who get it faced some kind of childhood trauma such as abuse, neglect, or the death of a parent. Most of the time, signs of the disorder first appear in childhood. But problems often don't start until the early adult years. It's important to know that the disorder can be treated. Treatment can be hard, and getting better can take years. But with treatment, most people with borderline personality disorder do get better over time. What are the symptoms? Everyone has problems with emotions or behaviors sometimes. But if you have borderline personality disorder, the problems are severe. They repeat over a long time and disrupt your life. The most common symptoms include:  Intense emotions and mood swings. Harmful, impulsive behaviors. These may include substance abuse, binge eating, out-of-control spending, risky sexual behavior, and reckless driving. Hurting yourself. This may include cutting or burning yourself or attempting suicide. Trouble with relationships. You may see others as either \"good\" or \"bad. \" And your view may suddenly shift from one to the other, for minor reasons. Feeling worthless or empty inside. A frantic fear of being left alone (abandoned). This fear may lead to desperate attempts to hold on to those around you. Or it may cause you to reject others before they can reject you. Problems with anger. These may include violent temper tantrums and aggressive behavior. How is it treated? It may seem that there is no one on your side. You might have been told that there is no hope. But just because you've heard this, that doesn't mean it's true. Getting medical treatment and taking care of yourself can really help. Medical treatment  When you start treatment, you will find health professionals who will help you.  You will also meet others who have gone through what you are going through. Long-term treatment can reduce symptoms and harmful behaviors. It can also help you manage your emotions better. Treatment may include:  Counseling and therapy. It's important to find a counselor you can build a stable relationship with. This can be hard. Your condition may cause you to see your counselor as caring one minute and cruel the next. This can happen especially when he or she asks you to try to change a behavior. Try to find a counselor who has special training in dialectical behavioral therapy. It's a type of therapy that is often used to treat people with this disorder. Medicines. Examples are antidepressants, mood stabilizers, and antipsychotics. They may be helpful when you use them along with therapy. Self-care  There are things you can do to help yourself. Here are some tips:  Keep a regular daily schedule. Do not drink alcohol or use drugs. If your doctor prescribed medicines for you, take them exactly as directed. Get a healthy amount of sleep. Try to be active during daylight hours, and go to sleep and wake up at the same time each day. Eat healthy foods like fruits and vegetables; whole-grain breads and cereals; and lean meats, fish, and poultry. Practice mindfulness or other meditation. To be mindful means to pay attention to and accept the things that are happening right now, in the present moment. Keep to your treatment plan, even when it's hard. Keep the numbers for these national suicide hotlines: 9-796-520-TALK (5-268.784.3960) and 3-301-DUTAVQE (2-775.370.8085). If you or someone you know talks about suicide or about feeling hopeless, get help right away. When should you call for help? Call 911 anytime you think you may need emergency care. For example, call if:  You feel you cannot stop from hurting yourself or someone else. Call your doctor now or seek immediate medical care if:  You feel much more depressed.   You hear voices. Watch closely for changes in your health, and be sure to contact your doctor if:  You have a new crisis you can't handle. Follow-up care is a key part of your treatment and safety. Be sure to make and go to all appointments, and call your doctor if you are having problems. It's also a good idea to know your test results and keep a list of the medicines you take. Where can you learn more? Go to http://star-arvin.info/. Enter G290 in the search box to learn more about \"Learning About Borderline Personality Disorder. \"  Current as of: May 12, 2017  Content Version: 11.4  © 0656-6496 [employer] Care instructions adapted under license by [employer] (which disclaims liability or warranty for this information). If you have questions about a medical condition or this instruction, always ask your healthcare professional. [place] [address] any warranty or liability for your use of this information.   Dialectical Behavior Therapy (DBT)    [facility]  Renita Cohen, [address] [facility]  [phone]  [email or web address]    Therapist:  Crow Pichardo      [facility]  (Dr Luis Miguel Dominguez, Dr Lupe George)  [address] [employer] [facility], [address]

## 2018-03-02 NOTE — PROGRESS NOTES
Pt here for   Chief Complaint   Patient presents with    Vaginal Itching     1. Have you been to the ER, urgent care clinic since your last visit? Hospitalized since your last visit? No    2. Have you seen or consulted any other health care providers outside of the [address] [place] [address] since your last visit? Include any pap smears or colon screening.  No     Pt denies pain at this time      PHQ over the last two weeks 3/2/2018   PHQ Not Done Active Diagnosis of Depression or Bipolar Disorder   Little interest or pleasure in doing things -   Feeling down, depressed or hopeless -   Total Score PHQ 2 -   Trouble falling or staying asleep, or sleeping too much -   Feeling tired or having little energy -   Poor appetite or overeating -   Feeling bad about yourself - or that you are a failure or have let yourself or your family down -   Trouble concentrating on things such as school, work, reading or watching TV -   Moving or speaking so slowly that other people could have noticed; or the opposite being so fidgety that others notice -   Thoughts of being better off dead, or hurting yourself in some way -   PHQ 9 Score -   How difficult have these problems made it for you to do your work, take care of your home and get along with others -

## 2018-03-02 NOTE — MR AVS SNAPSHOT
[address] [facility] [id number] [phone] Patient: Alec Gonzalez MRN: [id number] Visit Information Date & Time Provider Department Dept. Phone Encounter #  
 3/2/2018  8:20 AM Emerald Rivera, [address] [id number] Follow-up Instructions Return if symptoms worsen or fail to improve. Your Appointments 3/2/2018  9:00 AM  
ESTABLISHED PATIENT with Gabi Rossi NP [facility] ([facility]) Appt Note: 2 month follow-up [address] [facility] [address] [facility] [address]  
  
   
 [address] [facility] [address] [facility] 7 [address] Upcoming Health Maintenance Date Due  
 FOOT EXAM Q1 2016 HEMOGLOBIN A1C Q6M 2017 MICROALBUMIN Q1 3/14/2018 LIPID PANEL Q1 3/14/2018 EYE EXAM RETINAL OR DILATED Q1 2018 MEDICARE YEARLY EXAM 2018 PAP AKA CERVICAL CYTOLOGY 2022 DTaP/Tdap/Td series (2 - Td) 2024 COLONOSCOPY 2025 Allergies as of 3/2/2018  Review Complete On: 3/2/2018 By: Velvet Phalen. Elizabeth Rivera NP Severity Noted Reaction Type Reactions Amoxicillin  2010    Rash, Other (comments) Vaginal itching Flagyl [Metronidazole]  2016   Side Effect Nausea and Vomiting Pcn [Penicillins]  2014   Intolerance Itching Vaginal itching Current Immunizations  Reviewed on 11/15/2011 Name Date Influenza Vaccine (Quad) PF 2017 Influenza Vaccine Intradermal PF 2014 PPD 2012 Tdap 2014  4:52 AM  
  
 Not reviewed this visit You Were Diagnosed With   
  
 Codes Comments Acute vaginitis    -  Primary ICD-10-CM: N76.0 ICD-9-CM: 616.10 Vitals BP Pulse Temp Resp Height(growth percentile) Weight(growth percentile)  136/88 (BP 1 Location: Left arm, BP Patient Position: Sitting) 89 97.9 °F (36.6 °C) (Oral) 18 5' 2\" (1.575 m) 162 lb 1.6 oz (73.5 kg) LMP SpO2 BMI OB Status Smoking Status 02/16/2018 (Exact Date) 100% 29.65 kg/m2 Having regular periods Current Every Day Smoker BMI and BSA Data Body Mass Index Body Surface Area  
 29.65 kg/m 2 1.79 m 2 Preferred Pharmacy Pharmacy Name Phone [facility]/PHARMACY #[id number]- [place], [place] - [address] [phone] Your Updated Medication List  
  
   
This list is accurate as of 3/2/18  8:52 AM.  Always use your most recent med list.  
  
  
  
  
 acetaminophen 325 mg tablet Commonly known as:  TYLENOL  
TAKE 2 TABS BY MOUTH EVERY FOUR (4) HOURS AS NEEDED FOR PAIN. busPIRone 10 mg tablet Commonly known as:  BUSPAR Take 1 Tab by mouth two (2) times a day. cetirizine 10 mg tablet Commonly known as:  ZYRTEC Take 1 Tab by mouth daily. Indications: Allergic Rhinitis  
  
 fluconazole 150 mg tablet Commonly known as:  DIFLUCAN Take 1 Tab by mouth daily for 1 day. FDA advises cautious prescribing of oral fluconazole in pregnancy. naproxen 500 mg tablet Commonly known as:  NAPROSYN Take 1 Tab by mouth two (2) times daily as needed for Pain for up to 10 days. With food OLANZapine 5 mg tablet Commonly known as:  ZyPREXA Take 1 Tab by mouth nightly. ondansetron 8 mg disintegrating tablet Commonly known as:  ZOFRAN ODT Take 1 Tab by mouth every eight (8) hours as needed for Nausea. PRENATAL PLUS (CALCIUM CARB) 27 mg iron- 1 mg Tab Generic drug:  prenatal vit-calcium-iron-fa TAKE 1 TABLET EVERY DAY AS DIRECTED  
  
 promethazine-codeine 6.25-10 mg/5 mL syrup Commonly known as:  PHENERGAN with CODEINE Take 5 mL by mouth four (4) times daily as needed for Cough. Max Daily Amount: 20 mL. raNITIdine 150 mg tablet Commonly known as:  ZANTAC Take 1 Tab by mouth two (2) times a day.  Indications: HEARTBURN  
  
 sertraline 50 mg tablet Commonly known as:  ZOLOFT Take 1 Tab by mouth daily. Prescriptions Sent to Pharmacy Refills  
 fluconazole (DIFLUCAN) 150 mg tablet 0 Sig: Take 1 Tab by mouth daily for 1 day. FDA advises cautious prescribing of oral fluconazole in pregnancy. Class: Normal  
 Pharmacy: [facility] [id number] [facility] [address]  #: [phone] Route: Oral  
  
We Performed the Following [address] [id number] Custom] Follow-up Instructions Return if symptoms worsen or fail to improve. Introducing [facility]! Dear Antonio Ni: Thank you for requesting a [employer] account. Our records indicate that you already have an active [employer] account. You can access your account anytime at [email or web address] Did you know that you can access your hospital and ER discharge instructions at any time in [employer]? You can also review all of your test results from your hospital stay or ER visit. Additional Information If you have questions, please visit the Frequently Asked Questions section of the [employer] website at [email or web address] Remember, [employer] is NOT to be used for urgent needs. For medical emergencies, dial 911. Now available from your iPhone and Android! Please provide this summary of care documentation to your next provider. Your primary care clinician is listed as PIPO Rincon. If you have any questions after today's visit, please call [phone].

## 2018-03-02 NOTE — PROGRESS NOTES
Subjective: (As above and below)     Chief Complaint   Patient presents with    Vaginal Itching     Vernellvero Milan is a 34y.o. year old female who presents for vaginal itching x 2 days. She has used a new soap. No UTI s/s. No discharge. Has not tried otc tx. Reviewed PmHx, RxHx, FmHx, SocHx, AllgHx and updated in chart. Family History   Problem Relation Age of Onset    Diabetes Mother     Depression Mother     Asthma Brother     Stroke Maternal Grandmother      aneurysm    Cancer Maternal Grandmother      kidney    Hypertension Maternal Grandmother     Cancer Paternal Grandmother      lung    Diabetes Brother     Bipolar Disorder Brother     Schizophrenia Brother     Other Brother      paranoia    Breast Cancer Other     Breast Cancer Other        Past Medical History:   Diagnosis Date    Anxiety     [facility] [address]     Depression     Diabetes ([facility] [address]     diet control    Hyperlipidemia     High Cholesterol    Hyperlipidemia     Major depression       Social History     Social History    Marital status: SINGLE     Spouse name: N/A    Number of children: N/A    Years of education: N/A     Social History Main Topics    Smoking status: Current Every Day Smoker    Smokeless tobacco: Current User      Comment: cigars    Alcohol use Yes      Comment: occasional    Drug use: No    Sexual activity: Yes     Partners: Male     Birth control/ protection: None     Other Topics Concern    None     Social History Narrative          Current Outpatient Prescriptions   Medication Sig    fluconazole (DIFLUCAN) 150 mg tablet Take 1 Tab by mouth daily for 1 day. FDA advises cautious prescribing of oral fluconazole in pregnancy.  naproxen (NAPROSYN) 500 mg tablet Take 1 Tab by mouth two (2) times daily as needed for Pain for up to 10 days. With food    promethazine-codeine (PHENERGAN WITH CODEINE) 6.25-10 mg/5 mL syrup Take 5 mL by mouth four (4) times daily as needed for Cough.  Max Daily Amount: 20 mL.  cetirizine (ZYRTEC) 10 mg tablet Take 1 Tab by mouth daily. Indications: Allergic Rhinitis    acetaminophen (TYLENOL) 325 mg tablet TAKE 2 TABS BY MOUTH EVERY FOUR (4) HOURS AS NEEDED FOR PAIN.  busPIRone (BUSPAR) 10 mg tablet Take 1 Tab by mouth two (2) times a day.  OLANZapine (ZYPREXA) 5 mg tablet Take 1 Tab by mouth nightly.  sertraline (ZOLOFT) 50 mg tablet Take 1 Tab by mouth daily.  ondansetron (ZOFRAN ODT) 8 mg disintegrating tablet Take 1 Tab by mouth every eight (8) hours as needed for Nausea.  PRENATAL PLUS, CALCIUM CARB, 27 mg iron- 1 mg tab TAKE 1 TABLET EVERY DAY AS DIRECTED    raNITIdine (ZANTAC) 150 mg tablet Take 1 Tab by mouth two (2) times a day. Indications: HEARTBURN     No current facility-administered medications for this visit. Review of Systems:   Constitutional:    Negative for fever and chills, negative diaphoresis. HEENT:              Negative for neck pain and stiffness. Eyes:                  Negative for visual disturbance, itching, redness or discharge. Respiratory:        Negative for cough and shortness of breath. Cardiovascular:  Negative for chest pain and palpitations. Gastrointestinal: Negative for nausea, vomiting, , diarrhea or constipation. +abdominal pain  Genitourinary:     Negative for dysuria and frequency. Musculoskeletal: Negative for falls, tenderness and swelling. Skin:                    Negative for rash, masses or lesions. Neurological:       Negative for dizzyness, seizure, loss of consciousness, weakness and numbness.      Objective:     Vitals:    03/02/18 0835   BP: 136/88   Pulse: 89   Resp: 18   Temp: 97.9 °F (36.6 °C)   TempSrc: Oral   SpO2: 100%   Weight: 162 lb 1.6 oz (73.5 kg)   Height: 5' 2\" (1.575 m)       Results for orders placed or performed in visit on 02/05/18   TSH AND FREE T4   Result Value Ref Range    TSH 0.420 (L) 0.450 - 4.500 uIU/mL    T4, Free 0.92 0.82 - 1.77 ng/dL   TESTOSTERONE, FREE & TOTAL   Result Value Ref Range    Testosterone 77 (H) 8 - 48 ng/dL    Free testosterone (Direct) 3.7 0.0 - 4.2 pg/mL   [facility] AND LH   Result Value Ref Range    Luteinizing hormone 9.4 mIU/mL    FSH 4.3 mIU/mL   ESTRADIOL   Result Value Ref Range    Estradiol 132.8 pg/mL   PROLACTIN   Result Value Ref Range    Prolactin 14.0 4.8 - 23.3 ng/mL   NUSWAB VAGINITIS PLUS   Result Value Ref Range    Atopobium vaginae High - 2 (A) Score    BVAB 2 High - 2 (A) Score    Megasphaera 1 High - 2 (A) Score    C. albicans, ROLANDO Negative Negative    C. glabrata, ROLANDO Negative Negative    T. vaginalis, ROLANDO Negative Negative    C. trachomatis, ROLANDO CANCELED     N. gonorrhoeae, ROLANDO Negative Negative         Physical Examination: General appearance - alert, well appearing, and in no distress  Pelvic - VULVA: normal appearing vulva with no masses, tenderness or lesions      Assessment/ Plan:   Follow-up Disposition:  Return if symptoms worsen or fail to improve. 1. Acute vaginitis  Pt wishes to be tested for STI as well  Regarding mention of RLQ abd pain- not an acute problem. Unable to address in full d/t her new patient appt with psychiatry right now - advised she f/u with usual provider regarding this soon  - NUSWAB VAGINITIS PLUS  - fluconazole (DIFLUCAN) 150 mg tablet; Take 1 Tab by mouth daily for 1 day. FDA advises cautious prescribing of oral fluconazole in pregnancy. Dispense: 1 Tab; Refill: 0        I have discussed the diagnosis with the patient and the intended plan as seen in the above orders. The patient has received an after-visit summary and questions were answered concerning future plans. Pt conveyed understanding of plan. Medication Side Effects and Warnings were discussed with patient: yes  Patient Labs were reviewed: yes  Patient Past Records were reviewed:  yes    Charisse Melgar.  Kesha Ma, CORTNEY

## 2018-03-04 LAB
ALBUMIN SERPL-MCNC: 4 G/DL (ref 3.5–5.5)
ALBUMIN/GLOB SERPL: 1.3 {RATIO} (ref 1.2–2.2)
ALP SERPL-CCNC: 61 IU/L (ref 39–117)
ALT SERPL-CCNC: 12 IU/L (ref 0–32)
AMPHETAMINES UR QL SCN: NEGATIVE NG/ML
AST SERPL-CCNC: 13 IU/L (ref 0–40)
BASOPHILS # BLD AUTO: 0 X10E3/UL (ref 0–0.2)
BASOPHILS NFR BLD AUTO: 0 %
BILIRUB SERPL-MCNC: 0.2 MG/DL (ref 0–1.2)
BUN SERPL-MCNC: 12 MG/DL (ref 6–24)
BUN/CREAT SERPL: 17 (ref 9–23)
BZE UR QL: NEGATIVE NG/ML
CALCIUM SERPL-MCNC: 9.3 MG/DL (ref 8.7–10.2)
CANNABINOIDS UR QL SCN: POSITIVE NG/ML
CHLORIDE SERPL-SCNC: 101 MMOL/L (ref 96–106)
CO2 SERPL-SCNC: 26 MMOL/L (ref 18–29)
CREAT SERPL-MCNC: 0.71 MG/DL (ref 0.57–1)
DRUG SCREEN COMMENT:, 753798: NORMAL
EOSINOPHIL # BLD AUTO: 0.1 X10E3/UL (ref 0–0.4)
EOSINOPHIL NFR BLD AUTO: 1 %
ERYTHROCYTE [DISTWIDTH] IN BLOOD BY AUTOMATED COUNT: 12.7 % (ref 12.3–15.4)
GFR SERPLBLD CREATININE-BSD FMLA CKD-EPI: 107 ML/MIN/1.73
GFR SERPLBLD CREATININE-BSD FMLA CKD-EPI: 123 ML/MIN/1.73
GLOBULIN SER CALC-MCNC: 3.1 G/DL (ref 1.5–4.5)
GLUCOSE SERPL-MCNC: 94 MG/DL (ref 65–99)
HCT VFR BLD AUTO: 36.7 % (ref 34–46.6)
HGB BLD-MCNC: 12.4 G/DL (ref 11.1–15.9)
IMM GRANULOCYTES # BLD: 0 X10E3/UL (ref 0–0.1)
IMM GRANULOCYTES NFR BLD: 0 %
LYMPHOCYTES # BLD AUTO: 1.8 X10E3/UL (ref 0.7–3.1)
LYMPHOCYTES NFR BLD AUTO: 26 %
MCH RBC QN AUTO: 33.8 PG (ref 26.6–33)
MCHC RBC AUTO-ENTMCNC: 33.8 G/DL (ref 31.5–35.7)
MCV RBC AUTO: 100 FL (ref 79–97)
MONOCYTES # BLD AUTO: 0.6 X10E3/UL (ref 0.1–0.9)
MONOCYTES NFR BLD AUTO: 8 %
NEUTROPHILS # BLD AUTO: 4.7 X10E3/UL (ref 1.4–7)
NEUTROPHILS NFR BLD AUTO: 65 %
OPIATES UR QL SCN: POSITIVE NG/ML
PCP UR QL: NEGATIVE NG/ML
PLATELET # BLD AUTO: 293 X10E3/UL (ref 150–379)
POTASSIUM SERPL-SCNC: 4.5 MMOL/L (ref 3.5–5.2)
PROT SERPL-MCNC: 7.1 G/DL (ref 6–8.5)
RBC # BLD AUTO: 3.67 X10E6/UL (ref 3.77–5.28)
SODIUM SERPL-SCNC: 141 MMOL/L (ref 134–144)
WBC # BLD AUTO: 7.1 X10E3/UL (ref 3.4–10.8)

## 2018-03-05 ENCOUNTER — DOCUMENTATION ONLY (OUTPATIENT)
Dept: BEHAVIORAL/MENTAL HEALTH CLINIC | Age: 38
End: 2018-03-05

## 2018-03-07 ENCOUNTER — TELEPHONE (OUTPATIENT)
Dept: INTERNAL MEDICINE CLINIC | Age: 38
End: 2018-03-07

## 2018-03-07 DIAGNOSIS — A74.9 CHLAMYDIA: Primary | ICD-10-CM

## 2018-03-07 LAB
A VAGINAE DNA VAG QL NAA+PROBE: ABNORMAL SCORE
BVAB2 DNA VAG QL NAA+PROBE: ABNORMAL SCORE
C ALBICANS DNA VAG QL NAA+PROBE: NEGATIVE
C GLABRATA DNA VAG QL NAA+PROBE: NEGATIVE
C TRACH RRNA SPEC QL NAA+PROBE: POSITIVE
MEGA1 DNA VAG QL NAA+PROBE: ABNORMAL SCORE
N GONORRHOEA RRNA SPEC QL NAA+PROBE: NEGATIVE
T VAGINALIS RRNA SPEC QL NAA+PROBE: NEGATIVE

## 2018-03-07 RX ORDER — AZITHROMYCIN 500 MG/1
1000 TABLET, FILM COATED ORAL ONCE
Qty: 2 TAB | Refills: 0 | Status: SHIPPED | OUTPATIENT
Start: 2018-03-07 | End: 2018-03-07

## 2018-03-13 ENCOUNTER — OFFICE VISIT (OUTPATIENT)
Dept: INTERNAL MEDICINE CLINIC | Age: 38
End: 2018-03-13

## 2018-03-13 VITALS
TEMPERATURE: 97.4 F | DIASTOLIC BLOOD PRESSURE: 88 MMHG | RESPIRATION RATE: 18 BRPM | WEIGHT: 159.1 LBS | HEART RATE: 108 BPM | BODY MASS INDEX: 29.28 KG/M2 | SYSTOLIC BLOOD PRESSURE: 139 MMHG | HEIGHT: 62 IN | OXYGEN SATURATION: 100 %

## 2018-03-13 DIAGNOSIS — N76.0 ACUTE VAGINITIS: ICD-10-CM

## 2018-03-13 DIAGNOSIS — A74.9 CHLAMYDIA: Primary | ICD-10-CM

## 2018-03-13 NOTE — PROGRESS NOTES
Pt here for   Chief Complaint   Patient presents with    Vaginal Discharge     pt states she is here to make sure she is clear of STD, she still has discharge     1. Have you been to the ER, urgent care clinic since your last visit? Hospitalized since your last visit? No    2. Have you seen or consulted any other health care providers outside of the [address] [place] [address] since your last visit? Include any pap smears or colon screening.  No       Pt denies pain at this time      PHQ over the last two weeks 3/13/2018   PHQ Not Done Active Diagnosis of Depression or Bipolar Disorder   Little interest or pleasure in doing things -   Feeling down, depressed or hopeless -   Total Score PHQ 2 -   Trouble falling or staying asleep, or sleeping too much -   Feeling tired or having little energy -   Poor appetite or overeating -   Feeling bad about yourself - or that you are a failure or have let yourself or your family down -   Trouble concentrating on things such as school, work, reading or watching TV -   Moving or speaking so slowly that other people could have noticed; or the opposite being so fidgety that others notice -   Thoughts of being better off dead, or hurting yourself in some way -   PHQ 9 Score -   How difficult have these problems made it for you to do your work, take care of your home and get along with others -

## 2018-03-13 NOTE — MR AVS SNAPSHOT
[address] [facility] [address] [facility] [address] 
[phone] Patient: Ferny Phelan MRN: [id number] Visit Information Date & Time Provider Department Dept. Phone Encounter #  
 3/13/2018 10:00 AM Emerald Treadwell, [address] [id number] Follow-up Instructions Return if symptoms worsen or fail to improve. Your Appointments 5/2/2018  9:00 AM  
ESTABLISHED PATIENT with Evita Aguilar NP [facility] ([address]) Appt Note: 2 month follow-up [address] [facility] [address] [facility] [address]  
  
   
 [address] [facility] [address] [facility] [address] [facility] 7 [address] Upcoming Health Maintenance Date Due  
 FOOT EXAM Q1 5/18/2016 HEMOGLOBIN A1C Q6M 9/14/2017 MICROALBUMIN Q1 3/14/2018 LIPID PANEL Q1 3/14/2018 EYE EXAM RETINAL OR DILATED Q1 4/12/2018 MEDICARE YEARLY EXAM 9/29/2018 PAP AKA CERVICAL CYTOLOGY 6/1/2022 DTaP/Tdap/Td series (2 - Td) 2/2/2024 COLONOSCOPY 12/17/2025 Allergies as of 3/13/2018  Review Complete On: 3/13/2018 By: Mao Treadwell NP Severity Noted Reaction Type Reactions Amoxicillin  12/29/2010    Rash, Other (comments) Vaginal itching Flagyl [Metronidazole]  11/16/2016   Side Effect Nausea and Vomiting Pcn [Penicillins]  09/20/2014   Intolerance Itching Vaginal itching Current Immunizations  Reviewed on 11/15/2011 Name Date Influenza Vaccine (Quad) PF 9/8/2017 Influenza Vaccine Intradermal PF 11/5/2014 PPD 6/20/2012 Tdap 2/2/2014  4:52 AM  
  
 Not reviewed this visit You Were Diagnosed With   
  
 Codes Comments Chlamydia    -  Primary ICD-10-CM: A74.9 ICD-9-CM: 079.98 Vitals BP Pulse Temp Resp Height(growth percentile) Weight(growth percentile)  139/88 (BP 1 Location: Left arm, BP Patient Position: Sitting) (!) 108 97.4 °F (36.3 °C) (Oral) 18 5' 2\" (1.575 m) 159 lb 1.6 oz (72.2 kg) LMP SpO2 BMI OB Status Smoking Status 02/16/2018 100% 29.1 kg/m2 Having regular periods Current Every Day Smoker BMI and BSA Data Body Mass Index Body Surface Area  
 29.1 kg/m 2 1.78 m 2 Preferred Pharmacy Pharmacy Name Phone [facility]/PHARMACY #[id number]- [place], [place] - [address] [phone] Your Updated Medication List  
  
   
This list is accurate as of 3/13/18 10:42 AM.  Always use your most recent med list.  
  
  
  
  
 acetaminophen 325 mg tablet Commonly known as:  TYLENOL  
TAKE 2 TABS BY MOUTH EVERY FOUR (4) HOURS AS NEEDED FOR PAIN. busPIRone 10 mg tablet Commonly known as:  BUSPAR Take 1 Tab by mouth two (2) times a day. cetirizine 10 mg tablet Commonly known as:  ZYRTEC Take 1 Tab by mouth daily. Indications: Allergic Rhinitis OLANZapine 5 mg tablet Commonly known as:  ZyPREXA Take 1 Tab by mouth nightly. ondansetron 8 mg disintegrating tablet Commonly known as:  ZOFRAN ODT Take 1 Tab by mouth every eight (8) hours as needed for Nausea. PRENATAL PLUS (CALCIUM CARB) 27 mg iron- 1 mg Tab Generic drug:  prenatal vit-calcium-iron-fa TAKE 1 TABLET EVERY DAY AS DIRECTED  
  
 promethazine-codeine 6.25-10 mg/5 mL syrup Commonly known as:  PHENERGAN with CODEINE Take 5 mL by mouth four (4) times daily as needed for Cough. Max Daily Amount: 20 mL. raNITIdine 150 mg tablet Commonly known as:  ZANTAC Take 1 Tab by mouth two (2) times a day. Indications: HEARTBURN  
  
 sertraline 50 mg tablet Commonly known as:  ZOLOFT Take 1 Tab by mouth daily. Follow-up Instructions Return if symptoms worsen or fail to improve. [facility]! Dear Agustin Hewitt: Thank you for requesting a [employer] account.   Our records indicate that you already have an active [email or web address] account. You can access your account anytime at [email or web address] Did you know that you can access your hospital and ER discharge instructions at any time in [email or web address]? You can also review all of your test results from your hospital stay or ER visit. Additional Information If you have questions, please visit the Frequently Asked Questions section of the [email or web address] website at [email or web address] Remember, [email or web address] is NOT to be used for urgent needs. For medical emergencies, dial 911. Now available from your iPhone and Android! Please provide this summary of care documentation to your next provider. Your primary care clinician is listed as PIPO Wilkins. If you have any questions after today's visit, please call [phone].

## 2018-03-13 NOTE — PROGRESS NOTES
Subjective: (As above and below)     Chief Complaint   Patient presents with    Vaginal Discharge     pt states she is here to make sure she is clear of STD, she still has discharge     ELVIE POSADAS Randal Vernon is a 34y.o. year old female who presents for continued vaginal discharge after taking azithromycin for +chalmydia test. She states that her partner received treatment. She states that her discharge is different \"not as watery\", white. No itching. No s/s uti. No odor. She asks to be retested. .        Reviewed PmHx, RxHx, FmHx, SocHx, AllgHx and updated in chart. Family History   Problem Relation Age of Onset    Diabetes Mother     Depression Mother     Asthma Brother     Stroke Maternal Grandmother      aneurysm    Cancer Maternal Grandmother      kidney    Hypertension Maternal Grandmother     Cancer Paternal Grandmother      lung    Diabetes Brother     Bipolar Disorder Brother     Schizophrenia Brother     Other Brother      paranoia    Breast Cancer Other     Breast Cancer Other        Past Medical History:   Diagnosis Date    Anxiety     [facility] ([facility] [address]     Depression     Diabetes ([facility] [address]     diet control    Hyperlipidemia     High Cholesterol    Hyperlipidemia     Major depression       Social History     Social History    Marital status: SINGLE     Spouse name: N/A    Number of children: N/A    Years of education: N/A     Social History Main Topics    Smoking status: Current Every Day Smoker    Smokeless tobacco: Current User      Comment: cigars    Alcohol use Yes      Comment: occasional    Drug use: No    Sexual activity: Yes     Partners: Male     Birth control/ protection: None     Other Topics Concern    None     Social History Narrative          Current Outpatient Prescriptions   Medication Sig    busPIRone (BUSPAR) 10 mg tablet Take 1 Tab by mouth two (2) times a day.  OLANZapine (ZYPREXA) 5 mg tablet Take 1 Tab by mouth nightly.     sertraline (ZOLOFT) 50 mg tablet Take 1 Tab by mouth daily.  promethazine-codeine (PHENERGAN WITH CODEINE) 6.25-10 mg/5 mL syrup Take 5 mL by mouth four (4) times daily as needed for Cough. Max Daily Amount: 20 mL.  cetirizine (ZYRTEC) 10 mg tablet Take 1 Tab by mouth daily. Indications: Allergic Rhinitis    acetaminophen (TYLENOL) 325 mg tablet TAKE 2 TABS BY MOUTH EVERY FOUR (4) HOURS AS NEEDED FOR PAIN.  ondansetron (ZOFRAN ODT) 8 mg disintegrating tablet Take 1 Tab by mouth every eight (8) hours as needed for Nausea.  PRENATAL PLUS, CALCIUM CARB, 27 mg iron- 1 mg tab TAKE 1 TABLET EVERY DAY AS DIRECTED    raNITIdine (ZANTAC) 150 mg tablet Take 1 Tab by mouth two (2) times a day. Indications: HEARTBURN     No current facility-administered medications for this visit. Review of Systems:   Constitutional:    Negative for fever and chills, negative diaphoresis. HEENT:              Negative for neck pain and stiffness. Eyes:                  Negative for visual disturbance, itching, redness or discharge. Respiratory:        Negative for cough and shortness of breath. Cardiovascular:  Negative for chest pain and palpitations. Gastrointestinal: Negative for nausea, vomiting, abdominal pain, diarrhea or constipation. Genitourinary:     Negative for dysuria and frequency. Musculoskeletal: Negative for falls, tenderness and swelling. Skin:                    Negative for rash, masses or lesions. Neurological:       Negative for dizzyness, seizure, loss of consciousness, weakness and numbness.      Objective:     Vitals:    03/13/18 1027   BP: 139/88   Pulse: (!) 108   Resp: 18   Temp: 97.4 °F (36.3 °C)   TempSrc: Oral   SpO2: 100%   Weight: 159 lb 1.6 oz (72.2 kg)   Height: 5' 2\" (1.575 m)           Physical Examination: General appearance - alert, well appearing, and in no distress  Pelvic - VULVA: normal appearing vulva with no masses, tenderness or lesions      Assessment/ Plan:   Follow-up Disposition:  Return if symptoms worsen or fail to improve. 1. Chlamydia    - NUSWAB VAGINITIS PLUS    2. Acute vaginitis    - NUSWAB VAGINITIS PLUS        I have discussed the diagnosis with the patient and the intended plan as seen in the above orders. The patient has received an after-visit summary and questions were answered concerning future plans. Pt conveyed understanding of plan. Medication Side Effects and Warnings were discussed with patient: yes  Patient Labs were reviewed: yes  Patient Past Records were reviewed:  yes    Zenaida Wong.  Christi García NP

## 2018-04-27 ENCOUNTER — OFFICE VISIT (OUTPATIENT)
Dept: INTERNAL MEDICINE CLINIC | Age: 38
End: 2018-04-27

## 2018-04-27 VITALS
HEART RATE: 78 BPM | HEIGHT: 62 IN | RESPIRATION RATE: 18 BRPM | WEIGHT: 160.72 LBS | TEMPERATURE: 98.5 F | SYSTOLIC BLOOD PRESSURE: 125 MMHG | DIASTOLIC BLOOD PRESSURE: 78 MMHG | BODY MASS INDEX: 29.58 KG/M2

## 2018-04-27 DIAGNOSIS — N76.0 ACUTE VAGINITIS: Primary | ICD-10-CM

## 2018-04-27 DIAGNOSIS — R31.9 HEMATURIA, UNSPECIFIED TYPE: ICD-10-CM

## 2018-04-27 DIAGNOSIS — E11.9 CONTROLLED TYPE 2 DIABETES MELLITUS WITHOUT COMPLICATION, WITHOUT LONG-TERM CURRENT USE OF INSULIN (HCC): ICD-10-CM

## 2018-04-27 LAB
ALBUMIN UR QL STRIP: NORMAL MG/L
BILIRUB UR QL STRIP: NEGATIVE
CHOLEST SERPL-MCNC: 152 MG/DL
CREATININE, URINE POC: NORMAL MG/DL
GLUCOSE UR-MCNC: NEGATIVE MG/DL
HBA1C MFR BLD HPLC: 5.4 %
HDLC SERPL-MCNC: 57 MG/DL
KETONES P FAST UR STRIP-MCNC: NEGATIVE MG/DL
LDL CHOLESTEROL POC: 82 MG/DL
MICROALBUMIN/CREAT RATIO POC: NORMAL MG/G
NON-HDL GOAL (POC): 95
PH UR STRIP: 6 [PH] (ref 4.6–8)
PROT UR QL STRIP: NEGATIVE
SP GR UR STRIP: 1.02 (ref 1–1.03)
TCHOL/HDL RATIO (POC): 1.4
TRIGL SERPL-MCNC: 64 MG/DL
UA UROBILINOGEN AMB POC: NORMAL (ref 0.2–1)
URINALYSIS CLARITY POC: CLEAR
URINALYSIS COLOR POC: YELLOW
URINE BLOOD POC: NORMAL
URINE LEUKOCYTES POC: NEGATIVE
URINE NITRITES POC: NEGATIVE

## 2018-04-27 RX ORDER — CLOMIPHENE CITRATE 50 MG/1
TABLET ORAL
Refills: 2 | COMMUNITY
Start: 2018-03-17 | End: 2018-05-30

## 2018-04-27 NOTE — Clinical Note
The urine micro was not collected, but I'm unable to cancel it in [facility] care. .. I don't want her being billed for this - wilfred mccormick cancel it either, can you ask the [facility] care people?

## 2018-04-27 NOTE — PROGRESS NOTES
Pt here for   Chief Complaint   Patient presents with    Vaginal Discharge     with odor for a couple of days     1. Have you been to the ER, urgent care clinic since your last visit? Hospitalized since your last visit? No    2. Have you seen or consulted any other health care providers outside of the [address] [place] [address] since your last visit? Include any pap smears or colon screening.  No       Pt denies pain at this time      PHQ over the last two weeks 4/27/2018   PHQ Not Done Active Diagnosis of Depression or Bipolar Disorder   Little interest or pleasure in doing things -   Feeling down, depressed or hopeless -   Total Score PHQ 2 -   Trouble falling or staying asleep, or sleeping too much -   Feeling tired or having little energy -   Poor appetite or overeating -   Feeling bad about yourself - or that you are a failure or have let yourself or your family down -   Trouble concentrating on things such as school, work, reading or watching TV -   Moving or speaking so slowly that other people could have noticed; or the opposite being so fidgety that others notice -   Thoughts of being better off dead, or hurting yourself in some way -   PHQ 9 Score -   How difficult have these problems made it for you to do your work, take care of your home and get along with others -

## 2018-04-27 NOTE — PATIENT INSTRUCTIONS
Bacterial Vaginosis: Care Instructions  Your Care Instructions    Bacterial vaginosis is a type of vaginal infection. It is caused by excess growth of certain bacteria that are normally found in the vagina. Symptoms can include itching, swelling, pain when you urinate or have sex, and a gray or yellow discharge with a \"fishy\" odor. It is not considered an infection that is spread through sexual contact. Although symptoms can be annoying and uncomfortable, bacterial vaginosis does not usually cause other health problems. However, if you have it while you are pregnant, it can cause complications. While the infection may go away on its own, most doctors use antibiotics to treat it. You may have been prescribed pills or vaginal cream. With treatment, bacterial vaginosis usually clears up in 5 to 7 days. Follow-up care is a key part of your treatment and safety. Be sure to make and go to all appointments, and call your doctor if you are having problems. It's also a good idea to know your test results and keep a list of the medicines you take. How can you care for yourself at home? · Take your antibiotics as directed. Do not stop taking them just because you feel better. You need to take the full course of antibiotics. · Do not eat or drink anything that contains alcohol if you are taking metronidazole (Flagyl). · Keep using your medicine if you start your period. Use pads instead of tampons while using a vaginal cream or suppository. Tampons can absorb the medicine. · Wear loose cotton clothing. Do not wear nylon and other materials that hold body heat and moisture close to the skin. · Do not scratch. Relieve itching with a cold pack or a cool bath. · Do not wash your vaginal area more than once a day. Use plain water or a mild, unscented soap. Do not douche. When should you call for help?   Watch closely for changes in your health, and be sure to contact your doctor if:  ? · You have unexpected vaginal bleeding. ? · You have a fever. ? · You have new or increased pain in your vagina or pelvis. ? · You are not getting better after 1 week. ? · Your symptoms return after you finish the course of your medicine. Where can you learn more? Go to http://star-arvin.info/. Moises Hernandez in the search box to learn more about \"Bacterial Vaginosis: Care Instructions. \"  Current as of: October 13, 2016  Content Version: 11.4  © 6369-3060 [employer]. Care instructions adapted under license by [employer] (which disclaims liability or warranty for this information). If you have questions about a medical condition or this instruction, always ask your healthcare professional. [address] any warranty or liability for your use of this information.

## 2018-04-27 NOTE — PROGRESS NOTES
Subjective: (As above and below)     Chief Complaint   Patient presents with    Vaginal Discharge     with odor for a couple of days     Vernell POSADAS Aramis Holland is a 34y.o. year old female who presents for possible BV, symptoms present for  a few days - she reports vaginal odor/discharge. She did try a new soap. She has some vaginal irritation as well. No new partners. No dysuria, diarrhea or constipation, no fevers    She is due for A1c - she states she was dx in 2011, however, a1cs since then have been very well controlled w/ diet    She is going to have her eye exam soon        Reviewed PmHx, RxHx, FmHx, SocHx, AllgHx and updated in chart.   Family History   Problem Relation Age of Onset    Diabetes Mother     Depression Mother     Asthma Brother     Stroke Maternal Grandmother      aneurysm    Cancer Maternal Grandmother      kidney    Hypertension Maternal Grandmother     Cancer Paternal Grandmother      lung    Diabetes Brother     Bipolar Disorder Brother     Schizophrenia Brother     Other Brother      paranoia    Breast Cancer Other     Breast Cancer Other        Past Medical History:   Diagnosis Date    Anxiety     Bipolar affective [facility] [address]     Depression     Diabetes ([facility] [address]     diet control    Hyperlipidemia     High Cholesterol    Hyperlipidemia     Major depression       Social History     Social History    Marital status: SINGLE     Spouse name: N/A    Number of children: N/A    Years of education: N/A     Social History Main Topics    Smoking status: Current Every Day Smoker    Smokeless tobacco: Current User      Comment: cigars    Alcohol use Yes      Comment: occasional    Drug use: No    Sexual activity: Yes     Partners: Male     Birth control/ protection: None     Other Topics Concern    None     Social History Narrative          Current Outpatient Prescriptions   Medication Sig    clomiPHENE (CLOMID) 50 mg tablet TAKE 1 TABLET BY MOUTH EVERY DAY ON DAYS 3-7 OF CYCLE    cetirizine (ZYRTEC) 10 mg tablet TAKE 1 TAB BY MOUTH DAILY. INDICATIONS: ALLERGIC RHINITIS    busPIRone (BUSPAR) 10 mg tablet Take 1 Tab by mouth two (2) times a day.  OLANZapine (ZYPREXA) 5 mg tablet Take 1 Tab by mouth nightly.  sertraline (ZOLOFT) 50 mg tablet Take 1 Tab by mouth daily.  promethazine-codeine (PHENERGAN WITH CODEINE) 6.25-10 mg/5 mL syrup Take 5 mL by mouth four (4) times daily as needed for Cough. Max Daily Amount: 20 mL.  acetaminophen (TYLENOL) 325 mg tablet TAKE 2 TABS BY MOUTH EVERY FOUR (4) HOURS AS NEEDED FOR PAIN.  ondansetron (ZOFRAN ODT) 8 mg disintegrating tablet Take 1 Tab by mouth every eight (8) hours as needed for Nausea.  PRENATAL PLUS, CALCIUM CARB, 27 mg iron- 1 mg tab TAKE 1 TABLET EVERY DAY AS DIRECTED    raNITIdine (ZANTAC) 150 mg tablet Take 1 Tab by mouth two (2) times a day. Indications: HEARTBURN     No current facility-administered medications for this visit. Review of Systems:   Constitutional:    Negative for fever and chills, negative diaphoresis. HEENT:              Negative for neck pain and stiffness. Eyes:                  Negative for visual disturbance, itching, redness or discharge. Respiratory:        Negative for cough and shortness of breath. Cardiovascular:  Negative for chest pain and palpitations. Gastrointestinal: Negative for nausea, vomiting, abdominal pain, diarrhea or constipation. Genitourinary:     Negative for dysuria and frequency. Musculoskeletal: Negative for falls, tenderness and swelling. Skin:                    Negative for rash, masses or lesions. Neurological:       Negative for dizzyness, seizure, loss of consciousness, weakness and numbness.      Objective:     Vitals:    04/27/18 0828   BP: 125/78   Pulse: 78   Resp: 18   Temp: 98.5 °F (36.9 °C)   TempSrc: Oral   Weight: 160 lb 11.5 oz (72.9 kg)   Height: 5' 2\" (1.575 m)       Results for orders placed or performed in visit on 04/27/18   AMB POC URINALYSIS DIP STICK AUTO W/O MICRO   Result Value Ref Range    Color (UA POC) Yellow     Clarity (UA POC) Clear     Glucose (UA POC) Negative Negative    Bilirubin (UA POC) Negative Negative    Ketones (UA POC) Negative Negative    Specific gravity (UA POC) 1.025 1.001 - 1.035    Blood (UA POC) 2+ Negative    pH (UA POC) 6.0 4.6 - 8.0    Protein (UA POC) Negative Negative    Urobilinogen (UA POC) 0.2 mg/dL 0.2 - 1    Nitrites (UA POC) Negative Negative    Leukocyte esterase (UA POC) Negative Negative   AMB POC HEMOGLOBIN A1C   Result Value Ref Range    Hemoglobin A1c (POC) 5.4 %   AMB POC LIPID PROFILE   Result Value Ref Range    Cholesterol (POC) 152     Triglycerides (POC) 64     HDL Cholesterol (POC) 57     LDL Cholesterol (POC) 82 MG/DL    Non-HDL Goal (POC) 95     TChol/HDL Ratio (POC) 1.4    AMB POC URINE, MICROALBUMIN, SEMIQUANT (3 RESULTS)   Result Value Ref Range    ALBUMIN, URINE POC  Negative mg/L    CREATININE, URINE POC  mg/dL    Microalbumin/creat ratio (POC)  <30 MG/G         Physical Examination: General appearance - alert, well appearing, and in no distress  Chest - clear to auscultation, no wheezes, rales or rhonchi, symmetric air entry  Heart - normal rate, regular rhythm, normal S1, S2, no murmurs, rubs, clicks or gallops  Extremities - No lower extremity edema      Assessment/ Plan:   Follow-up Disposition:  Return in about 6 months (around 10/27/2018), or if symptoms worsen or fail to improve. 1. Acute vaginitis    - NUSWAB VAGINITIS    2. Hematuria, unspecified type  Incidental finding on ua - will repeat  - AMB POC URINALYSIS DIP STICK AUTO W/O MICRO  - CULTURE, URINE    3. Controlled type 2 diabetes mellitus without complication, without long-term current use of insulin (HCC)    - AMB POC HEMOGLOBIN A1C  - AMB POC LIPID PROFILE          I have discussed the diagnosis with the patient and the intended plan as seen in the above orders.   The patient has received an after-visit summary and questions were answered concerning future plans. Pt conveyed understanding of plan. Medication Side Effects and Warnings were discussed with patient: yes  Patient Labs were reviewed: yes  Patient Past Records were reviewed:  yes    Nicole Atwood.  Rick Granger NP

## 2018-04-27 NOTE — MR AVS SNAPSHOT
[address] [facility] [address] [address] 
[phone] Patient: Sagar Knight MRN: [id number] [id number] Visit Information Date & Time Provider Department Dept. Phone Encounter #  
 4/27/2018  8:20 AM Emerald Olivo, [address]. [id number] Follow-up Instructions Return in about 6 months (around 10/27/2018), or if symptoms worsen or fail to improve. Your Appointments 5/2/2018  9:00 AM  
ESTABLISHED PATIENT with Paige Daley NP [facility] ([address] [facility]) Appt Note: 2 month follow-up [address] [facility] [address] [place] [address]  
[phone]  
  
   
 [address] [facility] [address] [facility] [address]  
  
    
 5/30/2018 10:00 AM  
Complete Physical with Solange Alcala NP  
[address] [facility]) Appt Note: ANNUAL WITH LABS  
 [address] [facility] [address] [address]  
[phone]  
  
   
 [address] [facility] Upcoming Health Maintenance Date Due  
 FOOT EXAM Q1 5/18/2016 HEMOGLOBIN A1C Q6M 9/14/2017 MICROALBUMIN Q1 3/14/2018 LIPID PANEL Q1 3/14/2018 EYE EXAM RETINAL OR DILATED Q1 4/12/2018 Influenza Age 5 to Adult 8/1/2018 MEDICARE YEARLY EXAM 9/29/2018 PAP AKA CERVICAL CYTOLOGY 6/1/2022 DTaP/Tdap/Td series (2 - Td) 2/2/2024 COLONOSCOPY 12/17/2025 Allergies as of 4/27/2018  Review Complete On: 4/27/2018 By: Celestino Najera LPN Severity Noted Reaction Type Reactions Amoxicillin  12/29/2010    Rash, Other (comments) Vaginal itching Flagyl [Metronidazole]  11/16/2016   Side Effect Nausea and Vomiting Pcn [Penicillins]  09/20/2014   Intolerance Itching Vaginal itching Current Immunizations  Reviewed on 11/15/2011 Name Date Influenza Vaccine (Quad) PF 9/8/2017 Influenza Vaccine Intradermal PF 11/5/2014 PPD 6/20/2012 Tdap 2/2/2014  4:52 AM  
  
 Not reviewed this visit You Were Diagnosed With   
  
 Codes Comments Acute vaginitis    -  Primary ICD-10-CM: N76.0 ICD-9-CM: 616.10 Hematuria, unspecified type     ICD-10-CM: R31.9 ICD-9-CM: 599.70 Controlled type 2 diabetes mellitus without complication, without long-term current use of insulin ([facility]     ICD-10-CM: E11.9 ICD-9-CM: 250.00 Vitals BP Pulse Temp Resp Height(growth percentile) Weight(growth percentile) 125/78 (BP 1 Location: Right arm, BP Patient Position: Sitting) 78 98.5 °F (36.9 °C) (Oral) 18 5' 2\" (1.575 m) 160 lb 11.5 oz (72.9 kg) LMP BMI OB Status Smoking Status 04/12/2018 (Exact Date) 29.4 kg/m2 Having regular periods Current Every Day Smoker BMI and BSA Data Body Mass Index Body Surface Area  
 29.4 kg/m 2 1.79 m 2 Preferred Pharmacy Pharmacy Name Phone [phone] [facility] [phone] Your Updated Medication List  
  
   
This list is accurate as of 4/27/18  8:53 AM.  Always use your most recent med list.  
  
  
  
  
 acetaminophen 325 mg tablet Commonly known as:  TYLENOL  
TAKE 2 TABS BY MOUTH EVERY FOUR (4) HOURS AS NEEDED FOR PAIN. busPIRone 10 mg tablet Commonly known as:  BUSPAR Take 1 Tab by mouth two (2) times a day. cetirizine 10 mg tablet Commonly known as:  ZYRTEC  
TAKE 1 TAB BY MOUTH DAILY. INDICATIONS: ALLERGIC RHINITIS  
  
 clomiPHENE 50 mg tablet Commonly known as:  CLOMID  
TAKE 1 TABLET BY MOUTH EVERY DAY ON DAYS 3-7 OF CYCLE  
  
 OLANZapine 5 mg tablet Commonly known as:  ZyPREXA Take 1 Tab by mouth nightly. ondansetron 8 mg disintegrating tablet Commonly known as:  ZOFRAN ODT Take 1 Tab by mouth every eight (8) hours as needed for Nausea. PRENATAL PLUS (CALCIUM CARB) 27 mg iron- 1 mg Tab Generic drug:  prenatal vit-calcium-iron-fa TAKE 1 TABLET EVERY DAY AS DIRECTED  
  
 promethazine-codeine 6.25-10 mg/5 mL syrup Commonly known as:  PHENERGAN with CODEINE Take 5 mL by mouth four (4) times daily as needed for Cough. Max Daily Amount: 20 mL. raNITIdine 150 mg tablet Commonly known as:  ZANTAC Take 1 Tab by mouth two (2) times a day. Indications: HEARTBURN  
  
 sertraline 50 mg tablet Commonly known as:  ZOLOFT Take 1 Tab by mouth daily. We Performed the Following AMB POC HEMOGLOBIN A1C [38075 CPT(R)] AMB POC LIPID PROFILE [53949 CPT(R)] AMB POC URINALYSIS DIP STICK AUTO W/O MICRO [08803 CPT(R)] AMB POC URINE, MICROALBUMIN, SEMIQUANT (3 RESULTS) [27430 CPT(R)] CULTURE, URINE [id number] CPT(R)] NUSWAB VAGINITIS [[id number] Custom] Follow-up Instructions Return in about 6 months (around 10/27/2018), or if symptoms worsen or fail to improve. Patient Instructions Bacterial Vaginosis: Care Instructions Your Care Instructions Bacterial vaginosis is a type of vaginal infection. It is caused by excess growth of certain bacteria that are normally found in the vagina. Symptoms can include itching, swelling, pain when you urinate or have sex, and a gray or yellow discharge with a \"fishy\" odor. It is not considered an infection that is spread through sexual contact. Although symptoms can be annoying and uncomfortable, bacterial vaginosis does not usually cause other health problems. However, if you have it while you are pregnant, it can cause complications. While the infection may go away on its own, most doctors use antibiotics to treat it. You may have been prescribed pills or vaginal cream. With treatment, bacterial vaginosis usually clears up in 5 to 7 days. Follow-up care is a key part of your treatment and safety. Be sure to make and go to all appointments, and call your doctor if you are having problems.  It's also a good idea to know your test results and keep a list of the medicines you take. How can you care for yourself at home? · Take your antibiotics as directed. Do not stop taking them just because you feel better. You need to take the full course of antibiotics. · Do not eat or drink anything that contains alcohol if you are taking metronidazole (Flagyl). · Keep using your medicine if you start your period. Use pads instead of tampons while using a vaginal cream or suppository. Tampons can absorb the medicine. · Wear loose cotton clothing. Do not wear nylon and other materials that hold body heat and moisture close to the skin. · Do not scratch. Relieve itching with a cold pack or a cool bath. · Do not wash your vaginal area more than once a day. Use plain water or a mild, unscented soap. Do not douche. When should you call for help? Watch closely for changes in your health, and be sure to contact your doctor if: 
? · You have unexpected vaginal bleeding. ? · You have a fever. ? · You have new or increased pain in your vagina or pelvis. ? · You are not getting better after 1 week. ? · Your symptoms return after you finish the course of your medicine. Where can you learn more? Go to http://star-arvin.info/. Eddie Terry in the search box to learn more about \"Bacterial Vaginosis: Care Instructions. \" Current as of: October 13, 2016 Content Version: 11.4 © 7028-6043 [employer]. Care instructions adapted under license by [employer] (which disclaims liability or warranty for this information). If you have questions about a medical condition or this instruction, always ask your healthcare professional. [address] any warranty or liability for your use of this information. Introducing [facility]! Dear Cam Bhardwaj: Thank you for requesting a Rollbase (acquired by [employer]) account. Our records indicate that you already have an active Rollbase (acquired [employer]) account.   You can access your account anytime at [email or web address] Did you know that you can access your hospital and ER discharge instructions at any time in fypio? You can also review all of your test results from your hospital stay or ER visit. Additional Information If you have questions, please visit the Frequently Asked Questions section of the fypio website at [email or web address] Remember, fypio is NOT to be used for urgent needs. For medical emergencies, dial 911. Now available from your iPhone and Android! Please provide this summary of care documentation to your next provider. Your primary care clinician is listed as PIPO Mary. If you have any questions after today's visit, please call [phone].

## 2018-04-28 LAB — BACTERIA UR CULT: NORMAL

## 2018-05-02 ENCOUNTER — OFFICE VISIT (OUTPATIENT)
Dept: BEHAVIORAL/MENTAL HEALTH CLINIC | Age: 38
End: 2018-05-02

## 2018-05-02 ENCOUNTER — TELEPHONE (OUTPATIENT)
Dept: INTERNAL MEDICINE CLINIC | Age: 38
End: 2018-05-02

## 2018-05-02 VITALS
DIASTOLIC BLOOD PRESSURE: 73 MMHG | WEIGHT: 160 LBS | BODY MASS INDEX: 29.44 KG/M2 | SYSTOLIC BLOOD PRESSURE: 116 MMHG | HEART RATE: 74 BPM | HEIGHT: 62 IN

## 2018-05-02 DIAGNOSIS — F32.1 MODERATE SINGLE CURRENT EPISODE OF MAJOR DEPRESSIVE DISORDER (HCC): ICD-10-CM

## 2018-05-02 DIAGNOSIS — F43.10 PTSD (POST-TRAUMATIC STRESS DISORDER): ICD-10-CM

## 2018-05-02 DIAGNOSIS — B96.89 BV (BACTERIAL VAGINOSIS): Primary | ICD-10-CM

## 2018-05-02 DIAGNOSIS — F44.9 DISSOCIATIVE DISORDER OR REACTION: ICD-10-CM

## 2018-05-02 DIAGNOSIS — Z91.52 H/O SELF MUTILATION: ICD-10-CM

## 2018-05-02 DIAGNOSIS — F60.3 BORDERLINE PERSONALITY DISORDER (HCC): ICD-10-CM

## 2018-05-02 DIAGNOSIS — N76.0 BV (BACTERIAL VAGINOSIS): Primary | ICD-10-CM

## 2018-05-02 DIAGNOSIS — F41.9 ANXIETY: ICD-10-CM

## 2018-05-02 LAB
A VAGINAE DNA VAG QL NAA+PROBE: ABNORMAL SCORE
BVAB2 DNA VAG QL NAA+PROBE: ABNORMAL SCORE
C ALBICANS DNA VAG QL NAA+PROBE: NEGATIVE
C GLABRATA DNA VAG QL NAA+PROBE: NEGATIVE
MEGA1 DNA VAG QL NAA+PROBE: ABNORMAL SCORE
T VAGINALIS RRNA SPEC QL NAA+PROBE: NEGATIVE

## 2018-05-02 RX ORDER — BUSPIRONE HYDROCHLORIDE 10 MG/1
10 TABLET ORAL 2 TIMES DAILY
Qty: 60 TAB | Refills: 2 | Status: SHIPPED | OUTPATIENT
Start: 2018-05-02 | End: 2018-08-02 | Stop reason: SDUPTHER

## 2018-05-02 RX ORDER — METRONIDAZOLE 7.5 MG/G
1 GEL VAGINAL
Qty: 187.5 MG | Refills: 0 | Status: SHIPPED | OUTPATIENT
Start: 2018-05-02 | End: 2018-05-07

## 2018-05-02 RX ORDER — OLANZAPINE 5 MG/1
5 TABLET ORAL
Qty: 30 TAB | Refills: 2 | Status: SHIPPED | OUTPATIENT
Start: 2018-05-02 | End: 2018-08-02 | Stop reason: SDUPTHER

## 2018-05-02 RX ORDER — SERTRALINE HYDROCHLORIDE 50 MG/1
50 TABLET, FILM COATED ORAL DAILY
Qty: 30 TAB | Refills: 2 | Status: SHIPPED | OUTPATIENT
Start: 2018-05-02 | End: 2018-08-02 | Stop reason: SDUPTHER

## 2018-05-02 NOTE — TELEPHONE ENCOUNTER
Left message    Calling about nuswab results = BV  She has flagyl allergy on her list - can she take the flagyl gel though?
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
## 2018-05-02 NOTE — PATIENT INSTRUCTIONS
Dialectical Behavior Therapy (DBT)    [facility]  Renita Cohen, [address]  [phone]  [email or web address]    Therapist:  Elia Sheikh      [facility]  (Dr Juancho Mesa, Dr Heidy Martinez)  [address]  [facility], [address] [place] [address] Way     Learning About Borderline Personality Disorder  What is borderline personality disorder? Borderline personality disorder is a mental health condition. It causes intense mood swings, impulsive behaviors, and severe problems with self-worth. It can lead to troubled relationships in every area of your life. Experts don't know exactly what causes the disorder. It may be linked to a problem with the parts of the brain that control reactions to emotions. The disorder also seems to run in families. Often, people who get it faced some kind of childhood trauma such as abuse, neglect, or the death of a parent. Most of the time, signs of the disorder first appear in childhood. But problems often don't start until the early adult years. It's important to know that the disorder can be treated. Treatment can be hard, and getting better can take years. But with treatment, most people with borderline personality disorder do get better over time. What are the symptoms? Everyone has problems with emotions or behaviors sometimes. But if you have borderline personality disorder, the problems are severe. They repeat over a long time and disrupt your life. The most common symptoms include:  · Intense emotions and mood swings. · Harmful, impulsive behaviors. These may include substance abuse, binge eating, out-of-control spending, risky sexual behavior, and reckless driving. · Hurting yourself. This may include cutting or burning yourself or attempting suicide. · Trouble with relationships. You may see others as either \"good\" or \"bad. \" And your view may suddenly shift from one to the other, for minor reasons. · Feeling worthless or empty inside.   · A frantic fear of being left alone (abandoned). This fear may lead to desperate attempts to hold on to those around you. Or it may cause you to reject others before they can reject you. · Problems with anger. These may include violent temper tantrums and aggressive behavior. How is it treated? It may seem that there is no one on your side. You might have been told that there is no hope. But just because you've heard this, that doesn't mean it's true. Getting medical treatment and taking care of yourself can really help. Medical treatment  When you start treatment, you will find health professionals who will help you. You will also meet others who have gone through what you are going through. Long-term treatment can reduce symptoms and harmful behaviors. It can also help you manage your emotions better. Treatment may include:  · Counseling and therapy. It's important to find a counselor you can build a stable relationship with. This can be hard. Your condition may cause you to see your counselor as caring one minute and cruel the next. This can happen especially when he or she asks you to try to change a behavior. Try to find a counselor who has special training in dialectical behavioral therapy. It's a type of therapy that is often used to treat people with this disorder. · Medicines. Examples are antidepressants, mood stabilizers, and antipsychotics. They may be helpful when you use them along with therapy. Self-care  There are things you can do to help yourself. Here are some tips:  · Keep a regular daily schedule. · Do not drink alcohol or use drugs. If your doctor prescribed medicines for you, take them exactly as directed. · Get a healthy amount of sleep. Try to be active during daylight hours, and go to sleep and wake up at the same time each day. · Eat healthy foods like fruits and vegetables; whole-grain breads and cereals; and lean meats, fish, and poultry. · Practice mindfulness or other meditation.  To be mindful means to pay attention to and accept the things that are happening right now, in the present moment. · Keep to your treatment plan, even when it's hard. Keep the numbers for these national suicide hotlines: 0-939-351-TALK (5-362.521.7690) and 3-574-VRVQYXR (4-662.174.9589). If you or someone you know talks about suicide or about feeling hopeless, get help right away. When should you call for help? Call 911 anytime you think you may need emergency care. For example, call if:  · You feel you cannot stop from hurting yourself or someone else. Call your doctor now or seek immediate medical care if:  · You feel much more depressed. · You hear voices. Watch closely for changes in your health, and be sure to contact your doctor if:  · You have a new crisis you can't handle. Follow-up care is a key part of your treatment and safety. Be sure to make and go to all appointments, and call your doctor if you are having problems. It's also a good idea to know your test results and keep a list of the medicines you take. Where can you learn more? Go to http://star-arvin.info/. Enter T711 in the search box to learn more about \"Learning About Borderline Personality Disorder. \"  Current as of: May 12, 2017  Content Version: 11.4  © 6878-8838 [employer]. Care instructions adapted under license by [employer] (which disclaims liability or warranty for this information). If you have questions about a medical condition or this instruction, always ask your healthcare professional. [address] any warranty or liability for your use of this information.

## 2018-05-10 DIAGNOSIS — B37.31 YEAST VAGINITIS: Primary | ICD-10-CM

## 2018-05-10 RX ORDER — FLUCONAZOLE 150 MG/1
150 TABLET ORAL DAILY
Qty: 1 TAB | Refills: 0 | Status: SHIPPED | OUTPATIENT
Start: 2018-05-10 | End: 2018-05-11

## 2018-05-29 ENCOUNTER — OFFICE VISIT (OUTPATIENT)
Dept: BEHAVIORAL/MENTAL HEALTH CLINIC | Age: 38
End: 2018-05-29

## 2018-05-29 DIAGNOSIS — F33.1 MODERATE EPISODE OF RECURRENT MAJOR DEPRESSIVE DISORDER (HCC): Primary | ICD-10-CM

## 2018-05-30 ENCOUNTER — OFFICE VISIT (OUTPATIENT)
Dept: INTERNAL MEDICINE CLINIC | Age: 38
End: 2018-05-30

## 2018-05-30 VITALS
DIASTOLIC BLOOD PRESSURE: 71 MMHG | HEART RATE: 81 BPM | RESPIRATION RATE: 18 BRPM | HEIGHT: 62 IN | SYSTOLIC BLOOD PRESSURE: 114 MMHG | OXYGEN SATURATION: 100 % | BODY MASS INDEX: 29.59 KG/M2 | TEMPERATURE: 99.5 F | WEIGHT: 160.8 LBS

## 2018-05-30 DIAGNOSIS — Z00.00 ADULT GENERAL MEDICAL EXAMINATION: Primary | ICD-10-CM

## 2018-05-30 DIAGNOSIS — T36.95XA ANTIBIOTIC-INDUCED YEAST INFECTION: ICD-10-CM

## 2018-05-30 DIAGNOSIS — R79.89 LOW TSH LEVEL: ICD-10-CM

## 2018-05-30 DIAGNOSIS — Z13.228 SCREENING FOR ENDOCRINE, NUTRITIONAL, METABOLIC AND IMMUNITY DISORDER: ICD-10-CM

## 2018-05-30 DIAGNOSIS — Z71.89 ADVANCE CARE PLANNING: ICD-10-CM

## 2018-05-30 DIAGNOSIS — Z13.29 SCREENING FOR ENDOCRINE, NUTRITIONAL, METABOLIC AND IMMUNITY DISORDER: ICD-10-CM

## 2018-05-30 DIAGNOSIS — Z13.0 SCREENING FOR ENDOCRINE, NUTRITIONAL, METABOLIC AND IMMUNITY DISORDER: ICD-10-CM

## 2018-05-30 DIAGNOSIS — K21.9 GASTROESOPHAGEAL REFLUX DISEASE WITHOUT ESOPHAGITIS: ICD-10-CM

## 2018-05-30 DIAGNOSIS — B37.9 ANTIBIOTIC-INDUCED YEAST INFECTION: ICD-10-CM

## 2018-05-30 DIAGNOSIS — Z11.4 SCREENING FOR HIV WITHOUT PRESENCE OF RISK FACTORS: ICD-10-CM

## 2018-05-30 DIAGNOSIS — Z13.21 SCREENING FOR ENDOCRINE, NUTRITIONAL, METABOLIC AND IMMUNITY DISORDER: ICD-10-CM

## 2018-05-30 PROBLEM — F33.1 MODERATE EPISODE OF RECURRENT MAJOR DEPRESSIVE DISORDER (HCC): Status: ACTIVE | Noted: 2018-05-30

## 2018-05-30 PROBLEM — A74.9 CHLAMYDIA: Status: RESOLVED | Noted: 2018-03-07 | Resolved: 2018-05-30

## 2018-05-30 PROBLEM — R73.03 PREDIABETES: Status: RESOLVED | Noted: 2017-09-08 | Resolved: 2018-05-30

## 2018-05-30 RX ORDER — FLUCONAZOLE 150 MG/1
150 TABLET ORAL AS NEEDED
Qty: 2 TAB | Refills: 0 | Status: SHIPPED | OUTPATIENT
Start: 2018-05-30 | End: 2018-07-11 | Stop reason: SDUPTHER

## 2018-05-30 RX ORDER — ONDANSETRON 8 MG/1
8 TABLET, ORALLY DISINTEGRATING ORAL
Qty: 20 TAB | Refills: 0 | Status: SHIPPED | OUTPATIENT
Start: 2018-05-30 | End: 2018-07-11 | Stop reason: SDUPTHER

## 2018-05-30 RX ORDER — OMEPRAZOLE 20 MG/1
20 CAPSULE, DELAYED RELEASE ORAL DAILY
Qty: 90 CAP | Refills: 0 | Status: SHIPPED | OUTPATIENT
Start: 2018-05-30 | End: 2018-09-22 | Stop reason: SDUPTHER

## 2018-05-30 NOTE — PROGRESS NOTES
Pt is here for   Chief Complaint   Patient presents with    Annual Exam     with labs    Yeast Infection     Pt states that she was treated for a bacteria infection by NP Ashley Montoya and she thinks that the medication has given her a yeast infection      Pt denies pain at this time. 1. Have you been to the ER, urgent care clinic since your last visit? Hospitalized since your last visit? No    2. Have you seen or consulted any other health care providers outside of the [address] [place] [address] since your last visit? Include any pap smears or colon screening.  No

## 2018-05-30 NOTE — PROGRESS NOTES
Sagar Knight is a 34 y.o. female and presents with Annual Exam (with labs) and Yeast Infection (Pt states that she was treated for a bacteria infection by NP Sol Peng and she thinks that the medication has given her a yeast infection )    Subjective:  Sagar Knight is a 34 y.o. female presenting for annual checkup. ROS: Feeling well. No dyspnea or chest pain on exertion. No abdominal pain, change in bowel habits, black or bloody stools. No urinary tract or prostatic symptoms. No neurological complaints. Specific concerns today: VAGINAL DISCHARGE and GERD. Patient has a history of gastroesophageal reflux. Symptoms have been present for a few years and include bloating, nausea, vomiting, decreased appetite, and fullness after meals. Denies dysphagia, weight loss, melena, hematochezia, hematemesis, and coffee ground emesis. Medical therapy in the past has included OTC meds and proton pump inhibitors; none currently however. Patient presents with a complaint of vaginal discharge. Onset of symptoms was a few weeks following treatment for BV. Seen here by other NP. Has been unchanged since that time. The patient describes the discharge as white and curd-like and does not experience abdominal discomfort with the discharge. She does not complain of burning with urination. She denies genital lesions at this time. The patient does  have a history of STD's or PID and is sexually active, with last sexual intercourse a few days ago.      Discussed ADVANCED DIRECTIVE: yes  Advanced Directive on File: no  Last BM: today, normal  Dental exam in past 12 months: yes  Eye exam in past 12 months: yes      Review of Systems  Constitutional: negative for fevers, chills, anorexia and weight loss  Eyes:   negative for visual disturbance, drainage, and irritation  ENT:   negative for tinnitus,sore throat,nasal congestion,ear pain,and hoarseness  Respiratory:  negative for cough, hemoptysis, dyspnea, and wheezing  CV: negative for chest pain, palpitations, and lower extremity edema  GI:   negative for nausea, vomiting, diarrhea, abdominal pain, and melena  Endo:               negative for polyuria,polydipsia,polyphagia, and heat intolerance  Genitourinary: negative for frequency, urgency, dysuria, retention, and hematuria  Integument:  negative for rash, ulcerations, and pruritus  Hematologic:  negative for easy bruising and bleeding  Musculoskel: negative for arthralgias, muscle weakness,and joint pain/swelling  Neurological:  negative for headaches, dizziness, vertigo,and memory/gait problems  Behavl/Psych: + anxiety, depression.  negative for feelings of suicide and mood changes    Past Medical History:   Diagnosis Date    Anxiety     Bipolar affective ([facility]     Depression     Diabetes ([facility]     diet control    Hyperlipidemia     High Cholesterol    Hyperlipidemia     Major depression      Past Surgical History:   Procedure Laterality Date    HX GI      Colonoscopy    HX HERNIA REPAIR      as child     Social History     Social History    Marital status: SINGLE     Spouse name: N/A    Number of children: N/A    Years of education: N/A     Social History Main Topics    Smoking status: Current Every Day Smoker    Smokeless tobacco: Current User      Comment: cigars    Alcohol use Yes      Comment: occasional    Drug use: No    Sexual activity: Yes     Partners: Male     Birth control/ protection: None     Other Topics Concern    None     Social History Narrative     Family History   Problem Relation Age of Onset    Diabetes Mother     Depression Mother     Asthma Brother     Stroke Maternal Grandmother      aneurysm    Cancer Maternal Grandmother      kidney    Hypertension Maternal Grandmother     Cancer Paternal Grandmother      lung    Diabetes Brother     Bipolar Disorder Brother     Schizophrenia Brother     Other Brother      paranoia    Breast Cancer Other     Breast Cancer Other Current Outpatient Prescriptions   Medication Sig Dispense Refill    omeprazole (PRILOSEC) 20 mg capsule Take 1 Cap by mouth daily. Take in 2 week intervals. 90 Cap 0    ondansetron (ZOFRAN ODT) 8 mg disintegrating tablet Take 1 Tab by mouth every eight (8) hours as needed for Nausea. 20 Tab 0    fluconazole (DIFLUCAN) 150 mg tablet Take 1 Tab by mouth as needed (vaginal discharge and itching). 2 Tab 0    busPIRone (BUSPAR) 10 mg tablet Take 1 Tab by mouth two (2) times a day. 60 Tab 2    OLANZapine (ZYPREXA) 5 mg tablet Take 1 Tab by mouth nightly. 30 Tab 2    sertraline (ZOLOFT) 50 mg tablet Take 1 Tab by mouth daily. 30 Tab 2    cetirizine (ZYRTEC) 10 mg tablet TAKE 1 TAB BY MOUTH DAILY. INDICATIONS: ALLERGIC RHINITIS 30 Tab 5     Allergies   Allergen Reactions    Amoxicillin Rash and Other (comments)     Vaginal itching    Flagyl [Metronidazole] Nausea and Vomiting     metrogel is okay    Pcn [Penicillins] Itching     Vaginal itching       Objective:  Visit Vitals    /71 (BP 1 Location: Left arm, BP Patient Position: Sitting)    Pulse 81    Temp 99.5 °F (37.5 °C) (Oral)    Resp 18    Ht 5' 2\" (1.575 m)    Wt 160 lb 12.8 oz (72.9 kg)    LMP 05/09/2018 (Exact Date)    SpO2 100%    BMI 29.41 kg/m2     Wt Readings from Last 3 Encounters:   05/30/18 160 lb 12.8 oz (72.9 kg)   05/02/18 160 lb (72.6 kg)   04/27/18 160 lb 11.5 oz (72.9 kg)     Physical Exam:   General appearance - alert, well appearing, and in no distress. Mental status - A/O x 4, normal mood and affect. Head/Eyes- AT/NC. STEVE, EOMI, corneas normal, no foreign bodies. Ears- TM intact bilaterally, no erythema or drainage. Nose- Septum midline, pink mucosa. Turbinates pale and boggy on right, no polyps or erythema. No sinus tenderness. Mouth/Throat - mucous membranes moist, pharynx normal without lesions. No tonsillar swelling or exudates. +PND. Neck -Supple ,normal CSP. FROM, non-tender. No adenopathy. + thyromegaly. No JVD. Chest - CTA. Symmetric chest rise. No wheezing, rales or rhonchi. Heart - Normal rate, regular rhythm. Normal S1, S2. No MGR. Abdomen - Soft,non-distended. Normoactive BS in all quadrants. NT, no mass, rebound, or HSM   Ext- Radial, DP pulses, 2+ bilaterally. No pedal edema, clubbing, or cyanosis. Skin- Normal for ethnicity, warm, and dry. No hyperpigmentation, ulcerations, or suspicious lesions  Neuro - Normal speech, no focal findings. Normal strength and muscle tone. Coordination and gait normal.    CN II-XII intact. Cold and vibratory sensation intact. Normal DTR's.     Results for orders placed or performed in visit on 04/27/18   CULTURE, URINE   Result Value Ref Range    Urine Culture, Routine       Mixed urogenital nydia  Less than 10,000 colonies/mL     NUSWAB VAGINITIS   Result Value Ref Range    Atopobium vaginae High - 2 (A) Score    BVAB 2 High - 2 (A) Score    Megasphaera 1 High - 2 (A) Score    C. albicans, ROLANDO Negative Negative    C. glabrata, ROLANDO Negative Negative    T. vaginalis, ROLANDO Negative Negative   AMB POC URINALYSIS DIP STICK AUTO W/O MICRO   Result Value Ref Range    Color (UA POC) Yellow     Clarity (UA POC) Clear     Glucose (UA POC) Negative Negative    Bilirubin (UA POC) Negative Negative    Ketones (UA POC) Negative Negative    Specific gravity (UA POC) 1.025 1.001 - 1.035    Blood (UA POC) 2+ Negative    pH (UA POC) 6.0 4.6 - 8.0    Protein (UA POC) Negative Negative    Urobilinogen (UA POC) 0.2 mg/dL 0.2 - 1    Nitrites (UA POC) Negative Negative    Leukocyte esterase (UA POC) Negative Negative   AMB POC HEMOGLOBIN A1C   Result Value Ref Range    Hemoglobin A1c (POC) 5.4 %   AMB POC LIPID PROFILE   Result Value Ref Range    Cholesterol (POC) 152     Triglycerides (POC) 64     HDL Cholesterol (POC) 57     LDL Cholesterol (POC) 82 MG/DL    Non-HDL Goal (POC) 95     TChol/HDL Ratio (POC) 1.4    AMB POC URINE, MICROALBUMIN, SEMIQUANT (3 RESULTS)   Result Value Ref Range ALBUMIN, URINE POC  Negative mg/L    CREATININE, URINE POC  mg/dL    Microalbumin/creat ratio (POC)  <30 MG/G         Assessment/Plan:  Labs ordered. Repeat TSH. PROBIOTICs advised. Discussed the patient's BMI with her. The BMI follow up plan is as follows:     I have reviewed/discussed the above normal BMI (Body mass index is 29.41 kg/(m^2). ) with the patient. I have recommended the following interventions: encourage exercise, monitor weight, and dietary management education, guidance, and counseling, . Medication Side Effects and Warnings were discussed with patient: yes   Patient Labs were reviewed: yes  Patient Past Records were reviewed: yes  See orders below      ICD-10-CM ICD-9-CM    1. Adult general medical examination W03.70 R16.4 METABOLIC PANEL, COMPREHENSIVE      CBC WITH AUTOMATED DIFF      HIV 1/2 AG/AB, 4TH GENERATION,W RFLX CONFIRM      HCG QL SERUM   2. Gastroesophageal reflux disease without esophagitis K21.9 530.81 omeprazole (PRILOSEC) 20 mg capsule      ondansetron (ZOFRAN ODT) 8 mg disintegrating tablet   3. Antibiotic-induced yeast infection B37.9 112.9 fluconazole (DIFLUCAN) 150 mg tablet    T36.95XA E930.9    4. Advance care planning Z71.89 V65.49    5. Screening for HIV without presence of risk factors Z11.4 V73.89 HIV 1/2 AG/AB, 4TH GENERATION,W RFLX CONFIRM   6. Screening for endocrine, nutritional, metabolic and immunity disorder K18.61 X30.91 METABOLIC PANEL, COMPREHENSIVE    Z13.21  CBC WITH AUTOMATED DIFF    Z13.228  HCG QL SERUM    Z13.0     7. Low TSH level R94.6 794.5 TSH 3RD GENERATION     Orders Placed This Encounter    METABOLIC PANEL, COMPREHENSIVE    CBC WITH AUTOMATED DIFF    HIV 1/2 AG/AB, 4TH GENERATION,W RFLX CONFIRM    HCG QL SERUM    TSH 3RD GENERATION    omeprazole (PRILOSEC) 20 mg capsule     Sig: Take 1 Cap by mouth daily. Take in 2 week intervals.      Dispense:  90 Cap     Refill:  0    ondansetron (ZOFRAN ODT) 8 mg disintegrating tablet     Sig: Take 1 Tab by mouth every eight (8) hours as needed for Nausea. Dispense:  20 Tab     Refill:  0    fluconazole (DIFLUCAN) 150 mg tablet     Sig: Take 1 Tab by mouth as needed (vaginal discharge and itching). Dispense:  2 Tab     Refill:  0     Follow-up Disposition:  Return in about 6 months (around 11/30/2018) for BMI, GERD. Rodolfo Marbella expressed understanding of plan. An After Visit Summary was offered/printed and given to the patient.

## 2018-05-30 NOTE — PATIENT INSTRUCTIONS
Advance Directives: Care Instructions  Your Care Instructions  An advance directive is a legal way to state your wishes at the end of your life. It tells your family and your doctor what to do if you can no longer say what you want. There are two main types of advance directives. You can change them any time that your wishes change. · A living will tells your family and your doctor your wishes about life support and other treatment. · A durable power of  for health care lets you name a person to make treatment decisions for you when you can't speak for yourself. This person is called a health care agent. If you do not have an advance directive, decisions about your medical care may be made by a doctor or a  who doesn't know you. It may help to think of an advance directive as a gift to the people who care for you. If you have one, they won't have to make tough decisions by themselves. Follow-up care is a key part of your treatment and safety. Be sure to make and go to all appointments, and call your doctor if you are having problems. It's also a good idea to know your test results and keep a list of the medicines you take. How can you care for yourself at home? · Discuss your wishes with your loved ones and your doctor. This way, there are no surprises. · Many states have a unique form. Or you might use a universal form that has been approved by many states. This kind of form can sometimes be completed and stored online. Your electronic copy will then be available wherever you have a connection to the Internet. In most cases, doctors will respect your wishes even if you have a form from a different state. · You don't need a  to do an advance directive. But you may want to get legal advice. · Think about these questions when you prepare an advance directive:  ¨ Who do you want to make decisions about your medical care if you are not able to?  Many people choose a family member or close friend. ¨ Do you know enough about life support methods that might be used? If not, talk to your doctor so you understand. ¨ What are you most afraid of that might happen? You might be afraid of having pain, losing your independence, or being kept alive by machines. ¨ Where would you prefer to die? Choices include your home, a hospital, or a nursing home. ¨ Would you like to have information about hospice care to support you and your family? ¨ Do you want to donate organs when you die? ¨ Do you want certain Restorationism practices performed before you die? If so, put your wishes in the advance directive. · Read your advance directive every year, and make changes as needed. When should you call for help? Be sure to contact your doctor if you have any questions. Where can you learn more? Go to http://starJibbigoarvin.info/. Enter R264 in the search box to learn more about \"Advance Directives: Care Instructions. \"  Current as of: September 24, 2016  Content Version: 11.4  © 0068-3597 [employer]. Care instructions adapted under license by [employer] (which disclaims liability or warranty for this information). If you have questions about a medical condition or this instruction, always ask your healthcare professional. [place] [address] any warranty or liability for your use of this information. Probiotic (By mouth)   May increase the number of healthy bacteria in your stomach and intestines. Brand Name(s): 5X Probiotic, Abatinex, Acidophilus Probiotic Blend, Adult Probiotic, Align, BD Lactinex, Bacid, Bacid Probiotic, Bifidonate, BioGaia, BioGaia Gastrus, Culturelle, Culturelle Advanced Immune Defense, Culturelle Digestive Health Probiotic, Culturelle Health & Wellness Probiotic   There may be other brand names for this medicine. When This Medicine Should Not Be Used: This medicine is not right for everyone.  Do not use it if you had an allergic reaction to a probiotic, such as acidophilus, bifidobacterium, lactobacillus, saccharomyces, or streptococcus thermophilus. How to Use This Medicine:   Capsule, Delayed Release Capsule, Liquid, Powder, Tablet, Stick, Spray, Chewable Tablet, Coated Tablet, Wafer  · Your doctor will tell you how much medicine to use. Do not use more than directed. · Follow the instructions on the medicine label if you are using this medicine without a prescription. · Tablet or delayed-release capsule: Swallow whole. Do not chew, crush, or break. · Powder:  Be careful to not breathe in the powder, because it may bother your lungs. Do not get the powder on your skin. If you mix the powder in food or liquid, do this right before you take the medicine. Do not mix it and then save it for later. · Chewable tablet or wafer:  Chew it completely before you swallow. · Measure the oral liquid medicine with a marked measuring spoon, oral syringe, or medicine cup. · Missed dose: Take a dose as soon as you remember. If it is almost time for your next dose, wait until then and take a regular dose. Do not take extra medicine to make up for a missed dose. · Some brands must be stored in the refrigerator, and some other brands must be stored at room temperature. Follow the directions on the label. Ask your pharmacist if you are not sure how to store your medicine. Drugs and Foods to Avoid:      Ask your doctor or pharmacist before using any other medicine, including over-the-counter medicines, vitamins, and herbal products. Warnings While Using This Medicine:   · Different brands of this medicine will have different warnings, because the specific ingredients will be different. Read the label on your medicine carefully. Ask your doctor or pharmacist if you have questions. · Tell your doctor if you are pregnant or breastfeeding, or if you are allergic to milk, lactose, yeast, or soy.   · Ask your doctor before you use this medicine if you have a central line (port or catheter), or if you have a fever. · Keep all medicine out of the reach of children. Never share your medicine with anyone. Possible Side Effects While Using This Medicine:   Call your doctor right away if you notice any of these side effects:  · Allergic reaction: Itching or hives, swelling in your face or hands, swelling or tingling in your mouth or throat, chest tightness, trouble breathing  If you notice these less serious side effects, talk with your doctor:   · Mild diarrhea, constipation, nausea, or vomiting  · Mild gas or cramps  If you notice other side effects that you think are caused by this medicine, tell your doctor. Call your doctor for medical advice about side effects. You may report side effects to FDA at 4-136-HYC-2189  © 2017 [address] [employer] Information is for End User's use only and may not be sold, redistributed or otherwise used for commercial purposes. The above information is an  only. It is not intended as medical advice for individual conditions or treatments. Talk to your doctor, nurse or pharmacist before following any medical regimen to see if it is safe and effective for you. Gastroesophageal Reflux Disease (GERD): Care Instructions  Your Care Instructions    Gastroesophageal reflux disease (GERD) is the backward flow of stomach acid into the esophagus. The esophagus is the tube that leads from your throat to your stomach. A one-way valve prevents the stomach acid from moving up into this tube. When you have GERD, this valve does not close tightly enough. If you have mild GERD symptoms including heartburn, you may be able to control the problem with antacids or over-the-counter medicine. Changing your diet, losing weight, and making other lifestyle changes can also help reduce symptoms. Follow-up care is a key part of your treatment and safety.  Be sure to make and go to all appointments, and call your doctor if you are having problems. It's also a good idea to know your test results and keep a list of the medicines you take. How can you care for yourself at home? · Take your medicines exactly as prescribed. Call your doctor if you think you are having a problem with your medicine. · Your doctor may recommend over-the-counter medicine. For mild or occasional indigestion, antacids, such as Tums, Gaviscon, Mylanta, or Maalox, may help. Your doctor also may recommend over-the-counter acid reducers, such as Pepcid AC, Tagamet HB, Zantac 75, or Prilosec. Read and follow all instructions on the label. If you use these medicines often, talk with your doctor. · Change your eating habits. ¨ It's best to eat several small meals instead of two or three large meals. ¨ After you eat, wait 2 to 3 hours before you lie down. ¨ Chocolate, mint, and alcohol can make GERD worse. ¨ Spicy foods, foods that have a lot of acid (like tomatoes and oranges), and coffee can make GERD symptoms worse in some people. If your symptoms are worse after you eat a certain food, you may want to stop eating that food to see if your symptoms get better. · Do not smoke or chew tobacco. Smoking can make GERD worse. If you need help quitting, talk to your doctor about stop-smoking programs and medicines. These can increase your chances of quitting for good. · If you have GERD symptoms at night, raise the head of your bed 6 to 8 inches by putting the frame on blocks or placing a foam wedge under the head of your mattress. (Adding extra pillows does not work.)  · Do not wear tight clothing around your middle. · Lose weight if you need to. Losing just 5 to 10 pounds can help. When should you call for help? Call your doctor now or seek immediate medical care if:  ? · You have new or different belly pain. ? · Your stools are black and tarlike or have streaks of blood. ? Watch closely for changes in your health, and be sure to contact your doctor if:  ? · Your symptoms have not improved after 2 days. ? · Food seems to catch in your throat or chest.   Where can you learn more? Go to http://star-arvin.info/. Enter M220 in the search box to learn more about \"Gastroesophageal Reflux Disease (GERD): Care Instructions. \"  Current as of: May 12, 2017  Content Version: 11.4  © 2620-1732 [employer]. Care instructions adapted under license by [employer] (which disclaims liability or warranty for this information). If you have questions about a medical condition or this instruction, always ask your healthcare professional. [place] [address] any warranty or liability for your use of this information.

## 2018-05-30 NOTE — ACP (ADVANCE CARE PLANNING)
Advanced care planning- discussed Advance directive, Medical POA, and life sustaining options. Given/Mailing danisha crespo paper work and contact info for [facility] facilitator/NN to complete paperwork in office. Advance Care Planning (ACP) Provider Conversation Snapshot    Date of ACP Conversation: 05/30/18  Persons included in Conversation:  Patient/family  Length of ACP Conversation in minutes:  5-10 minutes    Authorized Decision Maker (if patient is incapable of making informed decisions): This person is:   Healthcare Agent/Medical Power of  under Advance Directive  Narda Sargent (primary)  Fidel Oseguera (seconday)        For Patients with Decision Making Capacity:   Values/Goals: Exploration of values, goals, and preferences if recovery is not expected, even with continued medical treatment in the event of:  Severe, permanent brain injury  \"In these circumstances, what matters most to you? \"  Care focused more on comfort or quality of life.     Conversation Outcomes / Follow-Up Plan:   Recommended completion of Advance Directive form after review of ACP materials and conversation with prospective healthcare agent   Referral made for ACP follow-up assistance to:  [facility] facilitator/ Nurse navigator

## 2018-05-30 NOTE — MR AVS SNAPSHOT
[address] [place] [address] [address] [id number] 
[phone] Patient: Bladimir Rdz MRN: [id number] Visit Information Date & Time Provider Department Dept. Phone Encounter #  
 5/30/2018 10:00 AM Ashley Fitzgerald NP [facility] [phone] [id number] Follow-up Instructions Return in about 6 months (around 11/30/2018) for BMI, GERD. Your Appointments 6/7/2018  3:30 PM  
COUNSELING with Trina Morgan LCSW [facility] ([facility]) Appt Note: 1 week f/u  
 [address] [facility] [address] [place] [id number]  
[phone]  
  
   
 [address] [place] [address] [address] [place] [id number]  
  
    
 8/2/2018  2:30 PM  
ESTABLISHED PATIENT with Denae Jimenez NP [facility] Appt Note: 3 month follow-up [address] [facility] [address] [place] [address] [address]  
  
   
 [address] [facility] [address] [facility] [address] [place] [phone] Upcoming Health Maintenance Date Due  
 FOOT EXAM Q1 5/18/2016 EYE EXAM RETINAL OR DILATED Q1 4/12/2018 Influenza Age 5 to Adult 8/1/2018 MEDICARE YEARLY EXAM 9/29/2018 HEMOGLOBIN A1C Q6M 10/27/2018 MICROALBUMIN Q1 4/27/2019 LIPID PANEL Q1 4/27/2019 PAP AKA CERVICAL CYTOLOGY 6/1/2022 DTaP/Tdap/Td series (2 - Td) 2/2/2024 COLONOSCOPY 12/17/2025 Allergies as of 5/30/2018  Review Complete On: 5/30/2018 By: Trina Morgan Severity Noted Reaction Type Reactions Amoxicillin  12/29/2010    Rash, Other (comments) Vaginal itching Flagyl [Metronidazole]  11/16/2016   Side Effect Nausea and Vomiting  
 metrogel is okay Pcn [Penicillins]  09/20/2014   Intolerance Itching Vaginal itching Current Immunizations  Reviewed on 11/15/2011 Name Date Influenza Vaccine (Quad) PF 9/8/2017 Influenza Vaccine Intradermal PF 11/5/2014 PPD 6/20/2012  Tdap 2/2/2014  4:52 AM  
 Not reviewed this visit You Were Diagnosed With   
  
 Codes Comments Adult general medical examination    -  Primary ICD-10-CM: Z00.00 ICD-9-CM: V70.9 Gastroesophageal reflux disease without esophagitis     ICD-10-CM: K21.9 ICD-9-CM: 530.81 Antibiotic-induced yeast infection     ICD-10-CM: B37.9, T36.95XA ICD-9-CM: 112.9, E930.9 Advance care planning     ICD-10-CM: Z71.89 ICD-9-CM: V65.49 Screening for HIV without presence of risk factors     ICD-10-CM: Z11.4 ICD-9-CM: V73.89 Screening for endocrine, nutritional, metabolic and immunity disorder     ICD-10-CM: Z13.29, Z13.21, Z13.228, Z13.0 ICD-9-CM: V77.99 Low TSH level     ICD-10-CM: R94.6 ICD-9-CM: 794.5 Vitals BP Pulse Temp Resp Height(growth percentile) Weight(growth percentile) 114/71 (BP 1 Location: Left arm, BP Patient Position: Sitting) 81 99.5 °F (37.5 °C) (Oral) 18 5' 2\" (1.575 m) 160 lb 12.8 oz (72.9 kg) LMP SpO2 BMI OB Status Smoking Status 05/09/2018 (Exact Date) 100% 29.41 kg/m2 Having regular periods Current Every Day Smoker Vitals History BMI and BSA Data Body Mass Index Body Surface Area  
 29.41 kg/m 2 1.79 m 2 Preferred Pharmacy Pharmacy Name Phone [facility]/PHARMACY #[id number] [phone] [phone] Your Updated Medication List  
  
   
This list is accurate as of 5/30/18 11:42 AM.  Always use your most recent med list.  
  
  
  
  
 busPIRone 10 mg tablet Commonly known as:  BUSPAR Take 1 Tab by mouth two (2) times a day. cetirizine 10 mg tablet Commonly known as:  ZYRTEC  
TAKE 1 TAB BY MOUTH DAILY. INDICATIONS: ALLERGIC RHINITIS  
  
 fluconazole 150 mg tablet Commonly known as:  DIFLUCAN Take 1 Tab by mouth as needed (vaginal discharge and itching). OLANZapine 5 mg tablet Commonly known as:  ZyPREXA Take 1 Tab by mouth nightly. omeprazole 20 mg capsule Commonly known as:  PRILOSEC Take 1 Cap by mouth daily. Take in 2 week intervals. ondansetron 8 mg disintegrating tablet Commonly known as:  ZOFRAN ODT Take 1 Tab by mouth every eight (8) hours as needed for Nausea. sertraline 50 mg tablet Commonly known as:  ZOLOFT Take 1 Tab by mouth daily. Prescriptions Sent to Pharmacy Refills  
 omeprazole (PRILOSEC) 20 mg capsule 0 Sig: Take 1 Cap by mouth daily. Take in 2 week intervals. Class: Normal  
 Pharmacy: [facility]/pharmacy #[address] [place] [address] Ph #: [phone] Route: Oral  
 ondansetron (ZOFRAN ODT) 8 mg disintegrating tablet 0 Sig: Take 1 Tab by mouth every eight (8) hours as needed for Nausea. Class: Normal  
 [facility] [place] [address] Ph #: [phone] Route: Oral  
 fluconazole (DIFLUCAN) 150 mg tablet 0 Sig: Take 1 Tab by mouth as needed (vaginal discharge and itching). Class: Normal  
 [facility] [place] [address] Ph #: [phone] Route: Oral  
  
We Performed the Following CBC WITH AUTOMATED DIFF [23770 CPT(R)] HCG QL SERUM [83335 CPT(R)] HIV 1/2 AG/AB, 4TH GENERATION,W RFLX CONFIRM [id number] CPT(R)] METABOLIC PANEL, COMPREHENSIVE [32720 CPT(R)] TSH 3RD GENERATION [95022 CPT(R)] Follow-up Instructions Return in about 6 months (around 11/30/2018) for BMI, GERD. Patient Instructions Advance Directives: Care Instructions Your Care Instructions An advance directive is a legal way to state your wishes at the end of your life. It tells your family and your doctor what to do if you can no longer say what you want. There are two main types of advance directives. You can change them any time that your wishes change. · A living will tells your family and your doctor your wishes about life support and other treatment. · A durable power of  for health care lets you name a person to make treatment decisions for you when you can't speak for yourself. This person is called a health care agent. If you do not have an advance directive, decisions about your medical care may be made by a doctor or a  who doesn't know you. It may help to think of an advance directive as a gift to the people who care for you. If you have one, they won't have to make tough decisions by themselves. Follow-up care is a key part of your treatment and safety. Be sure to make and go to all appointments, and call your doctor if you are having problems. It's also a good idea to know your test results and keep a list of the medicines you take. How can you care for yourself at home? · Discuss your wishes with your loved ones and your doctor. This way, there are no surprises. · Many states have a unique form. Or you might use a universal form that has been approved by many states. This kind of form can sometimes be completed and stored online. Your electronic copy will then be available wherever you have a connection to the Internet. In most cases, doctors will respect your wishes even if you have a form from a different state. · You don't need a  to do an advance directive. But you may want to get legal advice. · Think about these questions when you prepare an advance directive: ¨ Who do you want to make decisions about your medical care if you are not able to? Many people choose a family member or close friend. ¨ Do you know enough about life support methods that might be used? If not, talk to your doctor so you understand. ¨ What are you most afraid of that might happen? You might be afraid of having pain, losing your independence, or being kept alive by machines. ¨ Where would you prefer to die? Choices include your home, a hospital, or a nursing home. ¨ Would you like to have information about hospice care to support you and your family? ¨ Do you want to donate organs when you die? ¨ Do you want certain Sabianism practices performed before you die? If so, put your wishes in the advance directive. · Read your advance directive every year, and make changes as needed. When should you call for help? Be sure to contact your doctor if you have any questions. Where can you learn more? Go to http://star-arvin.info/. Enter R264 in the search box to learn more about \"Advance Directives: Care Instructions. \" Current as of: September 24, 2016 Content Version: 11.4 © 3437-2611 [employer]. Care instructions adapted under license by [employer] (which disclaims liability or warranty for this information). If you have questions about a medical condition or this instruction, always ask your healthcare professional. [place] [address] any warranty or liability for your use of this information. Probiotic (By mouth) May increase the number of healthy bacteria in your stomach and intestines. Brand Name(s): 5X Probiotic, Abatinex, Acidophilus Probiotic Blend, Adult Probiotic, Align, BD Lactinex, Bacid, Bacid Probiotic, Bifidonate, BioGaia, BioGaia Gastrus, Culturelle, Culturelle Advanced Immune Defense, Culturelle Digestive Health Probiotic, Culturelle Health & Wellness Probiotic There may be other brand names for this medicine. When This Medicine Should Not Be Used: This medicine is not right for everyone. Do not use it if you had an allergic reaction to a probiotic, such as acidophilus, bifidobacterium, lactobacillus, saccharomyces, or streptococcus thermophilus. How to Use This Medicine:  
Capsule, Delayed Release Capsule, Liquid, Powder, Tablet, Stick, Spray, Chewable Tablet, Coated Tablet, Wafer · Your doctor will tell you how much medicine to use. Do not use more than directed. · Follow the instructions on the medicine label if you are using this medicine without a prescription. · Tablet or delayed-release capsule: Swallow whole. Do not chew, crush, or break. · Powder:  Be careful to not breathe in the powder, because it may bother your lungs. Do not get the powder on your skin. If you mix the powder in food or liquid, do this right before you take the medicine. Do not mix it and then save it for later. · Chewable tablet or wafer:  Chew it completely before you swallow. · Measure the oral liquid medicine with a marked measuring spoon, oral syringe, or medicine cup. · Missed dose: Take a dose as soon as you remember. If it is almost time for your next dose, wait until then and take a regular dose. Do not take extra medicine to make up for a missed dose. · Some brands must be stored in the refrigerator, and some other brands must be stored at room temperature. Follow the directions on the label. Ask your pharmacist if you are not sure how to store your medicine. Drugs and Foods to Avoid: Ask your doctor or pharmacist before using any other medicine, including over-the-counter medicines, vitamins, and herbal products. Warnings While Using This Medicine: · Different brands of this medicine will have different warnings, because the specific ingredients will be different. Read the label on your medicine carefully. Ask your doctor or pharmacist if you have questions. · Tell your doctor if you are pregnant or breastfeeding, or if you are allergic to milk, lactose, yeast, or soy. · Ask your doctor before you use this medicine if you have a central line (port or catheter), or if you have a fever. · Keep all medicine out of the reach of children. Never share your medicine with anyone. Possible Side Effects While Using This Medicine: Call your doctor right away if you notice any of these side effects: · Allergic reaction: Itching or hives, swelling in your face or hands, swelling or tingling in your mouth or throat, chest tightness, trouble breathing If you notice these less serious side effects, talk with your doctor: · Mild diarrhea, constipation, nausea, or vomiting · Mild gas or cramps If you notice other side effects that you think are caused by this medicine, tell your doctor. Call your doctor for medical advice about side effects. You may report side effects to FDA at 8-067-CPL-8444 © 2017 [address] Information is for End User's use only and may not be sold, redistributed or otherwise used for commercial purposes. The above information is an  only. It is not intended as medical advice for individual conditions or treatments. Talk to your doctor, nurse or pharmacist before following any medical regimen to see if it is safe and effective for you. Gastroesophageal Reflux Disease (GERD): Care Instructions Your Care Instructions Gastroesophageal reflux disease (GERD) is the backward flow of stomach acid into the esophagus. The esophagus is the tube that leads from your throat to your stomach. A one-way valve prevents the stomach acid from moving up into this tube. When you have GERD, this valve does not close tightly enough. If you have mild GERD symptoms including heartburn, you may be able to control the problem with antacids or over-the-counter medicine. Changing your diet, losing weight, and making other lifestyle changes can also help reduce symptoms. Follow-up care is a key part of your treatment and safety. Be sure to make and go to all appointments, and call your doctor if you are having problems. It's also a good idea to know your test results and keep a list of the medicines you take. How can you care for yourself at home? · Take your medicines exactly as prescribed. Call your doctor if you think you are having a problem with your medicine. · Your doctor may recommend over-the-counter medicine. For mild or occasional indigestion, antacids, such as Tums, Gaviscon, Mylanta, or Maalox, may help. Your doctor also may recommend over-the-counter acid reducers, such as Pepcid AC, Tagamet HB, Zantac 75, or Prilosec. Read and follow all instructions on the label. If you use these medicines often, talk with your doctor. · Change your eating habits. ¨ It's best to eat several small meals instead of two or three large meals. ¨ After you eat, wait 2 to 3 hours before you lie down. ¨ Chocolate, mint, and alcohol can make GERD worse. ¨ Spicy foods, foods that have a lot of acid (like tomatoes and oranges), and coffee can make GERD symptoms worse in some people. If your symptoms are worse after you eat a certain food, you may want to stop eating that food to see if your symptoms get better. · Do not smoke or chew tobacco. Smoking can make GERD worse. If you need help quitting, talk to your doctor about stop-smoking programs and medicines. These can increase your chances of quitting for good. · If you have GERD symptoms at night, raise the head of your bed 6 to 8 inches by putting the frame on blocks or placing a foam wedge under the head of your mattress. (Adding extra pillows does not work.) · Do not wear tight clothing around your middle. · Lose weight if you need to. Losing just 5 to 10 pounds can help. When should you call for help? Call your doctor now or seek immediate medical care if: 
? · You have new or different belly pain. ? · Your stools are black and tarlike or have streaks of blood. ? Watch closely for changes in your health, and be sure to contact your doctor if: 
? · Your symptoms have not improved after 2 days. ? · Food seems to catch in your throat or chest.  
Where can you learn more? Go to http://star-arvin.info/. Enter Y382 in the search box to learn more about \"Gastroesophageal Reflux Disease (GERD): Care Instructions. \" Current as of: May 12, 2017 Content Version: 11.4 © 4717-8077 [employer]. Care instructions adapted under license by [employer] (which disclaims liability or warranty for this information). If you have questions about a medical condition or this instruction, always ask your healthcare professional. [address] any warranty or liability for your use of this information. Introducing \[facility]\"" & HEALTH SERVICES! Dear Cam Bhardwaj: Thank you for requesting a RawData account. Our records indicate that you already have an active RawData account. You can access your account anytime at [email or web address] Did you know that you can access your hospital and ER discharge instructions at any time in RawData? You can also review all of your test results from your hospital stay or ER visit. Additional Information If you have questions, please visit the Frequently Asked Questions section of the RawData website at [email or web address] Remember, RawData is NOT to be used for urgent needs. For medical emergencies, dial 911. Now available from your iPhone and Android! Please provide this summary of care documentation to your next provider. Your primary care clinician is listed as PIPO Choudhury. If you have any questions after today's visit, please call [phone].
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
## 2018-05-31 LAB
ALBUMIN SERPL-MCNC: 4.1 G/DL (ref 3.5–5.5)
ALBUMIN/GLOB SERPL: 1.4 {RATIO} (ref 1.2–2.2)
ALP SERPL-CCNC: 57 IU/L (ref 39–117)
ALT SERPL-CCNC: 11 IU/L (ref 0–32)
AST SERPL-CCNC: 14 IU/L (ref 0–40)
B-HCG SERPL QL: NEGATIVE MIU/ML
BASOPHILS # BLD AUTO: 0 X10E3/UL (ref 0–0.2)
BASOPHILS NFR BLD AUTO: 0 %
BILIRUB SERPL-MCNC: 0.3 MG/DL (ref 0–1.2)
BUN SERPL-MCNC: 12 MG/DL (ref 6–24)
BUN/CREAT SERPL: 17 (ref 9–23)
CALCIUM SERPL-MCNC: 9.7 MG/DL (ref 8.7–10.2)
CHLORIDE SERPL-SCNC: 108 MMOL/L (ref 96–106)
CO2 SERPL-SCNC: 22 MMOL/L (ref 18–29)
CREAT SERPL-MCNC: 0.71 MG/DL (ref 0.57–1)
EOSINOPHIL # BLD AUTO: 0 X10E3/UL (ref 0–0.4)
EOSINOPHIL NFR BLD AUTO: 0 %
ERYTHROCYTE [DISTWIDTH] IN BLOOD BY AUTOMATED COUNT: 12.6 % (ref 12.3–15.4)
GFR SERPLBLD CREATININE-BSD FMLA CKD-EPI: 107 ML/MIN/1.73
GFR SERPLBLD CREATININE-BSD FMLA CKD-EPI: 123 ML/MIN/1.73
GLOBULIN SER CALC-MCNC: 2.9 G/DL (ref 1.5–4.5)
GLUCOSE SERPL-MCNC: 102 MG/DL (ref 65–99)
HCT VFR BLD AUTO: 37.9 % (ref 34–46.6)
HGB BLD-MCNC: 12.5 G/DL (ref 11.1–15.9)
HIV 1+2 AB+HIV1 P24 AG SERPL QL IA: NON REACTIVE
IMM GRANULOCYTES # BLD: 0 X10E3/UL (ref 0–0.1)
IMM GRANULOCYTES NFR BLD: 0 %
LYMPHOCYTES # BLD AUTO: 1.6 X10E3/UL (ref 0.7–3.1)
LYMPHOCYTES NFR BLD AUTO: 24 %
MCH RBC QN AUTO: 33.1 PG (ref 26.6–33)
MCHC RBC AUTO-ENTMCNC: 33 G/DL (ref 31.5–35.7)
MCV RBC AUTO: 100 FL (ref 79–97)
MONOCYTES # BLD AUTO: 0.4 X10E3/UL (ref 0.1–0.9)
MONOCYTES NFR BLD AUTO: 6 %
NEUTROPHILS # BLD AUTO: 4.7 X10E3/UL (ref 1.4–7)
NEUTROPHILS NFR BLD AUTO: 70 %
PLATELET # BLD AUTO: 255 X10E3/UL (ref 150–379)
POTASSIUM SERPL-SCNC: 4.2 MMOL/L (ref 3.5–5.2)
PROT SERPL-MCNC: 7 G/DL (ref 6–8.5)
RBC # BLD AUTO: 3.78 X10E6/UL (ref 3.77–5.28)
SODIUM SERPL-SCNC: 141 MMOL/L (ref 134–144)
WBC # BLD AUTO: 6.7 X10E3/UL (ref 3.4–10.8)

## 2018-07-11 ENCOUNTER — OFFICE VISIT (OUTPATIENT)
Dept: INTERNAL MEDICINE CLINIC | Age: 38
End: 2018-07-11

## 2018-07-11 VITALS
DIASTOLIC BLOOD PRESSURE: 80 MMHG | RESPIRATION RATE: 18 BRPM | BODY MASS INDEX: 29.4 KG/M2 | HEIGHT: 62 IN | WEIGHT: 159.8 LBS | TEMPERATURE: 98.2 F | HEART RATE: 87 BPM | SYSTOLIC BLOOD PRESSURE: 124 MMHG | OXYGEN SATURATION: 97 %

## 2018-07-11 DIAGNOSIS — N92.6 IRREGULAR MENSES: ICD-10-CM

## 2018-07-11 DIAGNOSIS — R51.9 FREQUENT HEADACHES: ICD-10-CM

## 2018-07-11 DIAGNOSIS — N89.8 VAGINAL DISCHARGE: Primary | ICD-10-CM

## 2018-07-11 DIAGNOSIS — K21.9 GASTROESOPHAGEAL REFLUX DISEASE WITHOUT ESOPHAGITIS: ICD-10-CM

## 2018-07-11 DIAGNOSIS — B37.9 ANTIBIOTIC-INDUCED YEAST INFECTION: ICD-10-CM

## 2018-07-11 DIAGNOSIS — R11.0 NAUSEA: ICD-10-CM

## 2018-07-11 DIAGNOSIS — T36.95XA ANTIBIOTIC-INDUCED YEAST INFECTION: ICD-10-CM

## 2018-07-11 LAB
BILIRUB UR QL STRIP: NEGATIVE
GLUCOSE UR-MCNC: NEGATIVE MG/DL
HCG URINE, QL. (POC): NEGATIVE
KETONES P FAST UR STRIP-MCNC: NEGATIVE MG/DL
PH UR STRIP: 6 [PH] (ref 4.6–8)
PROT UR QL STRIP: NEGATIVE
SP GR UR STRIP: 1.02 (ref 1–1.03)
UA UROBILINOGEN AMB POC: NORMAL (ref 0.2–1)
URINALYSIS CLARITY POC: CLEAR
URINALYSIS COLOR POC: YELLOW
URINE BLOOD POC: NEGATIVE
URINE LEUKOCYTES POC: NEGATIVE
URINE NITRITES POC: NEGATIVE
VALID INTERNAL CONTROL?: YES

## 2018-07-11 RX ORDER — ONDANSETRON 8 MG/1
8 TABLET, ORALLY DISINTEGRATING ORAL
Qty: 20 TAB | Refills: 0 | Status: SHIPPED | OUTPATIENT
Start: 2018-07-11 | End: 2019-05-10 | Stop reason: ALTCHOICE

## 2018-07-11 RX ORDER — BUTALBITAL, ACETAMINOPHEN AND CAFFEINE 50; 325; 40 MG/1; MG/1; MG/1
TABLET ORAL
Qty: 12 TAB | Refills: 0 | Status: SHIPPED | OUTPATIENT
Start: 2018-07-11 | End: 2019-04-08

## 2018-07-11 RX ORDER — FLUCONAZOLE 150 MG/1
150 TABLET ORAL AS NEEDED
Qty: 2 TAB | Refills: 0 | Status: SHIPPED | OUTPATIENT
Start: 2018-07-11 | End: 2018-10-08

## 2018-07-11 RX ORDER — METRONIDAZOLE 500 MG/1
500 TABLET ORAL 2 TIMES DAILY
Qty: 14 TAB | Refills: 0 | Status: SHIPPED | OUTPATIENT
Start: 2018-07-11 | End: 2018-07-18

## 2018-07-11 NOTE — PROGRESS NOTES
Wilmer Vargas is a 34 y.o. female and presents with Vaginal Itching (pt states x 1 day, with discharge. . pt states discharge looks like milk and creamy. .. pt denies odor and pain); Pregnancy Test (pt states that she had spotting on the 7/2/18 and light blood on 7/3/18 and was done with everything on 7/4/18. .. pt states cycles are usually heavy and she blleds for 4 days but this time was different. ); and Headache (with nausea. .. pt states that the nausea started last week with 2 days of nausea, pt states that nausea is usually after eating. .. pt states headaches started on 7/4/18)    Subjective:  Patient presents with a complaint of vaginal discharge. Onset of symptoms was 2 days ago and has been unchanged since that time. The patient describes the discharge as white and creamy and does not experience abdominal discomfort with the discharge. Associated with itching since last night. She does not complain of burning with urination. She denies genital lesions at this time. The patient does  have a history of STD's or PID and is sexually active, with last sexual intercourse 1 week  ago. Having headaches daily since last Wednesday. Taking naproxen without relief. Associated with nausea just prior and during. No fall or head injury. No new meds or foods reported. No diarrhea, weakness, or fatigue. Lastly concerned for light menstrual cycle last week only 2-3 days, usually very heavy. Concerned for pregnancy.      Review of Systems  Constitutional: negative for fevers, chills, anorexia and weight loss  Eyes:   negative for visual disturbance, drainage, and irritation  ENT:   negative for tinnitus,sore throat,nasal congestion,ear pain,and hoarseness  Respiratory:  negative for cough, hemoptysis, dyspnea, and wheezing  CV:   negative for chest pain, palpitations, and lower extremity edema  GI:   negative for nausea, vomiting, diarrhea, abdominal pain, and melena  Endo:               negative for polyuria,polydipsia,polyphagia, and heat intolerance  Genitourinary: negative for frequency, urgency, dysuria, retention, and hematuria  Integument:  negative for rash, ulcerations, and pruritus  Hematologic:  negative for easy bruising and bleeding  Musculoskel: negative for arthralgias, muscle weakness,and joint pain/swelling  Neurological:  negative for headaches, dizziness, vertigo,and memory/gait problems  Behavl/Psych: negative for feelings of anxiety, depression, suicide, and mood changes    Past Medical History:   Diagnosis Date    Anxiety     Bipolar affective ([facility]     Depression     Diabetes ([facility]     diet control    Hyperlipidemia     High Cholesterol    Hyperlipidemia     Major depression      Past Surgical History:   Procedure Laterality Date    HX GI      Colonoscopy    HX HERNIA REPAIR      as child     Social History     Social History    Marital status: SINGLE     Spouse name: N/A    Number of children: N/A    Years of education: N/A     Social History Main Topics    Smoking status: Current Every Day Smoker    Smokeless tobacco: Current User      Comment: cigars    Alcohol use Yes      Comment: occasional    Drug use: No    Sexual activity: Yes     Partners: Male     Birth control/ protection: None     Other Topics Concern    None     Social History Narrative     Family History   Problem Relation Age of Onset    Diabetes Mother     Depression Mother     Asthma Brother     Stroke Maternal Grandmother      aneurysm    Cancer Maternal Grandmother      kidney    Hypertension Maternal Grandmother     Cancer Paternal Grandmother      lung    Diabetes Brother     Bipolar Disorder Brother     Schizophrenia Brother     Other Brother      paranoia    Breast Cancer Other     Breast Cancer Other      Current Outpatient Prescriptions   Medication Sig Dispense Refill    butalbital-acetaminophen-caffeine (FIORICET, ESGIC) -40 mg per tablet May take 1 tab every 3 days as needed for SEVERE pain. 12 Tab 0    metroNIDAZOLE (FLAGYL) 500 mg tablet Take 1 Tab by mouth two (2) times a day for 7 days. 14 Tab 0    fluconazole (DIFLUCAN) 150 mg tablet Take 1 Tab by mouth as needed (vaginal discharge and itching). 2 Tab 0    ondansetron (ZOFRAN ODT) 8 mg disintegrating tablet Take 1 Tab by mouth every eight (8) hours as needed for Nausea. 20 Tab 0    omeprazole (PRILOSEC) 20 mg capsule Take 1 Cap by mouth daily. Take in 2 week intervals. 90 Cap 0    busPIRone (BUSPAR) 10 mg tablet Take 1 Tab by mouth two (2) times a day. 60 Tab 2    OLANZapine (ZYPREXA) 5 mg tablet Take 1 Tab by mouth nightly. 30 Tab 2    sertraline (ZOLOFT) 50 mg tablet Take 1 Tab by mouth daily. 30 Tab 2    cetirizine (ZYRTEC) 10 mg tablet TAKE 1 TAB BY MOUTH DAILY. INDICATIONS: ALLERGIC RHINITIS 30 Tab 5     Allergies   Allergen Reactions    Amoxicillin Rash and Other (comments)     Vaginal itching    Flagyl [Metronidazole] Nausea and Vomiting     metrogel is okay    Pcn [Penicillins] Itching     Vaginal itching       Objective:  Visit Vitals    /80 (BP 1 Location: Right arm, BP Patient Position: Sitting)    Pulse 87    Temp 98.2 °F (36.8 °C) (Oral)    Resp 18    Ht 5' 2\" (1.575 m)    Wt 159 lb 12.8 oz (72.5 kg)    LMP 07/02/2018 (Exact Date)    SpO2 97%    BMI 29.23 kg/m2     Wt Readings from Last 3 Encounters:   07/11/18 159 lb 12.8 oz (72.5 kg)   05/30/18 160 lb 12.8 oz (72.9 kg)   05/02/18 160 lb (72.6 kg)     Physical Exam:   General appearance - alert, well appearing, and in no distress. Mental status - A/O x 4, normal mood and affect. Neck -Supple ,normal CSP. FROM, non-tender. No significant adenopathy/thyromegaly. No JVD. Chest - CTA. Symmetric chest rise. No wheezing, rales or rhonchi. Heart - Normal rate, regular rhythm. Normal S1, S2. No MGR or clicks. Abdomen - Soft, distended. Normoactive BS in all quadrants. NT, no mass or HSM. Ext- Radial, DP pulses, 2+ bilaterally.  No pedal edema, clubbing, or cyanosis. Skin-Warm and dry. No hyperpigmentation, ulcerations, or suspicious lesions. Neuro - Normal speech, no focal findings or movement disorder. Normal strength, gait, and muscle tone. Assessment/Plan:  Flagyl, fioricet, zofran, and diflucan ordered. Pt reports having metrogel at home, advised to take applicatorful nightly x 5 nights and follow up with diflucan. Discussed use of probiotics for prevention also. Increased fluid intake and proper use of fioricet discussed. Advised irregular menses is likely menopause. Medication Side Effects and Warnings were discussed with patient: yes   Patient Labs were reviewed: yes  Patient Past Records were reviewed: yes    See below for other orders   Follow-up Disposition:  Return if symptoms worsen or fail to improve. Pt has given consent verbally while in office for [facility]. ICD-10-CM ICD-9-CM    1. Vaginal discharge N89.8 623.5 metroNIDAZOLE (FLAGYL) 500 mg tablet      fluconazole (DIFLUCAN) 150 mg tablet   2. Irregular menses N92.6 626.4 AMB POC URINALYSIS DIP STICK AUTO W/O MICRO      AMB POC URINE PREGNANCY TEST, VISUAL COLOR COMPARISON   3. Antibiotic-induced yeast infection B37.9 112.9 fluconazole (DIFLUCAN) 150 mg tablet    T36.95XA E930.9    4. Gastroesophageal reflux disease without esophagitis K21.9 530.81 ondansetron (ZOFRAN ODT) 8 mg disintegrating tablet   5. Frequent headaches R51 784.0 butalbital-acetaminophen-caffeine (FIORICET, ESGIC) -40 mg per tablet   6. Nausea R11.0 787.02 ondansetron (ZOFRAN ODT) 8 mg disintegrating tablet     Orders Placed This Encounter    AMB POC URINALYSIS DIP STICK AUTO W/O MICRO    AMB POC URINE PREGNANCY TEST, VISUAL COLOR COMPARISON    butalbital-acetaminophen-caffeine (FIORICET, ESGIC) -40 mg per tablet     Sig: May take 1 tab every 3 days as needed for SEVERE pain.      Dispense:  12 Tab     Refill:  0    metroNIDAZOLE (FLAGYL) 500 mg tablet     Sig: Take 1 Tab by mouth two (2) times a day for 7 days. Dispense:  14 Tab     Refill:  0    fluconazole (DIFLUCAN) 150 mg tablet     Sig: Take 1 Tab by mouth as needed (vaginal discharge and itching). Dispense:  2 Tab     Refill:  0    ondansetron (ZOFRAN ODT) 8 mg disintegrating tablet     Sig: Take 1 Tab by mouth every eight (8) hours as needed for Nausea. Dispense:  20 Tab     Refill:  [place] [facility] expressed understanding of plan. An After Visit Summary was offered/printed and given to the patient.

## 2018-07-11 NOTE — MR AVS SNAPSHOT
Kassie Roldan 
 
 
 [address] [facility] [address] [address] [phone] Patient: Nasrin Milligan MRN: [id number] RTK:[id number] Visit Information Date & Time Provider Department Dept. Phone Encounter #  
 7/11/2018 10:00 AM Bobbi Rebolledo NP [address] [facility] [phone] [id number] Follow-up Instructions Return if symptoms worsen or fail to improve. Your Appointments 8/2/2018  2:30 PM  
ESTABLISHED PATIENT with Erica Reis NP [facility] ([facility]) Appt Note: 3 month follow-up [address] [address]  
[phone]  
  
   
 [address] [place] [address] [address] [id number]  
  
    
 11/30/2018 10:20 AM  
ROUTINE CARE with Bobbi Rebolledo NP  
[facility] [facility] Appt Note: BMI AND GERD  
 [address] [place] [address] [address] [address]  
[phone]  
  
   
 [address] [facility] Upcoming Health Maintenance Date Due Influenza Age 5 to Adult 8/1/2018 PAP AKA CERVICAL CYTOLOGY 6/1/2022 DTaP/Tdap/Td series (2 - Td) 2/2/2024 COLONOSCOPY 12/17/2025 Allergies as of 7/11/2018  Review Complete On: 7/11/2018 By: Luis Fernando Hickey LPN Severity Noted Reaction Type Reactions Amoxicillin  12/29/2010    Rash, Other (comments) Vaginal itching Flagyl [Metronidazole]  11/16/2016   Side Effect Nausea and Vomiting  
 metrogel is okay Pcn [Penicillins]  09/20/2014   Intolerance Itching Vaginal itching Current Immunizations  Reviewed on 11/15/2011 Name Date Influenza Vaccine (Quad) PF 9/8/2017 Influenza Vaccine Intradermal PF 11/5/2014 PPD 6/20/2012 Tdap 2/2/2014  4:52 AM  
  
 Not reviewed this visit You Were Diagnosed With   
  
 Codes Comments Vaginal discharge    -  Primary ICD-10-CM: N89.8 ICD-9-CM: 623.5 Irregular menses     ICD-10-CM: N92.6 ICD-9-CM: 626.4 Antibiotic-induced yeast infection     ICD-10-CM: B37.9, T36.95XA ICD-9-CM: 112.9, E930.9 Gastroesophageal reflux disease without esophagitis     ICD-10-CM: K21.9 ICD-9-CM: 530.81 Frequent headaches     ICD-10-CM: R51 ICD-9-CM: 784.0 Nausea     ICD-10-CM: R11.0 ICD-9-CM: 787.02 Vitals BP Pulse Temp Resp Height(growth percentile) Weight(growth percentile) 124/80 (BP 1 Location: Right arm, BP Patient Position: Sitting) 87 98.2 °F (36.8 °C) (Oral) 18 5' 2\" (1.575 m) 159 lb 12.8 oz (72.5 kg) LMP SpO2 BMI OB Status Smoking Status 07/02/2018 (Exact Date) 97% 29.23 kg/m2 Having regular periods Current Every Day Smoker Vitals History BMI and BSA Data Body Mass Index Body Surface Area  
 29.23 kg/m 2 1.78 m 2 Preferred Pharmacy Pharm[facility] Name Phone [id number] [phone] Your Updated Medication List  
  
   
This list is accurate as of 7/11/18 11:07 AM.  Always use your most recent med list.  
  
  
  
  
 busPIRone 10 mg tablet Commonly known as:  BUSPAR Take 1 Tab by mouth two (2) times a day. butalbital-acetaminophen-caffeine -40 mg per tablet Commonly known as:  Gennett Madura May take 1 tab every 3 days as needed for SEVERE pain. cetirizine 10 mg tablet Commonly known as:  ZYRTEC  
TAKE 1 TAB BY MOUTH DAILY. INDICATIONS: ALLERGIC RHINITIS  
  
 fluconazole 150 mg tablet Commonly known as:  DIFLUCAN Take 1 Tab by mouth as needed (vaginal discharge and itching). metroNIDAZOLE 500 mg tablet Commonly known as:  FLAGYL Take 1 Tab by mouth two (2) times a day for 7 days. OLANZapine 5 mg tablet Commonly known as:  ZyPREXA Take 1 Tab by mouth nightly. omeprazole 20 mg capsule Commonly known as:  PRILOSEC Take 1 Cap by mouth daily. Take in 2 week intervals. ondansetron 8 mg disintegrating tablet Commonly known as:  ZOFRAN ODT Take 1 Tab by mouth every eight (8) hours as needed for Nausea. sertraline 50 mg tablet Commonly known as:  ZOLOFT Take 1 Tab by mouth daily. Prescriptions Printed Refills  
 butalbital-acetaminophen-caffeine (FIORICET, ESGIC) -40 mg per tablet 0 Sig: May take 1 tab every 3 days as needed for SEVERE pain. Class: Print Prescriptions Sent to Pharmacy Refills  
 metroNIDAZOLE (FLAGYL) 500 mg tablet 0 Sig: Take 1 Tab by mouth two (2) times a day for 7 days. Class: Normal  
 Pharmacy: [facility]/pharmacy [place] [address] Ph #: [phone] Route: Oral  
 fluconazole (DIFLUCAN) 150 mg tablet 0 Sig: Take 1 Tab by mouth as needed (vaginal discharge and itching). Class: Normal  
 Pharmacy: [place] [address] Ph #: [phone] Route: Oral  
 ondansetron (ZOFRAN ODT) 8 mg disintegrating tablet 0 Sig: Take 1 Tab by mouth every eight (8) hours as needed for Nausea. Class: Normal  
 Pharmacy: [place] [address] Ph #: [phone] Route: Oral  
  
We Performed the Following AMB POC URINALYSIS DIP STICK AUTO W/O MICRO [46049 CPT(R)] AMB POC URINE PREGNANCY TEST, VISUAL COLOR COMPARISON [32519 CPT(R)] Follow-up Instructions Return if symptoms worsen or fail to improve. Patient Instructions Try taking PROBIOTICS 10 billion units daily for GI health. For vaginal health, 2-4 billion daily. Be sure to stay hydrated, aiming to drink at least 8 glasses or 4 BOTTLES of water daily. Try  either drinking Pedialyte, Gatorade, or Powerade (LIGHT version for diabetics) to help rehydrate. Popsicles are also helpful. Learning About Menopause What is menopause? For most women, menopause is a natural process of aging. Menstrual periods gradually stop. The ability to become pregnant ends. Some women feel relief that their childbearing years are ending. But other women struggle with the physical and emotional changes that come with menopause. For most women, menopause happens around age 48. But every woman's body has its own timeline. Some women stop having periods in their mid-40s. Others keep having periods well into their 50s. And some women go through menopause early because of cancer treatment or surgery to remove the ovaries. What can you expect with menopause? · It starts with perimenopause. This is the process of change that leads up to menopause. Perimenopause can start as early as your late 35s or as late as your early 46s. How long it lasts varies. But it usually lasts from 2 to 8 years. · During this time, your hormone levels will go up and down unevenly (fluctuate). This causes changes in your periods and other symptoms. In time, estrogen and progesterone levels drop enough that the menstrual cycle stops. Going a full year without having a period is usually considered menopause. · Low estrogen levels after menopause speed bone loss. This increases your risk of osteoporosis. Also, your risk of heart disease increases after menopause. · It's normal to have thinner, dryer, wrinkled skin after menopause. The vaginal lining and the lower urinary tract also thin and weaken. This can make it hard to have sex. It can also increase the risk of vaginal and urinary tract infections. What are the symptoms? · Lighter or heavier periods. Your menstrual cycle may be longer or shorter. You may skip periods. · Hot flashes. You may have a sudden feeling of intense body heat. You may sweat, and your head, neck, and chest may get red. Along with hot flashes, you may have a heartbeat that's too fast or not regular.  You may also feel anxious or grouchy. In rare cases you might feel panic. · Trouble sleeping. · Mood swings or feeling grouchy, depressed, or worried. · Problems with remembering or thinking clearly. · Vaginal dryness. Some women have only a few mild symptoms. Others have severe symptoms that disrupt their sleep and daily lives. Symptoms tend to last or get worse the first year or more after menopause. Over time, hormones even out at low levels. Many symptoms improve or go away. But some women may have symptoms that don't go away. How are menopause symptoms treated? 
 If you have mild symptoms, you may get some relief if you eat healthy foods, exercise, and lower your stress. Some women choose to take medicines if they have severe symptoms that aren't helped by making changes to their lifestyle. 
 Lifestyle changes 
  · Choose a heart-healthy diet that is low in saturated fat. It should include plenty of fish, fruits, vegetables, beans, and high-fiber grains and breads. Be sure you get enough calcium and vitamin D to help your bones stay strong. Low-fat or nonfat dairy products are a great source of calcium.  
  · Get regular exercise. Exercise can help you manage your weight, keep your heart and bones strong, and lift your mood.  
  · Limit caffeine, alcohol, and stress. These things can make symptoms worse. Limiting them may help you sleep better.  
  · If you smoke, stop. Quitting smoking can reduce hot flashes and long-term health risks. Medicines 
 If your symptoms are severe, talk with your doctor. You may want to try prescription medicines, such as: 
  · Low-dose birth control pills before menopause.  
  · Low-dose hormone therapy (HT) after menopause.  
  · Antidepressants.  
  · A medicine called clonidine (Catapres) that is usually used to treat high blood pressure.  
 All medicines for menopause symptoms have possible risks or side effects.  A very small number of women develop serious health problems when taking hormone therapy. Be sure to talk to your doctor about your possible health risks before you start a treatment for menopause symptoms. 
 Other treatments 
 You can try: 
  · Breathing exercises. They may help reduce hot flashes and emotional symptoms.  
  · Soy. Some women feel that eating lots of soy helps even out their menopause symptoms.  
  · Yoga or biofeedback. They may help reduce stress. Follow-up care is a key part of your treatment and safety. Be sure to make and go to all appointments, and call your doctor if you are having problems. It's also a good idea to know your test results and keep a list of the medicines you take. Where can you learn more? Go to http://star-arvin.info/. Enter H199 in the search box to learn more about \"Learning About Menopause. \" Current as of: October 6, 2017 Content Version: 11.7 © 5595-6095 [employer]. Care instructions adapted under license by [employer] (which disclaims liability or warranty for this information). If you have questions about a medical condition or this instruction, always ask your healthcare professional. [address] any warranty or liability for your use of this information. Migraine Headache: Care Instructions Your Care Instructions Migraines are painful, throbbing headaches that often start on one side of the head. They may cause nausea and vomiting and make you sensitive to light, sound, or smell. Without treatment, migraines can last from 4 hours to a few days. Medicines can help prevent migraines or stop them after they have started. Your doctor can help you find which ones work best for you. Follow-up care is a key part of your treatment and safety. Be sure to make and go to all appointments, and call your doctor if you are having problems. It's also a good idea to know your test results and keep a list of the medicines you take. How can you care for yourself at home? · Do not drive if you have taken a prescription pain medicine. · Rest in a quiet, dark room until your headache is gone. Close your eyes, and try to relax or go to sleep. Don't watch TV or read. · Put a cold, moist cloth or cold pack on the painful area for 10 to 20 minutes at a time. Put a thin cloth between the cold pack and your skin. · Use a warm, moist towel or a heating pad set on low to relax tight shoulder and neck muscles. · Have someone gently massage your neck and shoulders. · Take your medicines exactly as prescribed. Call your doctor if you think you are having a problem with your medicine. You will get more details on the specific medicines your doctor prescribes. · Be careful not to take pain medicine more often than the instructions allow. You could get worse or more frequent headaches when the medicine wears off. To prevent migraines · Keep a headache diary so you can figure out what triggers your headaches. Avoiding triggers may help you prevent headaches. Record when each headache began, how long it lasted, and what the pain was like. (Was it throbbing, aching, stabbing, or dull?) Write down any other symptoms you had with the headache, such as nausea, flashing lights or dark spots, or sensitivity to bright light or loud noise. Note if the headache occurred near your period. List anything that might have triggered the headache. Triggers may include certain foods (chocolate, cheese, wine) or odors, smoke, bright light, stress, or lack of sleep. · If your doctor has prescribed medicine for your migraines, take it as directed. You may have medicine that you take only when you get a migraine and medicine that you take all the time to help prevent migraines. ¨ If your doctor has prescribed medicine for when you get a headache, take it at the first sign of a migraine, unless your doctor has given you other instructions. ¨ If your doctor has prescribed medicine to prevent migraines, take it exactly as prescribed. Call your doctor if you think you are having a problem with your medicine. · Find healthy ways to deal with stress. Migraines are most common during or right after stressful times. Take time to relax before and after you do something that has caused a migraine in the past. 
· Try to keep your muscles relaxed by keeping good posture. Check your jaw, face, neck, and shoulder muscles for tension. Try to relax them. When you sit at a desk, change positions often. And make sure to stretch for 30 seconds each hour. · Get plenty of sleep and exercise. · Eat meals on a regular schedule. Avoid foods and drinks that often trigger migraines. These include chocolate, alcohol (especially red wine and port), aspartame, monosodium glutamate (MSG), and some additives found in foods (such as hot dogs, marquez, cold cuts, aged cheeses, and pickled foods). · Limit caffeine. Don't drink too much coffee, tea, or soda. But don't quit caffeine suddenly. That can also give you migraines. · Do not smoke or allow others to smoke around you. If you need help quitting, talk to your doctor about stop-smoking programs and medicines. These can increase your chances of quitting for good. · If you are taking birth control pills or hormone therapy, talk to your doctor about whether they are triggering your migraines. When should you call for help? Call 911 anytime you think you may need emergency care. For example, call if: 
  · You have signs of a stroke. These may include: 
¨ Sudden numbness, paralysis, or weakness in your face, arm, or leg, especially on only one side of your body. ¨ Sudden vision changes. ¨ Sudden trouble speaking. ¨ Sudden confusion or trouble understanding simple statements. ¨ Sudden problems with walking or balance. ¨ A sudden, severe headache that is different from past headaches.  Call your doctor now or seek immediate medical care if: 
  · You have new or worse nausea and vomiting.  
  · You have a new or higher fever.  
  · Your headache gets much worse.  
 Watch closely for changes in your health, and be sure to contact your doctor if: 
  · You are not getting better after 2 days (48 hours). Where can you learn more? Go to http://star-arvin.info/. Enter H709 in the search box to learn more about \"Migraine Headache: Care Instructions. \" Current as of: October 9, 2017 Content Version: 11.7 © 4168-7453 [employer]ink. Care instructions adapted under license by [employer] (which disclaims liability or warranty for this information). If you have questions about a medical condition or this instruction, always ask your healthcare professional. [address] any warranty or liability for your use of this information. Introducing [facility]! Dear Eva Motta: Thank you for requesting a [employer] account. Our records indicate that you already have an active [employer] account. You can access your account anytime at [email or web address] Did you know that you can access your hospital and ER discharge instructions at any time in [employer]? You can also review all of your test results from your hospital stay or ER visit. Additional Information If you have questions, please visit the Frequently Asked Questions section of the [employer] website at [email or web address] Remember, [employer] is NOT to be used for urgent needs. For medical emergencies, dial 911. Now available from your iPhone and Android! Please provide this summary of care documentation to your next provider. Your primary care clinician is listed as PIPO Shah. If you have any questions after today's visit, please call [phone].

## 2018-07-11 NOTE — PATIENT INSTRUCTIONS
Try taking PROBIOTICS 10 billion units daily for GI health. For vaginal health, 2-4 billion daily. Be sure to stay hydrated, aiming to drink at least 8 glasses or 4 BOTTLES of water daily. Try  either drinking Pedialyte, Gatorade, or Powerade (LIGHT version for diabetics) to help rehydrate. Popsicles are also helpful. Learning About Menopause  What is menopause? For most women, menopause is a natural process of aging. Menstrual periods gradually stop. The ability to become pregnant ends. Some women feel relief that their childbearing years are ending. But other women struggle with the physical and emotional changes that come with menopause. For most women, menopause happens around age 48. But every woman's body has its own timeline. Some women stop having periods in their mid-40s. Others keep having periods well into their 50s. And some women go through menopause early because of cancer treatment or surgery to remove the ovaries. What can you expect with menopause? · It starts with perimenopause. This is the process of change that leads up to menopause. Perimenopause can start as early as your late 35s or as late as your early 46s. How long it lasts varies. But it usually lasts from 2 to 8 years. · During this time, your hormone levels will go up and down unevenly (fluctuate). This causes changes in your periods and other symptoms. In time, estrogen and progesterone levels drop enough that the menstrual cycle stops. Going a full year without having a period is usually considered menopause. · Low estrogen levels after menopause speed bone loss. This increases your risk of osteoporosis. Also, your risk of heart disease increases after menopause. · It's normal to have thinner, dryer, wrinkled skin after menopause. The vaginal lining and the lower urinary tract also thin and weaken. This can make it hard to have sex. It can also increase the risk of vaginal and urinary tract infections.   What are the symptoms? · Lighter or heavier periods. Your menstrual cycle may be longer or shorter. You may skip periods. · Hot flashes. You may have a sudden feeling of intense body heat. You may sweat, and your head, neck, and chest may get red. Along with hot flashes, you may have a heartbeat that's too fast or not regular. You may also feel anxious or grouchy. In rare cases you might feel panic. · Trouble sleeping. · Mood swings or feeling grouchy, depressed, or worried. · Problems with remembering or thinking clearly. · Vaginal dryness. Some women have only a few mild symptoms. Others have severe symptoms that disrupt their sleep and daily lives. Symptoms tend to last or get worse the first year or more after menopause. Over time, hormones even out at low levels. Many symptoms improve or go away. But some women may have symptoms that don't go away. How are menopause symptoms treated?   If you have mild symptoms, you may get some relief if you eat healthy foods, exercise, and lower your stress. Some women choose to take medicines if they have severe symptoms that aren't helped by making changes to their lifestyle.   Lifestyle changes    · Choose a heart-healthy diet that is low in saturated fat. It should include plenty of fish, fruits, vegetables, beans, and high-fiber grains and breads. Be sure you get enough calcium and vitamin D to help your bones stay strong. Low-fat or nonfat dairy products are a great source of calcium.     · Get regular exercise. Exercise can help you manage your weight, keep your heart and bones strong, and lift your mood.     · Limit caffeine, alcohol, and stress. These things can make symptoms worse. Limiting them may help you sleep better.     · If you smoke, stop. Quitting smoking can reduce hot flashes and long-term health risks. Medicines   If your symptoms are severe, talk with your doctor.  You may want to try prescription medicines, such as:    · Low-dose birth control pills before menopause.     · Low-dose hormone therapy (HT) after menopause.     · Antidepressants.     · A medicine called clonidine (Catapres) that is usually used to treat high blood pressure.    All medicines for menopause symptoms have possible risks or side effects. A very small number of women develop serious health problems when taking hormone therapy. Be sure to talk to your doctor about your possible health risks before you start a treatment for menopause symptoms.   Other treatments   You can try:    · Breathing exercises. They may help reduce hot flashes and emotional symptoms.     · Soy. Some women feel that eating lots of soy helps even out their menopause symptoms.     · Yoga or biofeedback. They may help reduce stress. Follow-up care is a key part of your treatment and safety. Be sure to make and go to all appointments, and call your doctor if you are having problems. It's also a good idea to know your test results and keep a list of the medicines you take. Where can you learn more? Go to http://starLurnQarvin.info/. Enter H199 in the search box to learn more about \"Learning About Menopause. \"  Current as of: October 6, 2017  Content Version: 11.7  © 5812-5826 [employer]. Care instructions adapted under license by [employer] (which disclaims liability or warranty for this information). If you have questions about a medical condition or this instruction, always ask your healthcare professional. [address] any warranty or liability for your use of this information. Migraine Headache: Care Instructions  Your Care Instructions  Migraines are painful, throbbing headaches that often start on one side of the head. They may cause nausea and vomiting and make you sensitive to light, sound, or smell. Without treatment, migraines can last from 4 hours to a few days. Medicines can help prevent migraines or stop them after they have started. Your doctor can help you find which ones work best for you. Follow-up care is a key part of your treatment and safety. Be sure to make and go to all appointments, and call your doctor if you are having problems. It's also a good idea to know your test results and keep a list of the medicines you take. How can you care for yourself at home? · Do not drive if you have taken a prescription pain medicine. · Rest in a quiet, dark room until your headache is gone. Close your eyes, and try to relax or go to sleep. Don't watch TV or read. · Put a cold, moist cloth or cold pack on the painful area for 10 to 20 minutes at a time. Put a thin cloth between the cold pack and your skin. · Use a warm, moist towel or a heating pad set on low to relax tight shoulder and neck muscles. · Have someone gently massage your neck and shoulders. · Take your medicines exactly as prescribed. Call your doctor if you think you are having a problem with your medicine. You will get more details on the specific medicines your doctor prescribes. · Be careful not to take pain medicine more often than the instructions allow. You could get worse or more frequent headaches when the medicine wears off. To prevent migraines  · Keep a headache diary so you can figure out what triggers your headaches. Avoiding triggers may help you prevent headaches. Record when each headache began, how long it lasted, and what the pain was like. (Was it throbbing, aching, stabbing, or dull?) Write down any other symptoms you had with the headache, such as nausea, flashing lights or dark spots, or sensitivity to bright light or loud noise. Note if the headache occurred near your period. List anything that might have triggered the headache. Triggers may include certain foods (chocolate, cheese, wine) or odors, smoke, bright light, stress, or lack of sleep. · If your doctor has prescribed medicine for your migraines, take it as directed.  You may have medicine that you take only when you get a migraine and medicine that you take all the time to help prevent migraines. ¨ If your doctor has prescribed medicine for when you get a headache, take it at the first sign of a migraine, unless your doctor has given you other instructions. ¨ If your doctor has prescribed medicine to prevent migraines, take it exactly as prescribed. Call your doctor if you think you are having a problem with your medicine. · Find healthy ways to deal with stress. Migraines are most common during or right after stressful times. Take time to relax before and after you do something that has caused a migraine in the past.  · Try to keep your muscles relaxed by keeping good posture. Check your jaw, face, neck, and shoulder muscles for tension. Try to relax them. When you sit at a desk, change positions often. And make sure to stretch for 30 seconds each hour. · Get plenty of sleep and exercise. · Eat meals on a regular schedule. Avoid foods and drinks that often trigger migraines. These include chocolate, alcohol (especially red wine and port), aspartame, monosodium glutamate (MSG), and some additives found in foods (such as hot dogs, marquez, cold cuts, aged cheeses, and pickled foods). · Limit caffeine. Don't drink too much coffee, tea, or soda. But don't quit caffeine suddenly. That can also give you migraines. · Do not smoke or allow others to smoke around you. If you need help quitting, talk to your doctor about stop-smoking programs and medicines. These can increase your chances of quitting for good. · If you are taking birth control pills or hormone therapy, talk to your doctor about whether they are triggering your migraines. When should you call for help? Call 911 anytime you think you may need emergency care. For example, call if:    · You have signs of a stroke.  These may include:  ¨ Sudden numbness, paralysis, or weakness in your face, arm, or leg, especially on only one side of your body.  ¨ Sudden vision changes. ¨ Sudden trouble speaking. ¨ Sudden confusion or trouble understanding simple statements. ¨ Sudden problems with walking or balance. ¨ A sudden, severe headache that is different from past headaches.    Call your doctor now or seek immediate medical care if:    · You have new or worse nausea and vomiting.     · You have a new or higher fever.     · Your headache gets much worse.    Watch closely for changes in your health, and be sure to contact your doctor if:    · You are not getting better after 2 days (48 hours). Where can you learn more? Go to http://star-arvin.info/. Enter V591 in the search box to learn more about \"Migraine Headache: Care Instructions. \"  Current as of: October 9, 2017  Content Version: 11.7  © 4418-9421 [employer]. Care instructions adapted under license by [employer] (which disclaims liability or warranty for this information). If you have questions about a medical condition or this instruction, always ask your healthcare professional. [place] [address] any warranty or liability for your use of this information.

## 2018-07-11 NOTE — PROGRESS NOTES
Pt is here for   Chief Complaint   Patient presents with    Vaginal Itching     pt states x 1 day, with discharge. . pt states discharge looks like milk and creamy. .. pt denies odor and pain    Pregnancy Test     pt states that she had spotting on the 7/2/18 and light blood on 7/3/18 and was done with everything on 7/4/18. .. pt states cycles are usually heavy and she blleds for 4 days but this time was different.  Headache     with nausea. .. pt states that the nausea started last week with 2 days of nausea, pt states that nausea is usually after eating. .. pt states headaches started on 7/4/18     Pt denies pain at this time. 1. Have you been to the ER, urgent care clinic since your last visit? Hospitalized since your last visit? No    2. Have you seen or consulted any other health care providers outside of the [address] [place] [address] since your last visit? Include any pap smears or colon screening.  No

## 2018-08-02 ENCOUNTER — OFFICE VISIT (OUTPATIENT)
Dept: BEHAVIORAL/MENTAL HEALTH CLINIC | Age: 38
End: 2018-08-02

## 2018-08-02 VITALS — BODY MASS INDEX: 28.52 KG/M2 | HEIGHT: 62 IN | WEIGHT: 155 LBS

## 2018-08-02 DIAGNOSIS — F60.3 BORDERLINE PERSONALITY DISORDER (HCC): ICD-10-CM

## 2018-08-02 DIAGNOSIS — F41.9 ANXIETY: ICD-10-CM

## 2018-08-02 DIAGNOSIS — F43.10 PTSD (POST-TRAUMATIC STRESS DISORDER): ICD-10-CM

## 2018-08-02 DIAGNOSIS — Z91.52 H/O SELF MUTILATION: ICD-10-CM

## 2018-08-02 DIAGNOSIS — F32.1 MODERATE SINGLE CURRENT EPISODE OF MAJOR DEPRESSIVE DISORDER (HCC): ICD-10-CM

## 2018-08-02 DIAGNOSIS — F44.9 DISSOCIATIVE DISORDER OR REACTION: ICD-10-CM

## 2018-08-02 RX ORDER — OLANZAPINE 2.5 MG/1
2.5 TABLET ORAL
Qty: 30 TAB | Refills: 0 | Status: SHIPPED | OUTPATIENT
Start: 2018-08-02 | End: 2018-10-16 | Stop reason: SDUPTHER

## 2018-08-02 RX ORDER — BUSPIRONE HYDROCHLORIDE 10 MG/1
10 TABLET ORAL
Qty: 60 TAB | Refills: 0 | Status: SHIPPED | OUTPATIENT
Start: 2018-08-02 | End: 2018-10-16 | Stop reason: SDUPTHER

## 2018-08-02 RX ORDER — SERTRALINE HYDROCHLORIDE 100 MG/1
100 TABLET, FILM COATED ORAL DAILY
Qty: 30 TAB | Refills: 0 | Status: SHIPPED | OUTPATIENT
Start: 2018-08-02 | End: 2018-10-16 | Stop reason: SDUPTHER
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
## 2018-08-02 NOTE — PATIENT INSTRUCTIONS
Sleep Tips    What to avoid    · Do not have drinks with caffeine, such as coffee or black tea, for 8 hours before bed. · Do not smoke or use other types of tobacco near bedtime. Nicotine is a stimulant and can keep you awake. · Avoid drinking alcohol late in the evening, because it can cause you to wake in the middle of the night. · Do not eat a big meal close to bedtime. If you are hungry, eat a light snack. · Do not drink a lot of water close to bedtime, because the need to urinate may wake you up during the night. · Do not read or watch TV in bed. Use the bed only for sleeping and sexual activity. What to try    · Go to bed at the same time every night, and wake up at the same time every morning. Do not take naps during the day. · Keep your bedroom quiet, dark, and cool. · Get regular exercise, but not within 3 to 4 hours of your bedtime. · Sleep on a comfortable pillow and mattress. · If watching the clock makes you anxious, turn it facing away from you so you cannot see the time. · If you worry when you lie down, start a worry book. Well before bedtime, write down your worries, and then set the book and your concerns aside. · Try meditation or other relaxation techniques before you go to bed. · If you cannot fall asleep, get up and go to another room until you feel sleepy. Do something relaxing. Repeat your bedtime routine before you go to bed again. Make your house quiet and calm about an hour before bedtime. Turn down the lights, turn off the TV, log off the computer, and turn down the volume on music. This can help you relax after a busy day. Learning About Borderline Personality Disorder  What is borderline personality disorder? Borderline personality disorder is a mental health condition. It causes intense mood swings, impulsive behaviors, and severe problems with self-worth. It can lead to troubled relationships in every area of your life.   Experts don't know exactly what causes the disorder. It may be linked to a problem with the parts of the brain that control reactions to emotions. The disorder also seems to run in families. Often, people who get it faced some kind of childhood trauma such as abuse, neglect, or the death of a parent. Most of the time, signs of the disorder first appear in childhood. But problems often don't start until the early adult years. It's important to know that the disorder can be treated. Treatment can be hard, and getting better can take years. But with treatment, most people with borderline personality disorder do get better over time. What are the symptoms? Everyone has problems with emotions or behaviors sometimes. But if you have borderline personality disorder, the problems are severe. They repeat over a long time and disrupt your life. The most common symptoms include:  Intense emotions and mood swings. Harmful, impulsive behaviors. These may include substance abuse, binge eating, out-of-control spending, risky sexual behavior, and reckless driving. Hurting yourself. This may include cutting or burning yourself or attempting suicide. Trouble with relationships. You may see others as either \"good\" or \"bad. \" And your view may suddenly shift from one to the other, for minor reasons. Feeling worthless or empty inside. A frantic fear of being left alone (abandoned). This fear may lead to desperate attempts to hold on to those around you. Or it may cause you to reject others before they can reject you. Problems with anger. These may include violent temper tantrums and aggressive behavior. How is it treated? It may seem that there is no one on your side. You might have been told that there is no hope. But just because you've heard this, that doesn't mean it's true. Getting medical treatment and taking care of yourself can really help. Medical treatment  When you start treatment, you will find health professionals who will help you.  You will also meet others who have gone through what you are going through. Long-term treatment can reduce symptoms and harmful behaviors. It can also help you manage your emotions better. Treatment may include:  Counseling and therapy. It's important to find a counselor you can build a stable relationship with. This can be hard. Your condition may cause you to see your counselor as caring one minute and cruel the next. This can happen especially when he or she asks you to try to change a behavior. Try to find a counselor who has special training in dialectical behavioral therapy. It's a type of therapy that is often used to treat people with this disorder. Medicines. Examples are antidepressants, mood stabilizers, and antipsychotics. They may be helpful when you use them along with therapy. Self-care  There are things you can do to help yourself. Here are some tips:  Keep a regular daily schedule. Do not drink alcohol or use drugs. If your doctor prescribed medicines for you, take them exactly as directed. Get a healthy amount of sleep. Try to be active during daylight hours, and go to sleep and wake up at the same time each day. Eat healthy foods like fruits and vegetables; whole-grain breads and cereals; and lean meats, fish, and poultry. Practice mindfulness or other meditation. To be mindful means to pay attention to and accept the things that are happening right now, in the present moment. Keep to your treatment plan, even when it's hard. Keep the numbers for these national suicide hotlines: 3-334-475-TALK (5-638.713.7178) and 4-823-WVCBZGV (3-568.306.9801). If you or someone you know talks about suicide or about feeling hopeless, get help right away. When should you call for help? Call 911 anytime you think you may need emergency care. For example, call if:  You feel you cannot stop from hurting yourself or someone else. Call your doctor now or seek immediate medical care if:  You feel much more depressed.   You hear voices. Watch closely for changes in your health, and be sure to contact your doctor if:  You have a new crisis you can't handle. Follow-up care is a key part of your treatment and safety. Be sure to make and go to all appointments, and call your doctor if you are having problems. It's also a good idea to know your test results and keep a list of the medicines you take. Where can you learn more? Go to http://star-arvin.info/. Enter W402 in the search box to learn more about \"Learning About Borderline Personality Disorder. \"  Current as of: December 7, 2017  Content Version: 11.7  © 8665-1362 [employer], [email or web address]. Care instructions adapted under license by [employer] (which disclaims liability or warranty for this information). If you have questions about a medical condition or this instruction, always ask your healthcare professional. [place] [address] any warranty or liability for your use of this information.   ·

## 2018-09-05 ENCOUNTER — OFFICE VISIT (OUTPATIENT)
Dept: BEHAVIORAL/MENTAL HEALTH CLINIC | Age: 38
End: 2018-09-05

## 2018-09-05 VITALS
BODY MASS INDEX: 28.23 KG/M2 | HEART RATE: 95 BPM | SYSTOLIC BLOOD PRESSURE: 111 MMHG | DIASTOLIC BLOOD PRESSURE: 74 MMHG | RESPIRATION RATE: 16 BRPM | TEMPERATURE: 98.9 F | WEIGHT: 153.4 LBS | HEIGHT: 62 IN | OXYGEN SATURATION: 95 %

## 2018-09-05 NOTE — PROGRESS NOTES
Cyd Bamberger is a 34 y.o. female    1. Have you been to the ER, urgent care clinic since your last visit? Hospitalized since your last visit? No    2. Have you seen or consulted any other health care providers outside of the [address] [place] [address] since your last visit? Include any pap smears or colon screening.  No     Visit Vitals    /74 (BP 1 Location: Left arm, BP Patient Position: Sitting)    Pulse 95    Temp 98.9 °F (37.2 °C) (Oral)    Resp 16    Ht 5' 2\" (1.575 m)    Wt 69.6 kg (153 lb 6.4 oz)    SpO2 95%    BMI 28.06 kg/m2     Watt BISMARK CoatsN

## 2018-09-22 DIAGNOSIS — K21.9 GASTROESOPHAGEAL REFLUX DISEASE WITHOUT ESOPHAGITIS: ICD-10-CM

## 2018-09-24 RX ORDER — OMEPRAZOLE 20 MG/1
CAPSULE, DELAYED RELEASE ORAL
Qty: 90 CAP | Refills: 0 | Status: SHIPPED | OUTPATIENT
Start: 2018-09-24 | End: 2020-04-13 | Stop reason: SDUPTHER

## 2018-10-08 ENCOUNTER — DOCUMENTATION ONLY (OUTPATIENT)
Dept: FAMILY MEDICINE CLINIC | Age: 38
End: 2018-10-08

## 2018-10-08 ENCOUNTER — OFFICE VISIT (OUTPATIENT)
Dept: INTERNAL MEDICINE CLINIC | Age: 38
End: 2018-10-08

## 2018-10-08 VITALS
SYSTOLIC BLOOD PRESSURE: 125 MMHG | RESPIRATION RATE: 16 BRPM | TEMPERATURE: 99.7 F | HEART RATE: 79 BPM | DIASTOLIC BLOOD PRESSURE: 79 MMHG | WEIGHT: 157.2 LBS | BODY MASS INDEX: 28.93 KG/M2 | HEIGHT: 62 IN | OXYGEN SATURATION: 98 %

## 2018-10-08 DIAGNOSIS — R10.31 RLQ ABDOMINAL PAIN: ICD-10-CM

## 2018-10-08 DIAGNOSIS — R21 FACIAL RASH: ICD-10-CM

## 2018-10-08 DIAGNOSIS — Z23 ENCOUNTER FOR IMMUNIZATION: ICD-10-CM

## 2018-10-08 DIAGNOSIS — Z71.89 ADVANCE CARE PLANNING: ICD-10-CM

## 2018-10-08 DIAGNOSIS — R31.21 ASYMPTOMATIC MICROSCOPIC HEMATURIA: ICD-10-CM

## 2018-10-08 DIAGNOSIS — Z00.00 MEDICARE ANNUAL WELLNESS VISIT, SUBSEQUENT: ICD-10-CM

## 2018-10-08 DIAGNOSIS — N89.8 VAGINAL DISCHARGE: Primary | ICD-10-CM

## 2018-10-08 LAB
BILIRUB UR QL STRIP: NEGATIVE
GLUCOSE UR-MCNC: NEGATIVE MG/DL
KETONES P FAST UR STRIP-MCNC: NEGATIVE MG/DL
PH UR STRIP: 6.5 [PH] (ref 4.6–8)
PROT UR QL STRIP: NORMAL
SP GR UR STRIP: 1.02 (ref 1–1.03)
UA UROBILINOGEN AMB POC: NORMAL (ref 0.2–1)
URINALYSIS CLARITY POC: CLEAR
URINALYSIS COLOR POC: YELLOW
URINE BLOOD POC: NORMAL
URINE LEUKOCYTES POC: NEGATIVE
URINE NITRITES POC: NEGATIVE

## 2018-10-08 RX ORDER — DESONIDE 0.5 MG/G
OINTMENT TOPICAL 2 TIMES DAILY
Qty: 15 G | Refills: 0 | Status: SHIPPED | OUTPATIENT
Start: 2018-10-08 | End: 2019-04-10 | Stop reason: SDUPTHER

## 2018-10-08 RX ORDER — IBUPROFEN 600 MG/1
600 TABLET ORAL
Qty: 20 TAB | Refills: 0 | Status: SHIPPED | OUTPATIENT
Start: 2018-10-08 | End: 2019-04-08

## 2018-10-08 NOTE — PROGRESS NOTES
Wolfgang Quiñones is a 34 y.o. female who presents for routine immunizations. She denies any symptoms , reactions or allergies that would exclude them from being immunized today. Risks and adverse reactions were discussed and the VIS was given to them. All questions were addressed. She was observed for 5  min post injection. There were no reactions observed.  
 
Leida Tipton LPN

## 2018-10-08 NOTE — MR AVS SNAPSHOT
[address] [address] 
[phone] Patient: Sami Garcia MRN: [id number] JAW:[birth year] Visit Information Date & Time Provider Department Dept. Phone Encounter #  
 10/8/2018  3:00 PM Mario Brooke NP [address] [facility] [phone] [id number] Follow-up Instructions Return if symptoms worsen or fail to improve. Your Appointments 10/12/2018 11:30 AM  
ESTABLISHED PATIENT with Nain Dewitt NP [facility] Appt Note: confirmed 8.31.18 cj; follow up [address] [facility] [address] [place] [address]  
[phone]  
  
   
 [address] [place] [address]  
  
    
 11/30/2018 10:30 AM  
ROUTINE CARE with Mario Brooke NP  
[address]) Appt Note: BMI AND GERD; BMI AND GERD  
 [address] [place] [address] [address] [address]  
[phone]  
  
   
 [facility] [place] [place] Health Maintenance Date Due Influenza Age 5 to Adult 8/1/2018 PAP AKA CERVICAL CYTOLOGY 6/1/2022 DTaP/Tdap/Td series (2 - Td) 2/2/2024 COLONOSCOPY 12/17/2025 Allergies as of 10/8/2018  Review Complete On: 10/8/2018 By: Brendan Bradshaw LPN Severity Noted Reaction Type Reactions Amoxicillin  12/29/2010    Rash, Other (comments) Vaginal itching Flagyl [Metronidazole]  11/16/2016   Side Effect Nausea and Vomiting  
 metrogel is okay Pcn [Penicillins]  09/20/2014   Intolerance Itching Vaginal itching Current Immunizations  Reviewed on 11/15/2011 Name Date Influenza Vaccine (Quad) PF  Incomplete, 9/8/2017 Influenza Vaccine Intradermal PF 11/5/2014 PPD 6/20/2012 Tdap 2/2/2014  4:52 AM  
  
 Not reviewed this visit You Were Diagnosed With   
  
 Codes Comments Vaginal discharge    -  Primary ICD-10-CM: N89.8 ICD-9-CM: 623.5 Encounter for immunization     ICD-10-CM: J55 ICD-9-CM: V03.89 Medicare annual wellness visit, subsequent     ICD-10-CM: Z00.00 ICD-9-CM: V70.0   
 RLQ abdominal pain     ICD-10-CM: R10.31 ICD-9-CM: 789.03 Facial rash     ICD-10-CM: R21 
ICD-9-CM: 782.1 Asymptomatic microscopic hematuria     ICD-10-CM: R31.21 
ICD-9-CM: 599.72 Vitals BP Pulse Temp Resp Height(growth percentile) Weight(growth percentile) 125/79 (BP 1 Location: Right arm, BP Patient Position: Sitting) 79 99.7 °F (37.6 °C) (Oral) 16 5' 2\" (1.575 m) 157 lb 3.2 oz (71.3 kg) SpO2 BMI OB Status Smoking Status 98% 28.75 kg/m2 Having regular periods Current Every Day Smoker Vitals History BMI and BSA Data Body Mass Index Body Surface Area 28.75 kg/m 2 1.77 m 2 Preferred Pharmacy Pharmacy Name Phone [id number] [phone] Your Updated Medication List  
  
   
This list is accurate as of 10/8/18  4:37 PM.  Always use your most recent med list.  
  
  
  
  
 busPIRone 10 mg tablet Commonly known as:  BUSPAR Take 1 Tab by mouth two (2) times daily as needed. butalbital-acetaminophen-caffeine -40 mg per tablet Commonly known as:  Madhavi Sprinkle May take 1 tab every 3 days as needed for SEVERE pain. cetirizine 10 mg tablet Commonly known as:  ZYRTEC  
TAKE 1 TAB BY MOUTH DAILY. INDICATIONS: ALLERGIC RHINITIS  
  
 desonide 0.05 % topical ointment Commonly known as:  Juluis Saner Apply  to affected area two (2) times a day. ibuprofen 600 mg tablet Commonly known as:  MOTRIN Take 1 Tab by mouth every six (6) hours as needed for Pain. OLANZapine 2.5 mg tablet Commonly known as:  ZyPREXA Take 1 Tab by mouth nightly. omeprazole 20 mg capsule Commonly known as:  PRILOSEC  
TAKE ONE CAPSULE BY MOUTH EVERY DAY IN 2 WEEK INTERVALS ondansetron 8 mg disintegrating tablet Commonly known as:  ZOFRAN ODT Take 1 Tab by mouth every eight (8) hours as needed for Nausea. secnidazole 2 gram Grdp Commonly known as:  SOLOSEC Take 2 g by mouth once for 1 dose. Indications: Bacterial Vaginosis  
  
 sertraline 100 mg tablet Commonly known as:  ZOLOFT Take 1 Tab by mouth daily. Prescriptions Printed Refills  
 desonide (DESOWEN) 0.05 % topical ointment 0 Sig: Apply  to affected area two (2) times a day. Class: Print Route: Topical  
  
Prescriptions Sent to Pharmacy Refills  
 ibuprofen (MOTRIN) 600 mg tablet 0 Sig: Take 1 Tab by mouth every six (6) hours as needed for Pain. Class: Normal  
 Pharmacy: [facility]/[address] Ph #: [phone] Route: Oral  
 secnidazole (SOLOSEC) 2 gram grdp 0 Sig: Take 2 g by mouth once for 1 dose. Indications: Bacterial Vaginosis Class: Normal  
 Pharmacy: [address] Ph #: [phone] Route: Oral  
  
We Performed the Following AMB POC URINALYSIS DIP STICK AUTO W/O MICRO [21988 CPT(R)] CULTURE, URINE [id number] CPT(R)] INFLUENZA VIRUS VAC QUAD,SPLIT,PRESV FREE SYRINGE IM [id number] CPT(R)] [address] [id number] Custom] Follow-up Instructions Return if symptoms worsen or fail to improve. Patient Instructions Use BLACK SHEA BUTTER SOAP OR Dove/Aveeno soap. Apply vaseline like ointment to affected areas or raw shea butter blended with oil (flaxseed, olive, or coconut). Only use steroids when you have redness and itching at the first sign, then stop and start using other moisturizer. Avoid hot showers and pat dry. Vaccine Information Statement Influenza (Flu) Vaccine (Inactivated or Recombinant): What you need to know Many Vaccine Information Statements are available in [place] and other languages. See www.immunize.org/vis Hojas de Información Sobre Vacunas están disponibles en Español y en muchos otros idiomas. Visite www.immunize.org/vis 1. Why get vaccinated? Influenza (flu) is a contagious disease that spreads around the [place] every year, usually between October and May. Flu is caused by influenza viruses, and is spread mainly by coughing, sneezing, and close contact. Anyone can get flu. Flu strikes suddenly and can last several days. Symptoms vary by age, but can include: 
 fever/chills  sore throat  muscle aches  fatigue  cough  headache  runny or stuffy nose Flu can also lead to pneumonia and blood infections, and cause diarrhea and seizures in children. If you have a medical condition, such as heart or lung disease, flu can make it worse. Flu is more dangerous for some people. Infants and young children, people 72years of age and older, pregnant women, and people with certain health conditions or a weakened immune system are at greatest risk. Each year thousands of people in the [facility] die from flu, and many more are hospitalized. Flu vaccine can: 
 keep you from getting flu, 
 make flu less severe if you do get it, and 
 keep you from spreading flu to your family and other people. 2. Inactivated and recombinant flu vaccines A dose of flu vaccine is recommended every flu season. Children 6 months through 6years of age may need two doses during the same flu season. Everyone else needs only one dose each flu season. Some inactivated flu vaccines contain a very small amount of a mercury-based preservative called thimerosal. Studies have not shown thimerosal in vaccines to be harmful, but flu vaccines that do not contain thimerosal are available. There is no live flu virus in flu shots. They cannot cause the flu. There are many flu viruses, and they are always changing. Each year a new flu vaccine is made to protect against three or four viruses that are likely to cause disease in the upcoming flu season. But even when the vaccine doesnt exactly match these viruses, it may still provide some protection Flu vaccine cannot prevent: 
 flu that is caused by a virus not covered by the vaccine, or 
 illnesses that look like flu but are not. It takes about 2 weeks for protection to develop after vaccination, and protection lasts through the flu season. 3. Some people should not get this vaccine Tell the person who is giving you the vaccine:  If you have any severe, life-threatening allergies. If you ever had a life-threatening allergic reaction after a dose of flu vaccine, or have a severe allergy to any part of this vaccine, you may be advised not to get vaccinated. Most, but not all, types of flu vaccine contain a small amount of egg protein.  If you ever had Guillain-Barré Syndrome (also called GBS). Some people with a history of GBS should not get this vaccine. This should be discussed with your doctor.  If you are not feeling well. It is usually okay to get flu vaccine when you have a mild illness, but you might be asked to come back when you feel better. 4. Risks of a vaccine reaction With any medicine, including vaccines, there is a chance of reactions. These are usually mild and go away on their own, but serious reactions are also possible. Most people who get a flu shot do not have any problems with it. Minor problems following a flu shot include:  
 soreness, redness, or swelling where the shot was given  hoarseness  sore, red or itchy eyes  cough  fever  aches  headache  itching  fatigue If these problems occur, they usually begin soon after the shot and last 1 or 2 days. More serious problems following a flu shot can include the following:  There may be a small increased risk of Guillain-Barré Syndrome (GBS) after inactivated flu vaccine. This risk has been estimated at 1 or 2 additional cases per million people vaccinated. This is much lower than the risk of severe complications from flu, which can be prevented by flu vaccine.  Young children who get the flu shot along with pneumococcal vaccine (PCV13) and/or DTaP vaccine at the same time might be slightly more likely to have a seizure caused by fever. Ask your doctor for more information. Tell your doctor if a child who is getting flu vaccine has ever had a seizure. Problems that could happen after any injected vaccine:  People sometimes faint after a medical procedure, including vaccination. Sitting or lying down for about 15 minutes can help prevent fainting, and injuries caused by a fall. Tell your doctor if you feel dizzy, or have vision changes or ringing in the ears.  Some people get severe pain in the shoulder and have difficulty moving the arm where a shot was given. This happens very rarely.  Any medication can cause a severe allergic reaction. Such reactions from a vaccine are very rare, estimated at about 1 in a million doses, and would happen within a few minutes to a few hours after the vaccination. As with any medicine, there is a very remote chance of a vaccine causing a serious injury or death. The safety of vaccines is always being monitored. For more information, visit: www.cdc.gov/vaccinesafety/ 
 
5. What if there is a serious reaction? What should I look for?  Look for anything that concerns you, such as signs of a severe allergic reaction, very high fever, or unusual behavior. Signs of a severe allergic reaction can include hives, swelling of the face and throat, difficulty breathing, a fast heartbeat, dizziness, and weakness  usually within a few minutes to a few hours after the vaccination. What should I do?  If you think it is a severe allergic reaction or other emergency that cant wait, call 9-1-1 and get the person to the nearest hospital. Otherwise, call your doctor.  Reactions should be reported to the Vaccine Adverse Event Reporting System (VAERS). Your doctor should file this report, or you can do it yourself through  the VAERS web site at www.vaers. [facility].gov, or by calling 4-758.346.8627. VAERS does not give medical advice. 6. The National Vaccine Injury Compensation Program 
 
The [facility] Vaccine Injury Compensation Program (VICP) is a federal program that was created to compensate people who may have been injured by certain vaccines. Persons who believe they may have been injured by a vaccine can learn about the program and about filing a claim by calling 8-402.418.4220 or visiting the FastPay website at www.[facility].gov/vaccinecompensation. There is a time limit to file a claim for compensation. 7. How can I learn more?  Ask your healthcare provider. He or she can give you the vaccine package insert or suggest other sources of information.  Call your local or state health department.  Contact the Centers for Disease Control and Prevention (CDC): 
- Call 0-399.653.3501 (1-800-CDC-INFO) or 
- Visit CDCs website at www.cdc.gov/flu Vaccine Information Statement Inactivated Influenza Vaccine 8/7/2015 
42 UN[address] [place] 271WP-08 Department of Health and [employer] Centers for Disease Control and Prevention Office Use Only Antinuclear Antibodies: About This Test 
What is it? An antinuclear antibody (EBER) test measures the amount of antibodies in your blood that work against your own body (autoimmune reaction). Why is this test done? An EBER test is done to help identify problems with the immune system, such as: 
· Rheumatoid arthritis. · Lupus. · Polymyositis. · Scleroderma. · Sjögren's syndrome.  
How can you prepare for the test? 
 · In general, you don't need to prepare before having this test. Your doctor may give you some specific instructions. What happens during the test? 
· A health professional takes a sample of your blood. What else should you know about the test? 
· Your results will include an explanation of what a \"normal\" result is. This is called a \"reference range. \" It is just a guide. Your doctor will evaluate your results based on your health and other factors. This means that a value that falls outside the normal values listed may still be normal for you. · Autoimmune diseases cannot be diagnosed by the results of the EBER test alone. Other tests are needed to help identify diseases such as lupus or rheumatoid arthritis. · Some healthy people can have an increased amount of EBER in their blood. For instance, this can happen in some people with a family history of autoimmune disease. EBER levels can also increase as a person ages. How long does the test take? · The test will take several minutes. What happens after the test? 
· You will probably be able to go home right away. · You can go back to your usual activities right away. Follow-up care is a key part of your treatment and safety. Be sure to make and go to all appointments, and call your doctor if you are having problems. It's also a good idea to keep a list of the medicines you take. Ask your doctor when you can expect to have your test results. Where can you learn more? Go to http://star-arvin.info/. Enter E049 in the search box to learn more about \"Antinuclear Antibodies: About This Test.\" Current as of: June 26, 2018 Content Version: 11.8 © 1973-2275 [employer]. Care instructions adapted under license by [employer] (which disclaims liability or warranty for this information).  If you have questions about a medical condition or this instruction, always ask your healthcare professional. [employer] disclaims any warranty or liability for your use of this information. Introducing \[facility] [place] [facility]\"" & HEALTH SERVICES! Dear Miranda Clifford: Thank you for requesting a Symtext account. Our records indicate that you already have an active Symtext account. You can access your account anytime at [email or web address] Did you know that you can access your hospital and ER discharge instructions at any time in Symtext? You can also review all of your test results from your hospital stay or ER visit. Additional Information If you have questions, please visit the Frequently Asked Questions section of the Symtext website at [email or web address] Remember, Symtext is NOT to be used for urgent needs. For medical emergencies, dial 911. Now available from your iPhone and Android! Please provide this summary of care documentation to your next provider. Your primary care clinician is listed as PIPO Yin. If you have any questions after today's visit, please call [phone].

## 2018-10-08 NOTE — PROGRESS NOTES
Marcela Montague is a 34 y.o. female and presents with Annual Wellness Visit ([address]); Rash (facical ); and Abdominal Pain Subjective: 
Pt here for annual wellness visit, facial rash, vaginal discharge, and abd pain. Patient presents with a complaint of vaginal discharge. Onset of symptoms was 2 days ago, none since, however with odor noted. The patient describes the discharge as none and does experience abdominal discomfort with the discharge. Not associated with itching. She does not complain of burning with urination. She denies genital lesions at this time. The patient does  have a history of STD's or PID and is sexually active, with last sexual intercourse 2 days ago, unprotected. Also concerned for rash to both cheeks for past few months. Getting worse. Not pruritic. Using soap and witch hazel to treat, unresolved however. No additional moisturizers used. Review of Systems Constitutional: negative for fevers, chills, anorexia and weight loss Respiratory:  negative for cough, hemoptysis, dyspnea, and wheezing CV:   negative for chest pain, palpitations, and lower extremity edema GI:   negative for nausea, vomiting, diarrhea, and melena Endo:               negative for polyuria,polydipsia,polyphagia, and heat intolerance Genitourinary: negative for frequency, urgency, dysuria, retention, and hematuria Integument:  negative for ulcerations and pruritus Hematologic:  negative for easy bruising and bleeding Musculoskel: negative for arthralgias, muscle weakness,and joint pain/swelling Neurological:  negative for headaches, dizziness, vertigo,and memory/gait problems Behavl/Psych: negative for feelings of anxiety, depression, suicide, and mood changes Past Medical History:  
Diagnosis Date  Anxiety  Bipolar affective ([facility] [address]  Depression  Diabetes ([facility] [address] diet control  Hyperlipidemia High Cholesterol  Hyperlipidemia  Major depression Past Surgical History: Procedure Laterality Date  HX GI Colonoscopy  HX HERNIA REPAIR    
 as child Social History Social History  Marital status: SINGLE Spouse name: N/A  
 Number of children: N/A  
 Years of education: N/A Social History Main Topics  Smoking status: Current Every Day Smoker  Smokeless tobacco: Current User Comment: cigars  Alcohol use Yes Comment: occasional  
 Drug use: No  
 Sexual activity: Yes  
  Partners: Male Birth control/ protection: None Other Topics Concern  Not on file Social History Narrative Family History Problem Relation Age of Onset  Diabetes Mother  Depression Mother  Asthma Brother  Stroke Maternal Grandmother   
  aneurysm  Cancer Maternal Grandmother   
  kidney  Hypertension Maternal Grandmother  Cancer Paternal Grandmother   
  lung  Diabetes Brother  Bipolar Disorder Brother  Schizophrenia Brother  Other Brother   
  paranoia  Breast Cancer Other  Breast Cancer Other Current Outpatient Prescriptions Medication Sig Dispense Refill  desonide (DESOWEN) 0.05 % topical ointment Apply  to affected area two (2) times a day. 15 g 0  
 ibuprofen (MOTRIN) 600 mg tablet Take 1 Tab by mouth every six (6) hours as needed for Pain. 20 Tab 0  
 omeprazole (PRILOSEC) 20 mg capsule TAKE ONE CAPSULE BY MOUTH EVERY DAY IN 2 WEEK INTERVALS 90 Cap 0  
 OLANZapine (ZYPREXA) 2.5 mg tablet Take 1 Tab by mouth nightly. 30 Tab 0  
 sertraline (ZOLOFT) 100 mg tablet Take 1 Tab by mouth daily. 30 Tab 0  
 busPIRone (BUSPAR) 10 mg tablet Take 1 Tab by mouth two (2) times daily as needed. 60 Tab 0  
 ondansetron (ZOFRAN ODT) 8 mg disintegrating tablet Take 1 Tab by mouth every eight (8) hours as needed for Nausea. 20 Tab 0  
 tinidazole 250 mg tablet Take 2 g by mouth Daily (before breakfast) for 2 days.  Indications: Bacterial Vaginosis 16 Tab 0  
  butalbital-acetaminophen-caffeine (FIORICET, ESGIC) -40 mg per tablet May take 1 tab every 3 days as needed for SEVERE pain. 12 Tab 0  
 cetirizine (ZYRTEC) 10 mg tablet TAKE 1 TAB BY MOUTH DAILY. INDICATIONS: ALLERGIC RHINITIS 30 Tab 5 Allergies Allergen Reactions  Amoxicillin Rash and Other (comments) Vaginal itching  Flagyl [Metronidazole] Nausea and Vomiting  
  metrogel is okay  Pcn [Penicillins] Itching Vaginal itching Objective: 
Visit Vitals  /79 (BP 1 Location: Right arm, BP Patient Position: Sitting)  Pulse 79  Temp 99.7 °F (37.6 °C) (Oral)  Resp 16  
 Ht 5' 2\" (1.575 m)  Wt 157 lb 3.2 oz (71.3 kg)  SpO2 98%  BMI 28.75 kg/m2 Wt Readings from Last 3 Encounters:  
10/08/18 157 lb 3.2 oz (71.3 kg) 09/05/18 153 lb 6.4 oz (69.6 kg) 08/02/18 155 lb (70.3 kg) Physical Exam:  
General appearance - alert, well appearing, and in no distress. Mental status - A/O x 4, normal mood and affect. Neck -Supple ,normal CSP. FROM, non-tender. No significant adenopathy/thyromegaly. No JVD. Chest - CTA. Symmetric chest rise. No wheezing, rales or rhonchi. Heart - Normal rate, regular rhythm. Normal S1, S2. No MGR or clicks. Abdomen - Soft, distended. Normoactive BS in all quadrants. NT, no mass or HSM. Female Genitalia: normal, no lesions; urethra, nontender. Vagina: healthy pink mucosa without any lesions, no abnormal discharge. Cervix: normal appearance, no lesions or bleeding; +abnormal vaginal discharge present: thin white. No odor. Uterus: normal size, shape and consistency, nontender, no masses. Adnexa: NT. No masses palpated. Ext- Radial, DP pulses, 2+ bilaterally. No pedal edema, clubbing, or cyanosis. Skin-Warm and dry. No hyperpigmentation, ulcerations, or suspicious lesions. Scaly, maculopapular rash with mild redness to both cheeks. L>R. + pruritic Neuro - Normal speech, no focal findings or movement disorder.  Normal strength, gait, and muscle tone. Assessment of cognitive impairment: Alert and oriented x 4 Depression Screen: PHQ over the last two weeks 5/30/2018 PHQ Not Done - Little interest or pleasure in doing things Not at all Feeling down, depressed, irritable, or hopeless Several days Total Score PHQ 2 1 Trouble falling or staying asleep, or sleeping too much - Feeling tired or having little energy - Poor appetite, weight loss, or overeating - Feeling bad about yourself - or that you are a failure or have let yourself or your family down - Trouble concentrating on things such as school, work, reading, or watching TV - Moving or speaking so slowly that other people could have noticed; or the opposite being so fidgety that others notice - Thoughts of being better off dead, or hurting yourself in some way -  
PHQ 9 Score - How difficult have these problems made it for you to do your work, take care of your home and get along with others - Fall Risk Assessment:   
Fall Risk Assessment, last 12 mths 5/30/2018 Able to walk? Yes Fall in past 12 months? No  
 
 
Abuse Assessment: Patient NO domesticly abuse (verbal, physical, and financial) Current Alcohol use: yes, 1-2 monthly. Ranging from 3-6 drinks each sitting 
 reports that she drinks alcohol. Functional Ability:  
Does the patient exhibit a steady gait? Yes How long did it take the patient to get up and walk from a sitting position? 4 seconds Is the patient self reliant?  (ie can do own laundry, meals, household chores) yes Does the patient handle his/her own medications? yes Does the patient handle his/her own money? Yes Is the patients home safe (ie good lighting, handrails on stairs and bath, etc.)? Yes Did you notice or did patient express any hearing difficulties? no  
Did you notice of did patient express any vision difficulties?   Vision worsening, should wear glasses, but does NOT. Wants contacts. Were distance and reading eye charts used? n/a Advance Care Planning:  
Patient was offered the opportunity to discuss advance care planning:  Yes Does patient have an Advance Directive: No  
If no, did you provide information and forms? yes Health Maintenance Topic Date Due  MEDICARE YEARLY EXAM  10/08/2018  PAP AKA CERVICAL CYTOLOGY  06/01/2022  DTaP/Tdap/Td series (2 - Td) 02/02/2024  COLONOSCOPY  12/17/2025  Pneumococcal 19-64 Medium Risk  Addressed  Influenza Age 5 to Adult  Addressed Assessment/Plan: 
Use of OTC steroidal cream advised. Pt check EBER if no improvement, printed Rx for desowen INI. Medication Side Effects and Warnings were discussed with patient: yes Patient Labs were reviewed: yes Patient Past Records were reviewed: yes See below for other orders Follow-up Disposition: 
Return if symptoms worsen or fail to improve. ICD-10-CM ICD-9-CM 1. Vaginal discharge N89.8 623.5 AMB POC URINALYSIS DIP STICK AUTO W/O MICRO  
   NUSWAB VAGINITIS PLUS 2. Encounter for immunization Z23 V03.89 INFLUENZA VIRUS VAC QUAD,SPLIT,PRESV FREE SYRINGE IM 3. Medicare annual wellness visit, subsequent Z00.00 V70.0 4. RLQ abdominal pain R10.31 789.03 ibuprofen (MOTRIN) 600 mg tablet 5. Facial rash R21 782.1 desonide (DESOWEN) 0.05 % topical ointment 6. Asymptomatic microscopic hematuria R31.21 599.72 CULTURE, URINE Orders Placed This Encounter  CULTURE, URINE  Influenza virus vaccine (QUADRIVALENT PRES FREE SYRINGE) IM (42429)  NUSWAB VAGINITIS PLUS  
 AMB POC URINALYSIS DIP STICK AUTO W/O MICRO  desonide (DESOWEN) 0.05 % topical ointment Sig: Apply  to affected area two (2) times a day. Dispense:  15 g Refill:  0  
 ibuprofen (MOTRIN) 600 mg tablet Sig: Take 1 Tab by mouth every six (6) hours as needed for Pain. Dispense:  20 Tab   Refill:  0  
  secnidazole (SOLOSEC) 2 gram grdp Sig: Take 2 g by mouth once for 1 dose. Indications: Bacterial Vaginosis Dispense:  1 Packet Refill:  0 Bela Goff expressed understanding of plan. An After Visit Summary was offered/printed and given to the patient.

## 2018-10-08 NOTE — PATIENT INSTRUCTIONS
Use BLACK SHEA BUTTER SOAP OR Dove/Aveeno soap. Apply vaseline like ointment to affected areas or raw shea butter blended with oil (flaxseed, olive, or coconut). Only use steroids when you have redness and itching at the first sign, then stop and start using other moisturizer. Avoid hot showers and pat dry. Vaccine Information Statement Influenza (Flu) Vaccine (Inactivated or Recombinant): What you need to know Many Vaccine Information Statements are available in [place]ian and other languages. See www.immunize.org/vis Hojas de Información Sobre Vacunas están disponibles en Español y en muchos otros idiomas. Visite www.immunize.org/vis 1. Why get vaccinated? Influenza (flu) is a contagious disease that spreads around the [facility] every year, usually between October and May. Flu is caused by influenza viruses, and is spread mainly by coughing, sneezing, and close contact. Anyone can get flu. Flu strikes suddenly and can last several days. Symptoms vary by age, but can include: 
 fever/chills  sore throat  muscle aches  fatigue  cough  headache  runny or stuffy nose Flu can also lead to pneumonia and blood infections, and cause diarrhea and seizures in children. If you have a medical condition, such as heart or lung disease, flu can make it worse. Flu is more dangerous for some people. Infants and young children, people 72years of age and older, pregnant women, and people with certain health conditions or a weakened immune system are at greatest risk. Each year thousands of people in the [facility] die from flu, and many more are hospitalized. Flu vaccine can: 
 keep you from getting flu, 
 make flu less severe if you do get it, and 
 keep you from spreading flu to your family and other people. 2. Inactivated and recombinant flu vaccines A dose of flu vaccine is recommended every flu season.  Children 6 months through 6years of age may need two doses during the same flu season. Everyone else needs only one dose each flu season. Some inactivated flu vaccines contain a very small amount of a mercury-based preservative called thimerosal. Studies have not shown thimerosal in vaccines to be harmful, but flu vaccines that do not contain thimerosal are available. There is no live flu virus in flu shots. They cannot cause the flu. There are many flu viruses, and they are always changing. Each year a new flu vaccine is made to protect against three or four viruses that are likely to cause disease in the upcoming flu season. But even when the vaccine doesnt exactly match these viruses, it may still provide some protection Flu vaccine cannot prevent: 
 flu that is caused by a virus not covered by the vaccine, or 
 illnesses that look like flu but are not. It takes about 2 weeks for protection to develop after vaccination, and protection lasts through the flu season. 3. Some people should not get this vaccine Tell the person who is giving you the vaccine:  If you have any severe, life-threatening allergies. If you ever had a life-threatening allergic reaction after a dose of flu vaccine, or have a severe allergy to any part of this vaccine, you may be advised not to get vaccinated. Most, but not all, types of flu vaccine contain a small amount of egg protein.  If you ever had Guillain-Barré Syndrome (also called GBS). Some people with a history of GBS should not get this vaccine. This should be discussed with your doctor.  If you are not feeling well. It is usually okay to get flu vaccine when you have a mild illness, but you might be asked to come back when you feel better. 4. Risks of a vaccine reaction With any medicine, including vaccines, there is a chance of reactions. These are usually mild and go away on their own, but serious reactions are also possible. Most people who get a flu shot do not have any problems with it. Minor problems following a flu shot include:  
 soreness, redness, or swelling where the shot was given  hoarseness  sore, red or itchy eyes  cough  fever  aches  headache  itching  fatigue If these problems occur, they usually begin soon after the shot and last 1 or 2 days. More serious problems following a flu shot can include the following:  There may be a small increased risk of Guillain-Barré Syndrome (GBS) after inactivated flu vaccine. This risk has been estimated at 1 or 2 additional cases per million people vaccinated. This is much lower than the risk of severe complications from flu, which can be prevented by flu vaccine.  Young children who get the flu shot along with pneumococcal vaccine (PCV13) and/or DTaP vaccine at the same time might be slightly more likely to have a seizure caused by fever. Ask your doctor for more information. Tell your doctor if a child who is getting flu vaccine has ever had a seizure. Problems that could happen after any injected vaccine:  People sometimes faint after a medical procedure, including vaccination. Sitting or lying down for about 15 minutes can help prevent fainting, and injuries caused by a fall. Tell your doctor if you feel dizzy, or have vision changes or ringing in the ears.  Some people get severe pain in the shoulder and have difficulty moving the arm where a shot was given. This happens very rarely.  Any medication can cause a severe allergic reaction. Such reactions from a vaccine are very rare, estimated at about 1 in a million doses, and would happen within a few minutes to a few hours after the vaccination. As with any medicine, there is a very remote chance of a vaccine causing a serious injury or death. The safety of vaccines is always being monitored.  For more information, visit: www.cdc.gov/vaccinesafety/ 
 
 5. What if there is a serious reaction? What should I look for?  Look for anything that concerns you, such as signs of a severe allergic reaction, very high fever, or unusual behavior. Signs of a severe allergic reaction can include hives, swelling of the face and throat, difficulty breathing, a fast heartbeat, dizziness, and weakness  usually within a few minutes to a few hours after the vaccination. What should I do?  If you think it is a severe allergic reaction or other emergency that cant wait, call 9-1-1 and get the person to the nearest hospital. Otherwise, call your doctor.  Reactions should be reported to the Vaccine Adverse Event Reporting System (VAERS). Your doctor should file this report, or you can do it yourself through  the VAERS web site at www.vaers. [facility].gov, or by calling 1-564.227.9965. VAERS does not give medical advice. 6. The National Vaccine Injury Compensation Program 
 
The [facility] Vaccine Injury Compensation Program (VICP) is a federal program that was created to compensate people who may have been injured by certain vaccines. Persons who believe they may have been injured by a vaccine can learn about the program and about filing a claim by calling 4-468.709.2738 or visiting the [address] [place] [address] website at www.[facility].gov/vaccinecompensation. There is a time limit to file a claim for compensation. 7. How can I learn more?  Ask your healthcare provider. He or she can give you the vaccine package insert or suggest other sources of information.  Call your local or state health department.  Contact the Centers for Disease Control and Prevention (CDC): 
- Call 8-309.644.5112 (1-800-CDC-INFO) or 
- Visit CDCs website at www.cdc.gov/flu Vaccine Information Statement Inactivated Influenza Vaccine 8/7/2015 
42 LIYA Ramirez [id number] Department of Health and [place] Centers for Disease Control and Prevention Office Use Only Antinuclear Antibodies: About This Test 
What is it? An antinuclear antibody (EBER) test measures the amount of antibodies in your blood that work against your own body (autoimmune reaction). Why is this test done? An EBER test is done to help identify problems with the immune system, such as: 
· Rheumatoid arthritis. · Lupus. · Polymyositis. · Scleroderma. · Sjögren's syndrome. How can you prepare for the test? 
· In general, you don't need to prepare before having this test. Your doctor may give you some specific instructions. What happens during the test? 
· A health professional takes a sample of your blood. What else should you know about the test? 
· Your results will include an explanation of what a \"normal\" result is. This is called a \"reference range. \" It is just a guide. Your doctor will evaluate your results based on your health and other factors. This means that a value that falls outside the normal values listed may still be normal for you. · Autoimmune diseases cannot be diagnosed by the results of the EBER test alone. Other tests are needed to help identify diseases such as lupus or rheumatoid arthritis. · Some healthy people can have an increased amount of EBER in their blood. For instance, this can happen in some people with a family history of autoimmune disease. EBER levels can also increase as a person ages. How long does the test take? · The test will take several minutes. What happens after the test? 
· You will probably be able to go home right away. · You can go back to your usual activities right away. Follow-up care is a key part of your treatment and safety. Be sure to make and go to all appointments, and call your doctor if you are having problems. It's also a good idea to keep a list of the medicines you take. Ask your doctor when you can expect to have your test results. Where can you learn more? Go to http://star-arvin.info/. Enter A560 in the search box to learn more about \"Antinuclear Antibodies: About This Test.\" Current as of: June 26, 2018 Content Version: 11.8 © 9273-4204 Healthwise, [employer]. Care instructions adapted under license by [employer] (which disclaims liability or warranty for this information). If you have questions about a medical condition or this instruction, always ask your healthcare professional. [place] [address] any warranty or liability for your use of this information.

## 2018-10-09 RX ORDER — TINIDAZOLE 250 MG/1
2 TABLET ORAL
Qty: 16 TAB | Refills: 0 | Status: SHIPPED | OUTPATIENT
Start: 2018-10-09 | End: 2019-05-13 | Stop reason: SDUPTHER

## 2018-10-10 ENCOUNTER — TELEPHONE (OUTPATIENT)
Dept: INTERNAL MEDICINE CLINIC | Age: 38
End: 2018-10-10

## 2018-10-10 NOTE — TELEPHONE ENCOUNTER
CORTNEY Fowler/Telephone/Refill  Received: Yesterday       [facility]                     Pt requesting to see if there is a cheaper medication that her insurance will cover other than Secnidazole (Pt state this medication is 300.00).  Best contact [phone]

## 2018-10-12 LAB
A VAGINAE DNA VAG QL NAA+PROBE: ABNORMAL SCORE
BACTERIA UR CULT: NORMAL
BVAB2 DNA VAG QL NAA+PROBE: ABNORMAL SCORE
C ALBICANS DNA VAG QL NAA+PROBE: NEGATIVE
C GLABRATA DNA VAG QL NAA+PROBE: NEGATIVE
C TRACH RRNA SPEC QL NAA+PROBE: NEGATIVE
MEGA1 DNA VAG QL NAA+PROBE: ABNORMAL SCORE
N GONORRHOEA RRNA SPEC QL NAA+PROBE: NEGATIVE
SPECIMEN STATUS REPORT, ROLRST: NORMAL
T VAGINALIS RRNA SPEC QL NAA+PROBE: NEGATIVE

## 2018-10-16 ENCOUNTER — OFFICE VISIT (OUTPATIENT)
Dept: BEHAVIORAL/MENTAL HEALTH CLINIC | Age: 38
End: 2018-10-16

## 2018-10-16 VITALS
HEART RATE: 78 BPM | SYSTOLIC BLOOD PRESSURE: 120 MMHG | HEIGHT: 62 IN | WEIGHT: 160 LBS | BODY MASS INDEX: 29.44 KG/M2 | DIASTOLIC BLOOD PRESSURE: 87 MMHG

## 2018-10-16 DIAGNOSIS — F32.1 MODERATE SINGLE CURRENT EPISODE OF MAJOR DEPRESSIVE DISORDER (HCC): Primary | ICD-10-CM

## 2018-10-16 DIAGNOSIS — F44.9 DISSOCIATIVE DISORDER OR REACTION: ICD-10-CM

## 2018-10-16 DIAGNOSIS — F41.9 ANXIETY: ICD-10-CM

## 2018-10-16 DIAGNOSIS — F43.10 PTSD (POST-TRAUMATIC STRESS DISORDER): ICD-10-CM

## 2018-10-16 DIAGNOSIS — Z91.52 H/O SELF MUTILATION: ICD-10-CM

## 2018-10-16 DIAGNOSIS — F60.3 BORDERLINE PERSONALITY DISORDER (HCC): ICD-10-CM

## 2018-10-16 RX ORDER — SERTRALINE HYDROCHLORIDE 100 MG/1
TABLET, FILM COATED ORAL
Qty: 90 TAB | Refills: 0 | Status: SHIPPED | OUTPATIENT
Start: 2018-10-16 | End: 2018-12-12

## 2018-10-16 RX ORDER — OLANZAPINE 2.5 MG/1
2.5 TABLET ORAL
Qty: 45 TAB | Refills: 0 | Status: SHIPPED | OUTPATIENT
Start: 2018-10-16 | End: 2019-02-05 | Stop reason: SDUPTHER

## 2018-10-16 RX ORDER — BUSPIRONE HYDROCHLORIDE 10 MG/1
10 TABLET ORAL
Qty: 90 TAB | Refills: 0 | Status: SHIPPED | OUTPATIENT
Start: 2018-10-16 | End: 2018-12-12
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
## 2018-10-29 NOTE — ACP (ADVANCE CARE PLANNING)
Honoring [facility] Invitation    Patient was invited to [facility] on this date and given the information folder for review. Recommended appointment with [facility] facilitator for ACP conversation regarding advance directives. [x] Yes  [] No  Patient scheduled an appointment. Appt scheduled for November 2, 2018.

## 2018-12-07 ENCOUNTER — OFFICE VISIT (OUTPATIENT)
Dept: INTERNAL MEDICINE CLINIC | Age: 38
End: 2018-12-07

## 2018-12-07 VITALS
WEIGHT: 154 LBS | HEIGHT: 61 IN | HEART RATE: 76 BPM | RESPIRATION RATE: 18 BRPM | TEMPERATURE: 97.2 F | DIASTOLIC BLOOD PRESSURE: 76 MMHG | OXYGEN SATURATION: 98 % | BODY MASS INDEX: 29.07 KG/M2 | SYSTOLIC BLOOD PRESSURE: 121 MMHG

## 2018-12-07 DIAGNOSIS — K21.9 GASTROESOPHAGEAL REFLUX DISEASE WITHOUT ESOPHAGITIS: ICD-10-CM

## 2018-12-07 DIAGNOSIS — F43.21 GRIEVING: Primary | ICD-10-CM

## 2018-12-07 DIAGNOSIS — K59.00 CONSTIPATION, UNSPECIFIED CONSTIPATION TYPE: ICD-10-CM

## 2018-12-07 DIAGNOSIS — O03.9 SPONTANEOUS MISCARRIAGE: ICD-10-CM

## 2018-12-07 NOTE — PATIENT INSTRUCTIONS
Grief (Actual/Anticipated): Care Instructions Your Care Instructions Grief is your emotional reaction to a major loss. The words \"sorrow\" and \"heartache\" often are used to describe feelings of grief. You feel grief when you lose a beloved person, pet, place, or thing. It is also natural to feel grief when you lose a valued way of life, such as a job, marriage, or good health. You may begin to grieve before a loss occurs. You may grieve for a loved one who is sick and dying. Children and adults often feel the pain of loss before a big move or divorce. This type of grief helps you get ready for a loss. Grief is different for each person. There is no \"normal\" or \"expected\" period of time for grieving. Some people adjust to their loss within a couple of months. Others may take 2 years or longer, especially if their lives were changed a lot or if the loss was sudden and shocking. Grieving can cause problems such as headaches, loss of appetite, and trouble with thinking or sleeping. You may withdraw from friends and family and behave in ways that are unusual for you. Grief may cause you to question your beliefs and views about life. Grief is natural and does not require medical treatment. But if you have trouble sleeping, it may help to take sleeping pills for a short time. It may help to talk with people who have been through or are going through similar losses. You may also want to talk to a counselor about your feelings. Talking about your loss, sharing your cares and concerns, and getting support from others are important parts of healthy grieving. Follow-up care is a key part of your treatment and safety. Be sure to make and go to all appointments, and call your doctor if you are having problems. It's also a good idea to know your test results and keep a list of the medicines you take. How can you care for yourself at home? · Get enough sleep.  Your mind helps make sense of your life while you sleep. Missing sleep can lead to illness and make it harder for you to deal with your grief. · Eat healthy foods. Try to avoid eating only foods that give you comfort. Ask someone to join you for a meal if you do not like eating alone. Consider taking a multivitamin every day. · Get some exercise every day. Even a walk can help you deal with your grief. Other exercises, such as yoga, can also help you manage stress. · Comfort yourself. Take time to look at photos or use special items that make you feel better. · Stay involved in your life. Do not withdraw from the activities you enjoy. People you know at work, [employer], clubs, or other groups can help you get through your period of grief. · Think about joining a support group to help you deal with your grief. There are many support groups to help people recover from grief. When should you call for help? Call 911 anytime you think you may need emergency care. For example, call if: 
  · You feel you cannot stop from hurting yourself or someone else.  
 Watch closely for changes in your health, and be sure to contact your doctor if: 
  · You think you may be depressed.  
  · You do not get better as expected. Where can you learn more? Go to http://star-arvin.info/. Enter H249 in the search box to learn more about \"Grief (Actual/Anticipated): Care Instructions. \" Current as of: April 19, 2018 Content Version: 11.8 © 3413-4492 [employer]. Care instructions adapted under license by [employer] (which disclaims liability or warranty for this information). If you have questions about a medical condition or this instruction, always ask your healthcare professional. [address] any warranty or liability for your use of this information.

## 2018-12-07 NOTE — PROGRESS NOTES
Darrin Sellers is a [address] y.o[address] female and presents with Grief Counseling (pt states that she lost her baby on monday ) and Follow-up (BMI GERD) Subjective: 
Pt here initially to f/u GERD and BMI, but recently lost pregnancy on Monday. Still with some lower abdominal cramping reported. Went to ER on Sunday with constipation and passed mucous plug at that time. Ultrasound with NO intrauterine pregnancy and low serum HCG levels. Confirmed at GYN office on Monday. Started working at [employer] with severe back pain in November. Chronic constipation/IBS-C Review: 
Here today to report chronic constipation for the last 2 weeks. Reports averaging 5-6 BMs every 7 days. Associated with nothing, feels it was related to water intake. Denies rectal bleeding, laxative overuse, and decreased appetite. LBM: yesterday, [place]# 3. GERD managed with omeprazole taken PRN. INCREASED while pregnant, but not taken due to concerns for fetus. Review of Systems Constitutional: negative for fevers, chills, anorexia and weight loss Respiratory:  negative for cough, hemoptysis, dyspnea, and wheezing CV:   negative for chest pain, palpitations, and lower extremity edema GI:   negative for nausea, vomiting, diarrhea, and melena Endo:               negative for polyuria,polydipsia,polyphagia, and heat intolerance Genitourinary: negative for frequency, urgency, dysuria, retention, and hematuria Integument:  negative for ulcerations and pruritus Hematologic:  negative for easy bruising and bleeding Musculoskel: negative for arthralgias, muscle weakness,and joint pain/swelling Neurological:  negative for headaches, dizziness, vertigo,and memory/gait problems Behavl/Psych: negative for feelings of anxiety, depression, suicide, and mood changes Past Medical History:  
Diagnosis Date  Anxiety  Bipolar affective ([facility] [address]  Depression  Diabetes ([facility] [address] diet control  Hyperlipidemia High Cholesterol  Hyperlipidemia  Major depression Past Surgical History:  
Procedure Laterality Date  HX GI Colonoscopy  HX HERNIA REPAIR    
 as child Social History Socioeconomic History  Marital status: SINGLE Spouse name: Not on file  Number of children: Not on file  Years of education: Not on file  Highest education level: Not on file Tobacco Use  Smoking status: Current Every Day Smoker  Smokeless tobacco: Current User  Tobacco comment: cigars Substance and Sexual Activity  Alcohol use: Yes Comment: occasional  
 Drug use: No  
 Sexual activity: Yes  
  Partners: Male Birth control/protection: None Family History Problem Relation Age of Onset  Diabetes Mother  Depression Mother  Asthma Brother  Stroke Maternal Grandmother   
     aneurysm  Cancer Maternal Grandmother   
     kidney  Hypertension Maternal Grandmother  Cancer Paternal Grandmother   
     lung  Diabetes Brother  Bipolar Disorder Brother  Schizophrenia Brother  Other Brother   
     paranoia  Breast Cancer Other  Breast Cancer Other Current Outpatient Medications Medication Sig Dispense Refill  busPIRone (BUSPAR) 10 mg tablet Take 1 Tab by mouth two (2) times daily as needed. 90 Tab 0  
 OLANZapine (ZYPREXA) 2.5 mg tablet Take 1 Tab by mouth nightly. 45 Tab 0  
 sertraline (ZOLOFT) 100 mg tablet Please take one and half tablet daily for 7 days and then take 2 tabs daily 90 Tab 0  
 desonide (DESOWEN) 0.05 % topical ointment Apply  to affected area two (2) times a day. 15 g 0  
 ibuprofen (MOTRIN) 600 mg tablet Take 1 Tab by mouth every six (6) hours as needed for Pain. 20 Tab 0  
 omeprazole (PRILOSEC) 20 mg capsule TAKE ONE CAPSULE BY MOUTH EVERY DAY IN 2 WEEK INTERVALS 90 Cap 0  
 butalbital-acetaminophen-caffeine (FIORICET, ESGIC) -40 mg per tablet May take 1 tab every 3 days as needed for SEVERE pain.  12 Tab 0  
  ondansetron (ZOFRAN ODT) 8 mg disintegrating tablet Take 1 Tab by mouth every eight (8) hours as needed for Nausea. 20 Tab 0  
 cetirizine (ZYRTEC) 10 mg tablet TAKE 1 TAB BY MOUTH DAILY. INDICATIONS: ALLERGIC RHINITIS 30 Tab 5 Allergies Allergen Reactions  Amoxicillin Rash and Other (comments) Vaginal itching  Flagyl [Metronidazole] Nausea and Vomiting  
  metrogel is okay  Pcn [Penicillins] Itching Vaginal itching Objective: 
Visit Vitals /76 (BP 1 Location: Left arm, BP Patient Position: Sitting) Pulse 76 Temp 97.2 °F (36.2 °C) (Oral) Resp 18 Ht 5' 1\" (1.549 m) Wt 154 lb (69.9 kg) LMP 09/19/2018 Comment: miscarried Christiano Grier SpO2 98% BMI 29.10 kg/m² Wt Readings from Last 3 Encounters:  
12/07/18 154 lb (69.9 kg) 10/16/18 160 lb (72.6 kg) 10/08/18 157 lb 3.2 oz (71.3 kg) Physical Exam:  
General appearance - alert, well appearing, and in no distress. Mental status - A/O x 4, sad mood and affect. +tearful. Neck -Supple ,normal CSP. FROM, non-tender. No significant adenopathy/thyromegaly. No JVD. Chest - CTA. Symmetric chest rise. No wheezing, rales or rhonchi. Heart - Normal rate, regular rhythm. Normal S1, S2. No MGR or clicks. Abdomen - Soft, distended. Hypoactive BS in all quadrants. NT, no mass or HSM. Ext- Radial, DP pulses, 2+ bilaterally. No pedal edema, clubbing, or cyanosis. Skin-Warm and dry. No hyperpigmentation, ulcerations, or suspicious lesions. Neuro - Normal speech, no focal findings or movement disorder. Normal strength, gait, and muscle tone. Assessment/Plan: 
Reviewed signs of retained products of conception and when to call OB or report to ER,will see on Tuesday. Discussed possible meds or D&C if needed. Advised to call psych to advise of recent loss and progression of symptoms for possible med change. No focus on BMI today due to more acute concerns. Increase water intake, no meds for constipation today.  Continue current regimen for GERD. Medication Side Effects and Warnings were discussed with patient: yes Patient Labs were reviewed: yes Patient Past Records were reviewed: yes See below for other orders Follow-up Disposition: 
Return in about 5 months (around 5/7/2019) for annual with labs. ICD-10-CM ICD-9-CM 1. Grieving F43.21 309.0 2. Spontaneous miscarriage O03.9 634.90 3. Constipation, unspecified constipation type K59.00 564.00   
4. Gastroesophageal reflux disease without esophagitis K21.9 530.81 No orders of the defined types were placed in this encounter. Arvind London expressed understanding of plan. An After Visit Summary was offered/printed and given to the patient.

## 2018-12-07 NOTE — PROGRESS NOTES
Pt is here for Chief Complaint Patient presents with  Grief Counseling  
  pt states that she lost her baby on monday  Follow-up BMI GERD Pt denies pain at this time Pt states that when she first found out about the loss of her baby he wanted to hurt herself 1. Have you been to the ER, urgent care clinic since your last visit? Hospitalized since your last visit? No 
 
2. Have you seen or consulted any other health care providers outside of the [address] [place] [address] since your last visit? Include any pap smears or colon screening.  No

## 2018-12-12 ENCOUNTER — OFFICE VISIT (OUTPATIENT)
Dept: BEHAVIORAL/MENTAL HEALTH CLINIC | Age: 38
End: 2018-12-12

## 2018-12-12 VITALS
DIASTOLIC BLOOD PRESSURE: 86 MMHG | SYSTOLIC BLOOD PRESSURE: 125 MMHG | WEIGHT: 148.8 LBS | BODY MASS INDEX: 28.09 KG/M2 | HEIGHT: 61 IN | HEART RATE: 77 BPM

## 2018-12-12 DIAGNOSIS — F60.9 PERSONALITY DISORDER (HCC): ICD-10-CM

## 2018-12-12 DIAGNOSIS — F32.A ANXIETY AND DEPRESSION: Primary | ICD-10-CM

## 2018-12-12 DIAGNOSIS — F60.3 BORDERLINE PERSONALITY DISORDER (HCC): ICD-10-CM

## 2018-12-12 DIAGNOSIS — F41.9 ANXIETY AND DEPRESSION: Primary | ICD-10-CM

## 2018-12-12 DIAGNOSIS — F41.0 PANIC ATTACKS: ICD-10-CM

## 2018-12-12 RX ORDER — SERTRALINE HYDROCHLORIDE 25 MG/1
25 TABLET, FILM COATED ORAL
COMMUNITY
End: 2018-12-12 | Stop reason: SDUPTHER

## 2018-12-12 RX ORDER — SERTRALINE HYDROCHLORIDE 50 MG/1
TABLET, FILM COATED ORAL
Qty: 45 TAB | Refills: 0 | Status: SHIPPED | OUTPATIENT
Start: 2018-12-12 | End: 2019-02-05

## 2018-12-12 RX ORDER — CELECOXIB 100 MG/1
100 CAPSULE ORAL
COMMUNITY
End: 2019-04-08

## 2018-12-12 RX ORDER — BUSPIRONE HYDROCHLORIDE 5 MG/1
5 TABLET ORAL
COMMUNITY
End: 2018-12-12
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
## 2018-12-12 NOTE — PATIENT INSTRUCTIONS
[facility]  #[phone]    [facility]  #[phone]    [address]    Intake: [phone]   Emergency: [phone]    [facility] ([facility])    Intake: [phone]    Emergency: [address]:  [phone] ext [phone]

## 2019-01-10 ENCOUNTER — OFFICE VISIT (OUTPATIENT)
Dept: INTERNAL MEDICINE CLINIC | Age: 39
End: 2019-01-10

## 2019-01-10 VITALS
DIASTOLIC BLOOD PRESSURE: 85 MMHG | OXYGEN SATURATION: 100 % | HEART RATE: 83 BPM | SYSTOLIC BLOOD PRESSURE: 132 MMHG | TEMPERATURE: 97 F | RESPIRATION RATE: 17 BRPM | WEIGHT: 149 LBS | HEIGHT: 61 IN | BODY MASS INDEX: 28.13 KG/M2

## 2019-01-10 DIAGNOSIS — T36.95XA ANTIBIOTIC-INDUCED YEAST INFECTION: ICD-10-CM

## 2019-01-10 DIAGNOSIS — N89.8 VAGINAL ODOR: ICD-10-CM

## 2019-01-10 DIAGNOSIS — B37.9 ANTIBIOTIC-INDUCED YEAST INFECTION: ICD-10-CM

## 2019-01-10 DIAGNOSIS — K64.8 INTERNAL HEMORRHOID: Primary | ICD-10-CM

## 2019-01-10 DIAGNOSIS — Z86.010 HISTORY OF COLON POLYPS: ICD-10-CM

## 2019-01-10 RX ORDER — METRONIDAZOLE 7.5 MG/G
1 GEL VAGINAL
Qty: 187.5 MG | Refills: 0 | Status: SHIPPED | OUTPATIENT
Start: 2019-01-10 | End: 2019-01-15

## 2019-01-10 RX ORDER — FLUCONAZOLE 150 MG/1
150 TABLET ORAL ONCE
Qty: 1 TAB | Refills: 0 | Status: SHIPPED | OUTPATIENT
Start: 2019-01-10 | End: 2019-01-10

## 2019-01-10 NOTE — PROGRESS NOTES
Letitia Pineda is a 34 y.o. female and presents with Anal Bleeding (pt states that she thinks that it was a bloody stool, but unsure because she drank a pitcher of koolaid) and Vaginal Discharge (with odor, pt states x 2 days, pt states that its not in her paties but she notices it when she wipes and it had a reddish tinge to it )    Subjective:  Pt here today concerned for episode of rectal bleeding last week. None since. No rectal pain or constipation. H/o polyps and hemorrhoids. Supposed to return to repeat colonoscopy 1-2 years ago, not done. Last one 2013 with Nimisha Hawkins. Also with vaginal odor for past few days and discharge with wiping. Last intercourse today. No pain or spotting. Also used Lifestyle condoms with toy recently and typically can not tolerate.      Review of Systems  Constitutional: negative for fevers, chills, anorexia and weight loss  Eyes:   negative for visual disturbance, drainage, and irritation  ENT:   negative for tinnitus,sore throat,nasal congestion,ear pain,and hoarseness  Respiratory:  negative for cough, hemoptysis, dyspnea, and wheezing  CV:   negative for chest pain, palpitations, and lower extremity edema  GI:   negative for nausea, vomiting, diarrhea, abdominal pain, and melena  Endo:               negative for polyuria,polydipsia,polyphagia, and heat intolerance  Genitourinary: negative for frequency, urgency, dysuria, retention, and hematuria  Integument:  negative for rash, ulcerations, and pruritus  Hematologic:  negative for easy bruising and bleeding  Musculoskel: negative for arthralgias, muscle weakness,and joint pain/swelling  Neurological:  negative for headaches, dizziness, vertigo,and memory/gait problems  Behavl/Psych: negative for feelings of anxiety, depression, suicide, and mood changes    Past Medical History:   Diagnosis Date    Anxiety     Bipolar affective ([facility] [address]     Depression     Diabetes ([facility] [address] [facility]     diet control    Hyperlipidemia     High Cholesterol    Hyperlipidemia     Major depression      Past Surgical History:   Procedure Laterality Date    HX GI      Colonoscopy    HX HERNIA REPAIR      as child     Social History     Socioeconomic History    Marital status: SINGLE     Spouse name: Not on file    Number of children: Not on file    Years of education: Not on file    Highest education level: Not on file   Tobacco Use    Smoking status: Current Every Day Smoker    Smokeless tobacco: Current User    Tobacco comment: cigars   Substance and Sexual Activity    Alcohol use: Yes     Comment: occasional    Drug use: No    Sexual activity: Yes     Partners: Male     Birth control/protection: None     Family History   Problem Relation Age of Onset    Diabetes Mother     Depression Mother     Asthma Brother     Stroke Maternal Grandmother         aneurysm    Cancer Maternal Grandmother         kidney    Hypertension Maternal Grandmother     Cancer Paternal Grandmother         lung    Diabetes Brother     Bipolar Disorder Brother     Schizophrenia Brother     Other Brother         paranoia    Breast Cancer Other     Breast Cancer Other      Current Outpatient Medications   Medication Sig Dispense Refill    metroNIDAZOLE (METROGEL) 0.75 % vaginal gel Insert 1 Applicator into vagina nightly for 5 days. 187.5 mg 0    fluconazole (DIFLUCAN) 150 mg tablet Take 1 Tab by mouth once for 1 dose. 1 Tab 0    sertraline (ZOLOFT) 50 mg tablet Please take half tablet daily for 7 days and then take 1 tablet daily. 45 Tab 0    OLANZapine (ZYPREXA) 2.5 mg tablet Take 1 Tab by mouth nightly. 45 Tab 0    omeprazole (PRILOSEC) 20 mg capsule TAKE ONE CAPSULE BY MOUTH EVERY DAY IN 2 WEEK INTERVALS 90 Cap 0    ondansetron (ZOFRAN ODT) 8 mg disintegrating tablet Take 1 Tab by mouth every eight (8) hours as needed for Nausea. 20 Tab 0    cetirizine (ZYRTEC) 10 mg tablet TAKE 1 TAB BY MOUTH DAILY.  INDICATIONS: ALLERGIC RHINITIS 30 Tab 5    celecoxib (CELEBREX) 100 mg capsule 100 mg.      desonide (DESOWEN) 0.05 % topical ointment Apply  to affected area two (2) times a day. 15 g 0    ibuprofen (MOTRIN) 600 mg tablet Take 1 Tab by mouth every six (6) hours as needed for Pain. 20 Tab 0    butalbital-acetaminophen-caffeine (FIORICET, ESGIC) -40 mg per tablet May take 1 tab every 3 days as needed for SEVERE pain. 12 Tab 0     Allergies   Allergen Reactions    Amoxicillin Rash and Other (comments)     Vaginal itching    Flagyl [Metronidazole] Nausea and Vomiting     metrogel is okay    Pcn [Penicillins] Itching     Vaginal itching       Objective:  Visit Vitals  /85 (BP 1 Location: Left arm, BP Patient Position: Sitting)   Pulse 83   Temp 97 °F (36.1 °C) (Oral)   Resp 17   Ht 5' 1\" (1.549 m)   Wt 149 lb (67.6 kg)   LMP  (LMP Unknown)   SpO2 100%   BMI 28.15 kg/m²     Wt Readings from Last 3 Encounters:   01/10/19 149 lb (67.6 kg)   12/12/18 148 lb 12.8 oz (67.5 kg)   12/07/18 154 lb (69.9 kg)     Physical Exam:   General appearance - alert, well appearing, and in no distress. Mental status - A/O x 4, normal mood and affect. Neck -Supple ,normal CSP. FROM, non-tender. No significant adenopathy/thyromegaly. No JVD. Chest - CTA. Symmetric chest rise. No wheezing, rales or rhonchi. Heart - Normal rate, regular rhythm. Normal S1, S2. No MGR or clicks. Abdomen - Soft,non-distended. Normoactive BS in all quadrants. NT, no mass or HSM. Female Genitalia: normal, no lesions; urethra, nontender. Vagina: healthy pink mucosa without any lesions, no abnormal discharge. Tender to examination. Cervix: normal appearance, no lesions or bleeding; Mucoid  vaginal discharge present with trace blood. NuSwab collected. Ext- Radial, DP pulses, 2+ bilaterally. No pedal edema, clubbing, or cyanosis. Skin-Warm and dry. No hyperpigmentation, ulcerations, or suspicious lesions. Neuro - Normal speech, no focal findings or movement disorder.  Normal strength, gait, and muscle tone. Assessment/Plan:  Metrogel and diflucan prescribed. Medication Side Effects and Warnings were discussed with patient: yes   Patient Labs were reviewed: yes  Patient Past Records were reviewed: yes    See below for other orders   Follow-up Disposition:  Return in about 3 months (around 4/10/2019) for GI referral f/u for rectal bleeding. ICD-10-CM ICD-9-CM    1. Internal hemorrhoid K64.8 455.0 REFERRAL TO GASTROENTEROLOGY   2. History of colon polyps Z86.010 V12.72 REFERRAL TO GASTROENTEROLOGY   3. Vaginal odor N89.8 625.8 NUSWAB VAGINITIS PLUS      metroNIDAZOLE (METROGEL) 0.75 % vaginal gel   4. Antibiotic-induced yeast infection B37.9 112.9 fluconazole (DIFLUCAN) 150 mg tablet    T36.95XA E930.9      Orders Placed This Encounter    [address] [facility]     Referral Priority:   Routine     Referral Type:   Consultation     Referral Reason:   Specialty Services Required     Referral Location:   [facility]     Referred to Provider:   Amy Quiñones MD     Number of Visits Requested:   1    metroNIDAZOLE (METROGEL) 0.75 % vaginal gel     Sig: Insert 1 Applicator into vagina nightly for 5 days. Dispense:  187.5 mg     Refill:  0    fluconazole (DIFLUCAN) 150 mg tablet     Sig: Take 1 Tab by mouth once for 1 dose. Dispense:  1 Tab     Refill:  [address] expressed understanding of plan. An After Visit Summary was offered/printed and given to the patient.

## 2019-01-10 NOTE — PATIENT INSTRUCTIONS
Colon Polyps: Care Instructions  Your Care Instructions    Colon polyps are growths in the colon or the rectum. The cause of most colon polyps is not known, and most people who get them do not have any problems. But a certain kind can turn into cancer. For this reason, regular testing for colon polyps is important for people age 48 and older and anyone who has an increased risk for colon cancer. Polyps are usually found through routine colon cancer screening tests. Although most colon polyps are not cancerous, they are usually removed and then tested for cancer. Screening for colon cancer saves lives because the cancer can usually be cured if it is caught early. If you have a polyp that is the type that can turn into cancer, you may need more tests to examine your entire colon. The doctor will remove any other polyps that he or she finds, and you will be tested more often. Follow-up care is a key part of your treatment and safety. Be sure to make and go to all appointments, and call your doctor if you are having problems. It's also a good idea to know your test results and keep a list of the medicines you take. How can you care for yourself at home? Regular exams to look for colon polyps are the best way to prevent polyps from turning into colon cancer. These can include stool tests, sigmoidoscopy, colonoscopy, and CT colonography. Talk with your doctor about a testing schedule that is right for you. To prevent polyps  There is no home treatment that can prevent colon polyps. But these steps may help lower your risk for cancer. · Stay active. Being active can help you get to and stay at a healthy weight. Try to exercise on most days of the week. Walking is a good choice. · Eat well. Choose a variety of vegetables, fruits, legumes (such as peas and beans), fish, poultry, and whole grains. · Do not smoke. If you need help quitting, talk to your doctor about stop-smoking programs and medicines.  These can increase your chances of quitting for good. · If you drink alcohol, limit how much you drink. Limit alcohol to 2 drinks a day for men and 1 drink a day for women. When should you call for help? Call your doctor now or seek immediate medical care if:    · You have severe belly pain.     · Your stools are maroon or very bloody.    Watch closely for changes in your health, and be sure to contact your doctor if:    · You have a fever.     · You have nausea or vomiting.     · You have a change in bowel habits (new constipation or diarrhea).     · Your symptoms get worse or are not improving as expected. Where can you learn more? Go to http://star-arvin.info/. Enter 95 [id number] in the search box to learn more about \"Colon Polyps: Care Instructions. \"  Current as of: March 28, 2018  Content Version: 11.8  © 4420-9672 [employer]. Care instructions adapted under license by [employer] (which disclaims liability or warranty for this information). If you have questions about a medical condition or this instruction, always ask your healthcare professional. [address] any warranty or liability for your use of this information.

## 2019-01-10 NOTE — PROGRESS NOTES
Pt is here for   Chief Complaint   Patient presents with    Anal Bleeding     pt states that she thinks that it was a bloody stool, but unsure because she drank a pitcher of koolaid    Vaginal Discharge     with odor, pt states x 2 days, pt states that its not in her paties but she notices it when she wipes and it had a reddish tinge to it      Pt states pain level is a 0/10    1. Have you been to the ER, urgent care clinic since your last visit? Hospitalized since your last visit? No    2. Have you seen or consulted any other health care providers outside of the [address] [place] [address] since your last visit? Include any pap smears or colon screening.  No

## 2019-01-13 LAB
A VAGINAE DNA VAG QL NAA+PROBE: ABNORMAL SCORE
BVAB2 DNA VAG QL NAA+PROBE: ABNORMAL SCORE
C ALBICANS DNA VAG QL NAA+PROBE: NEGATIVE
C GLABRATA DNA VAG QL NAA+PROBE: NEGATIVE
C TRACH RRNA SPEC QL NAA+PROBE: NEGATIVE
MEGA1 DNA VAG QL NAA+PROBE: ABNORMAL SCORE
N GONORRHOEA RRNA SPEC QL NAA+PROBE: NEGATIVE
T VAGINALIS RRNA SPEC QL NAA+PROBE: NEGATIVE

## 2019-01-18 ENCOUNTER — TELEPHONE (OUTPATIENT)
Dept: INTERNAL MEDICINE CLINIC | Age: 39
End: 2019-01-18

## 2019-01-18 NOTE — TELEPHONE ENCOUNTER
Pt states she  Still has yeast coming out and she needs another rx for the yeast infection.  Pt # [phone]

## 2019-01-19 RX ORDER — FLUCONAZOLE 150 MG/1
150 TABLET ORAL AS NEEDED
Qty: 2 TAB | Refills: 0 | Status: SHIPPED | OUTPATIENT
Start: 2019-01-19 | End: 2019-04-08

## 2019-01-20 NOTE — TELEPHONE ENCOUNTER
Not sure why she is having more discharge. Her lab was only slightly positive for BV treated by Metrogel and no yeast noted. I will prescribe one additional Rx for yeast, but she may need to see GYN if this persist. She also needs to review her diet to ensure she is NOT eating foods HIGH IN SUGAR or HIGH CARB as this may be part of her issue with this discharge and odor.

## 2019-01-21 NOTE — TELEPHONE ENCOUNTER
I attempted to call pt but per automated system pt cannot accept calls at this time.  I called her twice on 1/21/9

## 2019-02-05 ENCOUNTER — OFFICE VISIT (OUTPATIENT)
Dept: BEHAVIORAL/MENTAL HEALTH CLINIC | Age: 39
End: 2019-02-05

## 2019-02-05 VITALS
BODY MASS INDEX: 28.13 KG/M2 | SYSTOLIC BLOOD PRESSURE: 119 MMHG | HEIGHT: 61 IN | DIASTOLIC BLOOD PRESSURE: 79 MMHG | WEIGHT: 149 LBS | HEART RATE: 68 BPM

## 2019-02-05 DIAGNOSIS — F41.9 ANXIETY AND DEPRESSION: Primary | ICD-10-CM

## 2019-02-05 DIAGNOSIS — F60.3 BORDERLINE PERSONALITY DISORDER (HCC): ICD-10-CM

## 2019-02-05 DIAGNOSIS — Z91.52 H/O SELF MUTILATION: ICD-10-CM

## 2019-02-05 DIAGNOSIS — F44.9 DISSOCIATIVE DISORDER OR REACTION: ICD-10-CM

## 2019-02-05 DIAGNOSIS — F32.A ANXIETY AND DEPRESSION: Primary | ICD-10-CM

## 2019-02-05 RX ORDER — OLANZAPINE 2.5 MG/1
2.5 TABLET ORAL
Qty: 30 TAB | Refills: 1 | Status: SHIPPED | OUTPATIENT
Start: 2019-02-05 | End: 2019-04-10 | Stop reason: SDUPTHER

## 2019-02-05 RX ORDER — BUSPIRONE HYDROCHLORIDE 10 MG/1
10 TABLET ORAL
Qty: 60 TAB | Refills: 1 | Status: SHIPPED | OUTPATIENT
Start: 2019-02-05 | End: 2019-06-18 | Stop reason: SDUPTHER

## 2019-02-05 RX ORDER — SERTRALINE HYDROCHLORIDE 100 MG/1
TABLET, FILM COATED ORAL
Qty: 45 TAB | Refills: 1 | Status: SHIPPED | OUTPATIENT
Start: 2019-02-05 | End: 2019-06-18

## 2019-02-05 NOTE — LETTER
NOTIFICATION OF RETURN TO WORK / SCHOOL 
2/5/2019 Ms. Jennifer Maharaj [address] [phone] To Whom It May Concern: 
 
Jennifer Maharaj is under our care. She can be excused from work from 02/06/19 till 02/12/19. She will return to work on 02/13/19. If there are questions or concerns please have the patient contact our office. Sincerely, Kishan Guajardo NP
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
## 2019-04-08 ENCOUNTER — APPOINTMENT (OUTPATIENT)
Dept: GENERAL RADIOLOGY | Age: 39
End: 2019-04-08
Attending: EMERGENCY MEDICINE
Payer: MEDICARE

## 2019-04-08 ENCOUNTER — HOSPITAL ENCOUNTER (EMERGENCY)
Age: 39
Discharge: HOME | End: 2019-04-08
Attending: EMERGENCY MEDICINE
Payer: MEDICARE

## 2019-04-08 VITALS
HEART RATE: 92 BPM | WEIGHT: 148 LBS | BODY MASS INDEX: 27.94 KG/M2 | TEMPERATURE: 100.5 F | DIASTOLIC BLOOD PRESSURE: 91 MMHG | HEIGHT: 61 IN | RESPIRATION RATE: 14 BRPM | OXYGEN SATURATION: 100 % | SYSTOLIC BLOOD PRESSURE: 141 MMHG

## 2019-04-08 DIAGNOSIS — Z72.0 TOBACCO ABUSE: ICD-10-CM

## 2019-04-08 DIAGNOSIS — R68.89 FLU-LIKE SYMPTOMS: ICD-10-CM

## 2019-04-08 DIAGNOSIS — R52 GENERALIZED BODY ACHES: ICD-10-CM

## 2019-04-08 DIAGNOSIS — Z71.6 ENCOUNTER FOR SMOKING CESSATION COUNSELING: ICD-10-CM

## 2019-04-08 DIAGNOSIS — R11.10 ACUTE VOMITING: ICD-10-CM

## 2019-04-08 DIAGNOSIS — R50.9 ACUTE FEBRILE ILLNESS: Primary | ICD-10-CM

## 2019-04-08 LAB
ALBUMIN SERPL-MCNC: 3.3 G/DL (ref 3.5–5)
ALBUMIN/GLOB SERPL: 0.8 {RATIO} (ref 1.1–2.2)
ALP SERPL-CCNC: 65 U/L (ref 45–117)
ALT SERPL-CCNC: 19 U/L (ref 12–78)
ANION GAP SERPL CALC-SCNC: 12 MMOL/L (ref 5–15)
APPEARANCE UR: ABNORMAL
AST SERPL-CCNC: 14 U/L (ref 15–37)
BACTERIA URNS QL MICRO: NEGATIVE /HPF
BASOPHILS # BLD: 0 K/UL (ref 0–0.1)
BASOPHILS NFR BLD: 0 % (ref 0–1)
BILIRUB SERPL-MCNC: 0.3 MG/DL (ref 0.2–1)
BILIRUB UR QL: NEGATIVE
BUN SERPL-MCNC: 10 MG/DL (ref 6–20)
BUN/CREAT SERPL: 12 (ref 12–20)
CALCIUM SERPL-MCNC: 8.8 MG/DL (ref 8.5–10.1)
CHLORIDE SERPL-SCNC: 103 MMOL/L (ref 97–108)
CO2 SERPL-SCNC: 25 MMOL/L (ref 21–32)
COLOR UR: ABNORMAL
CREAT SERPL-MCNC: 0.83 MG/DL (ref 0.55–1.02)
DEPRECATED S PYO AG THROAT QL EIA: NEGATIVE
DIFFERENTIAL METHOD BLD: ABNORMAL
EOSINOPHIL # BLD: 0 K/UL (ref 0–0.4)
EOSINOPHIL NFR BLD: 1 % (ref 0–7)
EPITH CASTS URNS QL MICRO: ABNORMAL /LPF
ERYTHROCYTE [DISTWIDTH] IN BLOOD BY AUTOMATED COUNT: 11.6 % (ref 11.5–14.5)
FLUAV AG NPH QL IA: NEGATIVE
FLUBV AG NOSE QL IA: NEGATIVE
GLOBULIN SER CALC-MCNC: 3.9 G/DL (ref 2–4)
GLUCOSE SERPL-MCNC: 86 MG/DL (ref 65–100)
GLUCOSE UR STRIP.AUTO-MCNC: NEGATIVE MG/DL
HCG UR QL: NEGATIVE
HCT VFR BLD AUTO: 33.6 % (ref 35–47)
HGB BLD-MCNC: 11.7 G/DL (ref 11.5–16)
HGB UR QL STRIP: ABNORMAL
IMM GRANULOCYTES # BLD AUTO: 0 K/UL (ref 0–0.04)
IMM GRANULOCYTES NFR BLD AUTO: 0 % (ref 0–0.5)
KETONES UR QL STRIP.AUTO: NEGATIVE MG/DL
LEUKOCYTE ESTERASE UR QL STRIP.AUTO: ABNORMAL
LIPASE SERPL-CCNC: 105 U/L (ref 73–393)
LYMPHOCYTES # BLD: 1.4 K/UL (ref 0.8–3.5)
LYMPHOCYTES NFR BLD: 21 % (ref 12–49)
MCH RBC QN AUTO: 33.6 PG (ref 26–34)
MCHC RBC AUTO-ENTMCNC: 34.8 G/DL (ref 30–36.5)
MCV RBC AUTO: 96.6 FL (ref 80–99)
MONOCYTES # BLD: 0.7 K/UL (ref 0–1)
MONOCYTES NFR BLD: 10 % (ref 5–13)
NEUTS SEG # BLD: 4.4 K/UL (ref 1.8–8)
NEUTS SEG NFR BLD: 68 % (ref 32–75)
NITRITE UR QL STRIP.AUTO: NEGATIVE
NRBC # BLD: 0 K/UL (ref 0–0.01)
NRBC BLD-RTO: 0 PER 100 WBC
PH UR STRIP: 7 [PH] (ref 5–8)
PLATELET # BLD AUTO: 244 K/UL (ref 150–400)
PMV BLD AUTO: 9.7 FL (ref 8.9–12.9)
POTASSIUM SERPL-SCNC: 3.7 MMOL/L (ref 3.5–5.1)
PROT SERPL-MCNC: 7.2 G/DL (ref 6.4–8.2)
PROT UR STRIP-MCNC: NEGATIVE MG/DL
RBC # BLD AUTO: 3.48 M/UL (ref 3.8–5.2)
RBC #/AREA URNS HPF: ABNORMAL /HPF (ref 0–5)
SODIUM SERPL-SCNC: 140 MMOL/L (ref 136–145)
SP GR UR REFRACTOMETRY: 1.02 (ref 1–1.03)
UA: UC IF INDICATED,UAUC: ABNORMAL
UROBILINOGEN UR QL STRIP.AUTO: 0.2 EU/DL (ref 0.2–1)
WBC # BLD AUTO: 6.5 K/UL (ref 3.6–11)
WBC URNS QL MICRO: ABNORMAL /HPF (ref 0–4)

## 2019-04-08 PROCEDURE — 99284 EMERGENCY DEPT VISIT MOD MDM: CPT

## 2019-04-08 PROCEDURE — 74011250637 HC RX REV CODE- 250/637: Performed by: EMERGENCY MEDICINE

## 2019-04-08 PROCEDURE — 83690 ASSAY OF LIPASE: CPT

## 2019-04-08 PROCEDURE — 96374 THER/PROPH/DIAG INJ IV PUSH: CPT

## 2019-04-08 PROCEDURE — 87804 INFLUENZA ASSAY W/OPTIC: CPT

## 2019-04-08 PROCEDURE — 87070 CULTURE OTHR SPECIMN AEROBIC: CPT

## 2019-04-08 PROCEDURE — 80053 COMPREHEN METABOLIC PANEL: CPT

## 2019-04-08 PROCEDURE — 85025 COMPLETE CBC W/AUTO DIFF WBC: CPT

## 2019-04-08 PROCEDURE — 87086 URINE CULTURE/COLONY COUNT: CPT

## 2019-04-08 PROCEDURE — 74011250636 HC RX REV CODE- 250/636: Performed by: EMERGENCY MEDICINE

## 2019-04-08 PROCEDURE — 74011000250 HC RX REV CODE- 250: Performed by: EMERGENCY MEDICINE

## 2019-04-08 PROCEDURE — 81025 URINE PREGNANCY TEST: CPT

## 2019-04-08 PROCEDURE — 71046 X-RAY EXAM CHEST 2 VIEWS: CPT

## 2019-04-08 PROCEDURE — 81001 URINALYSIS AUTO W/SCOPE: CPT

## 2019-04-08 PROCEDURE — 87880 STREP A ASSAY W/OPTIC: CPT

## 2019-04-08 PROCEDURE — 36415 COLL VENOUS BLD VENIPUNCTURE: CPT

## 2019-04-08 RX ORDER — LIDOCAINE HYDROCHLORIDE 20 MG/ML
15 SOLUTION OROPHARYNGEAL
Status: COMPLETED | OUTPATIENT
Start: 2019-04-08 | End: 2019-04-08

## 2019-04-08 RX ORDER — ACETAMINOPHEN 325 MG/1
650 TABLET ORAL
Qty: 20 TAB | Refills: 0 | Status: SHIPPED | OUTPATIENT
Start: 2019-04-08 | End: 2021-09-01 | Stop reason: ALTCHOICE

## 2019-04-08 RX ORDER — ONDANSETRON 4 MG/1
4 TABLET, ORALLY DISINTEGRATING ORAL
Qty: 20 TAB | Refills: 0 | Status: SHIPPED | OUTPATIENT
Start: 2019-04-08 | End: 2019-05-10 | Stop reason: ALTCHOICE

## 2019-04-08 RX ORDER — NITROFURANTOIN 25; 75 MG/1; MG/1
100 CAPSULE ORAL
Status: COMPLETED | OUTPATIENT
Start: 2019-04-08 | End: 2019-04-08

## 2019-04-08 RX ORDER — ACETAMINOPHEN 325 MG/1
650 TABLET ORAL
Status: COMPLETED | OUTPATIENT
Start: 2019-04-08 | End: 2019-04-08

## 2019-04-08 RX ORDER — KETOROLAC TROMETHAMINE 10 MG/1
10 TABLET, FILM COATED ORAL
Qty: 20 TAB | Refills: 0 | Status: SHIPPED | OUTPATIENT
Start: 2019-04-08 | End: 2021-09-01 | Stop reason: ALTCHOICE

## 2019-04-08 RX ORDER — NITROFURANTOIN 25; 75 MG/1; MG/1
100 CAPSULE ORAL 2 TIMES DAILY
Qty: 14 CAP | Refills: 0 | Status: SHIPPED | OUTPATIENT
Start: 2019-04-08 | End: 2019-04-15

## 2019-04-08 RX ORDER — NITROFURANTOIN 25; 75 MG/1; MG/1
100 CAPSULE ORAL
Status: DISCONTINUED | OUTPATIENT
Start: 2019-04-08 | End: 2019-04-08

## 2019-04-08 RX ORDER — KETOROLAC TROMETHAMINE 30 MG/ML
30 INJECTION, SOLUTION INTRAMUSCULAR; INTRAVENOUS
Status: COMPLETED | OUTPATIENT
Start: 2019-04-08 | End: 2019-04-08

## 2019-04-08 RX ADMIN — LIDOCAINE HYDROCHLORIDE 15 ML: 20 SOLUTION ORAL; TOPICAL at 02:17

## 2019-04-08 RX ADMIN — NITROFURANTOIN MONOHYDRATE/MACROCRYSTALLINE 100 MG: 25; 75 CAPSULE ORAL at 03:04

## 2019-04-08 RX ADMIN — KETOROLAC TROMETHAMINE 30 MG: 30 INJECTION, SOLUTION INTRAMUSCULAR; INTRAVENOUS at 02:17

## 2019-04-08 RX ADMIN — ACETAMINOPHEN 650 MG: 325 TABLET, FILM COATED ORAL at 02:17

## 2019-04-08 NOTE — ED NOTES
Pt presents to ED ambulatory complaining of flu-like symptoms for 2 days. Pt is alert and oriented x 4, RR even and unlabored, skin is warm and dry. Assessment completed and pt updated on plan of care. Emergency Department Nursing Plan of Care The Nursing Plan of Care is developed from the Nursing assessment and Emergency Department Attending provider initial evaluation. The plan of care may be reviewed in the ED Provider note. The Plan of Care was developed with the following considerations:  
Patient / Family readiness to learn indicated by:verbalized understanding Persons(s) to be included in education: patient Barriers to Learning/Limitations:No 
 
Signed Amado Gonzalez RN   
4/8/2019   2:28 AM

## 2019-04-08 NOTE — LETTER
[facility] EMERGENCY DEPT 
[id number] [phone] Work/School Note Date: 4/8/2019 To Whom It May concern: 
 
Terry Samples was seen and treated today in the emergency room by the following provider(s): 
Attending Provider: Samia Gage MD. Terry Samples may return to work on 4/9/19. Sincerely, Trav Carlson MD

## 2019-04-08 NOTE — ED PROVIDER NOTES
EMERGENCY DEPARTMENT HISTORY AND PHYSICAL EXAM 
 
 
Date: 4/8/2019 Patient Name: Yesica Mariscal Patient Age and Sex: 37 y.o. female History of Presenting Illness Chief Complaint Patient presents with  Flu Like Symptoms  Abdominal Pain History Provided By: Patient HPI: Yesica Mariscal, 37 y.o. female with PMHx significant for anxiety, bipolar disorder, depression, diabetes, hyperlipidemia presents ambulatory to the emergency room with 24 hours of persistent flulike symptoms including dry cough, sinus tension headache, generalized body aches as well as 2 episodes of nonbloody nonbilious emesis. She denies any abdominal cramping at this point but is concerned that she may have a urinary tract infection as she is having urinary frequency and dysuria. She reports taking over-the-counter DayQuil with minimal relief of symptoms. Patient states she is also had a recent UTI and finished a course of antibiotics for which she does not remove the name of. She does report having the flu vaccine earlier this year. She says she was exposed to sick contacts at work. She states she has been trying to drink more fluid the past several days with minimal relief of symptoms. She denies any other concerns at this point and there are no other modifying factors at H&P at this point. Additionally, pt specifically denies any CP, SOB,  numbness, BLE swelling, heart palpitations. PCP: Gene Lopez NP There are no other complaints, changes or physical findings at this time. Past History Past Medical History: 
Past Medical History:  
Diagnosis Date  Anxiety  Bipolar affective ([facility] [address]  Depression  Diabetes ([facility] [address] diet control  Hyperlipidemia High Cholesterol  Hyperlipidemia  Major depression Past Surgical History: 
Past Surgical History:  
Procedure Laterality Date  HX GI Colonoscopy  HX HERNIA REPAIR    
 as child Family History: Family History Problem Relation Age of Onset  Diabetes Mother  Depression Mother  Asthma Brother  Stroke Maternal Grandmother   
     aneurysm  Cancer Maternal Grandmother   
     kidney  Hypertension Maternal Grandmother  Cancer Paternal Grandmother   
     lung  Diabetes Brother  Bipolar Disorder Brother  Schizophrenia Brother  Other Brother   
     paranoia  Breast Cancer Other  Breast Cancer Other Social History: 
Social History Tobacco Use  Smoking status: Current Some Day Smoker Packs/day: 0.50  Smokeless tobacco: Current User  Tobacco comment: cigars Substance Use Topics  Alcohol use: Yes Comment: occasional  
 Drug use: No  
 
 
Allergies: Allergies Allergen Reactions  Amoxicillin Rash and Other (comments) Vaginal itching  Flagyl [Metronidazole] Nausea and Vomiting  
  metrogel is okay.  Pcn [Penicillins] Itching Vaginal itching Medications: No current facility-administered medications on file prior to encounter. Current Outpatient Medications on File Prior to Encounter Medication Sig Dispense Refill  prenatal vit-iron fumarate-fa 27 mg iron- 0.8 mg tab tablet Take 1 Tab by mouth daily.  OLANZapine (ZYPREXA) 2.5 mg tablet Take 1 Tab by mouth nightly. 30 Tab 1  
 omeprazole (PRILOSEC) 20 mg capsule TAKE ONE CAPSULE BY MOUTH EVERY DAY IN 2 WEEK INTERVALS 90 Cap 0  
 ondansetron (ZOFRAN ODT) 8 mg disintegrating tablet Take 1 Tab by mouth every eight (8) hours as needed for Nausea. 20 Tab 0  
 sertraline (ZOLOFT) 100 mg tablet Please take one  tablet daily for 7 days and then take 1and 1/2 tablet daily. 45 Tab 1  
 busPIRone (BUSPAR) 10 mg tablet Take 1 Tab by mouth two (2) times daily as needed. 60 Tab 1  
 desonide (DESOWEN) 0.05 % topical ointment Apply  to affected area two (2) times a day. 15 g 0 Review of Systems Review of Systems Constitutional: Positive for chills and fatigue. Negative for fever. HENT: Positive for congestion, sinus pressure, sinus pain and sore throat. Negative for facial swelling, rhinorrhea, trouble swallowing and voice change. Eyes: Negative. Respiratory: Positive for cough. Negative for apnea, shortness of breath and wheezing. Cardiovascular: Negative. Negative for chest pain, palpitations and leg swelling. Gastrointestinal: Negative. Negative for abdominal distention, abdominal pain, blood in stool, constipation, diarrhea, nausea and vomiting. Endocrine: Negative. Negative for cold intolerance, heat intolerance and polyuria. Genitourinary: Negative. Negative for difficulty urinating, dysuria, flank pain, frequency, hematuria and urgency. Musculoskeletal: Positive for myalgias. Negative for arthralgias, back pain, neck pain and neck stiffness. Skin: Negative. Negative for color change and rash. Neurological: Negative. Negative for dizziness, syncope, facial asymmetry, speech difficulty, weakness, light-headedness, numbness and headaches. Hematological: Negative. Does not bruise/bleed easily. Psychiatric/Behavioral: Negative. Negative for confusion and self-injury. The patient is not nervous/anxious. Physical Exam  
Physical Exam  
Constitutional: She is oriented to person, place, and time. She appears well-developed and well-nourished. No distress. HENT:  
Head: Normocephalic and atraumatic. Mouth/Throat: Oropharynx is clear and moist. No oropharyngeal exudate. Eyes: Pupils are equal, round, and reactive to light. Conjunctivae and EOM are normal.  
Neck: Normal range of motion. Cardiovascular: Normal rate, regular rhythm and normal heart sounds. Exam reveals no gallop and no friction rub. No murmur heard. Pulmonary/Chest: Effort normal and breath sounds normal. No respiratory distress. She has no wheezes. She has no rales. She exhibits no tenderness. Abdominal: Soft. Bowel sounds are normal. She exhibits no distension and no mass. There is no tenderness. There is no rebound and no guarding. Musculoskeletal: Normal range of motion. She exhibits no edema, tenderness or deformity. Neurological: She is alert and oriented to person, place, and time. She displays normal reflexes. No cranial nerve deficit. She exhibits normal muscle tone. Coordination normal.  
Skin: Skin is warm. No rash noted. She is not diaphoretic. Psychiatric: She has a normal mood and affect. Nursing note and vitals reviewed. Diagnostic Study Results Labs - Recent Results (from the past 24 hour(s)) CBC WITH AUTOMATED DIFF Collection Time: 04/08/19  1:57 AM  
Result Value Ref Range WBC 6.5 3.6 - 11.0 K/uL  
 RBC 3.48 (L) 3.80 - 5.20 M/uL  
 HGB 11.7 11.5 - 16.0 g/dL HCT 33.6 (L) 35.0 - 47.0 % MCV 96.6 80.0 - 99.0 FL  
 MCH 33.6 26.0 - 34.0 PG  
 MCHC 34.8 30.0 - 36.5 g/dL  
 RDW 11.6 11.5 - 14.5 % PLATELET 535 835 - 288 K/uL MPV 9.7 8.9 - 12.9 FL  
 NRBC 0.0 0  WBC ABSOLUTE NRBC 0.00 0.00 - 0.01 K/uL NEUTROPHILS 68 32 - 75 % LYMPHOCYTES 21 12 - 49 % MONOCYTES 10 5 - 13 % EOSINOPHILS 1 0 - 7 % BASOPHILS 0 0 - 1 % IMMATURE GRANULOCYTES 0 0.0 - 0.5 % ABS. NEUTROPHILS 4.4 1.8 - 8.0 K/UL  
 ABS. LYMPHOCYTES 1.4 0.8 - 3.5 K/UL  
 ABS. MONOCYTES 0.7 0.0 - 1.0 K/UL  
 ABS. EOSINOPHILS 0.0 0.0 - 0.4 K/UL  
 ABS. BASOPHILS 0.0 0.0 - 0.1 K/UL  
 ABS. IMM. GRANS. 0.0 0.00 - 0.04 K/UL  
 DF AUTOMATED METABOLIC PANEL, COMPREHENSIVE Collection Time: 04/08/19  1:57 AM  
Result Value Ref Range Sodium 140 136 - 145 mmol/L Potassium 3.7 3.5 - 5.1 mmol/L Chloride 103 97 - 108 mmol/L  
 CO2 25 21 - 32 mmol/L Anion gap 12 5 - 15 mmol/L Glucose 86 65 - 100 mg/dL BUN 10 6 - 20 MG/DL Creatinine 0.83 0.55 - 1.02 MG/DL  
 BUN/Creatinine ratio 12 12 - 20 GFR est AA >60 >60 ml/min/1.73m2 GFR est non-AA >60 >60 ml/min/1.73m2 Calcium 8.8 8.5 - 10.1 MG/DL Bilirubin, total 0.3 0.2 - 1.0 MG/DL  
 ALT (SGPT) 19 12 - 78 U/L  
 AST (SGOT) 14 (L) 15 - 37 U/L Alk. phosphatase 65 45 - 117 U/L Protein, total 7.2 6.4 - 8.2 g/dL Albumin 3.3 (L) 3.5 - 5.0 g/dL Globulin 3.9 2.0 - 4.0 g/dL A-G Ratio 0.8 (L) 1.1 - 2.2 LIPASE Collection Time: 04/08/19  1:57 AM  
Result Value Ref Range Lipase 105 73 - 393 U/L  
INFLUENZA A & B AG (RAPID TEST) Collection Time: 04/08/19  1:57 AM  
Result Value Ref Range Influenza A Antigen NEGATIVE  NEG Influenza B Antigen NEGATIVE  NEG    
STREP AG SCREEN, GROUP A Collection Time: 04/08/19  1:57 AM  
Result Value Ref Range Group A Strep Ag ID NEGATIVE  NEG    
URINALYSIS W/ REFLEX CULTURE Collection Time: 04/08/19  2:04 AM  
Result Value Ref Range Color YELLOW/STRAW Appearance CLOUDY (A) CLEAR Specific gravity 1.025 1.003 - 1.030    
 pH (UA) 7.0 5.0 - 8.0 Protein NEGATIVE  NEG mg/dL Glucose NEGATIVE  NEG mg/dL Ketone NEGATIVE  NEG mg/dL Bilirubin NEGATIVE  NEG Blood MODERATE (A) NEG Urobilinogen 0.2 0.2 - 1.0 EU/dL Nitrites NEGATIVE  NEG Leukocyte Esterase SMALL (A) NEG    
 WBC 0-4 0 - 4 /hpf  
 RBC 5-10 0 - 5 /hpf Epithelial cells MODERATE (A) FEW /lpf Bacteria NEGATIVE  NEG /hpf  
 UA:UC IF INDICATED CULTURE NOT INDICATED BY UA RESULT CNI Radiologic Studies -  
XR CHEST PA LAT Final Result IMPRESSION: Normal chest.  
  
 
CT Results  (Last 48 hours) None CXR Results  (Last 48 hours) 04/08/19 0227  XR CHEST PA LAT Final result Impression:  IMPRESSION: Normal chest.  
  
 Narrative:  EXAM: XR CHEST PA LAT INDICATION: cough, fever COMPARISON: 4/5/2016. FINDINGS: PA and lateral radiographs of the chest demonstrate clear lungs. The  
cardiac and mediastinal contours and pulmonary vascularity are normal. The bones and soft tissues are within normal limits. Medical Decision Making I am the first provider for this patient. I reviewed the vital signs, available nursing notes, past medical history, past surgical history, family history and social history. Vital Signs-Reviewed the patient's vital signs. Patient Vitals for the past 24 hrs: 
 Temp Pulse Resp BP SpO2  
04/08/19 0140 (!) 100.5 °F (38.1 °C) 92 14 (!) 141/91 100 % Pulse Oximetry Analysis - 100% on RA Cardiac Monitor:  
Rate: 92 bpm 
Rhythm: Normal Sinus Rhythm Records Reviewed: Nursing Notes, Old Medical Records, Previous electrocardiograms, Previous Radiology Studies and Previous Laboratory Studies Provider Notes (Medical Decision Making): Pt presents with flu like symptoms including nasal congestion, rhinorrhea and sore throat. Pt also has non-productive cough without dyspnea or wheezing. Symptoms are consistent with an uncomplicated URI. DDx: bacterial sinusitis vs. pharyngitis, migraine, flu. Symptomatic therapy suggested: acetaminophen, ibuprofen, antihistamine-decongestant of choice, cough suppressant of choice. Increase fluids, use vaporizer, stay in steamy bathroom tid 15 min prn severe cough, tylenol as needed, rest, avoid smoky areas. Lack of antibiotic effectiveness discussed with her. Symptomatic therapy suggested: gargle for sore throat, use mist at bedside for congestion. Apply facial warm packs for sinus pain or use nasal saline sprays. Follow up prn if not better in 72 hours. ED Course:  
Initial assessment performed. The patients presenting problems have been discussed, and they are in agreement with the care plan formulated and outlined with them. I have encouraged them to ask questions as they arise throughout their visit. TOBACCO COUNSELING:  
Upon evaluation, pt expressed that they are a current tobacco user.  For approximately 10 minutes, pt has been counseled on the dangers of smoking and was encouraged to quit as soon as possible in order to decrease further risks to their health. Pt has conveyed their understanding of the risks involved should they continue to use tobacco products. ALCOHOL/SUBSTANCE ABUSE COUNSELING: 
Upon evaluation, pt endorsed recent alcohol/illicit drug use. For approximately 15 minutes, pt has been counseled on the dangers of alcohol and illicit drug use on their health, and they were encouraged to quit as soon as possible in order to decrease further risks to their health. Pt has conveyed their understanding of the risks involved should they continue to use these products. I reviewed our electronic medical record system for any past medical records that were available that may contribute to the patient's current condition, the nursing notes and vital signs from today's visit. Tod Schwarz MD 
Medications Administered During ED Course: 
Medications  
acetaminophen (TYLENOL) tablet 650 mg (650 mg Oral Given 4/8/19 0217)  
ketorolac (TORADOL) injection 30 mg (30 mg IntraVENous Given 4/8/19 0217) lidocaine (XYLOCAINE) 2 % viscous solution 15 mL (15 mL Mouth/Throat Given 4/8/19 0217)  
nitrofurantoin (macrocrystal-monohydrate) (MACROBID) capsule 100 mg (100 mg Oral Given 4/8/19 0304) Progress Note: 
Patient has been reassessed and reports feeling better and symptoms have improved after ED treatment. Perla Mccracken is able to tolerate PO and ambulate per baseline. Nurys Rodriguez final labs and imaging have been reviewed with her. She has been counseled regarding her diagnosis. She verbally conveys understanding and agreement of the signs, symptoms, diagnosis, treatment and prognosis and additionally agrees to follow up as recommended with Shelbie Yeung, NP in 24 - 48 hours. She also agrees with the care-plan and conveys that all of her questions have been answered.   I have also put together some discharge instructions for her that include: 1) educational information regarding their diagnosis, 2) how to care for their diagnosis at home, as well a 3) list of reasons why they would want to return to the ED prior to their follow-up appointment, should their condition change. I have answered all questions to the patient's satisfaction. Strict return precautions given. She both understood and agreed with plan as discussed above. Vital signs stable for discharge. Disposition: DISCHARGE The pt is ready for discharge. The pt's signs, symptoms, diagnosis, and discharge instructions have been discussed and pt has conveyed their understanding. The pt is to follow up as recommended or return to ER should their symptoms worsen. Plan has been discussed and pt is in agreement. PLAN: 
1. Current Discharge Medication List  
  
START taking these medications Details  
acetaminophen (TYLENOL) 325 mg tablet Take 2 Tabs by mouth every four (4) hours as needed for Pain. Qty: 20 Tab, Refills: 0  
  
ketorolac (TORADOL) 10 mg tablet Take 1 Tab by mouth every six (6) hours as needed for Pain. Qty: 20 Tab, Refills: 0  
  
nitrofurantoin, macrocrystal-monohydrate, (MACROBID) 100 mg capsule Take 1 Cap by mouth two (2) times a day for 7 days. Qty: 14 Cap, Refills: 0  
  
!! ondansetron (ZOFRAN ODT) 4 mg disintegrating tablet Take 1 Tab by mouth every eight (8) hours as needed for Nausea. Qty: 20 Tab, Refills: 0  
  
 !! - Potential duplicate medications found. Please discuss with provider. CONTINUE these medications which have NOT CHANGED Details  
prenatal vit-iron fumarate-fa 27 mg iron- 0.8 mg tab tablet Take 1 Tab by mouth daily. OLANZapine (ZYPREXA) 2.5 mg tablet Take 1 Tab by mouth nightly. Qty: 30 Tab, Refills: 1 Associated Diagnoses: Borderline personality disorder ([address]  Dissociative disorder or reaction; H/O self mutilation  
  
omeprazole (PRILOSEC) 20 mg capsule TAKE ONE CAPSULE BY MOUTH EVERY DAY IN 2 WEEK INTERVALS Qty: 90 Cap, Refills: 0 Associated Diagnoses: Gastroesophageal reflux disease without esophagitis  
  
!! ondansetron (ZOFRAN ODT) 8 mg disintegrating tablet Take 1 Tab by mouth every eight (8) hours as needed for Nausea. Qty: 20 Tab, Refills: 0 Associated Diagnoses: Gastroesophageal reflux disease without esophagitis; Nausea  
  
sertraline (ZOLOFT) 100 mg tablet Please take one  tablet daily for 7 days and then take 1and 1/2 tablet daily. Qty: 45 Tab, Refills: 1 Associated Diagnoses: Anxiety and depression  
  
busPIRone (BUSPAR) 10 mg tablet Take 1 Tab by mouth two (2) times daily as needed. Qty: 60 Tab, Refills: 1 Associated Diagnoses: Anxiety and depression  
  
desonide (DESOWEN) 0.05 % topical ointment Apply  to affected area two (2) times a day. Qty: 15 g, Refills: 0 Associated Diagnoses: Facial rash  
  
 !! - Potential duplicate medications found. Please discuss with provider. 2.  
Follow-up Information Follow up With Specialties Details Why Contact Info Trenna Cooks, NP Nurse Practitioner   [address] [facility] [address] [facility] [id number] 
[phone] [facility] EMERGENCY DEPT Emergency Medicine  As needed, If symptoms worsen [address] Return to ED if worse Diagnosis Clinical Impression: 1. Acute febrile illness 2. Flu-like symptoms 3. Generalized body aches 4. Acute vomiting 5. Tobacco abuse 6. Encounter for smoking cessation counseling Attestation: 
 
I personally performed the services described in this documentation on this date 4/8/2019 for patient Terry Samples. I have reviewed and verified that the information is accurate and complete. Trav Carlson MD 
 
Please note that this dictation was completed with VTM, the [employer] voice recognition software.   Quite often unanticipated grammatical, syntax, homophones, and other interpretive errors are inadvertently transcribed by the computer software. Please disregard these errors. Please excuse any errors that have escaped final proofreading. Thank you. This note will not be viewable in [address]

## 2019-04-08 NOTE — DISCHARGE INSTRUCTIONS
Thank you for allowing us to take care of you today! We hope we addressed all of your concerns and needs. We strive to provide excellent quality care in the Emergency Department. You will receive a survey after your visit to evaluate the care you were provided. Should you receive a survey from us, we invite you to share your experience and tell us what made it excellent. It was a pleasure serving you, we invite you to share your experience with us, in our pursuit for excellence, should you be selected to receive a survey. The exam and treatment you received in the Emergency Department were for an urgent problem and are not intended as complete care. It is important that you follow up with a doctor, nurse practitioner, or physician assistant for ongoing care. If your symptoms become worse or you do not improve as expected and you are unable to reach your usual health care provider, you should return to the Emergency Department. We are available 24 hours a day. Please take your discharge instructions with you when you go to your follow-up appointment. If you have any problem arranging a follow-up appointment, contact the Emergency Department immediately. If a prescription has been provided, please have it filled as soon as possible to prevent a delay in treatment. Read the entire medication instruction sheet provided to you by the pharmacy. If you have any questions or reservations about taking the medication due to side effects or interactions with other medications, please call your primary care physician or contact the ER to speak with the charge nurse. Make an appointment with your family doctor or the physician you were referred to for follow-up of this visit as instructed on your discharge paperwork, as this is mandatory follow-up. Return to the ER if you are unable to be seen or if you are unable to be seen in a timely manner.     If you have any problem arranging the follow-up visit, contact the Emergency Department immediately. I hope you feel better and thank you again for allow us to provide you with excellent care today at [facility]!       Warmest regards,    Elsie Zee MD  Emergency Medicine Physician  [facility]

## 2019-04-08 NOTE — ED NOTES
Discharge Instructions Reviewed with patient. Discharge instructions given to patient per provider. patient able to return verbalize discharge instructions. Paper copy of discharge instructions given. 4 RX given to patient per provider. Patient condition stable, Respiratory status WNL, Neurostatus intact. patient out of er, to home with family.

## 2019-04-08 NOTE — ED TRIAGE NOTES
Pt reports dry cough, sinus headache, fever, generalized body aches, lower back pain, lower abd pain, and nausea & vomiting X 1 day.

## 2019-04-09 LAB
BACTERIA SPEC CULT: NORMAL
CC UR VC: NORMAL
SERVICE CMNT-IMP: NORMAL

## 2019-04-10 ENCOUNTER — OFFICE VISIT (OUTPATIENT)
Dept: INTERNAL MEDICINE CLINIC | Age: 39
End: 2019-04-10

## 2019-04-10 VITALS
TEMPERATURE: 97.1 F | OXYGEN SATURATION: 99 % | DIASTOLIC BLOOD PRESSURE: 87 MMHG | WEIGHT: 145.6 LBS | SYSTOLIC BLOOD PRESSURE: 135 MMHG | BODY MASS INDEX: 27.49 KG/M2 | RESPIRATION RATE: 18 BRPM | HEART RATE: 74 BPM | HEIGHT: 61 IN

## 2019-04-10 DIAGNOSIS — J06.9 ACUTE URI: Primary | ICD-10-CM

## 2019-04-10 DIAGNOSIS — R21 FACIAL RASH: ICD-10-CM

## 2019-04-10 DIAGNOSIS — J30.89 ENVIRONMENTAL AND SEASONAL ALLERGIES: ICD-10-CM

## 2019-04-10 DIAGNOSIS — Z86.010 HISTORY OF COLON POLYPS: ICD-10-CM

## 2019-04-10 LAB
BACTERIA SPEC CULT: NORMAL
SERVICE CMNT-IMP: NORMAL

## 2019-04-10 RX ORDER — DESONIDE 0.5 MG/G
OINTMENT TOPICAL 2 TIMES DAILY
Qty: 60 G | Refills: 0 | Status: SHIPPED | OUTPATIENT
Start: 2019-04-10 | End: 2019-09-05 | Stop reason: ALTCHOICE

## 2019-04-10 RX ORDER — GLUCOSAMINE SULFATE 1500 MG
POWDER IN PACKET (EA) ORAL
Refills: 4 | COMMUNITY
Start: 2019-02-04 | End: 2020-04-13 | Stop reason: SDUPTHER

## 2019-04-10 NOTE — PROGRESS NOTES
Sonja Meneses is a 37 y.o. female and presents with Follow-up (GI referral f/u for rectal bleeding)    Subjective:  Pt is here for ER follow-up on 4/8 for coughing, sore throat, sinus HA, AND body aches. Diagnosed with flu-like symptoms. Was given cough syrup and decongestants. Instructed to f/u with PCP/specialty. Reports feeling BETTER THAN when in ER. Needs new work note however, addressing these symptoms beginning on 4/6 since out of work since that time. Will see GI on 5/9 for rectal bleeding. No longer with bleeding however. Improved. Review of Systems  Constitutional: negative for fevers, chills, anorexia and weight loss  Eyes:   negative for visual disturbance, drainage, and irritation  ENT:   negative for tinnitus,sore throat,nasal congestion,ear pain,and hoarseness  Respiratory:  negative for cough, hemoptysis, dyspnea, and wheezing  CV:   negative for chest pain, palpitations, and lower extremity edema  GI:   negative for nausea, vomiting, diarrhea, abdominal pain, and melena  Endo:               negative for polyuria,polydipsia,polyphagia, and heat intolerance  Genitourinary: negative for frequency, urgency, dysuria, retention, and hematuria  Integument:  + facial rash worse since URI symptoms.  negative for rash, ulcerations, and pruritus  Hematologic:  negative for easy bruising and bleeding  Musculoskel: negative for arthralgias, muscle weakness,and joint pain/swelling  Neurological:  negative for headaches, dizziness, vertigo,and memory/gait problems  Behavl/Psych: negative for feelings of anxiety, depression, suicide, and mood changes    Past Medical History:   Diagnosis Date    Anxiety     [facility]     Depression     Diabetes ([facility]     diet control    Hyperlipidemia     High Cholesterol    Hyperlipidemia     Major depression      Past Surgical History:   Procedure Laterality Date    HX GI      Colonoscopy    HX HERNIA REPAIR      as child     Social History Socioeconomic History    Marital status: SINGLE     Spouse name: Not on file    Number of children: Not on file    Years of education: Not on file    Highest education level: Not on file   Tobacco Use    Smoking status: Current Some Day Smoker     Packs/day: 0.50    Smokeless tobacco: Current User    Tobacco comment: cigars   Substance and Sexual Activity    Alcohol use: Yes     Comment: occasional    Drug use: No    Sexual activity: Yes     Partners: Male     Birth control/protection: None     Family History   Problem Relation Age of Onset    Diabetes Mother     Depression Mother     Asthma Brother     Stroke Maternal Grandmother         aneurysm    Cancer Maternal Grandmother         kidney    Hypertension Maternal Grandmother     Cancer Paternal Grandmother         lung    Diabetes Brother     Bipolar Disorder Brother     Schizophrenia Brother     Other Brother         paranoia    Breast Cancer Other     Breast Cancer Other      Current Outpatient Medications   Medication Sig Dispense Refill    cholecalciferol (VITAMIN D3) 1,000 unit cap TAKE 1 CAPSULE BY MOUTH ONCE DAILY  4    acetaminophen (TYLENOL) 325 mg tablet Take 2 Tabs by mouth every four (4) hours as needed for Pain. 20 Tab 0    nitrofurantoin, macrocrystal-monohydrate, (MACROBID) 100 mg capsule Take 1 Cap by mouth two (2) times a day for 7 days. 14 Cap 0    ondansetron (ZOFRAN ODT) 4 mg disintegrating tablet Take 1 Tab by mouth every eight (8) hours as needed for Nausea. 20 Tab 0    OLANZapine (ZYPREXA) 2.5 mg tablet Take 1 Tab by mouth nightly. 30 Tab 1    sertraline (ZOLOFT) 100 mg tablet Please take one  tablet daily for 7 days and then take 1and 1/2 tablet daily. 45 Tab 1    busPIRone (BUSPAR) 10 mg tablet Take 1 Tab by mouth two (2) times daily as needed. 60 Tab 1    desonide (DESOWEN) 0.05 % topical ointment Apply  to affected area two (2) times a day.  15 g 0    ondansetron (ZOFRAN ODT) 8 mg disintegrating tablet Take 1 Tab by mouth every eight (8) hours as needed for Nausea. 20 Tab 0    prenatal vit-iron fumarate-fa 27 mg iron- 0.8 mg tab tablet Take 1 Tab by mouth daily.  ketorolac (TORADOL) 10 mg tablet Take 1 Tab by mouth every six (6) hours as needed for Pain. (Patient not taking: Reported on 4/10/2019) 20 Tab 0    omeprazole (PRILOSEC) 20 mg capsule TAKE ONE CAPSULE BY MOUTH EVERY DAY IN 2 WEEK INTERVALS (Patient not taking: Reported on 4/10/2019) 90 Cap 0     Allergies   Allergen Reactions    Amoxicillin Rash and Other (comments)     Vaginal itching    Flagyl [Metronidazole] Nausea and Vomiting     metrogel is okay.  Pcn [Penicillins] Itching     Vaginal itching       Objective:  Visit Vitals  /87 (BP 1 Location: Left arm, BP Patient Position: Sitting)   Pulse 74   Temp 97.1 °F (36.2 °C) (Oral)   Resp 18   Ht 5' 1\" (1.549 m)   Wt 145 lb 9.6 oz (66 kg)   LMP 03/31/2019   SpO2 99%   BMI 27.51 kg/m²     Wt Readings from Last 3 Encounters:   04/10/19 145 lb 9.6 oz (66 kg)   04/08/19 148 lb (67.1 kg)   02/05/19 149 lb (67.6 kg)     Physical Exam:   General appearance - alert, well appearing, and in no distress. Mental status - A/O x 4, normal mood and affect. Head/Eyes- AT/NC. STEVE, EOMI, corneas normal, no foreign bodies. Neck -Supple ,normal CSP. FROM, non-tender. No cervical adenopathy. No thyromegaly. No JVD. Chest - clear to auscultation with symmetric chest rise. No wheezing, rales or rhonchi. Heart - Normal rate, regular rhythm. Normal S1, S2. No MGR. Abdomen - Soft,non-distended. Normoactive BS in all quadrants. NT, no mass, rebound, or HSM   Ext- Radial, DP pulses, 2+ bilaterally. No pedal edema, clubbing, or cyanosis. Skin- Normal for ethnicity, warm, and dry. No hyperpigmentation, ulcerations, or suspicious lesions. Maculopapular rash to both cheeks. Neuro - Normal speech, no focal findings. Normal strength and muscle tone.  Coordination and gait normal. Assessment/Plan:  desowen refilled. Work note written through Friday. Use of OTC antihistamine advised. Medication Side Effects and Warnings were discussed with patient: yes   Patient Labs were reviewed: yes  Patient Past Records were reviewed: yes    See below for other orders         ICD-10-CM ICD-9-CM    1. Facial rash R21 782.1 desonide (DESOWEN) 0.05 % topical ointment     Orders Placed This Encounter    cholecalciferol (VITAMIN D3) 1,000 unit cap     Sig: TAKE 1 CAPSULE BY MOUTH ONCE DAILY     Refill:  [address] expressed understanding of plan. An After Visit Summary was offered/printed and given to the patient.

## 2019-04-10 NOTE — PATIENT INSTRUCTIONS
Use BLACK SHEA BUTTER SOAP OR Dove/Aveeno soap. Apply vaseline like ointment to affected areas or raw shea butter blended with oil (flaxseed, olive, or coconut). Only use steroids when you have redness and itching at the first sign, then stop and start using other moisturizer. Avoid hot showers and pat dry. May use Benadryl, Phenylephrine, Chlor-Trimetron, Claritin, Allegra, xyzal, or Zyrtec also for symptoms. Atopic Dermatitis: Care Instructions  Your Care Instructions  Atopic dermatitis (also called eczema) is a skin problem that causes intense itching and a red, raised rash. In severe cases, the rash develops clear fluid-filled blisters. The rash is not contagious. People with this condition seem to have very sensitive immune systems that are likely to react to things that cause allergies. The immune system is the body's way of fighting infection. There is no cure for atopic dermatitis, but you may be able to control it with care at home. Follow-up care is a key part of your treatment and safety. Be sure to make and go to all appointments, and call your doctor if you are having problems. It's also a good idea to know your test results and keep a list of the medicines you take. How can you care for yourself at home? · Use moisturizer at least twice a day. · If your doctor prescribes a cream, use it as directed. If your doctor prescribes other medicine, take it exactly as directed. · Wash the affected area with water only. Soap can make dryness and itching worse. Pat dry. · Apply a moisturizer after bathing. Use a cream such as Lubriderm, Moisturel, or Cetaphil that does not irritate the skin or cause a rash. Apply the cream while your skin is still damp after lightly drying with a towel. · Use cold, wet cloths to reduce itching. · Keep cool, and stay out of the sun.   · If itching affects your normal activities, an over-the-counter antihistamine, such as diphenhydramine (Benadryl) or loratadine (Claritin) may help. Read and follow all instructions on the label. When should you call for help? Call your doctor now or seek immediate medical care if:    · Your rash gets worse and you have a fever.     · You have new blisters or bruises, or the rash spreads and looks like a sunburn.     · You have signs of infection, such as:  ? Increased pain, swelling, warmth, or redness. ? Red streaks leading from the rash. ? Pus draining from the rash. ? A fever.     · You have crusting or oozing sores.     · You have joint aches or body aches along with your rash.    Watch closely for changes in your health, and be sure to contact your doctor if:    · Your rash does not clear up after 2 to 3 weeks of home treatment.     · Itching interferes with your sleep or daily activities. Where can you learn more? Go to http://star-arvin.info/. Enter V328 in the search box to learn more about \"Atopic Dermatitis: Care Instructions. \"  Current as of: April 17, 2018  Content Version: 11.9  © 2348-5919 [employer]. Care instructions adapted under license by [employer] (which disclaims liability or warranty for this information). If you have questions about a medical condition or this instruction, always ask your healthcare professional. [address] any warranty or liability for your use of this information.

## 2019-04-10 NOTE — PROGRESS NOTES
Kori Sousa is a 37 y.o. female    Chief Complaint   Patient presents with    Follow-up     GI referral f/u for rectal bleeding       1. Have you been to the ER, urgent care clinic since your last visit? Hospitalized since your last visit? Yes. ER on Monday for flu like symptoms and UTI and abdominal pain. 2. Have you seen or consulted any other health care providers outside of the [address] [place] [address] since your last visit? Include any pap smears or colon screening.      Visit Vitals  /87 (BP 1 Location: Left arm, BP Patient Position: Sitting)   Pulse 74   Temp 97.1 °F (36.2 °C) (Oral)   Resp 18   Ht 5' 1\" (1.549 m)   Wt 145 lb 9.6 oz (66 kg)   LMP 03/31/2019   SpO2 99%   BMI 27.51 kg/m²

## 2019-04-10 NOTE — LETTER
NOTIFICATION RETURN TO WORK / SCHOOL 
 
4/10/2019 10:02 AM 
 
Ms. Chitra Lozada [address] [place] [address] [id number] To Whom It May Concern: 
 
Chitra Lozada is currently under the care of Daniela Rubin. She will return to work/school on: 4/12/19 from illness starting on 4/6/19. If there are questions or concerns please have the patient contact our office. Sincerely, Jennyfer Jain NP

## 2019-04-23 ENCOUNTER — OFFICE VISIT (OUTPATIENT)
Dept: INTERNAL MEDICINE CLINIC | Age: 39
End: 2019-04-23

## 2019-04-23 VITALS
DIASTOLIC BLOOD PRESSURE: 83 MMHG | RESPIRATION RATE: 17 BRPM | OXYGEN SATURATION: 95 % | TEMPERATURE: 97.5 F | SYSTOLIC BLOOD PRESSURE: 125 MMHG | WEIGHT: 143 LBS | BODY MASS INDEX: 27 KG/M2 | HEART RATE: 85 BPM | HEIGHT: 61 IN

## 2019-04-23 DIAGNOSIS — J01.10 ACUTE NON-RECURRENT FRONTAL SINUSITIS: Primary | ICD-10-CM

## 2019-04-23 DIAGNOSIS — J20.9 ACUTE BRONCHITIS, UNSPECIFIED ORGANISM: ICD-10-CM

## 2019-04-23 RX ORDER — CETIRIZINE HCL 10 MG
10 TABLET ORAL DAILY
Qty: 30 TAB | Refills: 5 | Status: SHIPPED | OUTPATIENT
Start: 2019-04-23 | End: 2020-06-11

## 2019-04-23 RX ORDER — BENZONATATE 200 MG/1
200 CAPSULE ORAL
Qty: 21 CAP | Refills: 0 | Status: SHIPPED | OUTPATIENT
Start: 2019-04-23 | End: 2019-04-30

## 2019-04-23 RX ORDER — AZITHROMYCIN 250 MG/1
TABLET, FILM COATED ORAL
Qty: 6 TAB | Refills: 0 | Status: SHIPPED | OUTPATIENT
Start: 2019-04-23 | End: 2019-05-10 | Stop reason: ALTCHOICE

## 2019-04-23 NOTE — PATIENT INSTRUCTIONS
May use  Benadryl, Phenylephrine, or Chlor-Trimetron for continued cough and sore throat. Claritin, allegra, Xyzal, or Zyrtec may also help. Also try Breathe-Rite (or similar) nose strips for nasal congestion and difficulty sleeping. Try 2 teaspoons of 100% pure honey at bedtime to help with nighttime coughing/sore throat. Vitamin C (6048-9434 mg) may also be helpful for your symptoms. *Frequent handwashing*, rest, and plenty of fluids are most important. Drink some tea with ROSETTE (fresh), NO SUGAR, you may use HONEY or MAPLE SYRUP. Bronchitis: Care Instructions  Your Care Instructions    Bronchitis is inflammation of the bronchial tubes, which carry air to the lungs. The tubes swell and produce mucus, or phlegm. The mucus and inflamed bronchial tubes make you cough. You may have trouble breathing. Most cases of bronchitis are caused by viruses like those that cause colds. Antibiotics usually do not help and they may be harmful. Bronchitis usually develops rapidly and lasts about 2 to 3 weeks in otherwise healthy people. Follow-up care is a key part of your treatment and safety. Be sure to make and go to all appointments, and call your doctor if you are having problems. It's also a good idea to know your test results and keep a list of the medicines you take. How can you care for yourself at home? · Take all medicines exactly as prescribed. Call your doctor if you think you are having a problem with your medicine. · Get some extra rest.  · Take an over-the-counter pain medicine, such as acetaminophen (Tylenol), ibuprofen (Advil, Motrin), or naproxen (Aleve) to reduce fever and relieve body aches. Read and follow all instructions on the label. · Do not take two or more pain medicines at the same time unless the doctor told you to. Many pain medicines have acetaminophen, which is Tylenol. Too much acetaminophen (Tylenol) can be harmful.   · Take an over-the-counter cough medicine that contains dextromethorphan to help quiet a dry, hacking cough so that you can sleep. Avoid cough medicines that have more than one active ingredient. Read and follow all instructions on the label. · Breathe moist air from a humidifier, hot shower, or sink filled with hot water. The heat and moisture will thin mucus so you can cough it out. · Do not smoke. Smoking can make bronchitis worse. If you need help quitting, talk to your doctor about stop-smoking programs and medicines. These can increase your chances of quitting for good. When should you call for help? Call 911 anytime you think you may need emergency care. For example, call if:    · You have severe trouble breathing.    Call your doctor now or seek immediate medical care if:    · You have new or worse trouble breathing.     · You cough up dark brown or bloody mucus (sputum).     · You have a new or higher fever.     · You have a new rash.    Watch closely for changes in your health, and be sure to contact your doctor if:    · You cough more deeply or more often, especially if you notice more mucus or a change in the color of your mucus.     · You are not getting better as expected. Where can you learn more? Go to http://star-arvin.info/. Enter H333 in the search box to learn more about \"Bronchitis: Care Instructions. \"  Current as of: September 5, 2018  Content Version: 11.9  © 9586-1884 [employer]. Care instructions adapted under license by [employer] (which disclaims liability or warranty for this information). If you have questions about a medical condition or this instruction, always ask your healthcare professional. [place] [address] any warranty or liability for your use of this information.

## 2019-04-23 NOTE — PROGRESS NOTES
HISTORY  Minal Cool is a 37 y.o. female. Sinus Infection    The history is provided by the patient. This is a new problem. Episode onset: x 5 days. The problem has not changed since onset. There has been no fever. The pain is moderate. The pain has been constant since onset. Associated symptoms include congestion, hoarse voice, sinus pressure (maxillary), sore throat, cough and rhinorrhea. Pertinent negatives include no ear pain, no shortness of breath, no headaches and no chest pain. Associated symptoms comments: Sneezing  . She has tried other meds and decongestants for the symptoms. The treatment provided mild (for sleep only) relief. Medication Refill   Pertinent negatives include no chest pain, no headaches and no shortness of breath. Patient Active Problem List   Diagnosis Code    Gastroesophageal reflux disease without esophagitis K21.9    Moderate episode of recurrent major depressive disorder ([facility] F33.1     Patient Active Problem List    Diagnosis Date Noted    Gastroesophageal reflux disease without esophagitis 05/30/2018    Moderate episode of recurrent major depressive disorder ([facility] 05/30/2018     Current Outpatient Medications   Medication Sig Dispense Refill    cetirizine (ZYRTEC) 10 mg tablet Take 1 Tab by mouth daily. Indications: inflammation of the nose due to an allergy 30 Tab 5    azithromycin (ZITHROMAX Z-BREANNA) 250 mg tablet Take 2 tabs today, then one daily 6 Tab 0    benzonatate (TESSALON) 200 mg capsule Take 1 Cap by mouth three (3) times daily as needed for Cough for up to 7 days. 21 Cap 0    OLANZapine (ZYPREXA) 2.5 mg tablet TAKE 1 TABLET BY MOUTH EVERY DAY AT NIGHT 30 Tab 1    cholecalciferol (VITAMIN D3) 1,000 unit cap TAKE 1 CAPSULE BY MOUTH ONCE DAILY  4    desonide (DESOWEN) 0.05 % topical ointment Apply  to affected area two (2) times a day. 60 g 0    prenatal vit-iron fumarate-fa 27 mg iron- 0.8 mg tab tablet Take 1 Tab by mouth daily.  acetaminophen (TYLENOL) 325 mg tablet Take 2 Tabs by mouth every four (4) hours as needed for Pain. 20 Tab 0    ketorolac (TORADOL) 10 mg tablet Take 1 Tab by mouth every six (6) hours as needed for Pain. (Patient not taking: Reported on 4/10/2019) 20 Tab 0    ondansetron (ZOFRAN ODT) 4 mg disintegrating tablet Take 1 Tab by mouth every eight (8) hours as needed for Nausea. 20 Tab 0    sertraline (ZOLOFT) 100 mg tablet Please take one  tablet daily for 7 days and then take 1and 1/2 tablet daily. 45 Tab 1    busPIRone (BUSPAR) 10 mg tablet Take 1 Tab by mouth two (2) times daily as needed. 60 Tab 1    omeprazole (PRILOSEC) 20 mg capsule TAKE ONE CAPSULE BY MOUTH EVERY DAY IN 2 WEEK INTERVALS (Patient not taking: Reported on 4/10/2019) 90 Cap 0    ondansetron (ZOFRAN ODT) 8 mg disintegrating tablet Take 1 Tab by mouth every eight (8) hours as needed for Nausea. 20 Tab 0     Allergies   Allergen Reactions    Amoxicillin Rash and Other (comments)     Vaginal itching    Flagyl [Metronidazole] Nausea and Vomiting     metrogel is okay.     Pcn [Penicillins] Itching     Vaginal itching     Past Medical History:   Diagnosis Date    Anxiety     Bipolar affective ([address]     Depression     Diabetes ([facility]     diet control    Hyperlipidemia     High Cholesterol    Hyperlipidemia     Major depression      Past Surgical History:   Procedure Laterality Date    HX GI      Colonoscopy    HX HERNIA REPAIR      as child     Family History   Problem Relation Age of Onset    Diabetes Mother     Depression Mother     Asthma Brother     Stroke Maternal Grandmother         aneurysm    Cancer Maternal Grandmother         kidney    Hypertension Maternal Grandmother     Cancer Paternal Grandmother         lung    Diabetes Brother     Bipolar Disorder Brother     Schizophrenia Brother     Other Brother         paranoia    Breast Cancer Other     Breast Cancer Other      Social History     Tobacco Use    Smoking status: Current Some Day Smoker     Packs/day: 0.50    Smokeless tobacco: Current User    Tobacco comment: cigars   Substance Use Topics    Alcohol use: Yes     Comment: occasional          Review of Systems   Constitutional: Negative for fever and malaise/fatigue. HENT: Positive for congestion, hoarse voice, rhinorrhea, sinus pressure (maxillary), sinus pain and sore throat. Negative for ear pain and tinnitus. Respiratory: Positive for cough and sputum production. Negative for shortness of breath and wheezing. Cardiovascular: Negative for chest pain. Gastrointestinal: Negative for nausea. Musculoskeletal: Negative for myalgias. Neurological: Negative for headaches. All other systems reviewed and are negative. Physical Exam   Constitutional: She is oriented to person, place, and time. She appears well-developed and well-nourished. She appears distressed. HENT:   Head: Normocephalic and atraumatic. Right Ear: External ear normal. No drainage. Tympanic membrane is injected. Tympanic membrane is not erythematous. Left Ear: External ear normal. No drainage. Tympanic membrane is injected. Tympanic membrane is not erythematous. Nose: Mucosal edema present. No rhinorrhea. Right sinus exhibits no maxillary sinus tenderness and no frontal sinus tenderness. Left sinus exhibits no maxillary sinus tenderness and no frontal sinus tenderness. Mouth/Throat: Mucous membranes are normal. Posterior oropharyngeal erythema (mildly) present. No oropharyngeal exudate or posterior oropharyngeal edema. Eyes: Pupils are equal, round, and reactive to light. Cardiovascular: Normal rate and intact distal pulses. Pulmonary/Chest: Effort normal. No respiratory distress. She has no decreased breath sounds. She has no rhonchi. Abdominal: Soft. She exhibits no distension. Musculoskeletal: She exhibits no edema. Neurological: She is alert and oriented to person, place, and time.    Skin: Skin is warm and dry. She is not diaphoretic. Psychiatric: She has a normal mood and affect. Her behavior is normal. Judgment normal.   Nursing note and vitals reviewed. ASSESSMENT and PLAN    ICD-10-CM ICD-9-CM    1. Acute non-recurrent frontal sinusitis J01.10 461.1 cetirizine (ZYRTEC) 10 mg tablet   2.  Acute bronchitis, unspecified organism J20.9 466.0 cetirizine (ZYRTEC) 10 mg tablet      azithromycin (ZITHROMAX Z-BREANNA) 250 mg tablet      benzonatate (TESSALON) 200 mg capsule

## 2019-04-23 NOTE — PROGRESS NOTES
Pt is here for   Chief Complaint   Patient presents with    Sinus Infection     pt states x 5 day. .. pt states that coughing, runny nose, sneezing, pt states that she's now coughing up green phlem all day and night, pt states that she's tried OTC medication but nothing has really helped.  Medication Refill     pt states that she would like a refill on Zyrtec     Pt denies pain at this time    1. Have you been to the ER, urgent care clinic since your last visit? Hospitalized since your last visit? No    2. Have you seen or consulted any other health care providers outside of the [address] [place] [address] since your last visit? Include any pap smears or colon screening.  No

## 2019-05-01 ENCOUNTER — TELEPHONE (OUTPATIENT)
Dept: INTERNAL MEDICINE CLINIC | Age: 39
End: 2019-05-01

## 2019-05-01 NOTE — TELEPHONE ENCOUNTER
Pt states her job needs a letter/note from you stating what is preventing her from doing her job (example lifting, bending and climbing). You filled out paperwokr for her  About 2 weeks ago and they do not need another form filled out just a note.  Pt # [phone]

## 2019-05-10 ENCOUNTER — OFFICE VISIT (OUTPATIENT)
Dept: INTERNAL MEDICINE CLINIC | Age: 39
End: 2019-05-10

## 2019-05-10 VITALS
HEART RATE: 68 BPM | SYSTOLIC BLOOD PRESSURE: 107 MMHG | TEMPERATURE: 98.3 F | BODY MASS INDEX: 26.43 KG/M2 | DIASTOLIC BLOOD PRESSURE: 76 MMHG | HEIGHT: 61 IN | WEIGHT: 140 LBS | RESPIRATION RATE: 16 BRPM | OXYGEN SATURATION: 100 %

## 2019-05-10 DIAGNOSIS — N89.8 VAGINA ITCHING: Primary | ICD-10-CM

## 2019-05-10 DIAGNOSIS — R05.3 PERSISTENT COUGH FOR 3 WEEKS OR LONGER: ICD-10-CM

## 2019-05-10 DIAGNOSIS — Z02.9 ADMINISTRATIVE ENCOUNTER: ICD-10-CM

## 2019-05-10 DIAGNOSIS — D22.4 MELANOCYTIC NEVI OF SCALP AND NECK: ICD-10-CM

## 2019-05-10 RX ORDER — MONTELUKAST SODIUM 10 MG/1
10 TABLET ORAL DAILY
Qty: 30 TAB | Refills: 5 | Status: SHIPPED | OUTPATIENT
Start: 2019-05-10 | End: 2020-04-13 | Stop reason: SDUPTHER

## 2019-05-10 RX ORDER — PRENATAL VIT NO.126/IRON/FOLIC 28MG-0.8MG
TABLET ORAL
Refills: 11 | COMMUNITY
Start: 2019-04-04 | End: 2020-04-13 | Stop reason: ALTCHOICE

## 2019-05-10 RX ORDER — ALBUTEROL SULFATE 90 UG/1
2 AEROSOL, METERED RESPIRATORY (INHALATION)
Qty: 1 INHALER | Refills: 0 | Status: SHIPPED | OUTPATIENT
Start: 2019-05-10 | End: 2021-09-01 | Stop reason: SDUPTHER

## 2019-05-10 RX ORDER — MEDROXYPROGESTERONE ACETATE 150 MG/ML
INJECTION, SUSPENSION INTRAMUSCULAR
Refills: 2 | COMMUNITY
Start: 2019-05-03 | End: 2021-01-21

## 2019-05-10 RX ORDER — FLUCONAZOLE 150 MG/1
150 TABLET ORAL AS NEEDED
Qty: 2 TAB | Refills: 0 | Status: SHIPPED | OUTPATIENT
Start: 2019-05-10 | End: 2019-09-05 | Stop reason: SDUPTHER

## 2019-05-10 RX ORDER — PROMETHAZINE HYDROCHLORIDE 6.25 MG/5ML
12.5 SYRUP ORAL
Qty: 120 ML | Refills: 0 | Status: SHIPPED | OUTPATIENT
Start: 2019-05-10 | End: 2019-05-17

## 2019-05-10 NOTE — PROGRESS NOTES
Wilmer Vargas is a 37 y.o. female and presents with Vaginal Itching (pt states started yesterday with discharge, pt states the discharge is milky, pt states she used vaginal cream and them cottage cheese like discharge was present, pt states that she did change her soap, she ran out of  dove, so she used the gold bar dial)    Subjective:  Pt is here for work note adjustment to address ER visit on 4/8 for acute febrile illness & flu-like symptoms of fever, coughing, sore throat, sinus HA, AND body aches. Diagnosed with flu-like symptoms. Was given cough syrup and decongestants, however cough has NOT resolved with use of cough caps or prescribed meds. Mild SOB reported. Her job is giving her push back regarding the reason she needed time away from work and NOT accepting documentation completed already. Also concerned for vaginal discharge for past 3 days. Cottage cheese like after switching soaps when her Hassel Ratel ran out, until she was able to replenish recently. Associated with itching this morning. No pelvic pain or vaginal bleeding reported. Denies dysuria. No longer with rectal bleeding, but forgot appt yesterday with GI. Needs to reschedule. Lastly with spot on scalp that has been cut accidentally with lower hairstyle and causing discomfort. Present for years, but seemingly larger now. Would like it removed.      Review of Systems  Constitutional: negative for fevers, chills, anorexia and weight loss  Eyes:   negative for visual disturbance, drainage, and irritation  ENT:   negative for tinnitus,sore throat,nasal congestion,ear pain,and hoarseness  Respiratory:  negative for cough, hemoptysis, dyspnea, and wheezing  CV:   negative for chest pain, palpitations, and lower extremity edema  GI:   negative for nausea, vomiting, diarrhea, abdominal pain, and melena  Endo:               negative for polyuria,polydipsia,polyphagia, and heat intolerance  Genitourinary: negative for frequency, urgency, dysuria, retention, and hematuria  Integument:  + facial rash worse since URI symptoms.  negative for rash, ulcerations, and pruritus  Hematologic:  negative for easy bruising and bleeding  Musculoskel: negative for arthralgias, muscle weakness,and joint pain/swelling  Neurological:  negative for headaches, dizziness, vertigo,and memory/gait problems  Behavl/Psych: negative for feelings of anxiety, depression, suicide, and mood changes    Past Medical History:   Diagnosis Date    Anxiety     Bipolar affective ([facility]     Depression     Diabetes ([facility]     diet control    Hyperlipidemia     High Cholesterol    Hyperlipidemia     Major depression      Past Surgical History:   Procedure Laterality Date    HX GI      Colonoscopy    HX HERNIA REPAIR      as child     Social History     Socioeconomic History    Marital status: SINGLE     Spouse name: Not on file    Number of children: Not on file    Years of education: Not on file    Highest education level: Not on file   Tobacco Use    Smoking status: Current Some Day Smoker     Packs/day: 0.50    Smokeless tobacco: Current User    Tobacco comment: cigars   Substance and Sexual Activity    Alcohol use: Yes     Comment: occasional    Drug use: No    Sexual activity: Yes     Partners: Male     Birth control/protection: None     Family History   Problem Relation Age of Onset    Diabetes Mother     Depression Mother     Asthma Brother     Stroke Maternal Grandmother         aneurysm    Cancer Maternal Grandmother         kidney    Hypertension Maternal Grandmother     Cancer Paternal Grandmother         lung    Diabetes Brother     Bipolar Disorder Brother     Schizophrenia Brother     Other Brother         paranoia    Breast Cancer Other     Breast Cancer Other      Current Outpatient Medications   Medication Sig Dispense Refill    medroxyPROGESTERone (DEPO-PROVERA) 150 mg/mL syrg INJECT 1 ML UNDER THE SKIN EVERY 12 WEEKS (90 DAYS)  2    CLASSIC PRENATAL 28 mg iron- 800 mcg tab TAKE 1 TABLET BY MOUTH EVERY DAY *NOT COVERED*  11    fluconazole (DIFLUCAN) 150 mg tablet Take 1 Tab by mouth as needed (vaginal discharge and itching). 2 Tab 0    montelukast (SINGULAIR) 10 mg tablet Take 1 Tab by mouth daily. 30 Tab 5    albuterol (PROVENTIL HFA, VENTOLIN HFA, PROAIR HFA) 90 mcg/actuation inhaler Take 2 Puffs by inhalation every four (4) hours as needed for Wheezing or Shortness of Breath (persistent coughing). 1 Inhaler 0    promethazine (PHENERGAN) 6.25 mg/5 mL syrup Take 10 mL by mouth nightly as needed (coughing) for up to 7 days. Do NOT Drive, may cause drowsiness 120 mL 0    cetirizine (ZYRTEC) 10 mg tablet Take 1 Tab by mouth daily. Indications: inflammation of the nose due to an allergy 30 Tab 5    OLANZapine (ZYPREXA) 2.5 mg tablet TAKE 1 TABLET BY MOUTH EVERY DAY AT NIGHT 30 Tab 1    cholecalciferol (VITAMIN D3) 1,000 unit cap TAKE 1 CAPSULE BY MOUTH ONCE DAILY  4    acetaminophen (TYLENOL) 325 mg tablet Take 2 Tabs by mouth every four (4) hours as needed for Pain. 20 Tab 0    desonide (DESOWEN) 0.05 % topical ointment Apply  to affected area two (2) times a day. 60 g 0    prenatal vit-iron fumarate-fa 27 mg iron- 0.8 mg tab tablet Take 1 Tab by mouth daily.  ketorolac (TORADOL) 10 mg tablet Take 1 Tab by mouth every six (6) hours as needed for Pain. (Patient not taking: Reported on 4/10/2019) 20 Tab 0    sertraline (ZOLOFT) 100 mg tablet Please take one  tablet daily for 7 days and then take 1and 1/2 tablet daily. 45 Tab 1    busPIRone (BUSPAR) 10 mg tablet Take 1 Tab by mouth two (2) times daily as needed. 60 Tab 1    omeprazole (PRILOSEC) 20 mg capsule TAKE ONE CAPSULE BY MOUTH EVERY DAY IN 2 WEEK INTERVALS (Patient not taking: Reported on 4/10/2019) 90 Cap 0     Allergies   Allergen Reactions    Amoxicillin Rash and Other (comments)     Vaginal itching    Flagyl [Metronidazole] Nausea and Vomiting     metrogel is okay.     Pcn [Penicillins] Itching     Vaginal itching       Objective:  Visit Vitals  /76 (BP 1 Location: Right arm, BP Patient Position: Sitting)   Pulse 68   Temp 98.3 °F (36.8 °C) (Oral)   Resp 16   Ht 5' 1\" (1.549 m)   Wt 140 lb (63.5 kg)   LMP 04/27/2019   SpO2 100%   BMI 26.45 kg/m²     Wt Readings from Last 3 Encounters:   05/10/19 140 lb (63.5 kg)   04/23/19 143 lb (64.9 kg)   04/10/19 145 lb 9.6 oz (66 kg)     Physical Exam:   General appearance - alert, well appearing, and in no distress. Mental status - A/O x 4, normal mood and affect. Head/Eyes- AT/NC. STEVE, EOMI, corneas normal, no foreign bodies. Neck -Supple ,normal CSP. FROM, non-tender. No cervical adenopathy. No thyromegaly. No JVD. Chest - clear to auscultation with symmetric chest rise. No wheezing, rales or rhonchi. Heart - Normal rate, regular rhythm. Normal S1, S2. No MGR. Abdomen - Soft,non-distended. Normoactive BS in all quadrants. NT, no mass, rebound, or HSM   Ext- Radial, DP pulses, 2+ bilaterally. No pedal edema, clubbing, or cyanosis. Skin- Normal for ethnicity, warm, and dry. No hyperpigmentation, ulcerations, or suspicious lesions. Maculopapular rash to both cheeks. Neuro - Normal speech, no focal findings. Normal strength and muscle tone. Coordination and gait normal.        Assessment/Plan:  desowen refilled. Work note written through Friday. Use of OTC antihistamine advised. Medication Side Effects and Warnings were discussed with patient: yes   Patient Labs were reviewed: yes  Patient Past Records were reviewed: yes    See below for other orders   Follow-up and Dispositions    · Return in about 3 months (around 8/10/2019) for GI f/u for bleeding. ICD-10-CM ICD-9-CM    1. Vaginal odor N89.8 625.8 fluconazole (DIFLUCAN) 150 mg tablet   2.  Melanocytic nevi of scalp and neck D22.4 216.4 REFERRAL TO DERMATOLOGY   3. Persistent cough for 3 weeks or longer R05 786.2 montelukast (SINGULAIR) 10 mg tablet      albuterol (PROVENTIL HFA, VENTOLIN HFA, PROAIR HFA) 90 mcg/actuation inhaler      promethazine (PHENERGAN) 6.25 mg/5 mL syrup   4. Administrative encounter Z02.9 V68.9      Orders Placed This Encounter    REFERRAL TO DERMATOLOGY     Referral Priority:   Routine     Referral Type:   Consultation     Referral Reason:   Specialty Services Required     Referred to Provider:   Yinka Saavedra NP    medroxyPROGESTERone (DEPO-PROVERA) 150 mg/mL syrg     Sig: INJECT 1 ML UNDER THE SKIN EVERY 12 WEEKS (90 DAYS)     Refill:  2    CLASSIC PRENATAL 28 mg iron- 800 mcg tab     Sig: TAKE 1 TABLET BY MOUTH EVERY DAY *NOT COVERED*     Refill:  11    fluconazole (DIFLUCAN) 150 mg tablet     Sig: Take 1 Tab by mouth as needed (vaginal discharge and itching). Dispense:  2 Tab     Refill:  0    montelukast (SINGULAIR) 10 mg tablet     Sig: Take 1 Tab by mouth daily. Dispense:  30 Tab     Refill:  5    albuterol (PROVENTIL HFA, VENTOLIN HFA, PROAIR HFA) 90 mcg/actuation inhaler     Sig: Take 2 Puffs by inhalation every four (4) hours as needed for Wheezing or Shortness of Breath (persistent coughing). Dispense:  1 Inhaler     Refill:  0    promethazine (PHENERGAN) 6.25 mg/5 mL syrup     Sig: Take 10 mL by mouth nightly as needed (coughing) for up to 7 days. Do NOT Drive, may cause drowsiness     Dispense:  120 mL     Refill:  0       Vernell Judd expressed understanding of plan. An After Visit Summary was offered/printed and given to the patient.

## 2019-05-10 NOTE — PROGRESS NOTES
Pt is here for   Chief Complaint   Patient presents with    Vaginal Itching     pt states started yesterday with discharge, pt states the discharge is milky, pt states she used vaginal cream and them cottage cheese like discharge was present, pt states that she did change her soap, she ran out of  dove, so she used the gold bar dial     Pt denies pain at this time    1. Have you been to the ER, urgent care clinic since your last visit? Hospitalized since your last visit? No    2. Have you seen or consulted any other health care providers outside of the [address] [place] [address] since your last visit? Include any pap smears or colon screening.  No

## 2019-05-10 NOTE — PATIENT INSTRUCTIONS

## 2019-05-13 ENCOUNTER — TELEPHONE (OUTPATIENT)
Dept: INTERNAL MEDICINE CLINIC | Age: 39
End: 2019-05-13

## 2019-05-13 RX ORDER — TINIDAZOLE 250 MG/1
2 TABLET ORAL
Qty: 16 TAB | Refills: 0 | Status: SHIPPED | OUTPATIENT
Start: 2019-05-13 | End: 2019-05-15

## 2019-05-13 NOTE — TELEPHONE ENCOUNTER
Pt states the diflucan is not helping at all. The discharge is oozing out and she thinks she may have BV.  Pt # [phone]

## 2019-05-14 NOTE — TELEPHONE ENCOUNTER
As we discussed in the office, she really may need to seek care with GYN. However, I will send for Rx.

## 2019-06-18 ENCOUNTER — OFFICE VISIT (OUTPATIENT)
Dept: BEHAVIORAL/MENTAL HEALTH CLINIC | Age: 39
End: 2019-06-18

## 2019-06-18 VITALS
HEIGHT: 61 IN | WEIGHT: 145 LBS | DIASTOLIC BLOOD PRESSURE: 82 MMHG | SYSTOLIC BLOOD PRESSURE: 117 MMHG | BODY MASS INDEX: 27.38 KG/M2 | HEART RATE: 100 BPM

## 2019-06-18 DIAGNOSIS — F41.9 ANXIETY AND DEPRESSION: ICD-10-CM

## 2019-06-18 DIAGNOSIS — F32.A ANXIETY AND DEPRESSION: ICD-10-CM

## 2019-06-18 DIAGNOSIS — Z91.52 H/O SELF MUTILATION: ICD-10-CM

## 2019-06-18 DIAGNOSIS — F60.3 BORDERLINE PERSONALITY DISORDER (HCC): ICD-10-CM

## 2019-06-18 DIAGNOSIS — F39 MOOD DISORDER (HCC): Primary | ICD-10-CM

## 2019-06-18 DIAGNOSIS — F44.9 DISSOCIATIVE DISORDER OR REACTION: ICD-10-CM

## 2019-06-18 RX ORDER — OLANZAPINE 2.5 MG/1
TABLET ORAL
Qty: 30 TAB | Refills: 3 | Status: SHIPPED | OUTPATIENT
Start: 2019-06-18 | End: 2021-10-04

## 2019-06-18 RX ORDER — SERTRALINE HYDROCHLORIDE 50 MG/1
50 TABLET, FILM COATED ORAL DAILY
Qty: 30 TAB | Refills: 3 | Status: SHIPPED | OUTPATIENT
Start: 2019-06-18 | End: 2021-10-04

## 2019-06-18 RX ORDER — ONDANSETRON 4 MG/1
TABLET, ORALLY DISINTEGRATING ORAL
Refills: 0 | COMMUNITY
Start: 2019-04-08 | End: 2021-01-21

## 2019-06-18 RX ORDER — BUSPIRONE HYDROCHLORIDE 10 MG/1
10 TABLET ORAL
Qty: 60 TAB | Refills: 3 | Status: SHIPPED | OUTPATIENT
Start: 2019-06-18 | End: 2022-03-16
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
## 2019-06-18 NOTE — PATIENT INSTRUCTIONS
Learning About Borderline Personality Disorder  What is borderline personality disorder? Borderline personality disorder is a mental health condition. It causes intense mood swings, impulsive behaviors, and severe problems with self-worth. It can lead to troubled relationships in every area of your life. Experts don't know exactly what causes the disorder. It may be linked to a problem with the parts of the brain that control reactions to emotions. The disorder also seems to run in families. Often, people who get it faced some kind of childhood trauma such as abuse, neglect, or the death of a parent. Most of the time, signs of the disorder first appear in childhood. But problems often don't start until the early adult years. It's important to know that the disorder can be treated. Treatment can be hard, and getting better can take years. But with treatment, most people with borderline personality disorder do get better over time. What are the symptoms? Everyone has problems with emotions or behaviors sometimes. But if you have borderline personality disorder, the problems are severe. They repeat over a long time and disrupt your life. The most common symptoms include:  · Intense emotions and mood swings. · Harmful, impulsive behaviors. These may include substance abuse, binge eating, out-of-control spending, risky sexual behavior, and reckless driving. · Hurting yourself. This may include cutting or burning yourself or attempting suicide. · Trouble with relationships. You may see others as either \"good\" or \"bad. \" And your view may suddenly shift from one to the other, for minor reasons. · Feeling worthless or empty inside. · A frantic fear of being left alone (abandoned). This fear may lead to desperate attempts to hold on to those around you. Or it may cause you to reject others before they can reject you. · Problems with anger. These may include violent temper tantrums and aggressive behavior.   How is it treated? It may seem that there is no one on your side. You might have been told that there is no hope. But just because you've heard this, that doesn't mean it's true. Getting medical treatment and taking care of yourself can really help. Medical treatment  When you start treatment, you will find health professionals who will help you. You will also meet others who have gone through what you are going through. Long-term treatment can reduce symptoms and harmful behaviors. It can also help you manage your emotions better. Treatment may include:  · Counseling and therapy. It's important to find a counselor you can build a stable relationship with. This can be hard. Your condition may cause you to see your counselor as caring one minute and cruel the next. This can happen especially when he or she asks you to try to change a behavior. Try to find a counselor who has special training in dialectical behavioral therapy. It's a type of therapy that is often used to treat people with this disorder. · Medicines. Examples are antidepressants, mood stabilizers, and antipsychotics. They may be helpful when you use them along with therapy. Self-care  There are things you can do to help yourself. Here are some tips:  · Keep a regular daily schedule. · Do not drink alcohol or use drugs. If your doctor prescribed medicines for you, take them exactly as directed. · Get a healthy amount of sleep. Try to be active during daylight hours, and go to sleep and wake up at the same time each day. · Eat healthy foods like fruits and vegetables; whole-grain breads and cereals; and lean meats, fish, and poultry. · Practice mindfulness or other meditation. To be mindful means to pay attention to and accept the things that are happening right now, in the present moment. · Keep to your treatment plan, even when it's hard.   Keep the numbers for these national suicide hotlines: 2-524-756-TALK (4-391.856.6745) and 6-207-MZYDNQY (9-553.590.1514). If you or someone you know talks about suicide or about feeling hopeless, get help right away. When should you call for help? Call 911 anytime you think you may need emergency care. For example, call if:  · You feel you cannot stop from hurting yourself or someone else. Call your doctor now or seek immediate medical care if:  · You feel much more depressed. · You hear voices. Watch closely for changes in your health, and be sure to contact your doctor if:  · You have a new crisis you can't handle. Follow-up care is a key part of your treatment and safety. Be sure to make and go to all appointments, and call your doctor if you are having problems. It's also a good idea to know your test results and keep a list of the medicines you take. Where can you learn more? Go to http://star-arvin.info/. Enter Z131 in the search box to learn more about \"Learning About Borderline Personality Disorder. \"  Current as of: September 11, 2018  Content Version: 11.9  © 6772-5547 [employer]. Care instructions adapted under license by [employer] (which disclaims liability or warranty for this information). If you have questions about a medical condition or this instruction, always ask your healthcare professional. [address] any warranty or liability for your use of this information.

## 2019-09-05 ENCOUNTER — OFFICE VISIT (OUTPATIENT)
Dept: INTERNAL MEDICINE CLINIC | Age: 39
End: 2019-09-05

## 2019-09-05 VITALS
BODY MASS INDEX: 27.38 KG/M2 | HEART RATE: 88 BPM | OXYGEN SATURATION: 100 % | HEIGHT: 61 IN | RESPIRATION RATE: 17 BRPM | SYSTOLIC BLOOD PRESSURE: 113 MMHG | WEIGHT: 145 LBS | TEMPERATURE: 98.8 F | DIASTOLIC BLOOD PRESSURE: 83 MMHG

## 2019-09-05 DIAGNOSIS — B37.9 ANTIBIOTIC-INDUCED YEAST INFECTION: ICD-10-CM

## 2019-09-05 DIAGNOSIS — T36.95XA ANTIBIOTIC-INDUCED YEAST INFECTION: ICD-10-CM

## 2019-09-05 DIAGNOSIS — L24.0 IRRITANT CONTACT DERMATITIS DUE TO DETERGENT: Primary | ICD-10-CM

## 2019-09-05 DIAGNOSIS — D22.4 MELANOCYTIC NEVI OF SCALP AND NECK: ICD-10-CM

## 2019-09-05 DIAGNOSIS — K59.00 CONSTIPATION, UNSPECIFIED CONSTIPATION TYPE: ICD-10-CM

## 2019-09-05 DIAGNOSIS — N89.8 VAGINAL ODOR: ICD-10-CM

## 2019-09-05 DIAGNOSIS — K64.8 INTERNAL HEMORRHOID: ICD-10-CM

## 2019-09-05 RX ORDER — TRIAMCINOLONE ACETONIDE 1 MG/G
OINTMENT TOPICAL 2 TIMES DAILY
Qty: 30 G | Refills: 0 | Status: SHIPPED | OUTPATIENT
Start: 2019-09-05 | End: 2021-06-12 | Stop reason: SDUPTHER

## 2019-09-05 RX ORDER — METRONIDAZOLE 7.5 MG/G
1 GEL VAGINAL
Qty: 25 G | Refills: 0 | Status: SHIPPED | OUTPATIENT
Start: 2019-09-05 | End: 2019-09-10

## 2019-09-05 RX ORDER — FLUCONAZOLE 150 MG/1
150 TABLET ORAL AS NEEDED
Qty: 2 TAB | Refills: 0 | Status: SHIPPED | OUTPATIENT
Start: 2019-09-05 | End: 2021-01-21 | Stop reason: ALTCHOICE

## 2019-09-05 NOTE — PATIENT INSTRUCTIONS
You have been referred to GI for your polyps:  [place] [place] [address]  Phone: [phone]  Fax: [phone]         Dermatitis: Care Instructions  Your Care Instructions  Dermatitis is the general name used for any rash or inflammation of the skin. Different kinds of dermatitis cause different kinds of rashes. Common causes of a rash include new medicines, plants (such as poison oak or poison ivy), heat, and stress. Certain illnesses can also cause a rash. An allergic reaction to something that touches your skin, such as latex, nickel, or poison ivy, is called contact dermatitis. Contact dermatitis may also be caused by something that irritates the skin, such as bleach, a chemical, or soap. These types of rashes cannot be spread from person to person. How long your rash will last depends on what caused it. Rashes may last a few days or months. Follow-up care is a key part of your treatment and safety. Be sure to make and go to all appointments, and call your doctor if you are having problems. It's also a good idea to know your test results and keep a list of the medicines you take. How can you care for yourself at home? · Do not scratch the rash. Cut your nails short, and file them smooth. Or wear gloves if this helps keep you from scratching. · Wash the area with water only. Pat dry. · Put cold, wet cloths on the rash to reduce itching. · Keep cool, and stay out of the sun. · Leave the rash open to the air as much as possible. · If the rash itches, use hydrocortisone cream. Follow the directions on the label. Calamine lotion may help for plant rashes. · Take an over-the-counter antihistamine, such as diphenhydramine (Benadryl) or loratadine (Claritin), to help calm the itching. Read and follow all instructions on the label. · If your doctor prescribed a cream, use it as directed. If your doctor prescribed medicine, take it exactly as directed.   When should you call for help? Call your doctor now or seek immediate medical care if:    · You have symptoms of infection, such as:  ? Increased pain, swelling, warmth, or redness. ? Red streaks leading from the area. ? Pus draining from the area. ? A fever.     · You have joint pain along with the rash.    Watch closely for changes in your health, and be sure to contact your doctor if:    · Your rash is changing or getting worse.     · You are not getting better as expected. Where can you learn more? Go to http://star-arvin.info/. Enter [phone] in the search box to learn more about \"Dermatitis: Care Instructions. \"  Current as of: April 1, 2019  Content Version: 12.1  © 7676-4755 [employer]. Care instructions adapted under license by [employer] (which disclaims liability or warranty for this information). If you have questions about a medical condition or this instruction, always ask your healthcare professional. [place] [address] any warranty or liability for your use of this information.

## 2019-09-05 NOTE — PROGRESS NOTES
Pt is here for   Chief Complaint   Patient presents with    Vaginal Discharge     x 2 days with odor. . denies itching and odor, with clear discharge    Skin Problem     itching with red bumps between breast, pt states started yesterday      Pt denies pain at this time    1. Have you been to the ER, urgent care clinic since your last visit? Hospitalized since your last visit? No    2. Have you seen or consulted any other health care providers outside of the [address] [place] [address] since your last visit? Include any pap smears or colon screening.  No
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
## 2020-03-23 ENCOUNTER — TELEPHONE (OUTPATIENT)
Dept: INTERNAL MEDICINE CLINIC | Age: 40
End: 2020-03-23

## 2020-03-23 DIAGNOSIS — R03.0 ELEVATED BP WITHOUT DIAGNOSIS OF HYPERTENSION: Primary | ICD-10-CM

## 2020-03-23 DIAGNOSIS — H53.8 BLURRING OF VISION: ICD-10-CM

## 2020-03-23 DIAGNOSIS — R51.9 NONINTRACTABLE HEADACHE, UNSPECIFIED CHRONICITY PATTERN, UNSPECIFIED HEADACHE TYPE: ICD-10-CM

## 2020-03-23 RX ORDER — CLONIDINE HYDROCHLORIDE 0.1 MG/1
0.1 TABLET ORAL
Qty: 28 TAB | Refills: 0 | Status: SHIPPED | OUTPATIENT
Start: 2020-03-23 | End: 2020-03-23 | Stop reason: SDUPTHER

## 2020-03-23 RX ORDER — CLONIDINE HYDROCHLORIDE 0.1 MG/1
0.1 TABLET ORAL
Qty: 28 TAB | Refills: 0 | Status: SHIPPED | OUTPATIENT
Start: 2020-03-23 | End: 2020-04-13 | Stop reason: SDUPTHER

## 2020-03-23 NOTE — TELEPHONE ENCOUNTER
Patient called and stated that she went to the ED the other night but left because she had to wait too long. She believes she is having issues with her BP. Around 4am in the morning her bp was 185/121. Please call and advise patient. The contact number is [phone].

## 2020-03-23 NOTE — TELEPHONE ENCOUNTER
Called pt to discuss elevated BP readings. Had blurring vision, weakness, and leg pain. Reading 2 days ago 133/115 at work with [employer]. Also checked yesterday with wrist cuff, 195/144; asymptomatic except blurring vision and cough. Stayed elevated. Today when checked 127/88 using same cuff. Attempted to be seen in ER yesterday, reading at that time 0200 (193/131), no symptoms except continued blurring vision with slight headache. No history of HTN, except pre-eclampsia with first pregnancy in 1999. No meds. Will send med, clonidine, for use with BP readings >160/90. Advised to get ARM CUFF for BP readings as wrist cuff NOT reliable. Instructed to take after cuff placed on arm at table, back flat against backrest, feet flat on the floor and waiting 5 min before taking. Providing enough med for BID dosing if needed. Reviewed sedating effects of med, and operating machinery within 6 hr of use reviewed. Pt asked for med to be sent to new pharmacy [facility] on [address]. Will ask to come in 2 wks for in person visit to reassess BP and determine need for daily medication.

## 2020-03-27 ENCOUNTER — TELEPHONE (OUTPATIENT)
Dept: INTERNAL MEDICINE CLINIC | Age: 40
End: 2020-03-27

## 2020-03-27 NOTE — TELEPHONE ENCOUNTER
Pt returned call to the office today @ 3:30pm. When I reviewed her previous message you stated you wanted her to come into the office for a visit with you. However pt is diabetic. Do you still want her to be seen in the office ?

## 2020-03-30 NOTE — TELEPHONE ENCOUNTER
Actually yes, I need to confirm her elevated BP readings as this is a new finding for her. Sorry. However, if she purchased an arm cuff and her readings are better, than we can f/up virtually instead.

## 2020-04-13 ENCOUNTER — VIRTUAL VISIT (OUTPATIENT)
Dept: INTERNAL MEDICINE CLINIC | Age: 40
End: 2020-04-13

## 2020-04-13 DIAGNOSIS — N64.4 BREAST PAIN, RIGHT: ICD-10-CM

## 2020-04-13 DIAGNOSIS — K21.9 GASTROESOPHAGEAL REFLUX DISEASE WITHOUT ESOPHAGITIS: ICD-10-CM

## 2020-04-13 DIAGNOSIS — N64.4 SORENESS BREAST: ICD-10-CM

## 2020-04-13 DIAGNOSIS — M79.661 RIGHT CALF PAIN: ICD-10-CM

## 2020-04-13 DIAGNOSIS — I10 HYPERTENSION, UNSPECIFIED TYPE: Primary | ICD-10-CM

## 2020-04-13 DIAGNOSIS — M25.561 ACUTE PAIN OF RIGHT KNEE: ICD-10-CM

## 2020-04-13 DIAGNOSIS — R05.3 PERSISTENT COUGH FOR 3 WEEKS OR LONGER: ICD-10-CM

## 2020-04-13 RX ORDER — GLUCOSAMINE SULFATE 1500 MG
1000 POWDER IN PACKET (EA) ORAL DAILY
Qty: 90 CAP | Refills: 3 | Status: SHIPPED | OUTPATIENT
Start: 2020-04-13 | End: 2021-09-01 | Stop reason: ALTCHOICE

## 2020-04-13 RX ORDER — OMEPRAZOLE 20 MG/1
20 CAPSULE, DELAYED RELEASE ORAL DAILY
Qty: 30 CAP | Refills: 0 | Status: SHIPPED | OUTPATIENT
Start: 2020-04-13 | End: 2021-01-21

## 2020-04-13 RX ORDER — MONTELUKAST SODIUM 10 MG/1
10 TABLET ORAL DAILY
Qty: 30 TAB | Refills: 5 | Status: SHIPPED | OUTPATIENT
Start: 2020-04-13 | End: 2021-09-01 | Stop reason: ALTCHOICE

## 2020-04-13 RX ORDER — CLONIDINE HYDROCHLORIDE 0.1 MG/1
0.1 TABLET ORAL
Qty: 30 TAB | Refills: 2 | Status: SHIPPED | OUTPATIENT
Start: 2020-04-13 | End: 2020-06-22 | Stop reason: SDUPTHER

## 2020-04-13 NOTE — PROGRESS NOTES
Chief Complaint   Patient presents with    Hypertension     follow up        1. Have you been to the ER, urgent care clinic since your last visit? Hospitalized since your last visit? Yes, but pt did not stay to be seen - for BP    2. Have you seen or consulted any other health care providers outside of the [facility] since your last visit? Include any pap smears or colon screening.  No

## 2020-04-13 NOTE — PROGRESS NOTES
Consent: Rhett Helton, who was seen by synchronous (real-time) audio-video technology, and/or her healthcare decision maker, is aware that this patient-initiated, Telehealth encounter on 4/13/2020 is a billable service, with coverage as determined by her insurance carrier. She is aware that she may receive a bill and has provided verbal consent to proceed: Yes. Assessment & Plan:   Diagnoses and all orders for this visit:    1. Hypertension, unspecified type  -     cloNIDine HCL (CATAPRES) 0.1 mg tablet; Take 1 Tab by mouth nightly. May cause DROWSINESS. 2. Soreness breast  -     CHAY MAMMO BI DX INCL CAD; Future    3. Breast pain, right  -     CHAY MAMMO BI DX INCL CAD; Future    4. Acute pain of right knee    5. Right calf pain          Follow-up and Dispositions    · Return in about 2 months (around 6/13/2020) for HTN, breast f/u, knee pain. I spent at least 25 minutes with this established patient, and >50% of the time was spent counseling and/or coordinating care regarding knee bursitis, HTN, daily use of CLONIDINE, continued home blood pressure monitoring, diagnostic mammo for breast soreness, likely cause with wt gain reported, use of OTC agents for joint/muscle pain reported. 712  Subjective:   Rhett Helton is a 41 y.o. female who was seen for Hypertension (follow up )    Hypertension Review:  The patient has hypertension  Diet and Lifestyle: generally does try to follow a  low sodium diet, exercises sporadically   Home BP Monitoring: is not measured at home, 120-130's/90's  Pertinent ROS: taking medications as instructed, took clonidine last yesterday morning no medication side effects noted. No TIA's, chest pain on exertion, dyspnea on exertion, or swelling of ankles. BP Readings from Last 3 Encounters:   09/05/19 113/83   06/18/19 117/82   05/10/19 107/76     Having right knee and calf pain. Onset about a few weeks ago. Intermittent.  Worse 2 weeks ago and better over the past 1 week. Worse with air conditioning at work. Takes NON-Aspirin with good relief. Lastly concerned for breast soreness over past few weeks. Initially both breast, now only right breast with discomfort. No nipple discharge, lumps, or masses reported. Last MAMMO 2018. Prior to Admission medications    Medication Sig Start Date End Date Taking? Authorizing Provider   cloNIDine HCL (CATAPRES) 0.1 mg tablet Take 1 Tab by mouth nightly. May cause DROWSINESS. 4/13/20  Yes Gabriella Fowler NP   triamcinolone acetonide (KENALOG) 0.1 % ointment Apply  to affected area two (2) times a day. use thin layer 9/5/19  Yes Jimena Fowler NP   OLANZapine (ZYPREXA) 2.5 mg tablet TAKE 1 TABLET BY MOUTH EVERY DAY AT NIGHT 6/18/19  Yes Carmen Wolf NP   busPIRone (BUSPAR) 10 mg tablet Take 1 Tab by mouth two (2) times daily as needed (anxiety). 6/18/19  Yes Carmen Wolf NP   CLASSIC PRENATAL 28 mg iron- 800 mcg tab TAKE 1 TABLET BY MOUTH EVERY DAY *NOT COVERED* 4/4/19  Yes Provider, Historical   albuterol (PROVENTIL HFA, VENTOLIN HFA, PROAIR HFA) 90 mcg/actuation inhaler Take 2 Puffs by inhalation every four (4) hours as needed for Wheezing or Shortness of Breath (persistent coughing). 5/10/19  Yes Gabriella Fowler NP   cholecalciferol (VITAMIN D3) 1,000 unit cap TAKE 1 CAPSULE BY MOUTH ONCE DAILY 2/4/19  Yes Provider, Historical   acetaminophen (TYLENOL) 325 mg tablet Take 2 Tabs by mouth every four (4) hours as needed for Pain. 4/8/19  Yes Dylan Vallecillo MD   omeprazole (PRILOSEC) 20 mg capsule TAKE ONE CAPSULE BY MOUTH EVERY DAY IN 2 WEEK INTERVALS 9/24/18  Yes Gabriella Fowler NP   cloNIDine HCL (CATAPRES) 0.1 mg tablet Take 1 Tab by mouth two (2) times daily as needed (BP readings >160/90). May cause DROWSINESS. 3/23/20 4/13/20  Shailesh Curtis NP   fluconazole (DIFLUCAN) 150 mg tablet Take 1 Tab by mouth as needed (vaginal discharge and itching).  9/5/19   Shailesh Curtis NP   ondansetron (ZOFRAN ODT) 4 mg disintegrating tablet TAKE 1 TABLET BY MOUTH EVERY 8 HOURS AS NEEDED FOR NAUSEA 4/8/19   Provider, Historical   sertraline (ZOLOFT) 50 mg tablet Take 1 Tab by mouth daily. 6/18/19   Carmen Wolf NP   medroxyPROGESTERone (DEPO-PROVERA) 150 mg/mL syrg INJECT 1 ML UNDER THE SKIN EVERY 12 WEEKS (90 DAYS) 5/3/19   Provider, Historical   montelukast (SINGULAIR) 10 mg tablet Take 1 Tab by mouth daily. 5/10/19   Magi Yost NP   cetirizine (ZYRTEC) 10 mg tablet Take 1 Tab by mouth daily. Indications: inflammation of the nose due to an allergy 4/23/19   Magi Yost NP   prenatal vit-iron fumarate-fa 27 mg iron- 0.8 mg tab tablet Take 1 Tab by mouth daily. Other, MD Keaton   ketorolac (TORADOL) 10 mg tablet Take 1 Tab by mouth every six (6) hours as needed for Pain. 4/8/19   Lee Ann Oliveros MD     Allergies   Allergen Reactions    Amoxicillin Rash and Other (comments)     Vaginal itching    Flagyl [Metronidazole] Nausea and Vomiting     metrogel is okay.  Pcn [Penicillins] Itching     Vaginal itching       Patient Active Problem List    Diagnosis Date Noted    Gastroesophageal reflux disease without esophagitis 05/30/2018    Moderate episode of recurrent [facility] 05/30/2018     Current Outpatient Medications   Medication Sig Dispense Refill    cloNIDine HCL (CATAPRES) 0.1 mg tablet Take 1 Tab by mouth nightly. May cause DROWSINESS. 30 Tab 2    triamcinolone acetonide (KENALOG) 0.1 % ointment Apply  to affected area two (2) times a day. use thin layer 30 g 0    OLANZapine (ZYPREXA) 2.5 mg tablet TAKE 1 TABLET BY MOUTH EVERY DAY AT NIGHT 30 Tab 3    busPIRone (BUSPAR) 10 mg tablet Take 1 Tab by mouth two (2) times daily as needed (anxiety).  60 Tab 3    CLASSIC PRENATAL 28 mg iron- 800 mcg tab TAKE 1 TABLET BY MOUTH EVERY DAY *NOT COVERED*  11    albuterol (PROVENTIL HFA, VENTOLIN HFA, PROAIR HFA) 90 mcg/actuation inhaler Take 2 Puffs by inhalation every four (4) hours as needed for Wheezing or Shortness of Breath (persistent coughing). 1 Inhaler 0    cholecalciferol (VITAMIN D3) 1,000 unit cap TAKE 1 CAPSULE BY MOUTH ONCE DAILY  4    acetaminophen (TYLENOL) 325 mg tablet Take 2 Tabs by mouth every four (4) hours as needed for Pain. 20 Tab 0    omeprazole (PRILOSEC) 20 mg capsule TAKE ONE CAPSULE BY MOUTH EVERY DAY IN 2 WEEK INTERVALS 90 Cap 0    fluconazole (DIFLUCAN) 150 mg tablet Take 1 Tab by mouth as needed (vaginal discharge and itching). 2 Tab 0    ondansetron (ZOFRAN ODT) 4 mg disintegrating tablet TAKE 1 TABLET BY MOUTH EVERY 8 HOURS AS NEEDED FOR NAUSEA  0    sertraline (ZOLOFT) 50 mg tablet Take 1 Tab by mouth daily. 30 Tab 3    medroxyPROGESTERone (DEPO-PROVERA) 150 mg/mL syrg INJECT 1 ML UNDER THE SKIN EVERY 12 WEEKS (90 DAYS)  2    montelukast (SINGULAIR) 10 mg tablet Take 1 Tab by mouth daily. 30 Tab 5    cetirizine (ZYRTEC) 10 mg tablet Take 1 Tab by mouth daily. Indications: inflammation of the nose due to an allergy 30 Tab 5    prenatal vit-iron fumarate-fa 27 mg iron- 0.8 mg tab tablet Take 1 Tab by mouth daily.  ketorolac (TORADOL) 10 mg tablet Take 1 Tab by mouth every six (6) hours as needed for Pain. 20 Tab 0     Allergies   Allergen Reactions    Amoxicillin Rash and Other (comments)     Vaginal itching    Flagyl [Metronidazole] Nausea and Vomiting     metrogel is okay.     Pcn [Penicillins] Itching     Vaginal itching     Past Medical History:   Diagnosis Date    Anxiety     Bipolar affective ([address]     Depression     Hyperlipidemia     High Cholesterol    Hyperlipidemia     Major depression      Past Surgical History:   Procedure Laterality Date    HX GI      Colonoscopy    HX HERNIA REPAIR      as child     Family History   Problem Relation Age of Onset    Diabetes Mother     Depression Mother     Asthma Brother     Stroke Maternal Grandmother         aneurysm    Cancer Maternal Grandmother         kidney    Hypertension Maternal Grandmother     Cancer Paternal Grandmother         lung    Diabetes Brother     Bipolar Disorder Brother     Schizophrenia Brother     Other Brother         paranoia    Breast Cancer Other     Breast Cancer Other      Social History     Tobacco Use    Smoking status: Current Some Day Smoker     Packs/day: 0.50    Smokeless tobacco: Current User    Tobacco comment: cigars   Substance Use Topics    Alcohol use: Yes     Comment: occasional       Review of Systems   Constitutional: Negative for fever and malaise/fatigue. Respiratory: Negative for cough. Cardiovascular: Negative for chest pain. Gastrointestinal: Negative for diarrhea and vomiting. Musculoskeletal: Positive for joint pain (right knee, NEW). Negative for myalgias. Neurological: Positive for dizziness (intermittently). Negative for headaches. All other systems reviewed and are negative. Objective:   Vital Signs: (As obtained by patient/caregiver at home)  There were no vitals taken for this visit. Physical Exam:  General appearance - alert, well  appearing, and in no distress. Mental status - A/O x 4,normal mood and affect. Eyes- no periorbital edema, drainage, or irritation noted. Nose- no obvious drainage or swelling. Throat- no obvious swelling, goiter, or notable lymphadenopathy  Chest - Symmetric chest rise. No wheezing or coughing. No distress. Skin- normal skin tone noted. No hyperpigmentation or obvious deformities. No diaphoresis noted. No flushing. Neuro - Normal speech, no focal findings or movement disorder. Other pertinent observable physical exam findings:-        We discussed the expected course, resolution and complications of the diagnosis(es) in detail. Medication risks, benefits, costs, interactions, and alternatives were discussed as indicated. I advised her to contact the office if her condition worsens, changes or fails to improve as anticipated. She expressed understanding with the diagnosis(es) and plan. Lewis Espinosa is a 41 y.o. female being evaluated by a video visit encounter for concerns as above. A caregiver was present when appropriate. Due to this being a TeleHealth encounter (During NDJFR-15 public health emergency), evaluation of the following organ systems was limited: Vitals/Constitutional/EENT/Resp/CV/GI//MS/Neuro/Skin/Heme-Lymph-Imm. Pursuant to the emergency declaration under the [address] [place] [address], [facility] waiver authority and the [employer] Act, this Virtual  Visit was conducted, with patient's (and/or legal guardian's) consent, to reduce the patient's risk of exposure to COVID-19 and provide necessary medical care. Services were provided through a video synchronous discussion virtually to substitute for in-person clinic visit. Patient and provider were located at their individual homes.         Danna Basilio NP

## 2020-04-13 NOTE — PATIENT INSTRUCTIONS
Use a knee sleeve and apply heat to area with topical ointments like Bengay or [place]. Take TYLENOL ARTHRITIS daily, using Naproxen for moderate pain. Wait 1 hr, if your pain continues, take your muscle relaxer. Start taking your CLONIDINE at YOUR BEDTIME. Be sure to eat upon waking. Get your MAMMOGRAM done to ensure your breast tenderness is not cancer, but more than likely this is due to your likely reported weight gain. Your last MAMMO was in 2018, which was NORMAL. Kneecap Bursitis: Care Instructions Your Care Instructions Bursitis is inflammation of the bursa. A bursa is a small sac of fluid that cushions a joint and helps it move easily. Bursitis of the kneecap is inflammation of the bursa found between the front of the kneecap and the skin. Kneeling for a long time can cause kneecap bursitis, which can develop into an egg-shaped bump on the front of the kneecap. Bursitis usually gets better if you avoid the activity that caused it. If it lasts or gets worse despite home treatment, your doctor may draw fluid from the bursa through a needle. This may relieve your pain and help your doctor know if you have an infection. If so, your doctor will prescribe antibiotics. If you have inflammation only, you may get a corticosteroid shot to reduce swelling and pain. Your doctor may recommend physical therapy and stretching activities. Rarely, surgery is needed to drain or remove the bursa. Follow-up care is a key part of your treatment and safety. Be sure to make and go to all appointments, and call your doctor if you are having problems. It's also a good idea to know your test results and keep a list of the medicines you take. How can you care for yourself at home? · Put ice or a cold pack on your kneecap for 10 to 20 minutes at a time. Put a thin cloth between the ice and your skin. · After 3 days of using ice, you may use heat on your kneecap.  You can use a hot water bottle, a heating pad set on low, or a warm, moist towel. · Prop up the sore leg on a pillow when you ice it or anytime you sit or lie down during the next 3 days. Try to keep it above the level of your heart. This will help reduce swelling. · Rest your knee. Stop any activities that cause pain. Switch to activities that do not stress your knee. · Take your medicines exactly as prescribed. Call your doctor if you think you are having a problem with your medicine. · Ask your doctor if you can take an over-the-counter pain medicine, such as acetaminophen (Tylenol), ibuprofen (Advil, Motrin), or naproxen (Aleve). Be safe with medicines. Read and follow all instructions on the label. · To prevent and ease kneecap bursitis during work, play, and daily activities: 
? Wear kneepads when kneeling on hard surfaces. Avoid kneeling for too long at a time. ? Strengthen and stretch your leg muscles. ? Avoid deep knee bends. · You can slowly return to the activity that caused the pain, but do it with less effort until you can do it without pain or swelling. Be sure to warm up before and stretch after you do the activity. When should you call for help? Call your doctor now or seek immediate medical care if: 
  · You have a fever.  
  · You have increased swelling or redness in your knee area.  
  · You cannot use your knee, or the pain in your knee gets worse.  
 Watch closely for changes in your health, and be sure to contact your doctor if: 
  · You have pain for 2 weeks or longer despite home treatment. Where can you learn more? Go to http://star-arvin.info/ Enter H196 in the search box to learn more about \"Kneecap Bursitis: Care Instructions. \" Current as of: June 26, 2019Content Version: 12.4 © 2062-0495 Healthwise[employer] Incorporated. Care instructions adapted under license by [employer] (which disclaims liability or warranty for this information).  If you have questions about a medical condition or this instruction, always ask your healthcare professional. [address] any warranty or liability for your use of this information. Mammogram: About This Test 
What is it? A mammogram is an X-ray of the breast that is used to screen for breast cancer. This test can find tumors that are too small for you or your doctor to feel. Cancer is most easily treated and cured when it is found at an early stage. Why is this test done? A mammogram is done to: 
· Look for breast cancer in women who don't have symptoms. · Find breast cancer in women who have symptoms. Symptoms of breast cancer may include a lump or thickening in the breast, nipple discharge, or dimpling of the skin on one area of the breast. 
· Find an area of suspicious breast tissue to remove for an exam under a microscope (biopsy). How can you prepare for the test? 
· Tell your doctor if you: ? Are or might be pregnant. ? Are breastfeeding. ? Have breast implants. ? Have previously had a breast biopsy. · On the day of the test, don't use any deodorant, perfume, powders, or ointments. What happens before the test? 
· You will need to take off any jewelry that might interfere with the X-ray pictures. · You will need to take off your clothes above the waist. 
· You will be given a cloth or paper gown to use during the test. 
What happens during the test? 
· You usually stand during a mammogram. 
· One at a time, your breasts will be placed on a flat plate that contains the X-ray film. · Another plate is then pressed firmly against your breast to help flatten out the breast tissue. You may be asked to lift your arm. · For a few seconds while the X-ray picture is being taken, you will need to hold your breath. · At least two pictures are taken of each breast. One is taken from the top and one from the side.  
What else should you know about the test? 
 · The X-ray plate will feel cold when you place your breast on it. Having your breasts flattened and squeezed isn't comfortable. But it is necessary to flatten out the breast tissue to get the best pictures. · Mammograms do not prevent breast cancer or reduce a woman's risk of developing cancer. · Most things that are found during a mammogram are not breast cancer. How long does the test take? · The test will take about 10 to 15 minutes. You may be in the clinic for up to an hour. What happens after the test? 
· You will probably be able to go home right away. · You can go back to your usual activities right away. Follow-up care is a key part of your treatment and safety. Be sure to make and go to all appointments, and call your doctor if you are having problems. It's also a good idea to keep a list of the medicines you take. Ask your doctor when you can expect to have your test results. Where can you learn more? Go to http://star-arvin.info/ Enter Z702 in the search box to learn more about \"Mammogram: About This Test.\" Current as of: August 21, 2019Content Version: 12.4 © 3253-5418 Healthwise, Incorporated. Care instructions adapted under license by [employer] (which disclaims liability or warranty for this information). If you have questions about a medical condition or this instruction, always ask your healthcare professional. [place] [address] any warranty or liability for your use of this information.

## 2020-04-21 ENCOUNTER — HOSPITAL ENCOUNTER (OUTPATIENT)
Dept: MAMMOGRAPHY | Age: 40
Discharge: HOME | End: 2020-04-21
Attending: NURSE PRACTITIONER

## 2020-04-21 DIAGNOSIS — N64.4 BREAST PAIN, RIGHT: ICD-10-CM

## 2020-04-21 DIAGNOSIS — N64.4 SORENESS BREAST: ICD-10-CM

## 2020-05-20 ENCOUNTER — HOSPITAL ENCOUNTER (OUTPATIENT)
Dept: MAMMOGRAPHY | Age: 40
Discharge: HOME | End: 2020-05-20
Attending: NURSE PRACTITIONER
Payer: COMMERCIAL

## 2020-05-20 DIAGNOSIS — Z12.31 VISIT FOR SCREENING MAMMOGRAM: ICD-10-CM

## 2020-05-20 PROCEDURE — 77067 SCR MAMMO BI INCL CAD: CPT

## 2020-06-11 ENCOUNTER — VIRTUAL VISIT (OUTPATIENT)
Dept: INTERNAL MEDICINE CLINIC | Age: 40
End: 2020-06-11

## 2020-06-11 DIAGNOSIS — Z91.09 ENVIRONMENTAL ALLERGIES: Primary | ICD-10-CM

## 2020-06-11 RX ORDER — CETIRIZINE HCL 10 MG
10 TABLET ORAL
Qty: 90 TAB | Refills: 0 | Status: SHIPPED | OUTPATIENT
Start: 2020-06-11 | End: 2021-01-21

## 2020-06-11 NOTE — PROGRESS NOTES
Sury Hazel is a 41 y.o. female who was seen by synchronous (real-time) audio-video technology on 6/11/2020. Consent: Sury Hazel, who was seen by synchronous (real-time) audio-video technology, and/or her healthcare decision maker, is aware that this patient-initiated, Telehealth encounter on 6/11/2020 is a billable service, with coverage as determined by her insurance carrier. She is aware that she may receive a bill and has provided verbal consent to proceed: Yes. Assessment & Plan:   Diagnoses and all orders for this visit:    1. Environmental allergies  -     cetirizine (ZYRTEC) 10 mg tablet; Take 1 Tab by mouth daily as needed for Allergies (cough). The complexity of medical decision making for this visit is moderate     I spent at least 15 minutes on this visit with this established patient. Discussed managing environment - dusting fans, changing filters  Try allbuterol and resume allergy meds  Fu INI    Subjective:   Sury Hazel is a 41 y.o. female who was seen for Cough (x 05/31/2020, pt states cough starts whenever air conditioning is on and it is worse at night )      Prior to Admission medications    Medication Sig Start Date End Date Taking? Authorizing Provider   cetirizine (ZYRTEC) 10 mg tablet Take 1 Tab by mouth daily as needed for Allergies (cough). 6/11/20  Yes Lili Ayala., CORTNEY   omeprazole (PRILOSEC) 20 mg capsule Take 1 Cap by mouth daily. 4/13/20  Yes Shama Fowler NP   triamcinolone acetonide (KENALOG) 0.1 % ointment Apply  to affected area two (2) times a day. use thin layer 9/5/19  Yes Shama Fowler NP   albuterol (PROVENTIL HFA, VENTOLIN HFA, PROAIR HFA) 90 mcg/actuation inhaler Take 2 Puffs by inhalation every four (4) hours as needed for Wheezing or Shortness of Breath (persistent coughing). 5/10/19  Yes Shama Fowler NP   acetaminophen (TYLENOL) 325 mg tablet Take 2 Tabs by mouth every four (4) hours as needed for Pain.  4/8/19  Yes Lyssa Miller Alexa Amin MD   montelukast (SINGULAIR) 10 mg tablet Take 1 Tab by mouth daily. 4/13/20   Yasemin Worrell NP   prenatal vit-iron fumarate-fa 27 mg iron- 0.8 mg tab tablet Take 1 Tab by mouth daily. 4/13/20   Yasemin Worrell NP   cholecalciferol (VITAMIN D3) 25 mcg (1,000 unit) cap Take 1 Cap by mouth daily. 4/13/20   Yasemin Worrell NP   cloNIDine HCL (CATAPRES) 0.1 mg tablet Take 1 Tab by mouth nightly. May cause DROWSINESS. 4/13/20   Yasemin Worrell NP   fluconazole (DIFLUCAN) 150 mg tablet Take 1 Tab by mouth as needed (vaginal discharge and itching). 9/5/19   Yasemin Worrell NP   ondansetron (ZOFRAN ODT) 4 mg disintegrating tablet TAKE 1 TABLET BY MOUTH EVERY 8 HOURS AS NEEDED FOR NAUSEA 4/8/19   Provider, Historical   sertraline (ZOLOFT) 50 mg tablet Take 1 Tab by mouth daily. 6/18/19   Carmen Wolf NP   OLANZapine (ZYPREXA) 2.5 mg tablet TAKE 1 TABLET BY MOUTH EVERY DAY AT NIGHT 6/18/19   Carmen Wolf NP   busPIRone (BUSPAR) 10 mg tablet Take 1 Tab by mouth two (2) times daily as needed (anxiety). 6/18/19   Carmen Wolf NP   medroxyPROGESTERone (DEPO-PROVERA) 150 mg/mL syrg INJECT 1 ML UNDER THE SKIN EVERY 12 WEEKS (90 DAYS) 5/3/19   Provider, Historical   cetirizine (ZYRTEC) 10 mg tablet Take 1 Tab by mouth daily. Indications: inflammation of the nose due to an allergy 4/23/19 6/11/20  Yasemin Worrell NP   ketorolac (TORADOL) 10 mg tablet Take 1 Tab by mouth every six (6) hours as needed for Pain. 4/8/19   Jimmie Chen MD     Allergies   Allergen Reactions    Amoxicillin Rash and Other (comments)     Vaginal itching    Flagyl [Metronidazole] Nausea and Vomiting     metrogel is okay.  Pcn [Penicillins] Itching     Vaginal itching       Patient Active Problem List   Diagnosis Code    Gastroesophageal reflux disease without esophagitis K21.9    Moderate episode of recurrent major depressive disorder (HCC) F33.1     Cough; x few days - noted when ceiling fan is on or when cold air is blowing.  No fevers, body aches, sinusitis  Hx of seasonal/environmental allergies - not taking allergy meds  Feels that she has occasionally wheezed  -has not  Tried her inhaler for this  Denies elisabeth bonilla    Objective:   Vital Signs: (As obtained by patient/caregiver at home)  There were no vitals taken for this visit. [INSTRUCTIONS:  \"[x]\" Indicates a positive item  \"[]\" Indicates a negative item  -- DELETE ALL ITEMS NOT EXAMINED]    Constitutional: [x] Appears well-developed and well-nourished [x] No apparent distress      [] Abnormal -     Mental status: [x] Alert and awake  [x] Oriented to person/place/time [x] Able to follow commands    [] Abnormal -     Eyes:   EOM    [x]  Normal    [] Abnormal -   Sclera  [x]  Normal    [] Abnormal -          Discharge [x]  None visible   [] Abnormal -     HENT: [x] Normocephalic, atraumatic  [] Abnormal -   [x] Mouth/Throat: Mucous membranes are moist    External Ears [x] Normal  [] Abnormal -    Neck: [x] No visualized mass [] Abnormal -     Pulmonary/Chest: [x] Respiratory effort normal   [x] No visualized signs of difficulty breathing or respiratory distress        [] Abnormal -      Musculoskeletal:   [x] Normal gait with no signs of ataxia         [x] Normal range of motion of neck        [] Abnormal -     Neurological:        [x] No Facial Asymmetry (Cranial nerve 7 motor function) (limited exam due to video visit)          [x] No gaze palsy        [] Abnormal -          Skin:        [x] No significant exanthematous lesions or discoloration noted on facial skin         [] Abnormal -            Psychiatric:       [x] Normal Affect [] Abnormal -        [x] No Hallucinations    Other pertinent observable physical exam findings:-        We discussed the expected course, resolution and complications of the diagnosis(es) in detail. Medication risks, benefits, costs, interactions, and alternatives were discussed as indicated.   I advised her to contact the office if her condition worsens, changes or fails to improve as anticipated. She expressed understanding with the diagnosis(es) and plan. Rhett Helton is a 41 y.o. female who was evaluated by a video visit encounter for concerns as above. Patient identification was verified prior to start of the visit. A caregiver was present when appropriate. Due to this being a TeleHealth encounter (During [facility] public health emergency), evaluation of the following organ systems was limited: Vitals/Constitutional/EENT/Resp/CV/GI//MS/Neuro/Skin/Heme-Lymph-Imm. Pursuant to the emergency declaration under the [facility], [facility]5 waiver authority and the [employer] and [employer] Act, this Virtual  Visit was conducted, with patient's (and/or legal guardian's) consent, to reduce the patient's risk of exposure to COVID-19 and provide necessary medical care. Services were provided through a video synchronous discussion virtually to substitute for in-person clinic visit. Patient and provider were located at their individual homes. Emerald Rueda NP

## 2020-06-11 NOTE — PROGRESS NOTES
Chief Complaint   Patient presents with    Cough     x 05/31/2020, pt states cough starts whenever air conditioning is on and it is worse at night        1. Have you been to the ER, urgent care clinic since your last visit? Hospitalized since your last visit? No    2. Have you seen or consulted any other health care providers outside of the [address] [place] [address] since your last visit? Include any pap smears or colon screening.  Yes Where: GYN Reason for visit: routine

## 2020-06-15 RX ORDER — PROMETHAZINE HYDROCHLORIDE AND DEXTROMETHORPHAN HYDROBROMIDE 6.25; 15 MG/5ML; MG/5ML
5 SYRUP ORAL
Qty: 118 ML | Refills: 0 | Status: SHIPPED | OUTPATIENT
Start: 2020-06-15 | End: 2020-06-22

## 2020-06-15 RX ORDER — PREDNISONE 20 MG/1
40 TABLET ORAL DAILY
Qty: 6 TAB | Refills: 0 | Status: SHIPPED | OUTPATIENT
Start: 2020-06-15 | End: 2020-06-18

## 2020-06-22 ENCOUNTER — OFFICE VISIT (OUTPATIENT)
Dept: INTERNAL MEDICINE CLINIC | Age: 40
End: 2020-06-22

## 2020-06-22 VITALS
RESPIRATION RATE: 17 BRPM | BODY MASS INDEX: 27.75 KG/M2 | OXYGEN SATURATION: 99 % | HEIGHT: 61 IN | SYSTOLIC BLOOD PRESSURE: 120 MMHG | DIASTOLIC BLOOD PRESSURE: 87 MMHG | TEMPERATURE: 98 F | HEART RATE: 94 BPM | WEIGHT: 147 LBS

## 2020-06-22 DIAGNOSIS — I10 HYPERTENSION, UNSPECIFIED TYPE: ICD-10-CM

## 2020-06-22 RX ORDER — CLONIDINE HYDROCHLORIDE 0.1 MG/1
0.1 TABLET ORAL
Qty: 90 TAB | Refills: 3 | Status: SHIPPED | OUTPATIENT
Start: 2020-06-22 | End: 2021-01-21

## 2020-06-22 NOTE — PATIENT INSTRUCTIONS
Learning About Menopause What is menopause? For most women, menopause is a natural process of aging. Menstrual periods gradually stop. The ability to become pregnant ends. Some women feel relief that they no longer have periods. But other women struggle with the physical and emotional changes that come with menopause. For most women, menopause happens around age 48. But every woman's body has its own timeline. Some women stop having periods in their mid-40s. Others keep having periods well into their 50s. And some women go through menopause early because of cancer treatment or surgery to remove the ovaries. What can you expect with menopause? · It starts with perimenopause. This is the process of change that leads up to menopause. Perimenopause can start as early as your late 35s or as late as your early 46s. How long it lasts varies. But it usually lasts from 2 to 8 years. · During this time, your hormone levels will go up and down unevenly (fluctuate). This causes changes in your periods and other symptoms. In time, estrogen and progesterone levels drop enough that the menstrual cycle stops. Going a full year without having a period is usually considered menopause. · Low estrogen levels after menopause speed bone loss. This increases your risk of osteoporosis. Also, your risk of heart disease increases after menopause. · It's normal to have thinner, drier skin after menopause. The vaginal lining and the lower urinary tract also thin. This can make it hard to have sex. It can also increase the risk of vaginal and urinary tract infections. What are the symptoms? · Hot flashes. You may have a sudden feeling of intense body heat. You may sweat, and your head, neck, and chest may get red. Along with hot flashes, you may have a heartbeat that's too fast or not regular. You may also feel anxious or grouchy. In rare cases you might feel panic. · Trouble sleeping. · Vaginal dryness. Symptoms related to mood and thinking may also happen around the time of menopause. These include: · Mood swings or feeling grouchy, depressed, or worried. · Problems with remembering or thinking clearly. Some women have only a few mild symptoms. Others have severe symptoms that disrupt their sleep and daily lives. Symptoms tend to last or get worse the first year or more after menopause. Over time, hormones even out at low levels. Many symptoms improve or go away. But some women may have symptoms that don't go away. How are menopause symptoms treated? If your symptoms are bothering you, there are lifestyle changes and treatments that can help. Lifestyle changes · Choose a heart-healthy diet that is low in saturated fat. It should include plenty of fruits, vegetables, beans, and high-fiber grains and breads. Be sure you get enough calcium and vitamin D to help your bones stay strong. Low-fat or nonfat dairy products are a great source of calcium. · Get regular exercise. Exercise can help you manage your weight, keep your heart and bones strong, and lift your mood. · Limit caffeine, alcohol, and stress. These things may make symptoms worse. Limiting them may help you sleep better. · If you smoke, stop. Quitting smoking can reduce hot flashes and long-term health risks. Medicines If your symptoms bother you, talk with your doctor. You may want to try prescription medicines, such as: · Birth control pills before menopause. · Hormone therapy (HT). · Antidepressants. · A medicine called clonidine that is usually used to treat high blood pressure. All medicines for menopause symptoms have possible risks or side effects. A very small number of women develop serious health problems when taking hormone therapy. Be sure to talk to your doctor about your possible health risks before you start a treatment for menopause symptoms. Other treatments You can try: · Cognitive-behavorial therapy. This may help reduce hot flashes. · Hypnosis. This may help reduce the number and severity of hot flashes. · Breathing exercises. They may help reduce hot flashes and emotional symptoms. · Soy. Some women feel that eating lots of soy helps even out their menopause symptoms. · Yoga or biofeedback. They may help reduce stress. Follow-up care is a key part of your treatment and safety. Be sure to make and go to all appointments, and call your doctor if you are having problems. It's also a good idea to know your test results and keep a list of the medicines you take. Where can you learn more? Go to http://star-arvin.info/ Enter H199 in the search box to learn more about \"Learning About Menopause. \" Current as of: November 8, 2019               Content Version: 12.5 © 0335-5525 [employer]. Care instructions adapted under license by [employer] (which disclaims liability or warranty for this information). If you have questions about a medical condition or this instruction, always ask your healthcare professional. [address] any warranty or liability for your use of this information.

## 2020-06-22 NOTE — PROGRESS NOTES
Pt is here for   Chief Complaint   Patient presents with    Follow-up     HTN, breast, and knee pain     Pt denies pain at this time    1. Have you been to the ER, urgent care clinic since your last visit? Hospitalized since your last visit? No    2. Have you seen or consulted any other health care providers outside of the [address] [place] [address] since your last visit? Include any pap smears or colon screening.  No

## 2020-06-22 NOTE — PROGRESS NOTES
Sury Hazel is a 41 y.o. female and presents with Follow-up (HTN, breast, and knee pain)    Subjective:  Pt presents to f/u HTN, breast, and RIGHT knee pain. Taking clonidine. Had mammogram done. Feels good, breast and knee pain resolved. Only has knee pain intermittently. No acute complaints otherwise. Denies side effects from medication.    BP Readings from Last 3 Encounters:   06/22/20 120/87   09/05/19 113/83   06/18/19 117/82         Review of Systems  Constitutional: negative for fevers, chills, anorexia and weight loss  Respiratory:  negative for cough, hemoptysis, dyspnea, and wheezing  CV:   negative for chest pain, palpitations, and lower extremity edema  GI:   negative for nausea, vomiting, diarrhea, abdominal pain, and melena  Endo:               negative for polyuria,polydipsia,polyphagia, and heat intolerance  Genitourinary: negative for frequency, urgency, dysuria, retention, and hematuria  Integument:  negative for rash, ulcerations, and pruritus  Hematologic:  negative for easy bruising and bleeding  Musculoskel: negative for arthralgias, muscle weakness,and joint pain/swelling  Neurological:  negative for headaches, dizziness, vertigo,and memory/gait problems  Behavl/Psych: negative for feelings of anxiety, depression, suicide, and mood changes    Past Medical History:   Diagnosis Date    Anxiety     Bipolar affective ([address]     Depression     Hyperlipidemia     High Cholesterol    Hyperlipidemia     Major depression      Past Surgical History:   Procedure Laterality Date    HX GI      Colonoscopy    HX HERNIA REPAIR      as child     Social History     Socioeconomic History    Marital status: SINGLE     Spouse name: Not on file    Number of children: Not on file    Years of education: Not on file    Highest education level: Not on file   Tobacco Use    Smoking status: Current Some Day Smoker     Packs/day: 0.50    Smokeless tobacco: Current User    Tobacco comment: cigars Substance and Sexual Activity    Alcohol use: Yes     Comment: occasional    Drug use: No    Sexual activity: Yes     Partners: Male     Birth control/protection: None     Family History   Problem Relation Age of Onset    Diabetes Mother     Depression Mother     Asthma Brother     Stroke Maternal Grandmother         aneurysm    Cancer Maternal Grandmother         kidney    Hypertension Maternal Grandmother     Cancer Paternal Grandmother         lung    Diabetes Brother     Bipolar Disorder Brother     Schizophrenia Brother     Other Brother         paranoia    Breast Cancer Other         maternal---age unknown    Breast Cancer Other         Maternal--age unknown     Current Outpatient Medications   Medication Sig Dispense Refill    cloNIDine HCL (CATAPRES) 0.1 mg tablet Take 1 Tab by mouth nightly. May cause DROWSINESS. 90 Tab 3    omeprazole (PRILOSEC) 20 mg capsule Take 1 Cap by mouth daily. 30 Cap 0    montelukast (SINGULAIR) 10 mg tablet Take 1 Tab by mouth daily. 30 Tab 5    cholecalciferol (VITAMIN D3) 25 mcg (1,000 unit) cap Take 1 Cap by mouth daily. 90 Cap 3    sertraline (ZOLOFT) 50 mg tablet Take 1 Tab by mouth daily. 30 Tab 3    OLANZapine (ZYPREXA) 2.5 mg tablet TAKE 1 TABLET BY MOUTH EVERY DAY AT NIGHT 30 Tab 3    busPIRone (BUSPAR) 10 mg tablet Take 1 Tab by mouth two (2) times daily as needed (anxiety). 60 Tab 3    albuterol (PROVENTIL HFA, VENTOLIN HFA, PROAIR HFA) 90 mcg/actuation inhaler Take 2 Puffs by inhalation every four (4) hours as needed for Wheezing or Shortness of Breath (persistent coughing). 1 Inhaler 0    acetaminophen (TYLENOL) 325 mg tablet Take 2 Tabs by mouth every four (4) hours as needed for Pain. 20 Tab 0    promethazine-dextromethorphan (PROMETHAZINE-DM) 6.25-15 mg/5 mL syrup Take 5 mL by mouth every four (4) hours as needed for Cough for up to 7 days.  118 mL 0    cetirizine (ZYRTEC) 10 mg tablet Take 1 Tab by mouth daily as needed for Allergies (cough). 90 Tab 0    prenatal vit-iron fumarate-fa 27 mg iron- 0.8 mg tab tablet Take 1 Tab by mouth daily. 90 Tab 3    triamcinolone acetonide (KENALOG) 0.1 % ointment Apply  to affected area two (2) times a day. use thin layer 30 g 0    fluconazole (DIFLUCAN) 150 mg tablet Take 1 Tab by mouth as needed (vaginal discharge and itching). 2 Tab 0    ondansetron (ZOFRAN ODT) 4 mg disintegrating tablet TAKE 1 TABLET BY MOUTH EVERY 8 HOURS AS NEEDED FOR NAUSEA  0    medroxyPROGESTERone (DEPO-PROVERA) 150 mg/mL syrg INJECT 1 ML UNDER THE SKIN EVERY 12 WEEKS (90 DAYS)  2    ketorolac (TORADOL) 10 mg tablet Take 1 Tab by mouth every six (6) hours as needed for Pain. 20 Tab 0     Allergies   Allergen Reactions    Amoxicillin Rash and Other (comments)     Vaginal itching    Flagyl [Metronidazole] Nausea and Vomiting     metrogel is okay.  Pcn [Penicillins] Itching     Vaginal itching       Objective:  Visit Vitals  /87 (BP 1 Location: Left arm, BP Patient Position: Sitting)   Pulse 94   Temp 98 °F (36.7 °C) (Oral)   Resp 17   Ht 5' 1\" (1.549 m)   Wt 147 lb (66.7 kg)   SpO2 99%   BMI 27.78 kg/m²     Wt Readings from Last 3 Encounters:   06/22/20 147 lb (66.7 kg)   09/05/19 145 lb (65.8 kg)   06/18/19 145 lb (65.8 kg)     Physical Exam:   General appearance - alert, well appearing, and in no distress. Mental status - A/O x 4, normal mood and affect. Neck -Supple ,normal CSP. FROM, non-tender. No significant adenopathy/thyromegaly. No JVD. Chest - CTA. Symmetric chest rise. No wheezing, rales or rhonchi. Heart - Normal rate, regular rhythm. Normal S1, S2. No MGR or clicks. Abdomen - Soft,non-distended. Normoactive BS in all quadrants. NT, no mass or HSM. Ext- Radial, DP pulses, 2+ bilaterally. No pedal edema, clubbing, or cyanosis. Skin-Warm and dry. No hyperpigmentation, ulcerations, or suspicious lesions. Neuro - Normal speech, no focal findings or movement disorder.  Normal strength, gait, and muscle tone. Assessment/Plan:  Refilled BP med. Use of OTC agents for joint pain. MAMMO normal.  Medication Side Effects and Warnings were discussed with patient: yes   Patient Labs were reviewed: yes  Patient Past Records were reviewed: yes    See below for other orders   Follow-up and Dispositions    · Return in about 3 months (around 9/22/2020) for annual with labs. ICD-10-CM ICD-9-CM    1. Hypertension, unspecified type I10 401.9 cloNIDine HCL (CATAPRES) 0.1 mg tablet     Orders Placed This Encounter    cloNIDine HCL (CATAPRES) 0.1 mg tablet     Sig: Take 1 Tab by mouth nightly. May cause DROWSINESS. Dispense:  90 Tab     Refill:  [address] expressed understanding of plan. An After Visit Summary was offered/printed and given to the patient.

## 2020-07-07 RX ORDER — BENZONATATE 200 MG/1
200 CAPSULE ORAL
Qty: 21 CAP | Refills: 0 | Status: SHIPPED | OUTPATIENT
Start: 2020-07-07 | End: 2020-07-14

## 2020-07-08 ENCOUNTER — VIRTUAL VISIT (OUTPATIENT)
Dept: INTERNAL MEDICINE CLINIC | Age: 40
End: 2020-07-08

## 2020-07-08 DIAGNOSIS — J45.31 MILD PERSISTENT ASTHMATIC BRONCHITIS WITH ACUTE EXACERBATION: Primary | ICD-10-CM

## 2020-07-08 RX ORDER — PREDNISONE 20 MG/1
40 TABLET ORAL
Qty: 10 TAB | Refills: 0 | Status: SHIPPED | OUTPATIENT
Start: 2020-07-08 | End: 2021-01-21

## 2020-07-08 NOTE — PROGRESS NOTES
Clau Sanchez is a 41 y.o. female who was seen by synchronous (real-time) audio-video technology on 7/8/2020. Consent: Clau Sanchez, who was seen by synchronous (real-time) audio-video technology, and/or her healthcare decision maker, is aware that this patient-initiated, Telehealth encounter on 7/8/2020 is a billable service, with coverage as determined by her insurance carrier. She is aware that she may receive a bill and has provided verbal consent to proceed: Yes. Assessment & Plan:   Diagnoses and all orders for this visit:    1. Mild persistent asthmatic bronchitis with acute exacerbation  -     predniSONE (DELTASONE) 20 mg tablet; Take 40 mg by mouth daily (with breakfast). Follow-up and Dispositions    · Return if symptoms worsen or fail to improve. Discussed with pt calling office on Friday or Monday INI or symptoms worse. Advised to avoid excessive exposure in public with use of steroid, pt reports needing to work so prefers to NOT take off at this time. Subjective:   Clau Sanchez is a 41 y.o. female who was seen for Cough (x 4 days, pt reports green mucous ) and Request For New Medication (cough medication, pt states tessalon capsules do not help- pt reports use of Prednisone for relief but pt no longer has med)      Asthma Review:  The patient is being seen for follow up of asthma,  currently unstable since July 4th. Outside more than usual this day to celebrate holiday. Asthma symptoms occur: frequently with coughing and wheezing  Wheezing when present is described as mild and easily relieved with rescue bronchodilator. The patient reports NO use of a steroid inhaler. Frequency of use of quick-relief meds: BID  Regimen compliance: The patient reports adherence to this regimen. Prior to Admission medications    Medication Sig Start Date End Date Taking? Authorizing Provider   predniSONE (DELTASONE) 20 mg tablet Take 40 mg by mouth daily (with breakfast). 7/8/20  Yes Ollie Fowler NP   cloNIDine HCL (CATAPRES) 0.1 mg tablet Take 1 Tab by mouth nightly. May cause DROWSINESS. 6/22/20  Yes Ollie Fowler NP   cetirizine (ZYRTEC) 10 mg tablet Take 1 Tab by mouth daily as needed for Allergies (cough). 6/11/20  Yes Jimmy Car NP   omeprazole (PRILOSEC) 20 mg capsule Take 1 Cap by mouth daily. 4/13/20  Yes Ollie Fowler NP   montelukast (SINGULAIR) 10 mg tablet Take 1 Tab by mouth daily. 4/13/20  Yes Ollie Fowler NP   cholecalciferol (VITAMIN D3) 25 mcg (1,000 unit) cap Take 1 Cap by mouth daily. 4/13/20  Yes Ollie Fowler NP   triamcinolone acetonide (KENALOG) 0.1 % ointment Apply  to affected area two (2) times a day. use thin layer 9/5/19  Yes Ollie Fowler NP   sertraline (ZOLOFT) 50 mg tablet Take 1 Tab by mouth daily. 6/18/19  Yes Carmen Wolf NP   OLANZapine (ZYPREXA) 2.5 mg tablet TAKE 1 TABLET BY MOUTH EVERY DAY AT NIGHT 6/18/19  Yes Carmen Wolf NP   busPIRone (BUSPAR) 10 mg tablet Take 1 Tab by mouth two (2) times daily as needed (anxiety). 6/18/19  Yes Carmen Wolf NP   albuterol (PROVENTIL HFA, VENTOLIN HFA, PROAIR HFA) 90 mcg/actuation inhaler Take 2 Puffs by inhalation every four (4) hours as needed for Wheezing or Shortness of Breath (persistent coughing). 5/10/19  Yes Ollie Fowler NP   acetaminophen (TYLENOL) 325 mg tablet Take 2 Tabs by mouth every four (4) hours as needed for Pain. 4/8/19  Yes Km Santiago MD   benzonatate (TESSALON) 200 mg capsule Take 1 Cap by mouth three (3) times daily as needed for Cough for up to 7 days. 7/7/20 7/14/20  Eva Son, NP   prenatal vit-iron fumarate-fa 27 mg iron- 0.8 mg tab tablet Take 1 Tab by mouth daily. 4/13/20   Eva Son, NP   fluconazole (DIFLUCAN) 150 mg tablet Take 1 Tab by mouth as needed (vaginal discharge and itching).  9/5/19   Eva Son, NP   ondansetron (ZOFRAN ODT) 4 mg disintegrating tablet TAKE 1 TABLET BY MOUTH EVERY 8 HOURS AS NEEDED FOR NAUSEA 4/8/19 Provider, Historical   medroxyPROGESTERone (DEPO-PROVERA) 150 mg/mL syrg INJECT 1 ML UNDER THE SKIN EVERY 12 WEEKS (90 DAYS) 5/3/19   Provider, Historical   ketorolac (TORADOL) 10 mg tablet Take 1 Tab by mouth every six (6) hours as needed for Pain. 4/8/19   Ancelmo Salazar MD     Allergies   Allergen Reactions    Amoxicillin Rash and Other (comments)     Vaginal itching    Flagyl [Metronidazole] Nausea and Vomiting     metrogel is okay.  Pcn [Penicillins] Itching     Vaginal itching       Patient Active Problem List   Diagnosis Code    Gastroesophageal reflux disease without esophagitis K21.9    Moderate episode of recurrent major depressive disorder (Wilma Ply) F33.1     Patient Active Problem List    Diagnosis Date Noted    Gastroesophageal reflux disease without esophagitis 05/30/2018    Moderate episode of recurrent major depressive disorder (Wilma Ply) 05/30/2018     Current Outpatient Medications   Medication Sig Dispense Refill    predniSONE (DELTASONE) 20 mg tablet Take 40 mg by mouth daily (with breakfast). 10 Tab 0    cloNIDine HCL (CATAPRES) 0.1 mg tablet Take 1 Tab by mouth nightly. May cause DROWSINESS. 90 Tab 3    cetirizine (ZYRTEC) 10 mg tablet Take 1 Tab by mouth daily as needed for Allergies (cough). 90 Tab 0    omeprazole (PRILOSEC) 20 mg capsule Take 1 Cap by mouth daily. 30 Cap 0    montelukast (SINGULAIR) 10 mg tablet Take 1 Tab by mouth daily. 30 Tab 5    cholecalciferol (VITAMIN D3) 25 mcg (1,000 unit) cap Take 1 Cap by mouth daily. 90 Cap 3    triamcinolone acetonide (KENALOG) 0.1 % ointment Apply  to affected area two (2) times a day. use thin layer 30 g 0    sertraline (ZOLOFT) 50 mg tablet Take 1 Tab by mouth daily. 30 Tab 3    OLANZapine (ZYPREXA) 2.5 mg tablet TAKE 1 TABLET BY MOUTH EVERY DAY AT NIGHT 30 Tab 3    busPIRone (BUSPAR) 10 mg tablet Take 1 Tab by mouth two (2) times daily as needed (anxiety).  60 Tab 3    albuterol (PROVENTIL HFA, VENTOLIN HFA, PROAIR HFA) 90 mcg/actuation inhaler Take 2 Puffs by inhalation every four (4) hours as needed for Wheezing or Shortness of Breath (persistent coughing). 1 Inhaler 0    acetaminophen (TYLENOL) 325 mg tablet Take 2 Tabs by mouth every four (4) hours as needed for Pain. 20 Tab 0    benzonatate (TESSALON) 200 mg capsule Take 1 Cap by mouth three (3) times daily as needed for Cough for up to 7 days. 21 Cap 0    prenatal vit-iron fumarate-fa 27 mg iron- 0.8 mg tab tablet Take 1 Tab by mouth daily. 90 Tab 3    fluconazole (DIFLUCAN) 150 mg tablet Take 1 Tab by mouth as needed (vaginal discharge and itching). 2 Tab 0    ondansetron (ZOFRAN ODT) 4 mg disintegrating tablet TAKE 1 TABLET BY MOUTH EVERY 8 HOURS AS NEEDED FOR NAUSEA  0    medroxyPROGESTERone (DEPO-PROVERA) 150 mg/mL syrg INJECT 1 ML UNDER THE SKIN EVERY 12 WEEKS (90 DAYS)  2    ketorolac (TORADOL) 10 mg tablet Take 1 Tab by mouth every six (6) hours as needed for Pain. 20 Tab 0     Allergies   Allergen Reactions    Amoxicillin Rash and Other (comments)     Vaginal itching    Flagyl [Metronidazole] Nausea and Vomiting     metrogel is okay.     Pcn [Penicillins] Itching     Vaginal itching     Past Medical History:   Diagnosis Date    Anxiety     Bipolar affective ([address]     Depression     Hyperlipidemia     High Cholesterol    Hyperlipidemia     Major depression      Past Surgical History:   Procedure Laterality Date    HX GI      Colonoscopy    HX HERNIA REPAIR      as child     Family History   Problem Relation Age of Onset    Diabetes Mother     Depression Mother     Asthma Brother     Stroke Maternal Grandmother         aneurysm    Cancer Maternal Grandmother         kidney    Hypertension Maternal Grandmother     Cancer Paternal Grandmother         lung    Diabetes Brother     Bipolar Disorder Brother     Schizophrenia Brother     Other Brother         paranoia    Breast Cancer Other         maternal---age unknown    Breast Cancer Other Maternal--age unknown     Social History     Tobacco Use    Smoking status: Current Some Day Smoker     Packs/day: 0.50    Smokeless tobacco: Current User    Tobacco comment: cigars   Substance Use Topics    Alcohol use: Yes     Comment: occasional       Review of Systems   Constitutional: Negative for fever and malaise/fatigue. Eyes: Negative for blurred vision. Respiratory: Negative for cough and shortness of breath. Cardiovascular: Negative for chest pain and leg swelling. Neurological: Negative for dizziness, weakness and headaches. Objective:   Vital Signs: (As obtained by patient/caregiver at home)  There were no vitals taken for this visit. Physical Exam:  General appearance - alert, well appearing, and in no distress. Mental status - A/O x 4,normal mood and affect. Eyes- no periorbital edema, drainage, or irritation noted. Nose- no obvious drainage or swelling. Throat- no obvious swelling, goiter, or notable lymphadenopathy  Chest - Symmetric chest rise. No wheezing or coughing. No distress. Skin- normal skin tone noted. No hyperpigmentation or obvious deformities. No diaphoresis noted. No flushing. Neuro - Normal speech, no focal findings or movement disorder. Other pertinent observable physical exam findings:-        We discussed the expected course, resolution and complications of the diagnosis(es) in detail. Medication risks, benefits, costs, interactions, and alternatives were discussed as indicated. I advised her to contact the office if her condition worsens, changes or fails to improve as anticipated. She expressed understanding with the diagnosis(es) and plan. Rosy Castellon is a 41 y.o. female who was evaluated by a video visit encounter for concerns as above. Patient identification was verified prior to start of the visit. A caregiver was present when appropriate.  Due to this being a TeleHealth encounter (During RXKOL-16 public health emergency), evaluation of the following organ systems was limited: Vitals/Constitutional/EENT/Resp/CV/GI//MS/Neuro/Skin/Heme-Lymph-Imm. Pursuant to the emergency declaration under the [address] [place] [address], Critical access hospital5 waiver authority and the "[employer] and [employer] Act, this Virtual  Visit was conducted, with patient's (and/or legal guardian's) consent, to reduce the patient's risk of exposure to COVID-19 and provide necessary medical care. Services were provided through a video synchronous discussion virtually to substitute for in-person clinic visit. Patient and provider were located at their individual homes.       Alexandra Reyes NP

## 2020-07-08 NOTE — PATIENT INSTRUCTIONS
Learning About Asthma Triggers What are triggers? When you have asthma, certain things can make your symptoms worse. These are called triggers. They include: · Cigarette smoke or air pollution. · Things you are allergic to, such as: 
¨ Pollen, mold, or dust mites. ¨ Pet hair, skin, or saliva. · Illnesses, like colds, flu, or pneumonia. · Exercise. · Dry, cold air. How do triggers affect asthma? Triggers can make it harder for your lungs to work as they should and can lead to sudden difficulty breathing and other symptoms. When you are around a trigger, an asthma attack is more likely. If your symptoms are severe, you may need emergency treatment or have to go to the hospital for treatment. If you know what your triggers are and can avoid them, you may be able to prevent asthma attacks, reduce how often you have them, and make them less severe. What can you do to avoid triggers? The first thing is to know your triggers. When you are having symptoms, note the things around you that might be causing them. Then look for patterns in what may be triggering your symptoms. Record your triggers on a piece of paper or in an asthma diary. When you have your list of possible triggers, work with your doctor to find ways to avoid them. You also can check how well your lungs are working by measuring your peak expiratory flow (PEF) throughout the day. Your PEF may drop when you are near things that trigger symptoms. Here are some ways to avoid a few common triggers. · Do not smoke or allow others to smoke around you. If you need help quitting, talk to your doctor about stop-smoking programs and medicines. These can increase your chances of quitting for good. · If there is a lot of pollution, pollen, or dust outside, stay at home and keep your windows closed. Use an air conditioner or air filter in your home. Check your local weather report or newspaper for air quality and pollen reports. · Get a flu shot every year. Talk to your doctor about getting a pneumococcal shot. Wash your hands often to prevent infections. · Avoid exercising outdoors in cold weather. If you are outdoors in cold weather, wear a scarf around your face and breathe through your nose. How can you manage an asthma attack? · If you have an asthma action plan, follow the plan. In general: ¨ Use your quick-relief inhaler as directed by your doctor. If your symptoms do not get better after you use your medicine, have someone take you to the emergency room. Call an ambulance if needed. ¨ If your doctor has given you other inhaled medicines or steroid pills, take them as directed. Where can you learn more? Go to [employer] Enter N851 in the search box to learn more about \"Learning About Asthma Triggers. \"  
© 6108-2501 [employer]. Care instructions adapted under license by [employer] (which disclaims liability or warranty for this information). This care instruction is for use with your licensed healthcare professional. If you have questions about a medical condition or this instruction, always ask your healthcare professional. [address] any warranty or liability for your use of this information. Content Version: 41.0.210674; Last Revised: February 23, 2012

## 2020-07-08 NOTE — PROGRESS NOTES
Chief Complaint   Patient presents with    Cough     x 4 days, pt reports green mucous     Request For New Medication     cough medication, pt states tessalon capsules do not help- pt reports use of Prednisone for relief but pt no longer has med       1. Have you been to the ER, urgent care clinic since your last visit? Hospitalized since your last visit? No    2. Have you seen or consulted any other health care providers outside of the [address] [place] [address] since your last visit? Include any pap smears or colon screening.  No

## 2021-01-21 ENCOUNTER — OFFICE VISIT (OUTPATIENT)
Dept: INTERNAL MEDICINE CLINIC | Age: 41
End: 2021-01-21
Payer: COMMERCIAL

## 2021-01-21 VITALS
HEIGHT: 61 IN | HEART RATE: 89 BPM | OXYGEN SATURATION: 98 % | TEMPERATURE: 98.6 F | WEIGHT: 150 LBS | RESPIRATION RATE: 19 BRPM | DIASTOLIC BLOOD PRESSURE: 85 MMHG | BODY MASS INDEX: 28.32 KG/M2 | SYSTOLIC BLOOD PRESSURE: 121 MMHG

## 2021-01-21 DIAGNOSIS — R21 BUTTERFLY RASH: Primary | ICD-10-CM

## 2021-01-21 PROCEDURE — 99213 OFFICE O/P EST LOW 20 MIN: CPT | Performed by: INTERNAL MEDICINE

## 2021-01-21 NOTE — PROGRESS NOTES
Chief Complaint   Patient presents with    Psoriasis       1. Have you been to the ER, urgent care clinic since your last visit? Hospitalized since your last visit? No    2. Have you seen or consulted any other health care providers outside of the [address] [place] [address] since your last visit? Include any pap smears or colon screening. No       Pt decline Flu vaccine.

## 2021-01-21 NOTE — PROGRESS NOTES
Dennys Tijerina is a 41 y.o. female and presents with Psoriasis  . Subjective:    Pt w c/o worsening facial rash x years. \"I think I have psoriasis\". Pt has been seeing her PCP and has been prescribed steroids creams w/o improvement. Pt denies rash affecting any other part of her body EXCEPT over her cheekbone area. Pt has a h/o facial acne in young adulthood, but has not recently flared. She does have scarring of her skin from that. Review of Systems  Review of systems (12) negative, except noted above.       Past Medical History:   Diagnosis Date    Anxiety     Bipolar affective ([facility] [address]     Depression     Hyperlipidemia     High Cholesterol    Hyperlipidemia     Major depression      Past Surgical History:   Procedure Laterality Date    HX GI      Colonoscopy    HX HERNIA REPAIR      as child     Social History     Socioeconomic History    Marital status: SINGLE     Spouse name: Not on file    Number of children: Not on file    Years of education: Not on file    Highest education level: Not on file   Tobacco Use    Smoking status: Current Some Day Smoker     Packs/day: 0.50    Smokeless tobacco: Current User    Tobacco comment: cigars   Substance and Sexual Activity    Alcohol use: Yes     Comment: occasional    Drug use: No    Sexual activity: Yes     Partners: Male     Birth control/protection: None     Family History   Problem Relation Age of Onset    Diabetes Mother     Depression Mother     Asthma Brother     Stroke Maternal Grandmother         aneurysm    Cancer Maternal Grandmother         kidney    Hypertension Maternal Grandmother     Cancer Paternal Grandmother         lung    Diabetes Brother     Bipolar Disorder Brother     Schizophrenia Brother     Other Brother         paranoia    Breast Cancer Other         maternal---age unknown    Breast Cancer Other         Maternal--age unknown     Current Outpatient Medications   Medication Sig Dispense Refill    sertraline (ZOLOFT) 50 mg tablet Take 1 Tab by mouth daily. (Patient taking differently: 100 mg.) 30 Tab 3    OLANZapine (ZYPREXA) 2.5 mg tablet TAKE 1 TABLET BY MOUTH EVERY DAY AT NIGHT (Patient taking differently: Take 5 mg by mouth nightly. TAKE 1 TABLET BY MOUTH EVERY DAY AT NIGHT) 30 Tab 3    busPIRone (BUSPAR) 10 mg tablet Take 1 Tab by mouth two (2) times daily as needed (anxiety). 60 Tab 3    albuterol (PROVENTIL HFA, VENTOLIN HFA, PROAIR HFA) 90 mcg/actuation inhaler Take 2 Puffs by inhalation every four (4) hours as needed for Wheezing or Shortness of Breath (persistent coughing). 1 Inhaler 0    acetaminophen (TYLENOL) 325 mg tablet Take 2 Tabs by mouth every four (4) hours as needed for Pain. 20 Tab 0    predniSONE (DELTASONE) 20 mg tablet Take 40 mg by mouth daily (with breakfast). 10 Tab 0    cloNIDine HCL (CATAPRES) 0.1 mg tablet Take 1 Tab by mouth nightly. May cause DROWSINESS. 90 Tab 3    cetirizine (ZYRTEC) 10 mg tablet Take 1 Tab by mouth daily as needed for Allergies (cough). 90 Tab 0    omeprazole (PRILOSEC) 20 mg capsule Take 1 Cap by mouth daily. 30 Cap 0    montelukast (SINGULAIR) 10 mg tablet Take 1 Tab by mouth daily. 30 Tab 5    prenatal vit-iron fumarate-fa 27 mg iron- 0.8 mg tab tablet Take 1 Tab by mouth daily. 90 Tab 3    cholecalciferol (VITAMIN D3) 25 mcg (1,000 unit) cap Take 1 Cap by mouth daily. 90 Cap 3    triamcinolone acetonide (KENALOG) 0.1 % ointment Apply  to affected area two (2) times a day. use thin layer 30 g 0    fluconazole (DIFLUCAN) 150 mg tablet Take 1 Tab by mouth as needed (vaginal discharge and itching). 2 Tab 0    ondansetron (ZOFRAN ODT) 4 mg disintegrating tablet TAKE 1 TABLET BY MOUTH EVERY 8 HOURS AS NEEDED FOR NAUSEA  0    medroxyPROGESTERone (DEPO-PROVERA) 150 mg/mL syrg INJECT 1 ML UNDER THE SKIN EVERY 12 WEEKS (90 DAYS)  2    ketorolac (TORADOL) 10 mg tablet Take 1 Tab by mouth every six (6) hours as needed for Pain.  20 Tab 0 Allergies   Allergen Reactions    Amoxicillin Rash and Other (comments)     Vaginal itching    Flagyl [Metronidazole] Nausea and Vomiting     metrogel is okay.  Pcn [Penicillins] Itching     Vaginal itching       Objective:  Visit Vitals  /85 (BP 1 Location: Left arm, BP Patient Position: Sitting)   Pulse 89   Temp 98.6 °F (37 °C) (Temporal)   Resp 19   Ht 5' 1\" (1.549 m)   Wt 150 lb (68 kg)   SpO2 98%   BMI 28.34 kg/m²     Physical Exam:   General appearance - alert, well appearing, and in no distress  General appearance - alert, well appearing, and in no distress   Mental status - alert, oriented to person, place, and time  EYE-EOMI  Neck - supple  Chest - symmetric air entry    Abdomen - obese  Ext-no pedal edema, no clubbing or cyanosis  Skin-+ nodular/papular/erythemetous rash in a butterfly pattern over her malar area bilaterally  Neuro -alert, oriented, normal speech, no focal findings or movement disorder noted        Results for orders placed or performed during the hospital encounter of 04/08/19   INFLUENZA A & B AG (RAPID TEST)   Result Value Ref Range    Influenza A Antigen NEGATIVE  NEG      Influenza B Antigen NEGATIVE  NEG     STREP AG SCREEN, GROUP A    Specimen: Serum;  Throat   Result Value Ref Range    Group A Strep Ag ID NEGATIVE  NEG     CULTURE, URINE    Specimen: Clean catch; Urine   Result Value Ref Range    Special Requests: NO SPECIAL REQUESTS      [place] Count <10,000  COLONIES/mL        Culture result: NO SIGNIFICANT GROWTH     CULTURE, THROAT    Specimen: Throat   Result Value Ref Range    Special Requests: NO SPECIAL REQUESTS      Culture result: NORMAL RESPIRATORY ADDY/NO BETA STREP ISOLATED     CBC WITH AUTOMATED DIFF   Result Value Ref Range    WBC 6.5 3.6 - 11.0 K/uL    RBC 3.48 (L) 3.80 - 5.20 M/uL    HGB 11.7 11.5 - 16.0 g/dL    HCT 33.6 (L) 35.0 - 47.0 %    MCV 96.6 80.0 - 99.0 FL    MCH 33.6 26.0 - 34.0 PG    MCHC 34.8 30.0 - 36.5 g/dL    RDW 11.6 11.5 - 14.5 % PLATELET 852 315 - 532 K/uL    MPV 9.7 8.9 - 12.9 FL    NRBC 0.0 0  WBC    ABSOLUTE NRBC 0.00 0.00 - 0.01 K/uL    NEUTROPHILS 68 32 - 75 %    LYMPHOCYTES 21 12 - 49 %    MONOCYTES 10 5 - 13 %    EOSINOPHILS 1 0 - 7 %    BASOPHILS 0 0 - 1 %    IMMATURE GRANULOCYTES 0 0.0 - 0.5 %    ABS. NEUTROPHILS 4.4 1.8 - 8.0 K/UL    ABS. LYMPHOCYTES 1.4 0.8 - 3.5 K/UL    ABS. MONOCYTES 0.7 0.0 - 1.0 K/UL    ABS. EOSINOPHILS 0.0 0.0 - 0.4 K/UL    ABS. BASOPHILS 0.0 0.0 - 0.1 K/UL    ABS. IMM. GRANS. 0.0 0.00 - 0.04 K/UL    DF AUTOMATED     METABOLIC PANEL, COMPREHENSIVE   Result Value Ref Range    Sodium 140 136 - 145 mmol/L    Potassium 3.7 3.5 - 5.1 mmol/L    Chloride 103 97 - 108 mmol/L    CO2 25 21 - 32 mmol/L    Anion gap 12 5 - 15 mmol/L    Glucose 86 65 - 100 mg/dL    BUN 10 6 - 20 MG/DL    Creatinine 0.83 0.55 - 1.02 MG/DL    BUN/Creatinine ratio 12 12 - 20      GFR est AA >60 >60 ml/min/1.73m2    GFR est non-AA >60 >60 ml/min/1.73m2    Calcium 8.8 8.5 - 10.1 MG/DL    Bilirubin, total 0.3 0.2 - 1.0 MG/DL    ALT (SGPT) 19 12 - 78 U/L    AST (SGOT) 14 (L) 15 - 37 U/L    Alk.  phosphatase 65 45 - 117 U/L    Protein, total 7.2 6.4 - 8.2 g/dL    Albumin 3.3 (L) 3.5 - 5.0 g/dL    Globulin 3.9 2.0 - 4.0 g/dL    A-G Ratio 0.8 (L) 1.1 - 2.2     LIPASE   Result Value Ref Range    Lipase 105 73 - 393 U/L   URINALYSIS W/ REFLEX CULTURE    Specimen: Urine   Result Value Ref Range    Color YELLOW/STRAW      Appearance CLOUDY (A) CLEAR      Specific gravity 1.025 1.003 - 1.030      pH (UA) 7.0 5.0 - 8.0      Protein NEGATIVE  NEG mg/dL    Glucose NEGATIVE  NEG mg/dL    Ketone NEGATIVE  NEG mg/dL    Bilirubin NEGATIVE  NEG      Blood MODERATE (A) NEG      Urobilinogen 0.2 0.2 - 1.0 EU/dL    Nitrites NEGATIVE  NEG      Leukocyte Esterase SMALL (A) NEG      WBC 0-4 0 - 4 /hpf    RBC 5-10 0 - 5 /hpf    Epithelial cells MODERATE (A) FEW /lpf    Bacteria NEGATIVE  NEG /hpf    UA:UC IF INDICATED CULTURE NOT INDICATED BY UA RESULT CNI HCG URINE, QL. - POC   Result Value Ref Range    Pregnancy test,urine (POC) NEGATIVE  NEG         Assessment/Plan:    ICD-10-CM ICD-9-CM    1. Butterfly rash  R21 782.1 REFERRAL TO DERMATOLOGY     Orders Placed This Encounter    REFERRAL TO DERMATOLOGY     Referral Priority:   Routine     Referral Type:   Consultation     Referral Reason:   Specialty Services Required     Referred to Provider:   Javan Donovan MD     Number of Visits Requested:   1     -Discoid lupus vs rosacea vs acne  -refer to derm. May need a bx  -f/u PCP prn  There are no Patient Instructions on file for this visit. Follow-up and Dispositions    · Return if symptoms worsen or fail to improve, for With Christina Braden.           I have reviewed with the patient details of the assessment and plan and all questions were answered. Relevent patient education was performed. The most recent lab findings were reviewed with the patient. An After Visit Summary was printed and given to the patient.

## 2021-02-01 ENCOUNTER — HOSPITAL ENCOUNTER (EMERGENCY)
Age: 41
Discharge: HOME | End: 2021-02-01
Attending: EMERGENCY MEDICINE
Payer: COMMERCIAL

## 2021-02-01 VITALS
BODY MASS INDEX: 29.45 KG/M2 | SYSTOLIC BLOOD PRESSURE: 121 MMHG | DIASTOLIC BLOOD PRESSURE: 81 MMHG | WEIGHT: 150 LBS | HEART RATE: 89 BPM | RESPIRATION RATE: 16 BRPM | HEIGHT: 60 IN | OXYGEN SATURATION: 98 % | TEMPERATURE: 98.1 F

## 2021-02-01 DIAGNOSIS — Z20.822 EXPOSURE TO COVID-19 VIRUS: Primary | ICD-10-CM

## 2021-02-01 LAB — SARS-COV-2, COV2: NORMAL

## 2021-02-01 PROCEDURE — U0003 INFECTIOUS AGENT DETECTION BY NUCLEIC ACID (DNA OR RNA); SEVERE ACUTE RESPIRATORY SYNDROME CORONAVIRUS 2 (SARS-COV-2) (CORONAVIRUS DISEASE [COVID-19]), AMPLIFIED PROBE TECHNIQUE, MAKING USE OF HIGH THROUGHPUT TECHNOLOGIES AS DESCRIBED BY CMS-2020-01-R: HCPCS

## 2021-02-01 PROCEDURE — 99282 EMERGENCY DEPT VISIT SF MDM: CPT

## 2021-02-01 PROCEDURE — U0005 INFEC AGEN DETEC AMPLI PROBE: HCPCS

## 2021-02-01 NOTE — ED TRIAGE NOTES
Patient presents to the ED with c/o  exposure to covid at a dinner party x2 days. Pt denies any symptoms. Pt reports intermittent shortness of breath with smoking. Pt reports left wrist pain x1 week. Pt denies taking any medications.

## 2021-02-01 NOTE — Clinical Note
[facility] EMERGENCY DEPT 
[address] [facility] [id number] 
[phone] Work/School Note Date: 2/1/2021 To Whom It May concern: 
 
Elaine Sosa was evaulated by the following provider(s): 
Attending Provider: Leslie Tran DO Physician Assistant: Nicole Shepherd virus is suspected. Per the CDC guidelines we recommend home isolation until the following conditions are all met: 1. At least 10 days have passed since symptoms first appeared and 2. At least 24 hours have passed since last fever without the use of fever-reducing medications and 
3. Symptoms (e.g., cough, shortness of breath) have improved Sincerely, Sole Rogers PA-C

## 2021-02-01 NOTE — ED PROVIDER NOTES
EMERGENCY DEPARTMENT HISTORY AND PHYSICAL EXAM    Date: 2/1/2021  Patient Name: Talha Rosado    History of Presenting Illness     Chief Complaint   Patient presents with    Concern For COVID-19 (Coronavirus)    Wrist Pain         History Provided By: Patient    HPI: Talha Rosado is a 41 y.o. female with a PMH of bipolar, depression, anxiety, hyperlipidemia who presents with exposure to COVID-19. Patient states she was at a dinner party on Saturday not wearing a mask and informed today that someone that tested positive for Covid. Patient reports a slight headache but denies any Covid-like symptoms. Patient denies any chest pain, nausea, vomiting, cough, fevers or chills. PCP: Burgess Thomas NP    Current Outpatient Medications   Medication Sig Dispense Refill    montelukast (SINGULAIR) 10 mg tablet Take 1 Tab by mouth daily. 30 Tab 5    cholecalciferol (VITAMIN D3) 25 mcg (1,000 unit) cap Take 1 Cap by mouth daily. 90 Cap 3    triamcinolone acetonide (KENALOG) 0.1 % ointment Apply  to affected area two (2) times a day. use thin layer 30 g 0    sertraline (ZOLOFT) 50 mg tablet Take 1 Tab by mouth daily. (Patient taking differently: 100 mg.) 30 Tab 3    OLANZapine (ZYPREXA) 2.5 mg tablet TAKE 1 TABLET BY MOUTH EVERY DAY AT NIGHT (Patient taking differently: Take 5 mg by mouth nightly. TAKE 1 TABLET BY MOUTH EVERY DAY AT NIGHT) 30 Tab 3    busPIRone (BUSPAR) 10 mg tablet Take 1 Tab by mouth two (2) times daily as needed (anxiety). 60 Tab 3    albuterol (PROVENTIL HFA, VENTOLIN HFA, PROAIR HFA) 90 mcg/actuation inhaler Take 2 Puffs by inhalation every four (4) hours as needed for Wheezing or Shortness of Breath (persistent coughing). 1 Inhaler 0    acetaminophen (TYLENOL) 325 mg tablet Take 2 Tabs by mouth every four (4) hours as needed for Pain. 20 Tab 0    ketorolac (TORADOL) 10 mg tablet Take 1 Tab by mouth every six (6) hours as needed for Pain.  20 Tab 0       Past History     Past Medical History:  Past Medical History:   Diagnosis Date    Anxiety     Bipolar affective ([address]     Depression     Hyperlipidemia     High Cholesterol    Hyperlipidemia     Major depression        Past Surgical History:  Past Surgical History:   Procedure Laterality Date    HX GI      Colonoscopy    HX HERNIA REPAIR      as child       Family History:  Family History   Problem Relation Age of Onset    Diabetes Mother     Depression Mother     Asthma Brother     Stroke Maternal Grandmother         aneurysm    Cancer Maternal Grandmother         kidney    Hypertension Maternal Grandmother     Cancer Paternal Grandmother         lung    Diabetes Brother     Bipolar Disorder Brother     Schizophrenia Brother     Other Brother         paranoia    Breast Cancer Other         maternal---age unknown    Breast Cancer Other         Maternal--age unknown       Social History:  Social History     Tobacco Use    Smoking status: Current Some Day Smoker     Packs/day: 0.25    Smokeless tobacco: Current User    Tobacco comment: cigars   Substance Use Topics    Alcohol use: Yes     Comment: occasional    Drug use: No       Allergies: Allergies   Allergen Reactions    Amoxicillin Rash and Other (comments)     Vaginal itching    Flagyl [Metronidazole] Nausea and Vomiting     metrogel is okay.  Pcn [Penicillins] Itching     Vaginal itching         Review of Systems   Review of Systems   Constitutional: Negative for activity change, appetite change, chills and fever. Respiratory: Negative for cough. Gastrointestinal: Negative for abdominal pain, nausea and vomiting. Allergic/Immunologic: Negative for immunocompromised state. Neurological: Positive for headaches. Negative for speech difficulty and weakness. All other systems reviewed and are negative.       Physical Exam     Vitals:    02/01/21 1815   BP: 121/81   Pulse: 89   Resp: 16   Temp: 98.1 °F (36.7 °C)   SpO2: 98%   Weight: 68 kg (150 lb) Height: 5' (1.524 m)     Physical Exam  Vitals signs and nursing note reviewed. Constitutional:       General: She is not in acute distress. Appearance: She is well-developed. HENT:      Head: Normocephalic and atraumatic. Eyes:      Conjunctiva/sclera: Conjunctivae normal.   Cardiovascular:      Rate and Rhythm: Normal rate and regular rhythm. Heart sounds: Normal heart sounds. Pulmonary:      Effort: Pulmonary effort is normal. No respiratory distress. Breath sounds: Normal breath sounds. No wheezing or rales. Skin:     General: Skin is warm and dry. Neurological:      Mental Status: She is alert and oriented to person, place, and time. Psychiatric:         Behavior: Behavior normal.         Thought Content: Thought content normal.         Judgment: Judgment normal.           Diagnostic Study Results     Labs -   No results found for this or any previous visit (from the past 12 hour(s)). Radiologic Studies -   No orders to display     CT Results  (Last 48 hours)    None        CXR Results  (Last 48 hours)    None            Medical Decision Making   I am the first provider for this patient. I reviewed the vital signs, available nursing notes, past medical history, past surgical history, family history and social history. Vital Signs-Reviewed the patient's vital signs. Records Reviewed: Nursing Notes and Old Medical Records    Disposition:  Discharge    DISCHARGE NOTE:   6:54 PM      Care plan outlined and precautions discussed. Patient has no new complaints, changes, or physical findings. All medications were reviewed with the patient; will d/c home. All of pt's questions and concerns were addressed. Patient was instructed and agrees to follow up with PCP prn, as well as to return to the ED upon further deterioration. Patient is ready to go home.     Follow-up Information     Follow up With Specialties Details Why Contact Info    Lexie Roque NP Nurse Practitioner  As needed [facility]  [address]  [phone]            Current Discharge Medication List          Provider Notes (Medical Decision Making):   Patient presents with recent exposure to COVID-19 while at a dinner party and not wearing a mask. Patient is otherwise asymptomatic but we will send off Covid swab and patient advised to quarantine for 14 days or should she start having symptoms to quarantine from 10 days from when the symptoms started. Procedures:  Procedures    Please note that this dictation was completed with Dragon, computer voice recognition software. Quite often unanticipated grammatical, syntax, homophones, and other interpretive errors are inadvertently transcribed by the computer software. Please disregard these errors. Additionally, please excuse any errors that have escaped final proofreading. Diagnosis     Clinical Impression:   1.  Exposure to COVID-19 virus

## 2021-02-02 ENCOUNTER — PATIENT OUTREACH (OUTPATIENT)
Dept: CASE MANAGEMENT | Age: 41
End: 2021-02-02

## 2021-02-02 LAB
SARS-COV-2, XPLCVT: NOT DETECTED
SOURCE, COVRS: NORMAL

## 2021-02-02 NOTE — ED NOTES
Discharge instructions were given to the patient by Sincere Neal RN. The patient left the Emergency Department ambulatory, alert and oriented and in no acute distress with 0 prescriptions. The patient was encouraged to call or return to the ED for worsening issues or problems and was encouraged to schedule a follow up appointment for continuing care. The patient verbalized understanding of discharge instructions and prescriptions, all questions were answered. The patient has no further concerns at this time.

## 2021-02-03 NOTE — PROGRESS NOTES
Patient contacted regarding recent discharge and COVID-19 risk. Discussed COVID-19 related testing which was available at this time. Test results were negative. Patient informed of results, if available? yes    Outreach made within 2 business days of discharge: Yes    Care Transition Nurse/ Ambulatory Care Manager/ LPN Care Coordinator contacted the patient by telephone to perform post discharge assessment. Verified name and  with patient as identifiers. Patient has following risk factors of: no known risk factors. CTN/ACM/LPN reviewed discharge instructions, medical action plan and red flags related to discharge diagnosis. Reviewed and educated them on any new and changed medications related to discharge diagnosis. Advised obtaining a 90-day supply of all daily and as-needed medications. Advance Care Planning:   Does patient have an Advance Directive: health care decision makers updated  [address] Phone: [phone]   Education provided regarding infection prevention, and signs and symptoms of COVID-19 and when to seek medical attention with patient who verbalized understanding. Discussed exposure protocols and quarantine from [facility] [facility] you at higher risk for severe illness  and given an opportunity for questions and concerns. The patient agrees to contact the COVID-19 hotline [phone] or PCP office for questions related to their healthcare. CTN/ACM/LPN provided contact information for future reference. From CDC: Are you at higher risk for severe illness?  Wash your hands often.  Avoid close contact (6 feet, which is about two arm lengths) with people who are sick.  Put distance between yourself and other people if COVID-19 is spreading in your community.  Clean and disinfect frequently touched surfaces.  Avoid all cruise travel and non-essential air travel.    Call your healthcare professional if you have concerns about COVID-19 and your underlying condition or if you are sick. For more information on steps you can take to protect yourself, see CDC's How to Protect Yourself      Patient/family/caregiver given information for GetWell Loop and agrees to enroll yes  Patient's preferred e-mail:  [email or web address]  Patient's preferred phone number: [phone]  Based on Loop alert triggers, patient will be contacted by nurse care manager for worsening symptoms. Pt will be further monitored by COVID Loop Team, pending account activation by patient.  based on severity of symptoms and risk factors.   Appointment scheduled for   2/16/2021 3:30 PM Nena Rosa NP

## 2021-06-05 ENCOUNTER — HOSPITAL ENCOUNTER (EMERGENCY)
Age: 41
Discharge: HOME | End: 2021-06-05
Attending: EMERGENCY MEDICINE
Payer: COMMERCIAL

## 2021-06-05 VITALS
HEIGHT: 60 IN | SYSTOLIC BLOOD PRESSURE: 166 MMHG | RESPIRATION RATE: 20 BRPM | HEART RATE: 69 BPM | WEIGHT: 159 LBS | BODY MASS INDEX: 31.22 KG/M2 | DIASTOLIC BLOOD PRESSURE: 97 MMHG | OXYGEN SATURATION: 100 % | TEMPERATURE: 97.9 F

## 2021-06-05 DIAGNOSIS — N76.0 BV (BACTERIAL VAGINOSIS): ICD-10-CM

## 2021-06-05 DIAGNOSIS — B96.89 BV (BACTERIAL VAGINOSIS): ICD-10-CM

## 2021-06-05 DIAGNOSIS — A59.9 TRICHOMONAS VAGINALIS INFECTION: Primary | ICD-10-CM

## 2021-06-05 LAB
APPEARANCE UR: CLEAR
BACTERIA URNS QL MICRO: NEGATIVE /HPF
BILIRUB UR QL: NEGATIVE
CLUE CELLS VAG QL WET PREP: NORMAL
COLOR UR: ABNORMAL
EPITH CASTS URNS QL MICRO: ABNORMAL /LPF
GLUCOSE UR STRIP.AUTO-MCNC: NEGATIVE MG/DL
HCG UR QL: NEGATIVE
HGB UR QL STRIP: ABNORMAL
KETONES UR QL STRIP.AUTO: NEGATIVE MG/DL
KOH PREP SPEC: NORMAL
LEUKOCYTE ESTERASE UR QL STRIP.AUTO: ABNORMAL
NITRITE UR QL STRIP.AUTO: NEGATIVE
PH UR STRIP: 6.5 [PH] (ref 5–8)
PROT UR STRIP-MCNC: NEGATIVE MG/DL
RBC #/AREA URNS HPF: ABNORMAL /HPF (ref 0–5)
SERVICE CMNT-IMP: NORMAL
SP GR UR REFRACTOMETRY: 1.01 (ref 1–1.03)
T VAGINALIS VAG QL WET PREP: NORMAL
TRICHOMONAS UR QL MICRO: PRESENT
UA: UC IF INDICATED,UAUC: ABNORMAL
UROBILINOGEN UR QL STRIP.AUTO: 0.2 EU/DL (ref 0.2–1)
WBC URNS QL MICRO: ABNORMAL /HPF (ref 0–4)

## 2021-06-05 PROCEDURE — 99284 EMERGENCY DEPT VISIT MOD MDM: CPT

## 2021-06-05 PROCEDURE — 87210 SMEAR WET MOUNT SALINE/INK: CPT

## 2021-06-05 PROCEDURE — 81001 URINALYSIS AUTO W/SCOPE: CPT

## 2021-06-05 PROCEDURE — 74011250637 HC RX REV CODE- 250/637: Performed by: EMERGENCY MEDICINE

## 2021-06-05 PROCEDURE — 81025 URINE PREGNANCY TEST: CPT

## 2021-06-05 PROCEDURE — 87491 CHLMYD TRACH DNA AMP PROBE: CPT

## 2021-06-05 RX ORDER — IBUPROFEN 400 MG/1
800 TABLET ORAL ONCE
Status: COMPLETED | OUTPATIENT
Start: 2021-06-05 | End: 2021-06-05

## 2021-06-05 RX ORDER — METRONIDAZOLE 500 MG/1
500 TABLET ORAL
Status: COMPLETED | OUTPATIENT
Start: 2021-06-05 | End: 2021-06-05

## 2021-06-05 RX ORDER — METRONIDAZOLE 500 MG/1
500 TABLET ORAL 2 TIMES DAILY
Qty: 14 TABLET | Refills: 0 | Status: SHIPPED | OUTPATIENT
Start: 2021-06-05 | End: 2021-06-12

## 2021-06-05 RX ADMIN — IBUPROFEN 800 MG: 400 TABLET, FILM COATED ORAL at 06:29

## 2021-06-05 RX ADMIN — METRONIDAZOLE 500 MG: 500 TABLET ORAL at 07:08

## 2021-06-05 NOTE — ED NOTES
Patient presents to ED c/o x 3 days of light pink vaginal discharge and lower back pain. Patient reports LMP was 05/21/2021. Patient denies n/v/d/fever/urinary symptoms. Emergency Department Nursing Plan of Care       The Nursing Plan of Care is developed from the Nursing assessment and Emergency Department Attending provider initial evaluation. The plan of care may be reviewed in the ED Provider note.     The Plan of Care was developed with the following considerations:   Patient / Family readiness to learn indicated by:verbalized understanding  Persons(s) to be included in education: patient  Barriers to Learning/Limitations:No

## 2021-06-05 NOTE — ED PROVIDER NOTES
EMERGENCY DEPARTMENT HISTORY AND PHYSICAL EXAM      Date: 6/5/2021  Patient Name: Kailyn Herron  Patient Age and Sex: 35 y.o. female    History of Presenting Illness     Chief Complaint   Patient presents with    Vaginal Discharge    Back Pain       History Provided By: Patient    Ability to gather history was limited by:     HPI: Kailyn Herron, 35 y.o. female with history of bipolar disorder complains of 3 days of mild bilateral lower/flank pain described as aching and cramping, mild severity. She has also had mild light pink vaginal spotting for about 3 days. No dysuria or significant vaginal discharge. Patient is currently sexually active with 1 partner. She has missed taking her birth control multiple times in the past few weeks. Location:    Quality:      Severity:    Duration:   Timing:      Context:    Modifying factors:   Associated symptoms:     Past History      The patient's medical, surgical, and social history on file were reviewed by me today.      The family history was reviewed by me today and was non-contributory, unless otherwise specified below:    Past Medical History:  Past Medical History:   Diagnosis Date    Anxiety     Bipolar affective ([address]     Depression     Hyperlipidemia     High Cholesterol    Hyperlipidemia     Major depression        Past Surgical History:  Past Surgical History:   Procedure Laterality Date    HX GI      Colonoscopy    HX HERNIA REPAIR      as child       Family History:  Family History   Problem Relation Age of Onset    Diabetes Mother     Depression Mother     Asthma Brother     Stroke Maternal Grandmother         aneurysm    Cancer Maternal Grandmother         kidney    Hypertension Maternal Grandmother     Cancer Paternal Grandmother         lung    Diabetes Brother     Bipolar Disorder Brother     Schizophrenia Brother     Other Brother         paranoia    Breast Cancer Other         maternal---age unknown    Breast Cancer Other         Maternal--age unknown       Social History:  Social History     Tobacco Use    Smoking status: Current Some Day Smoker     Packs/day: 0.25    Smokeless tobacco: Current User    Tobacco comment: 1-2 black & milds daily    Substance Use Topics    Alcohol use: Yes     Comment: occasional    Drug use: No       Current Medications:  No current facility-administered medications on file prior to encounter. Current Outpatient Medications on File Prior to Encounter   Medication Sig Dispense Refill    montelukast (SINGULAIR) 10 mg tablet Take 1 Tab by mouth daily. (Patient not taking: Reported on 6/5/2021) 30 Tab 5    cholecalciferol (VITAMIN D3) 25 mcg (1,000 unit) cap Take 1 Cap by mouth daily. (Patient not taking: Reported on 6/5/2021) 90 Cap 3    triamcinolone acetonide (KENALOG) 0.1 % ointment Apply  to affected area two (2) times a day. use thin layer (Patient not taking: Reported on 6/5/2021) 30 g 0    sertraline (ZOLOFT) 50 mg tablet Take 1 Tab by mouth daily. (Patient not taking: Reported on 6/5/2021) 30 Tab 3    OLANZapine (ZYPREXA) 2.5 mg tablet TAKE 1 TABLET BY MOUTH EVERY DAY AT NIGHT (Patient not taking: Reported on 6/5/2021) 30 Tab 3    busPIRone (BUSPAR) 10 mg tablet Take 1 Tab by mouth two (2) times daily as needed (anxiety). (Patient not taking: Reported on 6/5/2021) 60 Tab 3    albuterol (PROVENTIL HFA, VENTOLIN HFA, PROAIR HFA) 90 mcg/actuation inhaler Take 2 Puffs by inhalation every four (4) hours as needed for Wheezing or Shortness of Breath (persistent coughing). (Patient not taking: Reported on 6/5/2021) 1 Inhaler 0    acetaminophen (TYLENOL) 325 mg tablet Take 2 Tabs by mouth every four (4) hours as needed for Pain. (Patient not taking: Reported on 6/5/2021) 20 Tab 0    ketorolac (TORADOL) 10 mg tablet Take 1 Tab by mouth every six (6) hours as needed for Pain. (Patient not taking: Reported on 6/5/2021) 20 Tab 0       Allergies:   Allergies   Allergen Reactions    Amoxicillin Rash and Other (comments)     Vaginal itching    Flagyl [Metronidazole] Nausea and Vomiting     metrogel is okay.  Pcn [Penicillins] Itching     Vaginal itching     Review of Systems    A complete ROS was reviewed by me today and was negative, unless otherwise specified below:    Review of Systems   Constitutional: Negative for fatigue and fever. Gastrointestinal: Negative for abdominal pain. Genitourinary: Positive for flank pain and vaginal bleeding. Negative for difficulty urinating, dysuria, vaginal discharge and vaginal pain. Musculoskeletal: Positive for back pain. All other systems reviewed and are negative. Physical Exam   Vital Signs  Patient Vitals for the past 8 hrs:   Temp Pulse Resp BP SpO2   06/05/21 0521 97.9 °F (36.6 °C) 69 20 (!) 166/97 100 %          Physical Exam  Vitals and nursing note reviewed. Constitutional:       General: She is not in acute distress. Appearance: Normal appearance. She is well-developed. She is not ill-appearing. HENT:      Head: Normocephalic and atraumatic. Mouth/Throat:      Mouth: Mucous membranes are moist.   Eyes:      General:         Right eye: No discharge. Left eye: No discharge. Conjunctiva/sclera: Conjunctivae normal.   Cardiovascular:      Rate and Rhythm: Normal rate and regular rhythm. Heart sounds: Normal heart sounds. No murmur heard. Pulmonary:      Effort: Pulmonary effort is normal. No respiratory distress. Breath sounds: Normal breath sounds. No wheezing. Abdominal:      General: There is no distension. Palpations: Abdomen is soft. Tenderness: There is no abdominal tenderness. Musculoskeletal:         General: No deformity. Normal range of motion. Cervical back: Normal range of motion and neck supple. Skin:     General: Skin is warm and dry. Findings: No rash. Neurological:      General: No focal deficit present.       Mental Status: She is alert and oriented to person, place, and time. Psychiatric:         Speech: Speech normal.         Behavior: Behavior normal.         Cognition and Memory: Cognition normal.         Diagnostic Study Results   Labs  Recent Results (from the past 24 hour(s))   URINALYSIS W/ REFLEX CULTURE    Collection Time: 06/05/21  5:47 AM    Specimen: Urine   Result Value Ref Range    Color YELLOW/STRAW      Appearance CLEAR CLEAR      Specific gravity 1.015 1.003 - 1.030      pH (UA) 6.5 5.0 - 8.0      Protein Negative NEG mg/dL    Glucose Negative NEG mg/dL    Ketone Negative NEG mg/dL    Bilirubin Negative NEG      Blood MODERATE (A) NEG      Urobilinogen 0.2 0.2 - 1.0 EU/dL    Nitrites Negative NEG      Leukocyte Esterase MODERATE (A) NEG      WBC 0-4 0 - 4 /hpf    RBC 5-10 0 - 5 /hpf    Epithelial cells FEW FEW /lpf    Bacteria Negative NEG /hpf    UA:UC IF INDICATED CULTURE NOT INDICATED BY UA RESULT CNI      Trichomonas PRESENT (A) NEG     WET PREP    Collection Time: 06/05/21  5:47 AM    Specimen: Miscellaneous sample   Result Value Ref Range    Clue cells CLUE CELLS PRESENT      Wet prep TRICHOMONAS PRESENT     KOH, OTHER SOURCES    Collection Time: 06/05/21  5:47 AM    Specimen: Vaginal Specimen; Other   Result Value Ref Range    Special Requests: NO SPECIAL REQUESTS      KOH NO YEAST SEEN     HCG URINE, QL. - POC    Collection Time: 06/05/21  5:49 AM   Result Value Ref Range    Pregnancy test,urine (POC) Negative NEG         Radiologic Studies  No orders to display     CT Results  (Last 48 hours)    None        CXR Results  (Last 48 hours)    None          Billable Procedures   Procedures    Cardiac monitoring was not ordered for this patient. Medical Decision Making     I reviewed the patient's most recent Emergency Dept notes and diagnostic tests in formulating my MDM on today's visit.     Provider Notes (Medical Decision Making):   56-year-old female with about 3 days of mild bilateral flank and lower back pain as well as mild vaginal bleeding/spotting, no dysuria. Well-appearing, no distress. Normal vital signs, afebrile. Abdomen is soft and nontender. Minimal tenderness in the flanks. Possible pyelonephritis, possible pregnancy, possible STI, doubt TOA or torsion or kidney stone. Pregnancy test, GC chlamydia probe, and urinalysis pending. Liseth Garza MD  6:33 AM  6/5/2021     Pregnancy test negative. Urinalysis not suggestive of UTI or pyelonephritis. Patient is positive for both clue cells (BV) and trichomonas on wet prep. She was administered Flagyl in the emergency department and I prescribed a 1 week course of Flagyl. Follow-up with GYN clinic as needed. Consults:    Social History     Tobacco Use    Smoking status: Current Some Day Smoker     Packs/day: 0.25    Smokeless tobacco: Current User    Tobacco comment: 1-2 black & milds daily    Substance Use Topics    Alcohol use: Yes     Comment: occasional    Drug use: No       Medications Administered during ED course:  Medications   metroNIDAZOLE (FLAGYL) tablet 500 mg (has no administration in time range)   ibuprofen (MOTRIN) tablet 800 mg (800 mg Oral Given 6/5/21 0629)          Prescriptions from today's ED visit:  Current Discharge Medication List      START taking these medications    Details   metroNIDAZOLE (FlagyL) 500 mg tablet Take 1 Tablet by mouth two (2) times a day for 7 days. Qty: 14 Tablet, Refills: 0  Start date: 6/5/2021, End date: 6/12/2021            Diagnosis and Disposition     Disposition:  Discharged    Clinical Impression:   1. Trichomonas vaginalis infection    2. BV (bacterial vaginosis)        Attestation:  I personally performed the services described in this documentation on this date 6/5/2021 for patient Lazarus Backbone. Liseth Garza MD        I was the first provider for this patient on this visit.   To the best of my ability I reviewed relevant prior medical records, electrocardiograms, laboratories, and radiologic studies. The patient's presenting problems were discussed, and the patient was in agreement with the care plan formulated and outlined with them. Jerilyn Jo MD    Please note that this dictation was completed with Dragon voice recognition software. Quite often unanticipated grammatical, syntax, homophones, and other interpretive errors are inadvertently transcribed by the computer software. Please disregard these errors and excuse any errors that have escaped final proofreading.

## 2021-06-05 NOTE — DISCHARGE INSTRUCTIONS
It was a pleasure taking care of you in our Emergency Department today. We know that when you come to [facility], you are entrusting us with your health, comfort, and safety. Our physicians and nurses honor that trust, and truly appreciate the opportunity to care for you and your loved ones. We also value your feedback. If you receive a survey about your Emergency Department experience today, please fill it out. We care about our patients' feedback, and we listen to what you have to say. Thank you!

## 2021-06-07 LAB
C TRACH RRNA SPEC QL NAA+PROBE: NEGATIVE
N GONORRHOEA RRNA SPEC QL NAA+PROBE: NEGATIVE
PLEASE NOTE:, 188601: NORMAL
SPECIMEN SOURCE: NORMAL

## 2021-06-12 DIAGNOSIS — L24.0 IRRITANT CONTACT DERMATITIS DUE TO DETERGENT: ICD-10-CM

## 2021-06-15 RX ORDER — TRIAMCINOLONE ACETONIDE 1 MG/G
OINTMENT TOPICAL 2 TIMES DAILY
Qty: 30 G | Refills: 0 | Status: SHIPPED | OUTPATIENT
Start: 2021-06-15 | End: 2021-09-01 | Stop reason: ALTCHOICE

## 2021-07-01 ENCOUNTER — OFFICE VISIT (OUTPATIENT)
Dept: INTERNAL MEDICINE CLINIC | Age: 41
End: 2021-07-01
Payer: COMMERCIAL

## 2021-07-01 VITALS — WEIGHT: 153.5 LBS | BODY MASS INDEX: 28.98 KG/M2 | HEIGHT: 61 IN

## 2021-07-01 DIAGNOSIS — N76.0 ACUTE VAGINITIS: ICD-10-CM

## 2021-07-01 DIAGNOSIS — R35.0 FREQUENT URINATION: ICD-10-CM

## 2021-07-01 DIAGNOSIS — N92.6 MISSED PERIOD: Primary | ICD-10-CM

## 2021-07-01 LAB
BILIRUB UR QL STRIP: NORMAL
GLUCOSE UR-MCNC: NEGATIVE MG/DL
HCG URINE, QL. (POC): NEGATIVE
KETONES P FAST UR STRIP-MCNC: NEGATIVE MG/DL
PH UR STRIP: 5.5 [PH] (ref 4.6–8)
PROT UR QL STRIP: NORMAL
SP GR UR STRIP: 1.03 (ref 1–1.03)
UA UROBILINOGEN AMB POC: NORMAL (ref 0.2–1)
URINALYSIS CLARITY POC: CLEAR
URINALYSIS COLOR POC: NORMAL
URINE BLOOD POC: NORMAL
URINE LEUKOCYTES POC: NEGATIVE
URINE NITRITES POC: NEGATIVE
VALID INTERNAL CONTROL?: YES

## 2021-07-01 PROCEDURE — 99214 OFFICE O/P EST MOD 30 MIN: CPT | Performed by: INTERNAL MEDICINE

## 2021-07-01 PROCEDURE — 81025 URINE PREGNANCY TEST: CPT | Performed by: INTERNAL MEDICINE

## 2021-07-01 PROCEDURE — 81003 URINALYSIS AUTO W/O SCOPE: CPT | Performed by: INTERNAL MEDICINE

## 2021-07-01 NOTE — PROGRESS NOTES
Chief Complaint   Patient presents with    Vaginal Infection     Pt c/o or irritation, it happended after using a vibrator. Denies dischage odor    Missed Menses     only brownish spotting LMP 5/23/21     1. Have you been to the ER, urgent care clinic since your last visit? Hospitalized since your last visit? No    2. Have you seen or consulted any other health care providers outside of the [address] [place] [address] since your last visit? Include any pap smears or colon screening.  No       [facility] amb pregnancy test and UA

## 2021-07-01 NOTE — PROGRESS NOTES
Daniel Lozada is a 35 y.o. female and presents with Vaginal Infection (Pt c/o or irritation, it happended after using a vibrator. Denies dischage odor) and Missed Menses (only brownish spotting LMP 5/23/21)  . Subjective:    Pts facial rash cleared after stopping prescription topical meds    Pt w multiple concerns:  1)Missed menses x3 mths- Uring hcg in ED 6/5/2021 NEGATIVE  2)Vaginal discomfort after using a vibrator- No abn vag discharge. No odor. Of note, pt was tx for trichomonas and BV in ED 4 weeks ago.     Results for orders placed or performed in visit on 07/01/21   AMB POC URINALYSIS DIP STICK AUTO W/O MICRO     Status: None   Result Value Ref Range Status    Color (UA POC) Dark Yellow  Final    Clarity (UA POC) Clear  Final    Glucose (UA POC) Negative Negative Final    Bilirubin (UA POC) 1+ Negative Final    Ketones (UA POC) Negative Negative Final    Specific gravity (UA POC) 1.030 1.001 - 1.035 Final    Blood (UA POC) 2+ Negative Final    pH (UA POC) 5.5 4.6 - 8.0 Final    Protein (UA POC) 1+ Negative Final    Urobilinogen (UA POC) 0.2 mg/dL 0.2 - 1 Final    Nitrites (UA POC) Negative Negative Final    Leukocyte esterase (UA POC) Negative Negative Final   Results for orders placed or performed in visit on 11/04/15   AMB POC URINALYSIS DIP STICK AUTO W/ MICRO     Status: Abnormal   Result Value Ref Range Status    Color (UA POC) Yellow  Final    Clarity (UA POC) Clear  Final    Glucose (UA POC) Negative Negative Final    Bilirubin (UA POC) Negative Negative Final    Ketones (UA POC) Negative Negative Final    Specific gravity (UA POC) 1.015 1.001 - 1.035 Final    Blood (UA POC) 1+ Negative Final    pH (UA POC) 8.5 (A) 4.6 - 8.0 Final    Protein (UA POC) Negative Negative mg/dL Final    Urobilinogen (UA POC) 0.2 mg/dL 0.2 - 1 Final    Nitrites (UA POC) Negative Negative Final    Leukocyte esterase (UA POC) 1+ Negative Final             Lab Results   Component Value Date/Time    Pregnancy test,urine (POC) Negative 06/05/2021 05:49 AM    HCG urine, Ql. (POC) Negative 07/01/2021 10:12 AM       Review of Systems  Review of systems (12) negative, except noted above. Past Medical History:   Diagnosis Date    Anxiety     Bipolar affective ([address]     Depression     Hyperlipidemia     High Cholesterol    Hyperlipidemia     Major depression      Past Surgical History:   Procedure Laterality Date    HX GI      Colonoscopy    HX HERNIA REPAIR      as child     Social History     Socioeconomic History    Marital status: SINGLE     Spouse name: Not on file    Number of children: Not on file    Years of education: Not on file    Highest education level: Not on file   Tobacco Use    Smoking status: Current Some Day Smoker     Packs/day: 0.25    Smokeless tobacco: Current User    Tobacco comment: 1-2 black & milds daily    Vaping Use    Vaping Use: Never used   Substance and Sexual Activity    Alcohol use: Yes     Comment: occasional    Drug use: No    Sexual activity: Yes     Partners: Male     Birth control/protection: None     Social Determinants of Health     Financial Resource Strain:     Difficulty of Paying Living Expenses:    Food Insecurity:     Worried About Running Out of Food in the Last Year:     Ran Out of Food in the Last Year:    Transportation Needs:     Lack of Transportation (Medical):      Lack of Transportation (Non-Medical):    Physical Activity:     Days of Exercise per Week:     Minutes of Exercise per Session:    Stress:     Feeling of Stress :    Social Connections:     Frequency of Communication with Friends and Family:     Frequency of Social Gatherings with Friends and Family:     Attends [employer] Services:     Active Member of Clubs or Organizations:     Attends Club or Organization Meetings:     Marital Status:      Family History   Problem Relation Age of Onset    Diabetes Mother     Depression Mother     Asthma Brother     Stroke Maternal Grandmother aneurysm    Cancer Maternal Grandmother         kidney    Hypertension Maternal Grandmother     Cancer Paternal Grandmother         lung    Diabetes Brother     Bipolar Disorder Brother     Schizophrenia Brother     Other Brother         paranoia    Breast Cancer Other         maternal---age unknown    Breast Cancer Other         Maternal--age unknown     Current Outpatient Medications   Medication Sig Dispense Refill    triamcinolone acetonide (KENALOG) 0.1 % ointment Apply  to affected area two (2) times a day. use thin layer 30 g 0    montelukast (SINGULAIR) 10 mg tablet Take 1 Tab by mouth daily. (Patient not taking: Reported on 6/5/2021) 30 Tab 5    cholecalciferol (VITAMIN D3) 25 mcg (1,000 unit) cap Take 1 Cap by mouth daily. (Patient not taking: Reported on 6/5/2021) 90 Cap 3    sertraline (ZOLOFT) 50 mg tablet Take 1 Tab by mouth daily. (Patient not taking: Reported on 6/5/2021) 30 Tab 3    OLANZapine (ZYPREXA) 2.5 mg tablet TAKE 1 TABLET BY MOUTH EVERY DAY AT NIGHT (Patient not taking: Reported on 6/5/2021) 30 Tab 3    busPIRone (BUSPAR) 10 mg tablet Take 1 Tab by mouth two (2) times daily as needed (anxiety). (Patient not taking: Reported on 6/5/2021) 60 Tab 3    albuterol (PROVENTIL HFA, VENTOLIN HFA, PROAIR HFA) 90 mcg/actuation inhaler Take 2 Puffs by inhalation every four (4) hours as needed for Wheezing or Shortness of Breath (persistent coughing). (Patient not taking: Reported on 6/5/2021) 1 Inhaler 0    acetaminophen (TYLENOL) 325 mg tablet Take 2 Tabs by mouth every four (4) hours as needed for Pain. (Patient not taking: Reported on 6/5/2021) 20 Tab 0    ketorolac (TORADOL) 10 mg tablet Take 1 Tab by mouth every six (6) hours as needed for Pain. (Patient not taking: Reported on 6/5/2021) 20 Tab 0     Allergies   Allergen Reactions    Amoxicillin Rash and Other (comments)     Vaginal itching    Flagyl [Metronidazole] Nausea and Vomiting     metrogel is okay.     Pcn [Penicillins] Itching     Vaginal itching       Objective:  Visit Vitals  BP (P) 131/86 (BP 1 Location: Left upper arm, BP Patient Position: Sitting, BP Cuff Size: Adult)   Pulse (P) 92   Temp (P) 98.5 °F (36.9 °C) (Temporal)   Resp (P) 19   Ht 5' 1\" (1.549 m)   Wt 153 lb 8 oz (69.6 kg)   LMP 05/23/2021 Comment: brownish spotting only   SpO2 (P) 98%   BMI 29.00 kg/m²     Physical Exam:   General appearance - alert, well appearing, and in no distress   Mental status - alert, oriented to person, place, and time  EYE-EOMI  Neck - supple,   Chest - symmetric air entry    Genital exam-nml female external genitalia, nml appearing cervix, no CMT mild increased erythema of vaginal wall  Ext-no pedal edema, no clubbing or cyanosis  Skin-Warm and dry. no hyperpigmentation, vitiligo, or suspicious lesions  Neuro -alert, oriented, normal speech, no focal findings or movement disorder noted          Results for orders placed or performed in visit on 07/01/21   AMB POC URINE PREGNANCY TEST, VISUAL COLOR COMPARISON   Result Value Ref Range    VALID INTERNAL CONTROL POC Yes     HCG urine, Ql. (POC) Negative Negative   AMB POC URINALYSIS DIP STICK AUTO W/O MICRO   Result Value Ref Range    Color (UA POC) Dark Yellow     Clarity (UA POC) Clear     Glucose (UA POC) Negative Negative    Bilirubin (UA POC) 1+ Negative    Ketones (UA POC) Negative Negative    Specific gravity (UA POC) 1.030 1.001 - 1.035    Blood (UA POC) 2+ Negative    pH (UA POC) 5.5 4.6 - 8.0    Protein (UA POC) 1+ Negative    Urobilinogen (UA POC) 0.2 mg/dL 0.2 - 1    Nitrites (UA POC) Negative Negative    Leukocyte esterase (UA POC) Negative Negative       Assessment/Plan:    ICD-10-CM ICD-9-CM    1. Missed period  N92.6 626.4 AMB POC URINE PREGNANCY TEST, VISUAL COLOR COMPARISON   2. Frequent urination  R35.0 788.41 AMB POC URINALYSIS DIP STICK AUTO W/O MICRO   3.  Acute vaginitis  N76.0 616.10 NUAB VAGINITIS PLUS     Orders Placed This Encounter   Travis Dai VAGINITIS PLUS     Standing Status:   Future     Standing Expiration Date:   7/1/2022    AMB POC URINE PREGNANCY TEST, VISUAL COLOR COMPARISON    AMB POC URINALYSIS DIP STICK AUTO W/O MICRO     1. Missed period  Urine hcg negative  F/u PCP/gyn  - AMB POC URINE PREGNANCY TEST, VISUAL COLOR COMPARISON    2. Frequent urination  Urinalysis negative  - AMB POC URINALYSIS DIP STICK AUTO W/O MICRO    3. Acute vaginitis  Further mngmnt as results dictate  R/o std  May be mild dermatitis  - NUSWAB VAGINITIS PLUS; Future    There are no Patient Instructions on file for this visit. I have reviewed with the patient details of the assessment and plan and all questions were answered. Relevent patient education was performed. The most recent lab findings were reviewed with the patient. An After Visit Summary was printed and given to the patient.

## 2021-07-06 ENCOUNTER — E-VISIT (OUTPATIENT)
Dept: INTERNAL MEDICINE CLINIC | Age: 41
End: 2021-07-06
Payer: COMMERCIAL

## 2021-07-06 DIAGNOSIS — B37.9 ANTIBIOTIC-INDUCED YEAST INFECTION: ICD-10-CM

## 2021-07-06 DIAGNOSIS — A59.01 TRICHOMONAL VAGINITIS: Primary | ICD-10-CM

## 2021-07-06 DIAGNOSIS — T36.95XA ANTIBIOTIC-INDUCED YEAST INFECTION: ICD-10-CM

## 2021-07-06 PROCEDURE — 99421 OL DIG E/M SVC 5-10 MIN: CPT | Performed by: NURSE PRACTITIONER

## 2021-07-06 RX ORDER — TINIDAZOLE 250 MG/1
500 TABLET ORAL 2 TIMES DAILY
Qty: 14 TABLET | Refills: 0 | Status: SHIPPED | OUTPATIENT
Start: 2021-07-06 | End: 2021-07-13

## 2021-07-06 RX ORDER — FLUCONAZOLE 150 MG/1
150 TABLET ORAL ONCE
Qty: 1 TABLET | Refills: 0 | Status: SHIPPED | OUTPATIENT
Start: 2021-07-06 | End: 2021-07-06

## 2021-07-06 NOTE — TELEPHONE ENCOUNTER
Pt requesting Evisit to address vaginal discharge. Seen by Dr. Lamberto Olivera on 7/1 with Holley Fan still noted on NuSwab. Will send for tinadozole to treat with diflucan following treatment also for hx of antibiotic-induced yeast infection. *see questionnaire responses for subjective history    Diagnoses and all orders for this visit:    1. Trichomonal vaginitis  -     tinidazole 250 mg tablet; Take 500 mg by mouth two (2) times a day for 7 days. Indications: an infection due to Trichomonas vaginalis    2. Antibiotic-induced yeast infection  -     fluconazole (Diflucan) 150 mg tablet; Take 1 Tablet by mouth once for 1 dose. Indications: antibiotic-induced yeast infection        5-10 minutes were spent on the digital evaluation and management of this patient.

## 2021-07-07 LAB
A VAGINAE DNA VAG QL NAA+PROBE: ABNORMAL SCORE
BVAB2 DNA VAG QL NAA+PROBE: ABNORMAL SCORE
C ALBICANS DNA VAG QL NAA+PROBE: NEGATIVE
C GLABRATA DNA VAG QL NAA+PROBE: NEGATIVE
C TRACH RRNA SPEC QL NAA+PROBE: NEGATIVE
MEGA1 DNA VAG QL NAA+PROBE: ABNORMAL SCORE
N GONORRHOEA RRNA SPEC QL NAA+PROBE: NEGATIVE
SPECIMEN SOURCE: ABNORMAL
T VAGINALIS RRNA SPEC QL NAA+PROBE: POSITIVE

## 2021-07-07 NOTE — PROGRESS NOTES
Pt had OV with Dr. Radha Pimentel on 7/1/2021, requested for Evisit on 7/6/2021 with Dr. Vickie Fischer who forwarded it to [facility] for treatment. Jimena sent tinidazole 250mg and Diflucan to the pharmacy for pt yesterday. I spoke with pt this morning and informed her that meds were sent to the pharmacy about for this.

## 2021-09-01 ENCOUNTER — OFFICE VISIT (OUTPATIENT)
Dept: INTERNAL MEDICINE CLINIC | Age: 41
End: 2021-09-01
Payer: COMMERCIAL

## 2021-09-01 DIAGNOSIS — F44.81 MULTIPLE PERSONALITY DISORDER (HCC): ICD-10-CM

## 2021-09-01 DIAGNOSIS — F43.10 PTSD (POST-TRAUMATIC STRESS DISORDER): ICD-10-CM

## 2021-09-01 DIAGNOSIS — F31.9 BIPOLAR DEPRESSION (HCC): ICD-10-CM

## 2021-09-01 DIAGNOSIS — F41.1 GENERALIZED ANXIETY DISORDER WITH PANIC ATTACKS: Primary | ICD-10-CM

## 2021-09-01 DIAGNOSIS — Z02.9 ADMINISTRATIVE ENCOUNTER: ICD-10-CM

## 2021-09-01 DIAGNOSIS — R05.3 PERSISTENT COUGH FOR 3 WEEKS OR LONGER: ICD-10-CM

## 2021-09-01 DIAGNOSIS — F41.0 GENERALIZED ANXIETY DISORDER WITH PANIC ATTACKS: Primary | ICD-10-CM

## 2021-09-01 PROBLEM — R21 BUTTERFLY RASH: Status: RESOLVED | Noted: 2021-01-21 | Resolved: 2021-09-01

## 2021-09-01 PROCEDURE — 99214 OFFICE O/P EST MOD 30 MIN: CPT | Performed by: NURSE PRACTITIONER

## 2021-09-01 RX ORDER — HYDROXYZINE 25 MG/1
TABLET, FILM COATED ORAL
COMMUNITY
Start: 2021-08-31 | End: 2022-03-16

## 2021-09-01 RX ORDER — ALBUTEROL SULFATE 90 UG/1
2 AEROSOL, METERED RESPIRATORY (INHALATION)
Qty: 6.7 G | Refills: 0 | Status: SHIPPED | OUTPATIENT
Start: 2021-09-01 | End: 2021-12-24 | Stop reason: SDUPTHER

## 2021-09-01 NOTE — LETTER
NOTIFICATION RETURN TO WORK / SCHOOL    9/1/2021 9:35 AM    [address] [id number]      To Whom It May Concern:    Nicki Collier is currently under the care of Daniela Rubin. She had a severe panic attack on 8/26/21 precluding her from finishing her commute in to work, please excuse her absence at that time. Ms. Collins Gary will be out of work starting 8/27/21 to address and stabilize her mood before returning. If there are questions or concerns please have the patient contact our office.         Sincerely,          Purvi Mckeon NP

## 2021-09-01 NOTE — PATIENT INSTRUCTIONS
Learning About Positive Thinking  What is positive thinking? Positive thinking, or healthy thinking, is a way to help you stay well or cope with a health problem by changing how you think. It's based on research that shows that you can change how you think. And how you think affects how you feel. Cognitive-behavioral therapy, also called CBT, is a therapy that is often used to help people think in a healthy way. It focuses on thought (cognitive) and action (behavioral). How can positive thinking help you? If you think in a positive way, you may be more able to care for yourself and handle life's challenges. You will feel better. And you may be more able to avoid or cope with stress, anxiety, and depression. CBT may be able to help you sleep better and lose weight. How can you get started with positive thinking? CBT involves techniques that you can practice every day so that healthy thinking comes naturally. Here are the steps for one technique. 1. Stop. When you notice a negative thought, stop it in its tracks and write it down. 2. Ask. Look at that thought and ask yourself whether it is helpful or unhelpful right now. 3. Choose. Choose a new, helpful thought to replace a negative one. Here's an example of how this might work:  · In a job review, your boss praised several things about your work. But you're feeling down because she had one small criticism. You might even think, \"I'm no good at my job\" or \"She doesn't like me. I must be bad. \" These are negative thoughts. You want to stop them. · Ask yourself questions about the situation and your negative thoughts. You might ask, \"What did my boss say exactly? \" \"Were there positive comments? \" \"Why do I focus only on one criticism? \" Your answers can help you find more accurate and helpful statements. · Now choose a helpful thought to replace the negative thoughts.  For example, you might think, \"I've done a lot of good work this year, and my boss noticed it. She thought there was one area I can improve. So I'll think of some things I can do to get stronger in that area. \"  With time and practice, you can learn to see that the harsh things you say to yourself may keep you from enjoying your life and work. You can replace them with more helpful thoughts. Where can you learn more? Go to http://www.gray.com/  Enter [id number] in the search box to learn more about \"Learning About Positive Thinking. \"  Current as of: September 23, 2020               Content Version: 12.8  © 2006-2021 [employer]. Care instructions adapted under license by [employer] (which disclaims liability or warranty for this information). If you have questions about a medical condition or this instruction, always ask your healthcare professional. [address] any warranty or liability for your use of this information.

## 2021-09-01 NOTE — PROGRESS NOTES
Betty Cooper is a 35 y.o. female established patient, here for evaluation of the following chief complaint(s):   Depression (Doxy. me [phone]) and Anxiety          Assessment & Plan:   Diagnoses and all orders for this visit:    1. Generalized anxiety disorder with panic attacks    2. Bipolar depression ([facility]  -     REFERRAL TO PSYCHOLOGY    3. Administrative encounter  -     REFERRAL TO PSYCHOLOGY    4. Multiple personality disorder ([facility]  -     REFERRAL TO PSYCHOLOGY    5. PTSD (post-traumatic stress disorder)  -     REFERRAL TO PSYCHOLOGY      Follow-up and Dispositions    · Return in about 4 weeks (around 9/29/2021) for VV- MWV, Anx/Dep f/u, RTW status. Needs VV with Luzmaria Paniagua also. We discussed the expected course, resolution and complications of the diagnosis(es) in detail. Medication risks, benefits, costs, interactions, and alternatives were discussed as indicated. I advised her to contact the office if her condition worsens, changes or fails to improve as anticipated. She expressed understanding with the diagnosis(es) and plan. Specific pt instructions until next visit: continue present plan, start counseling RTW for Nov. 22, 2021. Will release sooner if mood stabilized. Subjective:   Betty Cooper is a 35 y.o. female who was seen for Depression (Doxy. me [phone]) and Anxiety    Pt presents today for paperwork completion. Told by psych, they are unable to fill out unless seen 5 times prior to request. Seen by psych again 2 wks ago and resumed on meds: Zyprexa (incr yesterday) and Zoloft. Was out of meds for ~2 months since felt she was better and no longer needed. Atarax prescribed yesterday also. Has h/o anxiety, bipolar depression, multiple personality disorder, and PTSD. Started KEITH 8/27/21 for worsening anxiety and depression. Had episode of increased stress due to BAD traffic (worse than usual) last week, feels it may be due to anniversary of parents death.  Feels mood has worsened since May, Mother's day. Stress worse since returning to work in June. Upon return to work in June, had episode of crying spell and anger with panic attack. During traffic episode also had crying spell, SOB, and panic attack. Tried calling co-worker and cousin to help with symptoms, but still in traffic. Works in [employer] at [employer].  Has noticed mind wandering while at work, often thinks of child's father who was recently from nursing home, has harmful thoughts towards him.  3 most recent PHQ Screens 9/1/2021   PHQ Not Done -   Little interest or pleasure in doing things Nearly every day   Feeling down, depressed, irritable, or hopeless Nearly every day   Total Score PHQ 2 6   Trouble falling or staying asleep, or sleeping too much Several days   Feeling tired or having little energy Several days   Poor appetite, weight loss, or overeating More than half the days   Feeling bad about yourself - or that you are a failure or have let yourself or your family down More than half the days   Trouble concentrating on things such as school, work, reading, or watching TV Not at all   Moving or speaking so slowly that other people could have noticed; or the opposite being so fidgety that others notice Not at all   Thoughts of being better off dead, or hurting yourself in some way More than half the days   PHQ 9 Score 14   How difficult have these problems made it for you to do your work, take care of your home and get along with others Somewhat difficult           Patient Active Problem List    Diagnosis Date Noted    PTSD (post-traumatic stress disorder) 09/01/2021    Generalized anxiety disorder with panic attacks 09/01/2021    Multiple personality disorder ([facility] 09/01/2021    Bipolar depression ([facility] 09/01/2021    Gastroesophageal reflux disease without esophagitis 05/30/2018    Moderate episode of recurrent major depressive disorder ([facility] 05/30/2018     Current Outpatient Medications   Medication Sig Dispense Refill    hydrOXYzine HCL (ATARAX) 25 mg tablet       sertraline (ZOLOFT) 50 mg tablet Take 1 Tab by mouth daily. (Patient taking differently: Take 100 mg by mouth daily.) 30 Tab 3    OLANZapine (ZYPREXA) 2.5 mg tablet TAKE 1 TABLET BY MOUTH EVERY DAY AT NIGHT (Patient taking differently: 7.5 mg. TAKE 1 TABLET BY MOUTH EVERY DAY AT NIGHT) 30 Tab 3    busPIRone (BUSPAR) 10 mg tablet Take 1 Tab by mouth two (2) times daily as needed (anxiety). 60 Tab 3    triamcinolone acetonide (KENALOG) 0.1 % ointment Apply  to affected area two (2) times a day. use thin layer (Patient not taking: Reported on 9/1/2021) 30 g 0    montelukast (SINGULAIR) 10 mg tablet Take 1 Tab by mouth daily. (Patient not taking: Reported on 6/5/2021) 30 Tab 5    cholecalciferol (VITAMIN D3) 25 mcg (1,000 unit) cap Take 1 Cap by mouth daily. (Patient not taking: Reported on 6/5/2021) 90 Cap 3    albuterol (PROVENTIL HFA, VENTOLIN HFA, PROAIR HFA) 90 mcg/actuation inhaler Take 2 Puffs by inhalation every four (4) hours as needed for Wheezing or Shortness of Breath (persistent coughing). (Patient not taking: Reported on 6/5/2021) 1 Inhaler 0    acetaminophen (TYLENOL) 325 mg tablet Take 2 Tabs by mouth every four (4) hours as needed for Pain. (Patient not taking: Reported on 9/1/2021) 20 Tab 0    ketorolac (TORADOL) 10 mg tablet Take 1 Tab by mouth every six (6) hours as needed for Pain. (Patient not taking: Reported on 6/5/2021) 20 Tab 0     Allergies   Allergen Reactions    Amoxicillin Rash and Other (comments)     Vaginal itching    Flagyl [Metronidazole] Nausea and Vomiting     metrogel is okay.     Pcn [Penicillins] Itching     Vaginal itching     Past Medical History:   Diagnosis Date    Anxiety     Bipolar affective ([address]     Depression     Hyperlipidemia     High Cholesterol    Hyperlipidemia     Major depression      Past Surgical History:   Procedure Laterality Date    HX GI      Colonoscopy    HX HERNIA REPAIR as child       Review of Systems   Constitutional: Negative for fever and malaise/fatigue. Eyes: Negative for blurred vision. Respiratory: Negative for cough and shortness of breath. Cardiovascular: Negative for chest pain and leg swelling. Neurological: Negative for dizziness, weakness and headaches. Objective:   Vital Signs: (As obtained by patient/caregiver at home)  There were no vitals taken for this visit. Physical Exam:  General appearance - alert, well appearing, and in mild distress. Mental status - A/O x 4, irritable mood and affect. Near tearful when discussing panic attack and feelings towards child's father. +hyperverbal.   Eyes- trace periorbital edema, drainage, or irritation noted. Nose- no obvious drainage or swelling. Throat- no obvious swelling, goiter, or notable lymphadenopathy  Chest - Symmetric chest rise. No wheezing or coughing. No distress. Skin- normal skin tone noted. No hyperpigmentation or obvious deformities. No diaphoresis noted. No flushing. Neuro - Normal speech, no focal findings or movement disorder. Other pertinent observable physical exam findings:-        On this date 09/01/2021 I have spent 35 minutes reviewing previous notes, test results and face to face with the patient discussing the diagnosis and importance of compliance with the treatment plan as well as documenting on the day of the visit. Carolyn Hatfield is being evaluated by a Virtual Visit (video visit) encounter to address concerns as mentioned above. A caregiver was present when appropriate. Due to this being a TeleHealth encounter (During DOEQU- public [facility] emergency), evaluation of the following organ systems was limited: Vitals/Constitutional/EENT/Resp/CV/GI//MS/Neuro/Skin/Heme-Lymph-Imm.   Pursuant to the emergency declaration under the [address] waiver authority and the [employer] Appropriations Act, this Virtual Visit was conducted with patient's (and/or legal guardian's) consent, to reduce the patient's risk of exposure to COVID-19 and provide necessary medical care. The patient (and/or legal guardian) has also been advised to contact this office for worsening conditions or problems, and seek emergency medical treatment and/or call 911 if deemed necessary. Patient identification was verified at the start of the visit: YES    Services were provided through a video synchronous discussion virtually to substitute for in-person clinic visit. Patient and provider were located at their individual homes. An electronic signature was used to authenticate this note.   -- Meño Barrera NP

## 2021-09-01 NOTE — Clinical Note
She is going to drop off her [place] paperwork, please give her the work note when she comes in. She needed that for the day before.

## 2021-09-01 NOTE — PROGRESS NOTES
Pt states that she is thinking about harming others      Pt is here for   Chief Complaint   Patient presents with    Depression     Doxy. me [phone]    Anxiety         1. Have you been to the ER, urgent care clinic since your last visit? Hospitalized since your last visit? No    2. Have you seen or consulted any other health care providers outside of the [address] [place] [address] since your last visit? Include any pap smears or colon screening.  No

## 2021-09-14 PROBLEM — F33.1 MODERATE EPISODE OF RECURRENT MAJOR DEPRESSIVE DISORDER (HCC): Status: RESOLVED | Noted: 2018-05-30 | Resolved: 2021-09-14

## 2021-09-20 ENCOUNTER — VIRTUAL VISIT (OUTPATIENT)
Dept: INTERNAL MEDICINE CLINIC | Age: 41
End: 2021-09-20

## 2021-09-20 DIAGNOSIS — J02.9 SORE THROAT: Primary | ICD-10-CM

## 2021-09-20 DIAGNOSIS — L81.4 SOLAR LENTIGO: ICD-10-CM

## 2021-09-20 DIAGNOSIS — Z20.822 SUSPECTED COVID-19 VIRUS INFECTION: ICD-10-CM

## 2021-09-20 NOTE — PROGRESS NOTES
Today's visit cancelled, as I am unable to assess her complaints on the video platform. Deferred to either dispatch health or nearby urgent care for strep & COVID testing. ST started over weekend, but worse yesterday. No reported known contacts with positive COVID. Pt with leg spots noted recently following recent travel to [place] 2 weeks ago.

## 2021-09-20 NOTE — PROGRESS NOTES
Pt is for   Chief Complaint   Patient presents with    Skin Problem     spots on legs DOXY. [phone]    Sore Throat     with white spots and swollen tonsils     1. Have you been to the ER, urgent care clinic since your last visit? Hospitalized since your last visit? No    2. Have you seen or consulted any other health care providers outside of the [address] [place] [address] since your last visit? Include any pap smears or colon screening.  No    States pain level is a 8/10 throat

## 2021-10-04 ENCOUNTER — VIRTUAL VISIT (OUTPATIENT)
Dept: INTERNAL MEDICINE CLINIC | Age: 41
End: 2021-10-04
Payer: COMMERCIAL

## 2021-10-04 DIAGNOSIS — J45.41 MODERATE PERSISTENT REACTIVE AIRWAY DISEASE WITH ACUTE EXACERBATION: Primary | ICD-10-CM

## 2021-10-04 DIAGNOSIS — B37.9 ANTIBIOTIC-INDUCED YEAST INFECTION: ICD-10-CM

## 2021-10-04 DIAGNOSIS — T36.95XA ANTIBIOTIC-INDUCED YEAST INFECTION: ICD-10-CM

## 2021-10-04 PROCEDURE — 99214 OFFICE O/P EST MOD 30 MIN: CPT | Performed by: NURSE PRACTITIONER

## 2021-10-04 RX ORDER — FLUCONAZOLE 150 MG/1
150 TABLET ORAL ONCE
Qty: 1 TABLET | Refills: 0 | Status: SHIPPED | OUTPATIENT
Start: 2021-10-04 | End: 2021-12-03 | Stop reason: SDUPTHER

## 2021-10-04 RX ORDER — PREDNISONE 20 MG/1
40 TABLET ORAL
Qty: 10 TABLET | Refills: 0 | Status: SHIPPED | OUTPATIENT
Start: 2021-10-04 | End: 2021-11-17 | Stop reason: ALTCHOICE

## 2021-10-04 RX ORDER — NEBULIZER AND COMPRESSOR
1 EACH MISCELLANEOUS AS NEEDED
Qty: 1 EACH | Refills: 0 | Status: SHIPPED | OUTPATIENT
Start: 2021-10-04 | End: 2022-03-16

## 2021-10-04 RX ORDER — ALBUTEROL SULFATE 0.83 MG/ML
2.5 SOLUTION RESPIRATORY (INHALATION)
Qty: 30 EACH | Refills: 1 | Status: SHIPPED | OUTPATIENT
Start: 2021-10-04 | End: 2022-03-16

## 2021-10-04 RX ORDER — DOXYCYCLINE 100 MG/1
100 CAPSULE ORAL 2 TIMES DAILY
Qty: 14 CAPSULE | Refills: 0 | Status: SHIPPED | OUTPATIENT
Start: 2021-10-04 | End: 2021-10-11

## 2021-10-04 NOTE — PATIENT INSTRUCTIONS
Reactive Airway Disease: Care Instructions  Your Care Instructions     Reactive airway disease is a breathing problem that appears as wheezing, a whistling noise in your airways. It may be caused by a viral or bacterial infection, allergies, tobacco smoke, or something else in the environment. When you are around these triggers, your body releases chemicals that make the airways get tight. Reactive airway disease is a lot like asthma. Both can cause wheezing. But asthma is ongoing, while reactive airway disease may occur only now and then. Tests can be done to tell whether you have asthma. You may take the same medicines used to treat asthma. Good home care and follow-up care with your doctor can help you recover. Follow-up care is a key part of your treatment and safety. Be sure to make and go to all appointments, and call your doctor if you are having problems. It's also a good idea to know your test results and keep a list of the medicines you take. How can you care for yourself at home? · Take your medicines exactly as prescribed. Call your doctor if you think you are having a problem with your medicine. · Do not smoke or allow others to smoke around you. If you need help quitting, talk to your doctor about stop-smoking programs and medicines. These can increase your chances of quitting for good. · If you know what caused your wheezing (such as perfume or the odor of household chemicals), try to avoid it in the future. · Wash your hands several times a day, and consider using hand gels or wipes that contain alcohol. This can prevent colds and other infections. When should you call for help? Call 911 anytime you think you may need emergency care. For example, call if:    · You have severe trouble breathing. Watch closely for changes in your health, and be sure to contact your doctor if:    · You cough up yellow, dark brown, or bloody mucus.     · You have a fever.     · Your wheezing gets worse. Where can you learn more? Go to http://www.gray.com/  Enter G945 in the search box to learn more about \"Reactive Airway Disease: Care Instructions. \"  Current as of: July 6, 2021               Content Version: 13.0  © 2494-5124 Healthwise, Incorporated. Care instructions adapted under license by [employer] (which disclaims liability or warranty for this information). If you have questions about a medical condition or this instruction, always ask your healthcare professional. [place] [address] any warranty or liability for your use of this information.

## 2021-10-04 NOTE — PROGRESS NOTES
Calvin Prince is a 35 y.o. female established patient, here for evaluation of the following chief complaint(s):   Cough (pt states since the 28th of last month) and Chest Congestion (coughing up green and brown mucus, )          Assessment & Plan:   Diagnoses and all orders for this visit:    1. Moderate persistent reactive airway disease with acute exacerbation  -     Nebulizer & Compressor machine; 1 Each by Does Not Apply route as needed for Cough (wheezing). Use nebulizer every 4-6 hours as needed for shortness of breath or wheezing  -     albuterol (PROVENTIL VENTOLIN) 2.5 mg /3 mL (0.083 %) nebu; 3 mL by Nebulization route every four (4) hours as needed for Wheezing.  -     predniSONE (DELTASONE) 20 mg tablet; Take 40 mg by mouth daily (with breakfast). -     doxycycline (MONODOX) 100 mg capsule; Take 1 Capsule by mouth two (2) times a day for 7 days. 2. Antibiotic-induced yeast infection  -     fluconazole (Diflucan) 150 mg tablet; Take 1 Tablet by mouth once for 1 dose. Indications: antibiotic-induced yeast infection            We discussed the expected course, resolution and complications of the diagnosis(es) in detail. Medication risks, benefits, costs, interactions, and alternatives were discussed as indicated. I advised her to contact the office if her condition worsens, changes or fails to improve as anticipated. She expressed understanding with the diagnosis(es) and plan. Specific pt instructions until next visit: call if any problems, use of prednisone and albuterol neb initially. INI fill and take doxy after 2-3 days. Subjective:   Calvin Prince is a 35 y.o. female who was seen for Cough (pt states since the 28th of last month) and Chest Congestion (coughing up green and brown mucus, )      [facility] for Strep with antibiotics. COVID negative. Cleared up, but having worsening cough for past ~1 week.  Recalls having dinner last week, and when at hair salon under AC and incense burning. Using hot ginger tea with honey and alb inhaler. No CP reported or fever. Not feeling well overall. Patient Active Problem List    Diagnosis Date Noted    PTSD (post-traumatic stress disorder) 09/01/2021    Generalized anxiety disorder with panic attacks 09/01/2021    Multiple personality disorder ([facility] 09/01/2021    Bipolar depression ([facility] 09/01/2021    Gastroesophageal reflux disease without esophagitis 05/30/2018     Current Outpatient Medications   Medication Sig Dispense Refill    Nebulizer & Compressor machine 1 Each by Does Not Apply route as needed for Cough (wheezing). Use nebulizer every 4-6 hours as needed for shortness of breath or wheezing 1 Each 0    albuterol (PROVENTIL VENTOLIN) 2.5 mg /3 mL (0.083 %) nebu 3 mL by Nebulization route every four (4) hours as needed for Wheezing. 30 Each 1    predniSONE (DELTASONE) 20 mg tablet Take 40 mg by mouth daily (with breakfast). 10 Tablet 0    doxycycline (MONODOX) 100 mg capsule Take 1 Capsule by mouth two (2) times a day for 7 days. 14 Capsule 0    fluconazole (Diflucan) 150 mg tablet Take 1 Tablet by mouth once for 1 dose. Indications: antibiotic-induced yeast infection 1 Tablet 0    hydrOXYzine HCL (ATARAX) 25 mg tablet       albuterol (PROVENTIL HFA, VENTOLIN HFA, PROAIR HFA) 90 mcg/actuation inhaler Take 2 Puffs by inhalation every four (4) hours as needed for Wheezing or Shortness of Breath (persistent coughing). 6.7 g 0    busPIRone (BUSPAR) 10 mg tablet Take 1 Tab by mouth two (2) times daily as needed (anxiety). 60 Tab 3    sertraline (ZOLOFT) 50 mg tablet Take 1 Tab by mouth daily.  (Patient not taking: Reported on 10/4/2021) 30 Tab 3    OLANZapine (ZYPREXA) 2.5 mg tablet TAKE 1 TABLET BY MOUTH EVERY DAY AT NIGHT (Patient not taking: Reported on 10/4/2021) 30 Tab 3     Allergies   Allergen Reactions    Amoxicillin Rash and Other (comments)     Vaginal itching    Flagyl [Metronidazole] Nausea and Vomiting     metrogel is okay.  Pcn [Penicillins] Itching     Vaginal itching     Past Medical History:   Diagnosis Date    Anxiety     Bipolar affective ([address]     Depression     Hyperlipidemia     High Cholesterol    Hyperlipidemia     Major depression      Past Surgical History:   Procedure Laterality Date    HX GI      Colonoscopy    HX HERNIA REPAIR      as child       Review of Systems   Constitutional: Negative for fever and malaise/fatigue. Eyes: Negative for blurred vision. Respiratory: Negative for cough and shortness of breath. Cardiovascular: Negative for chest pain and leg swelling. Neurological: Negative for dizziness, weakness and headaches. Objective:   Vital Signs: (As obtained by patient/caregiver at home)  There were no vitals taken for this visit. Physical Exam:  General appearance - alert, well appearing, and in mild distress. Mental status - A/O x 4,normal mood and affect. Eyes- trace periorbital edema, drainage, or irritation noted. Nose- no obvious drainage or swelling. Throat- no obvious swelling, goiter, or notable lymphadenopathy  Chest - Symmetric chest rise. No wheezing or coughing. No distress. Skin- normal skin tone noted. No hyperpigmentation or obvious deformities. No diaphoresis noted. No flushing. Neuro - Normal speech, no focal findings or movement disorder. Other pertinent observable physical exam findings:-              Sury Hazel is being evaluated by a Virtual Visit (video visit) encounter to address concerns as mentioned above. A caregiver was present when appropriate. Due to this being a TeleHealth encounter (During VRNAJ-93 public health emergency), evaluation of the following organ systems was limited: Vitals/Constitutional/EENT/Resp/CV/GI//MS/Neuro/Skin/Heme-Lymph-Imm.   Pursuant to the emergency declaration under the [address] [facility] waiver authority and the Coronavirus Preparedness and Response Supplemental Appropriations Act, this Virtual Visit was conducted with patient's (and/or legal guardian's) consent, to reduce the patient's risk of exposure to COVID-19 and provide necessary medical care. The patient (and/or legal guardian) has also been advised to contact this office for worsening conditions or problems, and seek emergency medical treatment and/or call 911 if deemed necessary. Patient identification was verified at the start of the visit: YES    Services were provided through a video synchronous discussion virtually to substitute for in-person clinic visit. Patient and provider were located at their individual homes. An electronic signature was used to authenticate this note.   -- Pretty Willingham NP

## 2021-10-05 ENCOUNTER — VIRTUAL VISIT (OUTPATIENT)
Dept: INTERNAL MEDICINE CLINIC | Age: 41
End: 2021-10-05
Payer: COMMERCIAL

## 2021-10-05 DIAGNOSIS — F41.0 GENERALIZED ANXIETY DISORDER WITH PANIC ATTACKS: ICD-10-CM

## 2021-10-05 DIAGNOSIS — F43.10 PTSD (POST-TRAUMATIC STRESS DISORDER): ICD-10-CM

## 2021-10-05 DIAGNOSIS — F31.9 BIPOLAR DEPRESSION (HCC): Primary | ICD-10-CM

## 2021-10-05 DIAGNOSIS — F41.1 GENERALIZED ANXIETY DISORDER WITH PANIC ATTACKS: ICD-10-CM

## 2021-10-05 DIAGNOSIS — F44.81 MULTIPLE PERSONALITY DISORDER (HCC): ICD-10-CM

## 2021-10-05 PROCEDURE — 90832 PSYTX W PT 30 MINUTES: CPT | Performed by: SOCIAL WORKER

## 2021-10-05 NOTE — PSYCHOTHERAPY NOTE
History of Present Illness: Samy Oliveros is a 35 y.o. female who presents with symptoms of depression, agitation and anxiety, depression, lethargy     Duration of session: 30+ min     Mental Status exam:           Sensorium  oriented to time, place and person   Relations cooperative   Appearance:  age appropriate   Motor Behavior:  within normal limits   Speech:  normal pitch and normal volume   Thought Process: goal directed   Thought Content free of delusions, free of hallucinations and not internally preoccupied    Suicidal ideations none   Homicidal ideations none   Mood:  stable   Affect:  flat   Memory recent  adequate   Memory remote:  adequate   Concentration:  impaired   Abstraction:  abstract   Insight:  fair   Reliability good   Judgment:  good            DIAGNOSIS AND IMPRESSION:        Axis I: mood d/o vs mdd, andrea, panic  Axis II: Deferred  Axis III:        Past Medical History:   Diagnosis Date    Anxiety      B[facility]      Depression      Hyperlipidemia       High Cholesterol    Hyperlipidemia      Major depression        Axis IV: Problems with primary support group, Problems related to social environment and Other psychosocial or environmental problems  Axis V:  61-70 mild symptoms        Client presents via doxy vv for 2nd visit, lethargic, reports recent episodes of \"ptsd\" symptoms, \"flare up\" when triggered, causing her to feel angry and combative. Seems also to have correlation with her menstrual cycle. Reports missing her recent psych appt, feels discouraged in that area. Discussed the importance of proper medication with mood d/o. Encouraged her to connect with psych, will msg pcp.

## 2021-10-14 ENCOUNTER — OFFICE VISIT (OUTPATIENT)
Dept: INTERNAL MEDICINE CLINIC | Age: 41
End: 2021-10-14
Payer: COMMERCIAL

## 2021-10-14 VITALS
SYSTOLIC BLOOD PRESSURE: 115 MMHG | BODY MASS INDEX: 28.89 KG/M2 | WEIGHT: 153 LBS | HEART RATE: 72 BPM | DIASTOLIC BLOOD PRESSURE: 73 MMHG | TEMPERATURE: 97.3 F | RESPIRATION RATE: 18 BRPM | OXYGEN SATURATION: 96 % | HEIGHT: 61 IN

## 2021-10-14 DIAGNOSIS — F31.9 BIPOLAR DEPRESSION (HCC): ICD-10-CM

## 2021-10-14 DIAGNOSIS — Z13.21 SCREENING FOR ENDOCRINE, NUTRITIONAL, METABOLIC AND IMMUNITY DISORDER: ICD-10-CM

## 2021-10-14 DIAGNOSIS — Z13.0 SCREENING FOR ENDOCRINE, NUTRITIONAL, METABOLIC AND IMMUNITY DISORDER: ICD-10-CM

## 2021-10-14 DIAGNOSIS — F41.1 GENERALIZED ANXIETY DISORDER WITH PANIC ATTACKS: ICD-10-CM

## 2021-10-14 DIAGNOSIS — N76.0 ACUTE VAGINITIS: ICD-10-CM

## 2021-10-14 DIAGNOSIS — F43.10 PTSD (POST-TRAUMATIC STRESS DISORDER): ICD-10-CM

## 2021-10-14 DIAGNOSIS — F41.0 GENERALIZED ANXIETY DISORDER WITH PANIC ATTACKS: ICD-10-CM

## 2021-10-14 DIAGNOSIS — Z13.228 SCREENING FOR ENDOCRINE, NUTRITIONAL, METABOLIC AND IMMUNITY DISORDER: ICD-10-CM

## 2021-10-14 DIAGNOSIS — F44.81 MULTIPLE PERSONALITY DISORDER (HCC): ICD-10-CM

## 2021-10-14 DIAGNOSIS — Z11.59 NEED FOR HEPATITIS C SCREENING TEST: ICD-10-CM

## 2021-10-14 DIAGNOSIS — F32.81 PMDD (PREMENSTRUAL DYSPHORIC DISORDER): Primary | ICD-10-CM

## 2021-10-14 DIAGNOSIS — L65.9 HAIR LOSS: ICD-10-CM

## 2021-10-14 DIAGNOSIS — Z13.220 SCREENING FOR LIPID DISORDERS: ICD-10-CM

## 2021-10-14 DIAGNOSIS — Z71.89 ADVANCE CARE PLANNING: ICD-10-CM

## 2021-10-14 DIAGNOSIS — Z13.29 SCREENING FOR ENDOCRINE, NUTRITIONAL, METABOLIC AND IMMUNITY DISORDER: ICD-10-CM

## 2021-10-14 DIAGNOSIS — Z00.00 MEDICARE ANNUAL WELLNESS VISIT, SUBSEQUENT: ICD-10-CM

## 2021-10-14 PROCEDURE — G0439 PPPS, SUBSEQ VISIT: HCPCS | Performed by: NURSE PRACTITIONER

## 2021-10-14 PROCEDURE — 99214 OFFICE O/P EST MOD 30 MIN: CPT | Performed by: NURSE PRACTITIONER

## 2021-10-14 RX ORDER — CLONIDINE HYDROCHLORIDE 0.1 MG/1
TABLET ORAL
COMMUNITY
End: 2021-11-17

## 2021-10-14 RX ORDER — OLANZAPINE 7.5 MG/1
TABLET ORAL
COMMUNITY
End: 2022-03-16

## 2021-10-14 RX ORDER — SERTRALINE HYDROCHLORIDE 100 MG/1
TABLET, FILM COATED ORAL
COMMUNITY
End: 2022-03-16

## 2021-10-14 RX ORDER — METRONIDAZOLE 7.5 MG/G
1 GEL VAGINAL
Qty: 25 G | Refills: 0 | Status: SHIPPED | OUTPATIENT
Start: 2021-10-14 | End: 2022-08-05 | Stop reason: SDUPTHER

## 2021-10-14 NOTE — ACP (ADVANCE CARE PLANNING)
Advanced care planning- discussed Advance directive, Medical POA, and life sustaining options. Advised of free virtual or in-person visit for advance care planning paperwork completion with ACP specialist. Referreal sent. Advance Care Planning (ACP) Provider Conversation Snapshot    Date of ACP Conversation: 10/14/21  Persons included in Conversation:  Patient/family  Length of ACP Conversation in minutes:  5-10 minutes    Authorized Decision Maker (if patient is incapable of making informed decisions): This person is:   Healthcare Agent/Medical Power of  under Advance Directive        For Patients with Decision Making Capacity:   Intubation, CPR, use of IVF/Nutrition, Tube Feedings, and organ donation options reviewed briefly    Conversation Outcomes / Follow-Up Plan:   Recommended completion of Advance Directive form after review of ACP materials and conversation with prospective healthcare agent     Referral made for ACP follow-up assistance to:  ACP facilitator/specialist    ====Advance Care Planning Invitation====    Patient was invited to begin or continue Advance Care Planning on this date and reviewed ACP materials in the office OR discussed in detail during virtual visit. Recommended appointment with a [employer]  facilitator for ACP conversation regarding advance directives. [x] Yes  [] No  Referral sent to [employer] ACP team member or Coordinator for follow-up    [] Yes  [x] No  Patient scheduled an appointment.        Site of Referral: [place] [address]

## 2021-10-14 NOTE — LETTER
NOTIFICATION RETURN TO WORK / SCHOOL    10/14/2021 12:01 PM    [address] [id number]      To Whom It May Concern:    Verner Loron is currently under the care of Daniela Rubin. She will return to work/school on: 11/22/21 to allow time for mood stabilization via medication management, therapy, and specialty follow up. If there are questions or concerns please have the patient contact our office.         Sincerely,      Keyanna Gomez NP

## 2021-10-14 NOTE — PATIENT INSTRUCTIONS
Call and request RETURN TO WORK PLANNING FORM from [facility] and drop off or try sending via [address] Advance Care Planning: Care Instructions  Your Care Instructions     It can be hard to live with an illness that cannot be cured. But if your health is getting worse, you may want to make decisions about end-of-life care. Planning for the end of your life does not mean that you are giving up. It is a way to make sure that your wishes are met. Clearly stating your wishes can make it easier for your loved ones. Making plans while you are still able may also ease your mind and make your final days less stressful and more meaningful. Follow-up care is a key part of your treatment and safety. Be sure to make and go to all appointments, and call your doctor if you are having problems. It's also a good idea to know your test results and keep a list of the medicines you take. What can you do to plan for the end of life? · You can bring these issues up with your doctor. You do not need to wait until your doctor starts the conversation. You might start with, \"What makes life worth living for me is. Mary Rico \" And then follow it with, \"I would not be willing to live with . Mary Trisha Durhame Trisha \" When you complete this sentence it helps your doctor understand your wishes. · Talk openly and honestly with your doctor. This is the best way to understand the decisions you will need to make as your health changes. Know that you can always change your mind. · Ask your doctor about commonly used life-support measures. These include tube feedings, breathing machines, and fluids given through a vein (IV). Understanding these treatments will help you decide whether you want them. · You may choose to have these life-supporting treatments for a limited time. This allows a trial period to see whether they will help you. You may also decide that you want your doctor to take only certain measures to keep you alive.  It may help to think about the big picture, like what makes life worth living for you or what your values and goals are. · Talk to your doctor about how long you are likely to live. Your doctor may be able to give you an idea of what usually happens with your specific illness. · Think about preparing papers that state your wishes. These papers are called advance directives. If you do this early and review them often, there will not be any confusion about what you want. You can change your instructions at any time. Which papers should you prepare? Advance directives are legal papers that tell doctors how you want to be cared for at the end of your life. You do not need a  to write these papers. Ask your doctor or your state health department for information on how to write your advance directives. They may have the forms for each of these types of papers. Make sure your doctor has a copy of these on file, and give a copy to a family member or close friend. · Consider a do-not-resuscitate order (DNR). This order asks that no extra treatments be done if your heart stops or you stop breathing. Extra treatments may include cardiopulmonary resuscitation (CPR), electrical shock to restart your heart, or a machine to breathe for you. If you decide to have a DNR order, ask your doctor to explain and write it. Place the order in your home where everyone can easily see it. · Consider a living will. A living will explains your wishes about life support and other treatments at the end of your life if you become unable to speak for yourself. Living hoskins tell doctors to use or not use treatments that would keep you alive. You must have one or two witnesses or a notary present when you sign this form. A living will may be called something else in your state. · Consider a medical power of . This form allows you to name a person to make decisions about your care if you are not able to. Most people ask a close friend or family member.  Talk to this person about the kinds of treatments you want and those that you do not want. Make sure this person understands your wishes. A medical power of  may be called something else in your state. These legal papers are simple to change. Tell your doctor what you want to change, and ask him or her to make a note in your medical file. Give your family updated copies of the papers. Where can you learn more? Go to http://www.sandoval.com/  Enter P184 in the search box to learn more about \"Advance Care Planning: Care Instructions. \"  Current as of: March 17, 2021               Content Version: 13.0  © 8136-8259 [employer]. Care instructions adapted under license by [employer] (which disclaims liability or warranty for this information). If you have questions about a medical condition or this instruction, always ask your healthcare professional. [address] any warranty or liability for your use of this information. Premenstrual Dysphoric Disorder (PMDD): Care Instructions  Your Care Instructions     Premenstrual dysphoric disorder (PMDD) is a severe form of premenstrual syndrome (PMS). For most women, symptoms start about a week before their periods start. Then symptoms go away a few days after their periods start. Doctors don't know why some women have PMDD and others don't. They also don't know why some women have worse symptoms than others. Symptoms include mood swings, depression, and feeling grouchy or anxious. You may also have sore breasts, bloating and weight gain, or joint or muscle pain. With PMDD, these symptoms seriously disrupt your life. They may affect your relationships, work, or school. Home treatments can help you feel better. Regular exercise and healthy eating also can help. Doctors often prescribe medicines for PMDD. Selective serotonin reuptake inhibitors (SSRIs) can relieve symptoms.  So can low-estrogen birth control pills. If these don't help, your doctor may prescribe other medicines. Follow-up care is a key part of your treatment and safety. Be sure to make and go to all appointments, and call your doctor if you are having problems. It's also a good idea to know your test results and keep a list of the medicines you take. How can you care for yourself at home? · Take your medicines exactly as prescribed. Call your doctor if you think you are having a problem with your medicine. · Take anti-inflammatory medicines if your body aches or your breasts are sore. These include ibuprofen (Advil, Motrin) and naproxen (Aleve). Read and follow all instructions on the label. · Get regular daily exercise to help improve your mood. Walks are a good choice. · Some foods and drinks can make symptoms worse. Try to drink less caffeine or alcohol while you have PMDD or several days before you expect to have symptoms. You may also want to eat less salt then too. · Manage your stress. Try to meditate, do guided imagery, or practice breathing exercises. When should you call for help? Call 911  anytime you think you may need emergency care. For example, call if:    · You feel you cannot stop from hurting yourself or someone else. Call your doctor now or seek immediate medical care if:    · You have severe vaginal bleeding.     · You have new or worse belly or pelvic pain. Watch closely for changes in your health, and be sure to contact your doctor if:    · You have unusual vaginal bleeding.     · You do not get better as expected. Where can you learn more? Go to http://www.gray.com/  Enter P101 in the search box to learn more about \"Premenstrual Dysphoric Disorder (PMDD): Care Instructions. \"  Current as of: February 11, 2021               Content Version: 13.0  © 9270-7655 Healthwise, Incorporated.    Care instructions adapted under license by [employer] (which disclaims liability or warranty for this information). If you have questions about a medical condition or this instruction, always ask your healthcare professional. [place] [address] any warranty or liability for your use of this information.

## 2021-10-14 NOTE — PROGRESS NOTES
Calvin Prince is a 35 y.o. female established patient, here for evaluation of the following chief complaint(s):   Follow-up (Anx, Dep) and Vaginal Discharge (x 2 days ago, no odor, no itching, pt states recently started using new soap)          Assessment & Plan:   Diagnoses and all orders for this visit:    1. PMDD (premenstrual dysphoric disorder)  -     REFERRAL TO PSYCHIATRY  -     VITAMIN D, 25 HYDROXY; Future    2. Acute vaginitis  -     metroNIDAZOLE (METROGEL) 0.75 % gel; Insert 5 g into vagina nightly for 5 days. 3. Advance care planning  -     REFERRAL TO ACP CLINICAL SPECIALIST    4. Medicare annual wellness visit, subsequent    5. Bipolar depression ([facility]  -     REFERRAL TO PSYCHIATRY  -     VITAMIN D, 25 HYDROXY; Future    6. Generalized anxiety disorder with panic attacks  -     REFERRAL TO PSYCHIATRY  -     VITAMIN D, 25 HYDROXY; Future    7. PTSD (post-traumatic stress disorder)  -     REFERRAL TO PSYCHIATRY  -     VITAMIN D, 25 HYDROXY; Future    8. Multiple personality disorder ([facility]  -     REFERRAL TO PSYCHIATRY  -     VITAMIN D, 25 HYDROXY; Future    9. Hair loss  -     VITAMIN D, 25 HYDROXY; Future  -     TSH 3RD GENERATION; Future    10. Screening for lipid disorders  -     LIPID PANEL; Future    11. Screening for endocrine, nutritional, metabolic and immunity disorder  -     TSH 3RD GENERATION; Future  -     HEMOGLOBIN A1C WITH EAG; Future  -     CBC WITH AUTOMATED DIFF; Future  -     METABOLIC PANEL, COMPREHENSIVE; Future      Follow-up and Dispositions    · Return for VV- RTW status update, PMDD/Psych f/u. We discussed the expected course, resolution and complications of the diagnosis(es) in detail. Medication risks, benefits, costs, interactions, and alternatives were discussed as indicated. I advised her to contact the office if her condition worsens, changes or fails to improve as anticipated. She expressed understanding with the diagnosis(es) and plan.      Specific pt instructions until next visit: continue counseling, referred to new psych and advised to inquire about cycle dosing for suspected PMDD, pt wishes to keep RTW date of Nov. 22, 2021; not ready to return now. Subjective:   Gerardo Branham is a 35 y.o. female who was seen for Follow-up (Anx, Dep) and Vaginal Discharge (x 2 days ago, no odor, no itching, pt states recently started using new soap)    Pt presents today for paperwork completion to remain out on FMLA to address mood and for subsequent MWV. Last seen by psych before last visit, missed recent appt due to acute illness, remains on meds: Zyprexa and Zoloft. Has h/o anxiety, bipolar depression, multiple personality disorder, and PTSD. However, had episode of feeling very irritable and angry with daughter ABOUT 1 week prior to menses. (LMP: 10/3/21). Finds herself to feel like this before menses and wants to fix it. Would like med adjustment or something to help, as she feels it is very hard to control her anger during this time. Using two sensitive soaps for bathing, one helps prevent vaginal discharge, but for past few days having vaginal discharge. No odor or itching. Unsure if it is related to use of vibrator and cleansing of device also, reports use often lately. Denies vaginal pain otherwise. Started [facility] 8/27/21 for worsening anxiety and depression. Told by employer she is due to return 10/25/21, but feels unable to return at this time and wants to remain out for now. Seen by therapist once monthly.   3 most recent PHQ Screens 10/14/2021   PHQ Not Done -   Little interest or pleasure in doing things Several days   Feeling down, depressed, irritable, or hopeless More than half the days   Total Score PHQ 2 3   Trouble falling or staying asleep, or sleeping too much Several days   Feeling tired or having little energy Several days   Poor appetite, weight loss, or overeating Several days   Feeling bad about yourself - or that you are a failure or have let yourself or your family down More than half the days   Trouble concentrating on things such as school, work, reading, or watching TV Not at all   Moving or speaking so slowly that other people could have noticed; or the opposite being so fidgety that others notice Not at all   Thoughts of being better off dead, or hurting yourself in some way Not at all   PHQ 9 Score 8   How difficult have these problems made it for you to do your work, take care of your home and get along with others Somewhat difficult       Patient Active Problem List    Diagnosis Date Noted    PTSD (post-traumatic stress disorder) 09/01/2021    Generalized anxiety disorder with panic attacks 09/01/2021    Multiple personality disorder ([facility] 09/01/2021    Bipolar depression ([facility] 09/01/2021    Gastroesophageal reflux disease without esophagitis 05/30/2018     Current Outpatient Medications   Medication Sig Dispense Refill    OLANZapine (ZyPREXA) 7.5 mg tablet olanzapine 7.5 mg tablet   TAKE 1 TABLET BY MOUTH EVERYDAY AT BEDTIME      sertraline (ZOLOFT) 100 mg tablet sertraline 100 mg tablet   TAKE 1 TABLET BY MOUTH EVERY MORNING WITH A MEAL      metroNIDAZOLE (METROGEL) 0.75 % gel Insert 5 g into vagina nightly for 5 days. 25 g 0    albuterol (PROVENTIL VENTOLIN) 2.5 mg /3 mL (0.083 %) nebu 3 mL by Nebulization route every four (4) hours as needed for Wheezing. 30 Each 1    hydrOXYzine HCL (ATARAX) 25 mg tablet       albuterol (PROVENTIL HFA, VENTOLIN HFA, PROAIR HFA) 90 mcg/actuation inhaler Take 2 Puffs by inhalation every four (4) hours as needed for Wheezing or Shortness of Breath (persistent coughing). 6.7 g 0    busPIRone (BUSPAR) 10 mg tablet Take 1 Tab by mouth two (2) times daily as needed (anxiety). 60 Tab 3    cloNIDine HCL (CATAPRES) 0.1 mg tablet clonidine HCl 0.1 mg tablet   TAKE 1 TAB BY MOUTH NIGHTLY.  MAY CAUSE DROWSINESS. (Patient not taking: Reported on 10/14/2021)      Nebulizer & Compressor machine 1 Each by Does Not Apply route as needed for Cough (wheezing). Use nebulizer every 4-6 hours as needed for shortness of breath or wheezing 1 Each 0    predniSONE (DELTASONE) 20 mg tablet Take 40 mg by mouth daily (with breakfast). (Patient not taking: Reported on 10/14/2021) 10 Tablet 0     Allergies   Allergen Reactions    Amoxicillin Rash and Other (comments)     Vaginal itching    Flagyl [Metronidazole] Nausea and Vomiting     metrogel is okay.  Pcn [Penicillins] Itching     Vaginal itching     Past Medical History:   Diagnosis Date    Anxiety     Bipolar affective ([address]     Depression     Hyperlipidemia     High Cholesterol    Hyperlipidemia     Major depression      Past Surgical History:   Procedure Laterality Date    HX GI      Colonoscopy    HX HERNIA REPAIR      as child       Review of Systems   Constitutional: Negative for fever and malaise/fatigue. Eyes: Negative for blurred vision. Respiratory: Negative for cough and shortness of breath. Cardiovascular: Negative for chest pain and leg swelling. Neurological: Negative for dizziness, weakness and headaches. Objective:   Vital Signs: (As obtained by patient/caregiver at home)  Visit Vitals  /73 (BP 1 Location: Right upper arm, BP Patient Position: Sitting, BP Cuff Size: Large adult)   Pulse 72   Temp 97.3 °F (36.3 °C) (Temporal)   Resp 18   Ht 5' 1\" (1.549 m)   Wt 153 lb (69.4 kg)   LMP 10/03/2021 (Exact Date)   SpO2 96%   BMI 28.91 kg/m²              Physical Exam:  General appearance - alert, well appearing, and in mild distress. Mental status - mildly anxious when discussing episode of outburst recently. Normal affect otherwise. Eyes- trace periorbital edema, drainage, or irritation noted. Nose- no obvious drainage or swelling. Throat- no obvious swelling, goiter, or notable lymphadenopathy  Chest - Symmetric chest rise. No wheezing or coughing. No distress. Skin- normal skin tone noted.  No hyperpigmentation or obvious deformities. No diaphoresis noted. No flushing. Neuro - Normal speech, no focal findings or movement disorder. Other pertinent observable physical exam findings:-    Assessment of cognitive impairment: Alert and oriented x 4    Depression Screen:   3 most recent PHQ Screens 10/14/2021   PHQ Not Done -   Little interest or pleasure in doing things Several days   Feeling down, depressed, irritable, or hopeless More than half the days   Total Score PHQ 2 3   Trouble falling or staying asleep, or sleeping too much Several days   Feeling tired or having little energy Several days   Poor appetite, weight loss, or overeating Several days   Feeling bad about yourself - or that you are a failure or have let yourself or your family down More than half the days   Trouble concentrating on things such as school, work, reading, or watching TV Not at all   Moving or speaking so slowly that other people could have noticed; or the opposite being so fidgety that others notice Not at all   Thoughts of being better off dead, or hurting yourself in some way Not at all   PHQ 9 Score 8   How difficult have these problems made it for you to do your work, take care of your home and get along with others Somewhat difficult       Fall Risk Assessment:    Fall Risk Assessment, last 12 mths 12/7/2018   Able to walk? Yes   Fall in past 12 months? No       Abuse Assessment: Patient NOT domesticly abuse (verbal, physical, and financial)    Current Alcohol use: 1-2 times weekly with 2-3 drinks each day. reports current alcohol use. Functional Ability:   Does the patient exhibit a steady gait? Yes   How long did it take the patient to get up and walk from a sitting position? 4 seconds   Is the patient self reliant?  (ie can do own laundry, meals, household chores) yes     Does the patient handle his/her own medications? yes     Does the patient handle his/her own money?    Yes     Is the patients home safe (ie good lighting, handrails on stairs and bath, etc.)? Yes   Did you notice or did patient express any hearing difficulties? no   Did you notice of did patient express any vision difficulties? Yes, blurring vision lately   Were distance and reading eye charts used? n/a     Advance Care Planning:   Patient was offered the opportunity to discuss advance care planning:  Yes   Does patient have an Advance Directive: No   If no, did you provide information and forms?  no     Health Maintenance   Topic Date Due    Hepatitis C Screening  Never done    Pneumococcal 0-64 years (1 of 2 - PPSV23) Never done    COVID-19 Vaccine (1) Never done    Flu Vaccine (1) 09/01/2021    Cervical cancer screen  06/01/2022    Medicare Yearly Exam  10/15/2022    Lipid Screen  04/27/2023    DTaP/Tdap/Td series (2 - Td or Tdap) 02/02/2024             On this date 10/14/2021 I have spent 35 minutes reviewing previous notes, test results and face to face with the patient discussing the diagnosis and importance of compliance with the treatment plan as well as documenting on the day of the visit. Reyna Waterman is being evaluated by a Virtual Visit (video visit) encounter to address concerns as mentioned above. A caregiver was present when appropriate. Due to this being a TeleHealth encounter (During [facility] public health emergency), evaluation of the following organ systems was limited: Vitals/Constitutional/EENT/Resp/CV/GI//MS/Neuro/Skin/Heme-Lymph-Imm. Pursuant to the emergency declaration under the [address] [place] [address] authority and the [employer] and the[employer] Act, this Virtual Visit was conducted with patient's (and/or legal guardian's) consent, to reduce the patient's risk of exposure to COVID-19 and provide necessary medical care.   The patient (and/or legal guardian) has also been advised to contact this office for worsening conditions or problems, and seek emergency medical treatment and/or call 911 if deemed necessary. Patient identification was verified at the start of the visit: YES    Services were provided through a video synchronous discussion virtually to substitute for in-person clinic visit. Patient and provider were located at their individual homes. An electronic signature was used to authenticate this note.   -- Marika Fair NP

## 2021-10-14 NOTE — PROGRESS NOTES
Pt is here for   Chief Complaint   Patient presents with    Follow-up     Anx, Dep    Vaginal Discharge     x 2 days ago, no odor, no itching, pt states recently started using new soap     1. Have you been to the ER, urgent care clinic since your last visit? Hospitalized since your last visit? No    2. Have you seen or consulted any other health care providers outside of the [address] [place] [address] since your last visit? Include any pap smears or colon screening.  No    Denies pain at this time

## 2021-10-15 ENCOUNTER — TELEPHONE (OUTPATIENT)
Dept: INTERNAL MEDICINE CLINIC | Age: 41
End: 2021-10-15

## 2021-10-15 DIAGNOSIS — E55.9 VITAMIN D DEFICIENCY: Primary | ICD-10-CM

## 2021-10-15 RX ORDER — ERGOCALCIFEROL 1.25 MG/1
50000 CAPSULE ORAL
Qty: 12 CAPSULE | Refills: 1 | Status: SHIPPED | OUTPATIENT
Start: 2021-10-15 | End: 2022-03-16

## 2021-10-15 NOTE — TELEPHONE ENCOUNTER
Pt states she requested another leave of absence form from . They told her you could just change the dates on the previous form and initial it please.   Pt [id number] reprinted the form and put it on your desk

## 2021-10-18 ENCOUNTER — PATIENT OUTREACH (OUTPATIENT)
Dept: CASE MANAGEMENT | Age: 41
End: 2021-10-18

## 2021-10-18 NOTE — ACP (ADVANCE CARE PLANNING)
Advance Care Planning   Ambulatory ACP Specialist Patient Outreach    Date:  10/18/2021    ACP Specialist:  Maurice Yee LPN    Outreach call to patient in follow-up to ACP Specialist referral from:    [x] PCP  [] Provider   [] Ambulatory Care Management [] Other     For:                  [x] Advance Directive Assistance              [] Complete Portable DNR order              [] Complete POST/MOST              [] Code Status Discussion             [] Discuss Goals of Care             [] Early ACP Decision-Making              [] Other (Specify)    Date Referral Received: 10/15/21    Today's Outreach:  [x] First   [] Second  [] Third       Third outreach made by: [] Phone  [] Email / mail    [] MyChart     Intervention:  [x] Spoke with Patient   [] Left VM requesting return call      Outcome: Spoke with pt who wishes to move forward in completing an AMD. Appt with ACP specialist is scheduled for 10/21/21 at [address] [place] [address]. ACP documents have been e-mailed to pt for review prior to appt. Next Step:   [x] ACP scheduled conversation  [x] Outreach again in one week               [x] Email / Mail ACP Info Sheets  [] Email / Mail Advance Directive   [] Closing referral.  Routing closure to referring provider/staff and to ACP Specialist . [] Closure letter mailed to patient with invitation to contact ACP Specialist if / when ready.   Thank you for this referral.

## 2021-10-21 ENCOUNTER — DOCUMENTATION ONLY (OUTPATIENT)
Dept: CASE MANAGEMENT | Age: 41
End: 2021-10-21

## 2021-10-21 NOTE — ACP (ADVANCE CARE PLANNING)
Advance Care Planning     Advance Care Planning Clinical Specialist  Conversation Note      Date of ACP Conversation: 10/21/21    Conversation Conducted with:  Patient with Decision Making Capacity    ACP Clinical Specialist: Kellie Gallardo, [facility] [facility] Decision Maker:    Current Designated Health Care Decision Maker:     Primary Decision Maker: Leida Miller - Other Relative - [phone]      Care Preferences    Hospitalization: \"If your health worsens and it becomes clear that your chance of recovery is unlikely, what would your preference be regarding hospitalization? \"    Choice:  []  The patient wants hospitalization  [x]  The patient prefers comfort-focused treatment without hospitalization. Ventilation: \"If you were in your present state of health and suddenly became very ill and were unable to breathe on your own, what would your preference be about the use of a ventilator (breathing machine) if it were available to you? \"      If patient would desire the use of a ventilator (breathing machine), answer \"yes\", if not \"no\":yes    \"If your health worsens and it becomes clear that your chance of recovery is unlikely, what would your preference be about the use of a ventilator (breathing machine) if it were available to you? \"     Would the patient desire the use of a ventilator (breathing machine)? I don't know. I would leave this to Agent to make decision with consultation of the medical staff. Resuscitation  \"CPR works best to restart the heart when there is a sudden event, like a heart attack, in someone who is otherwise healthy. Unfortunately, CPR does not typically restart the heart for people who have serious health conditions or who are very sick. \"    \"In the event your heart stopped as a result of an underlying serious health condition, would you want attempts to be made to restart your heart (answer \"yes\" for attempt to resuscitate) or would you prefer a natural death (answer \"no\" for do not attempt to resuscitate)? \" yes      [x] Yes  [] No   Educated Patient / Ernestine Ford regarding differences between Advance Directives and portable DNR orders. Length of ACP Conversation in minutes:  30 minutes. She named her Aunt to be [address] She plans to update this document once her child reaches the age of 23 within the next 6 months. Conversation Outcomes:  [x] ACP discussion completed  [] Existing advance directive reviewed with patient; no changes to patient's previously recorded wishes   [x] New Advance Directive completed: Sent via [employer] for signatures. Once a signed copy is received, it will be scanned into chart.    [] Portable Do Not Resuscitate prepared for Provider review and signature  [] POLST/POST/MOLST/MOST prepared for Provider review and signature      Follow-up plan:    [] Schedule follow-up conversation to continue planning  [] Referred individual to Provider for additional questions/concerns   [x] Advised patient/agent/surrogate to review completed ACP document and update if needed with changes in condition, patient preferences or care setting     [x] This note routed to one or more involved healthcare providers    Quinton Bowen LCSW

## 2021-11-17 ENCOUNTER — VIRTUAL VISIT (OUTPATIENT)
Dept: INTERNAL MEDICINE CLINIC | Age: 41
End: 2021-11-17
Payer: COMMERCIAL

## 2021-11-17 DIAGNOSIS — F31.9 BIPOLAR DEPRESSION (HCC): Primary | ICD-10-CM

## 2021-11-17 DIAGNOSIS — Z02.9 ADMINISTRATIVE ENCOUNTER: ICD-10-CM

## 2021-11-17 DIAGNOSIS — F43.0 STRESS REACTION: ICD-10-CM

## 2021-11-17 DIAGNOSIS — F32.81 PMDD (PREMENSTRUAL DYSPHORIC DISORDER): ICD-10-CM

## 2021-11-17 DIAGNOSIS — E55.9 VITAMIN D DEFICIENCY: ICD-10-CM

## 2021-11-17 PROCEDURE — 99214 OFFICE O/P EST MOD 30 MIN: CPT | Performed by: NURSE PRACTITIONER

## 2021-11-17 NOTE — PATIENT INSTRUCTIONS
Learning About Positive Thinking  What is positive thinking? Positive thinking, or healthy thinking, is a way to help you stay well or cope with a health problem by changing how you think. It's based on research that shows that you can change how you think. And how you think affects how you feel. Cognitive-behavioral therapy, also called CBT, is a therapy that is often used to help people think in a healthy way. It focuses on thought (cognitive) and action (behavioral). How can positive thinking help you? If you think in a positive way, you may be more able to care for yourself and handle life's challenges. You will feel better. And you may be more able to avoid or cope with stress, anxiety, and depression. CBT may be able to help you sleep better and lose weight. How can you get started with positive thinking? CBT involves techniques that you can practice every day so that healthy thinking comes naturally. Here are the steps for one technique. 1. Stop. When you notice a negative thought, stop it in its tracks and write it down. 2. Ask. Look at that thought and ask yourself whether it is helpful or unhelpful right now. 3. Choose. Choose a new, helpful thought to replace a negative one. Here's an example of how this might work:  · In a job review, your boss praised several things about your work. But you're feeling down because she had one small criticism. You might even think, \"I'm no good at my job\" or \"She doesn't like me. I must be bad. \" These are negative thoughts. You want to stop them. · Ask yourself questions about the situation and your negative thoughts. You might ask, \"What did my boss say exactly? \" \"Were there positive comments? \" \"Why do I focus only on one criticism? \" Your answers can help you find more accurate and helpful statements. · Now choose a helpful thought to replace the negative thoughts.  For example, you might think, \"I've done a lot of good work this year, and my boss noticed it. She thought there was one area I can improve. So I'll think of some things I can do to get stronger in that area. \"  With time and practice, you can learn to see that the harsh things you say to yourself may keep you from enjoying your life and work. You can replace them with more helpful thoughts. Where can you learn more? Go to http://www.gray.com/  Enter [id number] in the search box to learn more about \"Learning About Positive Thinking. \"  Current as of: June 16, 2021               Content Version: 13.0  © 7857-3060 Healthwise, [employer] Care instructions adapted under license by [employer] (which disclaims liability or warranty for this information). If you have questions about a medical condition or this instruction, always ask your healthcare professional. [address] any warranty or liability for your use of this information.

## 2021-11-17 NOTE — PROGRESS NOTES
Verner Loron is a 35 y.o. female established patient, here for evaluation of the following chief complaint(s):   Follow-up (RTW status update, PMDD, Psych)          Assessment & Plan:   Diagnoses and all orders for this visit:    1. Bipolar depression ([facility] [address]    2. Stress reaction    3. Vitamin D deficiency    4. PMDD (premenstrual dysphoric disorder)    5. Administrative encounter      Follow-up and Dispositions    · Return in about 5 months (around 4/17/2022) for OV- repeat Vit D, mood/psych f/u. We discussed the expected course, resolution and complications of the diagnosis(es) in detail. Medication risks, benefits, costs, interactions, and alternatives were discussed as indicated. I advised her to contact the office if her condition worsens, changes or fails to improve as anticipated. She expressed understanding with the diagnosis(es) and plan. Specific pt instructions until next visit: continue present plan, call counselor for appt. Keep psych appt as planned. RTW as planned on 11/22/21, released to [id number] [address], no restrictions. Subjective:   Verner Loron is a 35 y.o. female who was seen for Follow-up (RTW status update, PMDD, Psych)      Pt presents to f/u mood and referral to psych. Will be seen 12/14. Taking meds as prescribed to include Vit D. Has NOT called counselor however since visit, will do so today. Denies side effects from medication. Feels better since current partner and her had a conversation to allow her more space, has improved some with less mood changes noted. Will have next menses, next and feeling good thus far. Ready to RTW on 11/22 as planned.     3 most recent PHQ Screens 11/17/2021   PHQ Not Done -   Little interest or pleasure in doing things Several days   Feeling down, depressed, irritable, or hopeless Nearly every day   Total Score PHQ 2 4   Trouble falling or staying asleep, or sleeping too much Several days   Feeling tired or having little energy Several days   Poor appetite, weight loss, or overeating Not at all   Feeling bad about yourself - or that you are a failure or have let yourself or your family down Not at all   Trouble concentrating on things such as school, work, reading, or watching TV More than half the days   Moving or speaking so slowly that other people could have noticed; or the opposite being so fidgety that others notice Not at all   Thoughts of being better off dead, or hurting yourself in some way Not at all   PHQ 9 Score 8   How difficult have these problems made it for you to do your work, take care of your home and get along with others Somewhat difficult             Patient Active Problem List    Diagnosis Date Noted    Vitamin D deficiency 11/17/2021    PTSD (post-traumatic stress disorder) 09/01/2021    Generalized anxiety disorder with panic attacks 09/01/2021    Multiple personality disorder ([facility] 09/01/2021    Bipolar depression ([facility] 09/01/2021    Gastroesophageal reflux disease without esophagitis 05/30/2018     Current Outpatient Medications   Medication Sig Dispense Refill    ergocalciferol (ERGOCALCIFEROL) 1,250 mcg (50,000 unit) capsule Take 1 Capsule by mouth every seven (7) days. 12 Capsule 1    OLANZapine (ZyPREXA) 7.5 mg tablet olanzapine 7.5 mg tablet   TAKE 1 TABLET BY MOUTH EVERYDAY AT BEDTIME      sertraline (ZOLOFT) 100 mg tablet sertraline 100 mg tablet   TAKE 1 TABLET BY MOUTH EVERY MORNING WITH A MEAL      albuterol (PROVENTIL VENTOLIN) 2.5 mg /3 mL (0.083 %) nebu 3 mL by Nebulization route every four (4) hours as needed for Wheezing. 30 Each 1    hydrOXYzine HCL (ATARAX) 25 mg tablet       albuterol (PROVENTIL HFA, VENTOLIN HFA, PROAIR HFA) 90 mcg/actuation inhaler Take 2 Puffs by inhalation every four (4) hours as needed for Wheezing or Shortness of Breath (persistent coughing). 6.7 g 0    busPIRone (BUSPAR) 10 mg tablet Take 1 Tab by mouth two (2) times daily as needed (anxiety).  60 Tab 3    cloNIDine HCL (CATAPRES) 0.1 mg tablet clonidine HCl 0.1 mg tablet   TAKE 1 TAB BY MOUTH NIGHTLY. MAY CAUSE DROWSINESS. (Patient not taking: Reported on 10/14/2021)      Nebulizer & Compressor machine 1 Each by Does Not Apply route as needed for Cough (wheezing). Use nebulizer every 4-6 hours as needed for shortness of breath or wheezing 1 Each 0    predniSONE (DELTASONE) 20 mg tablet Take 40 mg by mouth daily (with breakfast). (Patient not taking: Reported on 10/14/2021) 10 Tablet 0     Allergies   Allergen Reactions    Amoxicillin Rash and Other (comments)     Vaginal itching    Flagyl [Metronidazole] Nausea and Vomiting     metrogel is okay.  Pcn [Penicillins] Itching     Vaginal itching     Past Medical History:   Diagnosis Date    Anxiety     Bipolar affective ([address]     Depression     Hyperlipidemia     High Cholesterol    Hyperlipidemia     Major depression      Past Surgical History:   Procedure Laterality Date    HX GI      Colonoscopy    HX HERNIA REPAIR      as child       Review of Systems   Constitutional: Negative for fever and malaise/fatigue. Eyes: Negative for blurred vision. Respiratory: Negative for cough and shortness of breath. Cardiovascular: Negative for chest pain and leg swelling. Neurological: Negative for dizziness, weakness and headaches. Objective:   Vital Signs: (As obtained by patient/caregiver at home)  There were no vitals taken for this visit. Physical Exam:  General appearance - alert, well  appearing, and in no distress. Mental status - A/O x 4,normal mood and affect. Eyes- no periorbital edema, drainage, or irritation noted. Nose- no obvious drainage or swelling. Throat- no obvious swelling, goiter, or notable lymphadenopathy  Chest - Symmetric chest rise. No wheezing or coughing. No distress. Skin- normal skin tone noted. No hyperpigmentation or obvious deformities. No diaphoresis noted. No flushing.   Neuro - Normal speech, no focal findings or movement disorder. Other pertinent observable physical exam findings:-              Nina Simmonds is being evaluated by a Virtual Visit (video visit) encounter to address concerns as mentioned above. A caregiver was present when appropriate. Due to this being a TeleHealth encounter (During [facility]-61 public health emergency), evaluation of the following organ systems was limited: Vitals/Constitutional/EENT/Resp/CV/GI//MS/Neuro/Skin/Heme-Lymph-Imm. Pursuant to the emergency declaration under the [address] [place] [address], [address] [place] [address] authority and the [employer] Act, this Virtual Visit was conducted with patient's (and/or legal guardian's) consent, to reduce the patient's risk of exposure to COVID-19 and provide necessary medical care. The patient (and/or legal guardian) has also been advised to contact this office for worsening conditions or problems, and seek emergency medical treatment and/or call 911 if deemed necessary. Patient identification was verified at the start of the visit: YES    Services were provided through a video synchronous discussion virtually to substitute for in-person clinic visit. Patient and provider were located at their individual homes. An electronic signature was used to authenticate this note.   -- Jose Elias Harris NP

## 2021-11-17 NOTE — PROGRESS NOTES
Pt is here for   Chief Complaint   Patient presents with    Follow-up     RTW status update, PMDD, Psych     1. Have you been to the ER, urgent care clinic since your last visit? Hospitalized since your last visit? No    2. Have you seen or consulted any other health care providers outside of the [address] [place] [address] since your last visit? Include any pap smears or colon screening.  No

## 2021-11-26 ENCOUNTER — TELEPHONE (OUTPATIENT)
Dept: INTERNAL MEDICINE CLINIC | Age: 41
End: 2021-11-26

## 2021-11-26 NOTE — TELEPHONE ENCOUNTER
Pt states she did not return to work on yesterday because she was not ready and her emotions were all over the pace. She will need something stating  This from you and she is waiting for HR to send her something.   Pt # [phone]

## 2021-12-03 ENCOUNTER — TELEPHONE (OUTPATIENT)
Dept: INTERNAL MEDICINE CLINIC | Age: 41
End: 2021-12-03

## 2021-12-03 DIAGNOSIS — T36.95XA ANTIBIOTIC-INDUCED YEAST INFECTION: ICD-10-CM

## 2021-12-03 DIAGNOSIS — B37.9 ANTIBIOTIC-INDUCED YEAST INFECTION: ICD-10-CM

## 2021-12-03 RX ORDER — FLUCONAZOLE 150 MG/1
150 TABLET ORAL ONCE
Qty: 1 TABLET | Refills: 0 | Status: SHIPPED | OUTPATIENT
Start: 2021-12-03 | End: 2021-12-03

## 2021-12-15 ENCOUNTER — VIRTUAL VISIT (OUTPATIENT)
Dept: INTERNAL MEDICINE CLINIC | Age: 41
End: 2021-12-15
Payer: COMMERCIAL

## 2021-12-15 DIAGNOSIS — F43.0 STRESS REACTION: ICD-10-CM

## 2021-12-15 DIAGNOSIS — R45.4 IRRITABILITY AND ANGER: ICD-10-CM

## 2021-12-15 DIAGNOSIS — F31.9 BIPOLAR DEPRESSION (HCC): Primary | ICD-10-CM

## 2021-12-15 DIAGNOSIS — Z02.9 ADMINISTRATIVE ENCOUNTER: ICD-10-CM

## 2021-12-15 PROCEDURE — 99213 OFFICE O/P EST LOW 20 MIN: CPT | Performed by: NURSE PRACTITIONER
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
## 2021-12-15 NOTE — PATIENT INSTRUCTIONS
Learning About Progressive Muscle Relaxation for Stress  What are progressive muscle relaxation and stress? Stress is what you feel when you have to handle more than you are used to. A lot of things can cause stress. You may feel stress when you go on a job interview, take a test, or run a race. This kind of short-term stress is normal and even useful. It can help you if you need to work hard or react quickly. Stress also can last a long time. Long-term stress is caused by stressful situations or events. Examples of long-term stress include long-term health problems, ongoing problems at work, and conflicts in your family. Long-term stress can harm your health. When you have anxiety or stress in your life, one of the ways your body responds is with muscle tension. Progressive muscle relaxation is a method that helps relieve that tension. How does progressive muscle relaxation reduce stress? The body responds to stress with muscle tension, which can cause pain or discomfort. In turn, tense muscles relay to the body that it's stressed. That keeps the cycle of stress and muscle tension going. Progressive muscle relaxation helps break this cycle by reducing muscle tension and general mental anxiety. This method often helps people get to sleep. How do you do progressive muscle relaxation? To do progressive muscle relaxation, first you tense a group of muscles as you breathe in. Then you relax them as you breathe out. Notice how your muscles feel when you relax them. You work on your muscle groups in a certain order. Through practice, you can learn to feel the difference between a tensed muscle and a relaxed muscle. Then you can learn how to turn on this relaxed state at the first sign of a muscle tensing up due to stress. Practicing this method for a few weeks will help you get better at this skill. In time you'll be able to use this method to relieve stress.  When you first start, it may help to use an audio recording until you learn all the muscle groups in order. Check online for these recordings. Choose a place where you won't be interrupted. It should have space for you to lie down on your back and stretch out comfortably, such as on a carpeted floor. Follow-up care is a key part of your treatment and safety. Be sure to make and go to all appointments, and call your doctor if you are having problems. It's also a good idea to know your test results and keep a list of the medicines you take. Where can you learn more? Go to http://www.gray.com/  Enter C957 in the search box to learn more about \"Learning About Progressive Muscle Relaxation for Stress. \"  Current as of: June 16, 2021               Content Version: 13.0  © 2006-2021 [employer]wise[employer] Inc[employer]. Care instructions adapted under license by [employer] (which disclaims liability or warranty for this information). If you have questions about a medical condition or this instruction, always ask your healthcare professional. [address] any warranty or liability for your use of this information.

## 2021-12-15 NOTE — PROGRESS NOTES
Pt is here for   Chief Complaint   Patient presents with   [employer] Other     return to work. .. pt state sthat she feels she's mentally ready to return      1. Have you been to the ER, urgent care clinic since your last visit? Hospitalized since your last visit? No    2. Have you seen or consulted any other health care providers outside of the [address] [place] [address] since your last visit? Include any pap smears or colon screening.  No    Denies pain at this time

## 2021-12-22 ENCOUNTER — TELEPHONE (OUTPATIENT)
Dept: INTERNAL MEDICINE CLINIC | Age: 41
End: 2021-12-22
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
## 2021-12-23 DIAGNOSIS — R05.3 PERSISTENT COUGH FOR 3 WEEKS OR LONGER: ICD-10-CM

## 2021-12-24 RX ORDER — ALBUTEROL SULFATE 90 UG/1
AEROSOL, METERED RESPIRATORY (INHALATION)
Qty: 6.7 EACH | Refills: 0 | Status: SHIPPED | OUTPATIENT
Start: 2021-12-24 | End: 2022-03-16

## 2022-01-03 NOTE — ED NOTES
Pt presents to ED ambulatory complaining of intermittent headaches. PT reports someone she was around over the weekend tested positive for COVID-19 and now she is concerned. Pt resting in position of comfort in NAD. Pt is alert and oriented x 4, RR even and unlabored, skin is warm and dry. Assessment completed and pt updated on plan of care. Call bell in reach. Emergency Department Nursing Plan of Care       The Nursing Plan of Care is developed from the Nursing assessment and Emergency Department Attending provider initial evaluation. The plan of care may be reviewed in the ED Provider note.     The Plan of Care was developed with the following considerations:   Patient / Family readiness to learn indicated by:verbalized understanding  Persons(s) to be included in education: patient  Barriers to Learning/Limitations:No    Signed     Bela Maravilla RN    2/1/2021   8:08 PM Admission Reconciliation is Completed  Discharge Reconciliation is Completed

## 2022-01-26 ENCOUNTER — DOCUMENTATION ONLY (OUTPATIENT)
Dept: BEHAVIORAL/MENTAL HEALTH CLINIC | Age: 42
End: 2022-01-26

## 2022-01-26 NOTE — PROGRESS NOTES
Pt had follow up appt vv scheduled for 1pm; provider sent doxy invite link to [phone] at 12:57p, pt did not sign on, provider left platform at 1:16pm.  -RES

## 2022-01-27 ENCOUNTER — DOCUMENTATION ONLY (OUTPATIENT)
Dept: BEHAVIORAL/MENTAL HEALTH CLINIC | Age: 42
End: 2022-01-27

## 2022-01-27 NOTE — PROGRESS NOTES
Pt scheduled for vv follow up at 10am.  Provider sent doxy invite link to [phone] at 9:53am; pt did not sign on, provider left platform at 10:15am.  -RES

## 2022-02-17 ENCOUNTER — TELEPHONE (OUTPATIENT)
Dept: INTERNAL MEDICINE CLINIC | Age: 42
End: 2022-02-17

## 2022-02-17 NOTE — TELEPHONE ENCOUNTER
----- Message from Daquan Bernal sent at 2/16/2022  4:17 PM EST -----  Subject: Message to Provider    QUESTIONS  Information for Provider? Pt returning call after office hours to schedule   appt. Please advise.  ---------------------------------------------------------------------------  --------------  CALL BACK INFO  What is the best way for the office to contact you? OK to leave message on   voicemail  Preferred Call Back Phone Number?  [phone]  ---------------------------------------------------------------------------  --------------  SCRIPT ANSWERS  undefined

## 2022-02-18 NOTE — TELEPHONE ENCOUNTER
Pt was called on 2/18/2022 @ 9:55 a m.  She states she does not need an appointment now the odor went away and she thinks it is due to the fact that she just went off her cycle

## 2022-03-11 ENCOUNTER — HOSPITAL ENCOUNTER (EMERGENCY)
Age: 42
Discharge: HOME | End: 2022-03-11
Attending: EMERGENCY MEDICINE
Payer: COMMERCIAL

## 2022-03-11 ENCOUNTER — NURSE TRIAGE (OUTPATIENT)
Dept: OTHER | Facility: CLINIC | Age: 42
End: 2022-03-11

## 2022-03-11 VITALS
SYSTOLIC BLOOD PRESSURE: 145 MMHG | RESPIRATION RATE: 16 BRPM | HEIGHT: 60 IN | WEIGHT: 154 LBS | BODY MASS INDEX: 30.23 KG/M2 | HEART RATE: 105 BPM | TEMPERATURE: 101.6 F | OXYGEN SATURATION: 98 % | DIASTOLIC BLOOD PRESSURE: 99 MMHG

## 2022-03-11 DIAGNOSIS — J02.0 ACUTE STREPTOCOCCAL PHARYNGITIS: Primary | ICD-10-CM

## 2022-03-11 PROCEDURE — 99284 EMERGENCY DEPT VISIT MOD MDM: CPT

## 2022-03-11 PROCEDURE — 74011250637 HC RX REV CODE- 250/637: Performed by: EMERGENCY MEDICINE

## 2022-03-11 PROCEDURE — 96372 THER/PROPH/DIAG INJ SC/IM: CPT

## 2022-03-11 PROCEDURE — 74011250636 HC RX REV CODE- 250/636: Performed by: EMERGENCY MEDICINE

## 2022-03-11 RX ORDER — CLINDAMYCIN HYDROCHLORIDE 300 MG/1
300 CAPSULE ORAL 4 TIMES DAILY
Qty: 40 CAPSULE | Refills: 0 | Status: SHIPPED | OUTPATIENT
Start: 2022-03-11 | End: 2022-05-19 | Stop reason: ALTCHOICE

## 2022-03-11 RX ORDER — IBUPROFEN 800 MG/1
800 TABLET ORAL
Qty: 20 TABLET | Refills: 0 | Status: SHIPPED | OUTPATIENT
Start: 2022-03-11 | End: 2022-03-18

## 2022-03-11 RX ORDER — IBUPROFEN 400 MG/1
800 TABLET ORAL
Status: COMPLETED | OUTPATIENT
Start: 2022-03-11 | End: 2022-03-11

## 2022-03-11 RX ORDER — DEXAMETHASONE SODIUM PHOSPHATE 100 MG/10ML
10 INJECTION INTRAMUSCULAR; INTRAVENOUS
Status: COMPLETED | OUTPATIENT
Start: 2022-03-11 | End: 2022-03-11

## 2022-03-11 RX ORDER — ACETAMINOPHEN 500 MG
1000 TABLET ORAL
Status: COMPLETED | OUTPATIENT
Start: 2022-03-11 | End: 2022-03-11

## 2022-03-11 RX ORDER — CLINDAMYCIN HYDROCHLORIDE 150 MG/1
300 CAPSULE ORAL
Status: COMPLETED | OUTPATIENT
Start: 2022-03-11 | End: 2022-03-11

## 2022-03-11 RX ADMIN — ACETAMINOPHEN 1000 MG: 500 TABLET ORAL at 08:14

## 2022-03-11 RX ADMIN — CLINDAMYCIN HYDROCHLORIDE 300 MG: 150 CAPSULE ORAL at 08:13

## 2022-03-11 RX ADMIN — DEXAMETHASONE SODIUM PHOSPHATE 10 MG: 10 INJECTION INTRAMUSCULAR; INTRAVENOUS at 08:15

## 2022-03-11 RX ADMIN — IBUPROFEN 800 MG: 400 TABLET, FILM COATED ORAL at 08:14

## 2022-03-11 NOTE — ED NOTES
Discharge instructions were given to the patient by Kwame Beltrán RN. The patient left the Emergency Department ambulatory, alert and oriented and in no acute distress with 2 prescriptions. The patient was encouraged to call or return to the ED for worsening issues or problems and was encouraged to schedule a follow up appointment for continuing care. The patient verbalized understanding of discharge instructions and prescriptions, all questions were answered. The patient has no further concerns at this time.

## 2022-03-11 NOTE — TELEPHONE ENCOUNTER
Received call from [facility] at [facility] with [employer] Complaint. Subjective: Caller states \"sore throat\"     Current Symptoms: trouble swallowing, sore throat, hurts to talk, right ear pain, not able to swallow saliva spitting it out     Onset: 4 days ago; sudden    Associated Symptoms: NA    Pain Severity: 10/10; aching and stabbing; constant    Temperature: 98 orally    What has been tried: Tylenol, Motrin, Theraflu    LMP: NA Pregnant: NA    Recommended disposition: Go to ED Now    Care advice provided, patient verbalizes understanding; denies any other questions or concerns; instructed to call back for any new or worsening symptoms. Patient/caller agrees to proceed to nearest ED Emergency Department    Attention Provider: Thank you for allowing me to participate in the care of your patient. The patient was connected to triage in response to information provided to the [facility]. Please do not respond through this encounter as the response is not directed to a shared pool.         Reason for Disposition   [1] Drooling or spitting out saliva (because can't swallow) AND [2] normal breathing    Protocols used: SORE THROAT-ADULT-

## 2022-03-11 NOTE — ED PROVIDER NOTES
EMERGENCY DEPARTMENT HISTORY AND PHYSICAL EXAM      Date: 3/11/2022  Patient Name: Zen Bee    History of Presenting Illness     Chief Complaint   Patient presents with    Sore Throat       History Provided By: Patient    HPI: Zen Bee, 38 y.o. female presents to the ED with cc of fever, sore throat, trouble swallowing for the past 2 days. Patient also complains of body aches. Denies shortness of breath cough chest pain nausea vomiting or abdominal pain. There are no other associated symptoms, patient concerns, or physical findings at this time. I reviewed the vital signs, available nursing notes, past medical history, past surgical history, family history and social history. Vital Signs:  Patient Vitals for the past 12 hrs:   Temp Pulse Resp BP SpO2   03/11/22 0748 (!) 101.6 °F (38.7 °C) (!) 113 16 (!) 130/105 95 %     Vital signs reviewed. Current Medications:  No current facility-administered medications on file prior to encounter. Current Outpatient Medications on File Prior to Encounter   Medication Sig Dispense Refill    albuterol (PROVENTIL HFA, VENTOLIN HFA, PROAIR HFA) 90 mcg/actuation inhaler TAKE 2 PUFFS EVERY 4 HOURS AS NEEDED FOR WHEEZING OR SHORTNESS OF BREATH (PERSISTENT COUGHING). (Patient not taking: Reported on 3/11/2022) 6.7 Each 0    ergocalciferol (ERGOCALCIFEROL) 1,250 mcg (50,000 unit) capsule Take 1 Capsule by mouth every seven (7) days. (Patient not taking: Reported on 3/11/2022) 12 Capsule 1    OLANZapine (ZyPREXA) 7.5 mg tablet olanzapine 7.5 mg tablet   TAKE 1 TABLET BY MOUTH EVERYDAY AT BEDTIME (Patient not taking: Reported on 3/11/2022)      sertraline (ZOLOFT) 100 mg tablet sertraline 100 mg tablet   TAKE 1 TABLET BY MOUTH EVERY MORNING WITH A MEAL (Patient not taking: Reported on 3/11/2022)      Nebulizer & Compressor machine 1 Each by Does Not Apply route as needed for Cough (wheezing).  Use nebulizer every 4-6 hours as needed for shortness of breath or wheezing (Patient not taking: Reported on 3/11/2022) 1 Each 0    albuterol (PROVENTIL VENTOLIN) 2.5 mg /3 mL (0.083 %) nebu 3 mL by Nebulization route every four (4) hours as needed for Wheezing. (Patient not taking: Reported on 3/11/2022) 30 Each 1    hydrOXYzine HCL (ATARAX) 25 mg tablet  (Patient not taking: Reported on 3/11/2022)      busPIRone (BUSPAR) 10 mg tablet Take 1 Tab by mouth two (2) times daily as needed (anxiety). (Patient not taking: Reported on 3/11/2022) 60 Tab 3       Past History     Past Medical History:  Past Medical History:   Diagnosis Date    Anxiety     Bipolar affective ([address]     Depression     Hyperlipidemia     High Cholesterol    Hyperlipidemia     Major depression        Past Surgical History:  Past Surgical History:   Procedure Laterality Date    HX GI      Colonoscopy    HX HERNIA REPAIR      as child       Family History:  Family History   Problem Relation Age of Onset    Diabetes Mother     Depression Mother     Asthma Brother     Stroke Maternal Grandmother         aneurysm    Cancer Maternal Grandmother         kidney    Hypertension Maternal Grandmother     Cancer Paternal Grandmother         lung    Diabetes Brother     Bipolar Disorder Brother     Schizophrenia Brother     Other Brother         paranoia    Breast Cancer Other         maternal---age unknown    Breast Cancer Other         Maternal--age unknown       Social History:  Social History     Tobacco Use    Smoking status: Current Some Day Smoker     Packs/day: 0.25    Smokeless tobacco: Current User    Tobacco comment: 1-2 black & milds daily    Vaping Use    Vaping Use: Never used   Substance Use Topics    Alcohol use: Yes     Comment: occasional    Drug use: No       Allergies: Allergies   Allergen Reactions    Amoxicillin Rash and Other (comments)     Vaginal itching    Flagyl [Metronidazole] Nausea and Vomiting     metrogel is okay.     Pcn [Penicillins] Itching Vaginal itching         Review of Systems   Review of Systems   Constitutional: Positive for chills and fever. HENT: Positive for congestion, sore throat and trouble swallowing. Eyes: Negative for photophobia and redness. Respiratory: Negative for shortness of breath and wheezing. Cardiovascular: Negative for chest pain and leg swelling. Gastrointestinal: Negative for abdominal pain, blood in stool, nausea and vomiting. Genitourinary: Negative for difficulty urinating, dysuria, hematuria, menstrual problem and vaginal bleeding. Musculoskeletal: Negative for back pain and joint swelling. Neurological: Negative for dizziness, seizures, syncope, speech difficulty, weakness, numbness and headaches. Hematological: Negative for adenopathy. Psychiatric/Behavioral: Negative for agitation, confusion and suicidal ideas. The patient is not nervous/anxious. All other systems reviewed and are negative. Physical Exam   Physical Exam  Vitals and nursing note reviewed. Constitutional:       General: She is not in acute distress. Appearance: She is well-developed. HENT:      Head: Normocephalic and atraumatic. Right Ear: Tympanic membrane normal.      Left Ear: Tympanic membrane normal.      Nose: Congestion present. Mouth/Throat:      Pharynx: Uvula midline. Oropharyngeal exudate and posterior oropharyngeal erythema present. No uvula swelling. Comments: Enlarged, erythematous tonsils with bilateral exudates. Uvula midline. No evidence of peritonsillar abscesses. Eyes:      General:         Left eye: No discharge. Conjunctiva/sclera: Conjunctivae normal.      Pupils: Pupils are equal, round, and reactive to light. Neck:      Vascular: No JVD. Cardiovascular:      Rate and Rhythm: Normal rate and regular rhythm. Heart sounds: Normal heart sounds. Pulmonary:      Effort: Pulmonary effort is normal. No respiratory distress. Breath sounds: Normal breath sounds. No wheezing. Abdominal:      General: Bowel sounds are normal. There is no distension. Palpations: Abdomen is soft. Tenderness: There is no abdominal tenderness. There is no guarding or rebound. Musculoskeletal:         General: No tenderness. Normal range of motion. Cervical back: Normal range of motion and neck supple. Lymphadenopathy:      Cervical: No cervical adenopathy. Skin:     General: Skin is warm and dry. Findings: No rash. Neurological:      Mental Status: She is alert and oriented to person, place, and time. Cranial Nerves: No cranial nerve deficit. Deep Tendon Reflexes: Reflexes are normal and symmetric. Psychiatric:         Behavior: Behavior normal.         Emergency Department Course   ED Course:  Initial assessment performed. The patient's complaints have been discussed, and they are in agreement with the care plan formulated and outlined with them. I have encouraged them to ask questions as they arise throughout their visit. EKG interpretation: (Preliminary)    Medical Decision Making:  Streptococcal pharyngitis, acute pharyngitis, peritonsillar abscess. Critical Care Time:  none    Procedure:      Progress note:       Disposition:  DISCHARGED at 8:30 am,  I reviewed exam findings, diagnostic results, and clinical impression with patient. Counseled patient on diagnosis and care plan. Encouraged patient to ask questions and discussed need for follow up with primary care and to return to ED precautions. Patient expresses understanding at this time. I have reviewed discharge instructions with the patient and/or family/caregiver who verbalized understanding. The patient has been re-evaluated and is ready for discharge. Discharge instructions have been provided and explained to the patient. Ready for discharge. DISCHARGE PLAN:  1. Current Discharge Medication List        2. Follow-up Information    None       3.   Return to ED if current symptoms worsen or new symptoms arise. 4. Follow up with Cari Kaba NP in 3-5 days. Diagnosis     Clinical Impression: No diagnosis found.

## 2022-03-11 NOTE — ED NOTES
Pt presents to ED ambulatory complaining of sore throat x 3 days. Pt reports she has been having sore throat, fevers, chills, HA, and right ear ache. Pt reports taking multiple OTC meds w/o relief. Pt denies n/v. Pt is alert and oriented x 4, RR even and unlabored, skin is warm and dry. Assessment completed and pt updated on plan of care. Call bell in reach. Emergency Department Nursing Plan of Care       The Nursing Plan of Care is developed from the Nursing assessment and Emergency Department Attending provider initial evaluation. The plan of care may be reviewed in the ED Provider note.     The Plan of Care was developed with the following considerations:   Patient / Family readiness to learn indicated by:verbalized understanding  Persons(s) to be included in education: patient  Barriers to Learning/Limitations:No    Signed     Marco Gill RN    3/11/2022   7:55 AM

## 2022-03-16 ENCOUNTER — CLINICAL SUPPORT (OUTPATIENT)
Dept: INTERNAL MEDICINE CLINIC | Age: 42
End: 2022-03-16
Payer: COMMERCIAL

## 2022-03-16 VITALS
HEIGHT: 60 IN | WEIGHT: 154 LBS | SYSTOLIC BLOOD PRESSURE: 126 MMHG | DIASTOLIC BLOOD PRESSURE: 91 MMHG | OXYGEN SATURATION: 97 % | TEMPERATURE: 97.6 F | BODY MASS INDEX: 30.23 KG/M2 | RESPIRATION RATE: 18 BRPM | HEART RATE: 87 BPM

## 2022-03-16 DIAGNOSIS — H53.8 BLURRED VISION: ICD-10-CM

## 2022-03-16 DIAGNOSIS — R73.09 ELEVATED RANDOM BLOOD GLUCOSE LEVEL: Primary | ICD-10-CM

## 2022-03-16 DIAGNOSIS — R03.0 ELEVATED BP WITHOUT DIAGNOSIS OF HYPERTENSION: Primary | ICD-10-CM

## 2022-03-16 LAB
GLUCOSE POC: 156 MG/DL
HBA1C MFR BLD HPLC: 5.7 %
HGB BLD-MCNC: 12.2 G/DL

## 2022-03-16 PROCEDURE — 85018 HEMOGLOBIN: CPT | Performed by: INTERNAL MEDICINE

## 2022-03-16 PROCEDURE — 82962 GLUCOSE BLOOD TEST: CPT | Performed by: INTERNAL MEDICINE

## 2022-03-16 PROCEDURE — 83036 HEMOGLOBIN GLYCOSYLATED A1C: CPT | Performed by: INTERNAL MEDICINE

## 2022-03-16 RX ORDER — INSULIN PUMP SYRINGE, 3 ML
EACH MISCELLANEOUS
Qty: 1 KIT | Refills: 0 | Status: SHIPPED | OUTPATIENT
Start: 2022-03-16 | End: 2022-10-20

## 2022-03-16 RX ORDER — AMLODIPINE BESYLATE 2.5 MG/1
2.5 TABLET ORAL DAILY
Qty: 30 TABLET | Refills: 2 | Status: SHIPPED | OUTPATIENT
Start: 2022-03-16 | End: 2022-05-19

## 2022-03-16 RX ORDER — ISOPROPYL ALCOHOL 70 ML/100ML
SWAB TOPICAL
Qty: 200 PAD | Refills: 3 | Status: SHIPPED | OUTPATIENT
Start: 2022-03-16 | End: 2022-10-20

## 2022-03-16 RX ORDER — IBUPROFEN 200 MG
CAPSULE ORAL
Qty: 30 STRIP | Refills: 0 | Status: SHIPPED | OUTPATIENT
Start: 2022-03-16 | End: 2022-04-10 | Stop reason: SDUPTHER

## 2022-03-16 RX ORDER — LANCETS
EACH MISCELLANEOUS
Qty: 30 EACH | Refills: 11 | Status: SHIPPED | OUTPATIENT
Start: 2022-03-16 | End: 2022-10-20

## 2022-03-16 NOTE — PROGRESS NOTES
Pt acutely ill. Offered PRN clonidine use as done in the past or daily med, pt opted for daily med. Continue home BP monitoring and will fu as planned next month. Report to ER for any further progression of symptoms. HA and vision change may be r/t BP elevation. Seeing optometry is reasonable also for both.      BP Readings from Last 3 Encounters:   03/16/22 (!) 126/91   03/11/22 (!) 145/99   10/14/21 115/73

## 2022-03-16 NOTE — PROGRESS NOTES
Pt was seen at the ED on 3/11/2022 for sore throat, but noticed BP was elevated    Pt states that her BP was 145/103 yesterday when she took it, she's been having slight headaches numbness and pain on the right arm and leg and blurred vision, states that her head just feels cloudy and the vision is funny     Denies SOB, and chest pain    BP Readings from Last 3 Encounters:   03/16/22 (!) 126/91   03/11/22 (!) 145/99   10/14/21 115/73     Pt states that she does smoke blacks    Does not drink coffee, or caffeine     States that today she had for breakfast chicken minis from [employer] a with OJ    Last night just a salad with fruit      Does not take BP meds. Pt will be started on low dose Amlodipine 2.5mg daily per NP Jony Galan and will keep 4/18/2022 appointment   Instructed to take BP once a week until appointment after starting medication and if symptoms does not improve or start to have SOB, chest pain to report to the ED.

## 2022-03-16 NOTE — PROGRESS NOTES
Glucose level TOO high, this too may explain vision change and feeling funny. If she has taken steroids recently this may explain the elevation. Also add REPEAT GLUCOSE to next visit. A1c fine for now, may help to sporadically check blood glucose levels too to ensure this isn't a new problem trying to present. Sent for DM testing supplies to help with this.

## 2022-03-17 ENCOUNTER — TELEPHONE (OUTPATIENT)
Dept: INTERNAL MEDICINE CLINIC | Age: 42
End: 2022-03-17

## 2022-03-17 NOTE — TELEPHONE ENCOUNTER
Pt states she was seen in Er Saturday for strep throat. She was given Clindomycin. She  Has a vaginal  infection, Her private area is now swollen, itching and burning. No discharge She can't continue taking this medication. She has a few more pills to take. She wants something sent to the pharmacy please.

## 2022-03-18 ENCOUNTER — TELEPHONE (OUTPATIENT)
Dept: INTERNAL MEDICINE CLINIC | Age: 42
End: 2022-03-18

## 2022-03-18 DIAGNOSIS — T50.905A ADVERSE EFFECT OF DRUG, INITIAL ENCOUNTER: ICD-10-CM

## 2022-03-18 DIAGNOSIS — J02.0 STREP PHARYNGITIS: ICD-10-CM

## 2022-03-18 DIAGNOSIS — T36.95XA ANTIBIOTIC-INDUCED YEAST INFECTION: Primary | ICD-10-CM

## 2022-03-18 DIAGNOSIS — B37.9 ANTIBIOTIC-INDUCED YEAST INFECTION: Primary | ICD-10-CM

## 2022-03-18 RX ORDER — AZITHROMYCIN 250 MG/1
TABLET, FILM COATED ORAL
Qty: 6 TABLET | Refills: 0 | Status: SHIPPED | OUTPATIENT
Start: 2022-03-18 | End: 2022-05-19 | Stop reason: ALTCHOICE

## 2022-03-18 RX ORDER — FLUCONAZOLE 150 MG/1
150 TABLET ORAL AS NEEDED
Qty: 2 TABLET | Refills: 0 | Status: SHIPPED | OUTPATIENT
Start: 2022-03-18 | End: 2022-05-19 | Stop reason: SDUPTHER

## 2022-03-18 NOTE — TELEPHONE ENCOUNTER
Pt states she needs something for vaginal irritation and something else for the strep throat.   Pt  [phone]

## 2022-03-19 PROBLEM — F31.9 BIPOLAR DEPRESSION (HCC): Status: ACTIVE | Noted: 2021-09-01

## 2022-03-19 PROBLEM — F44.81 MULTIPLE PERSONALITY DISORDER (HCC): Status: ACTIVE | Noted: 2021-09-01

## 2022-03-19 PROBLEM — F41.0 GENERALIZED ANXIETY DISORDER WITH PANIC ATTACKS: Status: ACTIVE | Noted: 2021-09-01

## 2022-03-19 PROBLEM — F43.10 PTSD (POST-TRAUMATIC STRESS DISORDER): Status: ACTIVE | Noted: 2021-09-01

## 2022-03-19 PROBLEM — F41.1 GENERALIZED ANXIETY DISORDER WITH PANIC ATTACKS: Status: ACTIVE | Noted: 2021-09-01

## 2022-03-19 PROBLEM — K21.9 GASTROESOPHAGEAL REFLUX DISEASE WITHOUT ESOPHAGITIS: Status: ACTIVE | Noted: 2018-05-30

## 2022-03-19 LAB — SARS-COV-2, NAA: NOT DETECTED

## 2022-03-20 PROBLEM — E55.9 VITAMIN D DEFICIENCY: Status: ACTIVE | Noted: 2021-11-17

## 2022-04-07 DIAGNOSIS — R73.09 ELEVATED RANDOM BLOOD GLUCOSE LEVEL: ICD-10-CM

## 2022-04-10 RX ORDER — BLOOD SUGAR DIAGNOSTIC
STRIP MISCELLANEOUS
Qty: 100 STRIP | Refills: 1 | Status: SHIPPED | OUTPATIENT
Start: 2022-04-10 | End: 2022-10-20

## 2022-05-19 ENCOUNTER — OFFICE VISIT (OUTPATIENT)
Dept: INTERNAL MEDICINE CLINIC | Age: 42
End: 2022-05-19
Payer: COMMERCIAL

## 2022-05-19 VITALS
DIASTOLIC BLOOD PRESSURE: 83 MMHG | SYSTOLIC BLOOD PRESSURE: 110 MMHG | RESPIRATION RATE: 17 BRPM | WEIGHT: 155 LBS | HEART RATE: 77 BPM | TEMPERATURE: 98.6 F | OXYGEN SATURATION: 99 % | BODY MASS INDEX: 30.43 KG/M2 | HEIGHT: 60 IN

## 2022-05-19 DIAGNOSIS — F41.1 GENERALIZED ANXIETY DISORDER WITH PANIC ATTACKS: ICD-10-CM

## 2022-05-19 DIAGNOSIS — F41.0 GENERALIZED ANXIETY DISORDER WITH PANIC ATTACKS: ICD-10-CM

## 2022-05-19 DIAGNOSIS — N89.8 VAGINA ITCHING: ICD-10-CM

## 2022-05-19 DIAGNOSIS — R35.0 FREQUENT URINATION: Primary | ICD-10-CM

## 2022-05-19 DIAGNOSIS — F31.9 BIPOLAR DEPRESSION (HCC): ICD-10-CM

## 2022-05-19 LAB
BILIRUB UR QL STRIP: NEGATIVE
GLUCOSE UR-MCNC: NEGATIVE MG/DL
KETONES P FAST UR STRIP-MCNC: NEGATIVE MG/DL
PH UR STRIP: 6 [PH] (ref 4.6–8)
PROT UR QL STRIP: NORMAL
SP GR UR STRIP: 1.03 (ref 1–1.03)
UA UROBILINOGEN AMB POC: NORMAL (ref 0.2–1)
URINALYSIS CLARITY POC: CLEAR
URINALYSIS COLOR POC: YELLOW
URINE BLOOD POC: NORMAL
URINE LEUKOCYTES POC: NORMAL
URINE NITRITES POC: NEGATIVE

## 2022-05-19 PROCEDURE — 99214 OFFICE O/P EST MOD 30 MIN: CPT | Performed by: NURSE PRACTITIONER

## 2022-05-19 PROCEDURE — 81001 URINALYSIS AUTO W/SCOPE: CPT | Performed by: NURSE PRACTITIONER

## 2022-05-19 RX ORDER — NITROFURANTOIN 25; 75 MG/1; MG/1
100 CAPSULE ORAL 2 TIMES DAILY
Qty: 10 CAPSULE | Refills: 0 | Status: SHIPPED | OUTPATIENT
Start: 2022-05-19 | End: 2022-05-24

## 2022-05-19 RX ORDER — FLUCONAZOLE 150 MG/1
150 TABLET ORAL AS NEEDED
Qty: 2 TABLET | Refills: 0 | Status: SHIPPED | OUTPATIENT
Start: 2022-05-19 | End: 2022-08-02 | Stop reason: SDUPTHER

## 2022-05-19 NOTE — PROGRESS NOTES
Dory Paez (: 1980) is a 38 y.o. female, established patient, here for evaluation of the following chief complaint(s):  Vaginal Itching (pt states that she was having some vaginal itching, denies discharge, burning and odor)       ASSESSMENT/PLAN:  Below is the assessment and plan developed based on review of pertinent history, physical exam, labs, studies, and medications. 1. Frequent urination  -     AMB POC URINALYSIS DIP STICK AUTO W/ MICRO  -     CULTURE, URINE; Future  -     nitrofurantoin, macrocrystal-monohydrate, (Macrobid) 100 mg capsule; Take 1 Capsule by mouth two (2) times a day for 5 days. , Normal, Disp-10 Capsule, R-0  2. Vagina itching  -     fluconazole (Diflucan) 150 mg tablet; Take 1 Tablet by mouth as needed (vaginal discharge and itching). May repeat taking 1 tab in 3 days if discharge remains. , Normal, Disp-2 Tablet, R-0  3. Bipolar depression ([facility] [address]  4. Generalized anxiety disorder with panic attacks      Return in about 5 months (around 10/19/2022) for Physical with labs, psych fu. Needs VV with [facility]. Pt asked to complete follow by next visit: see psych and resume BUSPAR with Zyprexa. UA + bld and leuk, sent for urine culture. SUBJECTIVE/OBJECTIVE:  HPI    Pt presents with vaginal itching. Started a few days ago. Associated with no discharge, dysuria, or vaginal spotting. Has NOT had in the past.     Feeling more stressed lately with family issues. Followed by Dr. Alexander Reyna, will return next month. Taking zyprexa, but felt zoloft and buspar made her too sluggish. Using THC with relief of anxiety. No therapy however. H/o bipolar.        Review of Systems  Constitutional: negative for fevers, chills, anorexia and weight loss  Respiratory:  negative for cough, hemoptysis, dyspnea, and wheezing  CV:   negative for chest pain, palpitations, and lower extremity edema  GI:   negative for nausea, vomiting, diarrhea, abdominal pain, and melena  Endo:               negative for polyuria,polydipsia,polyphagia, and heat intolerance  Genitourinary: negative for frequency, urgency, dysuria, retention, and hematuria  Integument:  negative for rash, ulcerations, and pruritus  Hematologic:  negative for easy bruising and bleeding  Musculoskel: negative for arthralgias, muscle weakness,and joint pain/swelling  Neurological:  negative for headaches, dizziness, vertigo,and memory/gait problems  Behavl/Psych: negative for feelings of anxiety, depression, suicide, and mood changes    Visit Vitals  /83 (BP 1 Location: Right upper arm, BP Patient Position: Sitting, BP Cuff Size: Large adult)   Pulse 77   Temp 98.6 °F (37 °C) (Temporal)   Resp 17   Ht 5' (1.524 m)   Wt 155 lb (70.3 kg)   LMP 04/27/2022 (Exact Date)   SpO2 99%   BMI 30.27 kg/m²       Wt Readings from Last 3 Encounters:   05/19/22 155 lb (70.3 kg)   03/16/22 154 lb (69.9 kg)   03/11/22 154 lb (69.9 kg)     Physical Exam:   General appearance - alert, well appearing, and in no distress. Mental status - A/O x 4,normal mood and affect. Chest - CTA. Symmetric chest rise. No wheezing. No distress. Heart - Normal rate & rhythm. Normal S1 & S2. No MGR. Abdomen- Soft, round. Non-distended, NT. No pulsatile masses or hernias. Ext-  No pedal edema, clubbing, or cyanosis. Skin-Warm and dry. No hyperpigmentation, ulcerations, or suspicious lesions. Neuro - Normal speech, no focal findings or movement disorder. Normal strength, gait, and muscle tone. An electronic signature was used to authenticate this note.   -- Idania Montague NP

## 2022-05-19 NOTE — PROGRESS NOTES
Pt is here for   Chief Complaint   Patient presents with    Vaginal Itching     pt states that she was having some vaginal itching, denies discharge, burning and odor     1. Have you been to the ER, urgent care clinic since your last visit? Hospitalized since your last visit? No    2. Have you seen or consulted any other health care providers outside of the [address] [place] [address] since your last visit? Include any pap smears or colon screening.  No    Denies pain at this time       Pt states that she feels overwhelmed and feels like she's gonna have a mental break

## 2022-05-19 NOTE — PATIENT INSTRUCTIONS
Take your BUSPAR with your Zyprexa until see by Dr. Bose Back again, please. Urinary Tract Infection (UTI) in Women: Care Instructions  Overview     A urinary tract infection, or UTI, is a general term for an infection anywhere between the kidneys and the urethra (where urine comes out). Most UTIs are bladder infections. They often cause pain or burning when you urinate. UTIs are caused by bacteria and can be cured with antibiotics. Be sure to complete your treatment so that the infection does not get worse. Follow-up care is a key part of your treatment and safety. Be sure to make and go to all appointments, and call your doctor if you are having problems. It's also a good idea to know your test results and keep a list of the medicines you take. How can you care for yourself at home? · Take your antibiotics as directed. Do not stop taking them just because you feel better. You need to take the full course of antibiotics. · Drink extra water and other fluids for the next day or two. This will help make the urine less concentrated and help wash out the bacteria that are causing the infection. (If you have kidney, heart, or liver disease and have to limit fluids, talk with your doctor before you increase the amount of fluids you drink.)  · Avoid drinks that are carbonated or have caffeine. They can irritate the bladder. · Urinate often. Try to empty your bladder each time. · To relieve pain, take a hot bath or lay a heating pad set on low over your lower belly or genital area. Never go to sleep with a heating pad in place. To prevent UTIs  · Drink plenty of water each day. This helps you urinate often, which clears bacteria from your system. (If you have kidney, heart, or liver disease and have to limit fluids, talk with your doctor before you increase the amount of fluids you drink.)  · Urinate when you need to. · If you are sexually active, urinate right after you have sex.   · Change sanitary pads often.  · Avoid douches, bubble baths, feminine hygiene sprays, and other feminine hygiene products that have deodorants. · After going to the bathroom, wipe from front to back. When should you call for help? Call your doctor now or seek immediate medical care if:    · Symptoms such as fever, chills, nausea, or vomiting get worse or appear for the first time.     · You have new pain in your back just below your rib cage. This is called flank pain.     · There is new blood or pus in your urine.     · You have any problems with your antibiotic medicine. Watch closely for changes in your health, and be sure to contact your doctor if:    · You are not getting better after taking an antibiotic for 2 days.     · Your symptoms go away but then come back. Where can you learn more? Go to http://www.gray.com/  Enter W619 in the search box to learn more about \"Urinary Tract Infection (UTI) in Women: Care Instructions. \"  Current as of: October 18, 2021               Content Version: 13.2  © 2006-2022 [employer]. Care instructions adapted under license by [employer] (which disclaims liability or warranty for this information). If you have questions about a medical condition or this instruction, always ask your healthcare professional. [address] any warranty or liability for your use of this information.

## 2022-05-26 LAB
BACTERIA SPEC CULT: NORMAL
CC UR VC: NORMAL
SERVICE CMNT-IMP: NORMAL

## 2022-05-26 NOTE — PROGRESS NOTES
NML/Stable labs, no changes. We can discuss at the next OV, if pt would like.        Thanks, [employer], [employer].

## 2022-06-06 ENCOUNTER — VIRTUAL VISIT (OUTPATIENT)
Dept: BEHAVIORAL/MENTAL HEALTH CLINIC | Age: 42
End: 2022-06-06
Payer: COMMERCIAL

## 2022-06-06 DIAGNOSIS — F41.0 GENERALIZED ANXIETY DISORDER WITH PANIC ATTACKS: ICD-10-CM

## 2022-06-06 DIAGNOSIS — F43.10 PTSD (POST-TRAUMATIC STRESS DISORDER): ICD-10-CM

## 2022-06-06 DIAGNOSIS — F41.1 GENERALIZED ANXIETY DISORDER WITH PANIC ATTACKS: ICD-10-CM

## 2022-06-06 DIAGNOSIS — F31.9 BIPOLAR DEPRESSION (HCC): Primary | ICD-10-CM

## 2022-06-06 PROCEDURE — 90832 PSYTX W PT 30 MINUTES: CPT | Performed by: SOCIAL WORKER

## 2022-06-06 NOTE — PROGRESS NOTES
History of Present Illness: Vernell Judd is a 35 y.o. female who presents with symptoms of depression, agitation, mood instabiliity     Duration of session: 32+ min     Mental Status exam:           Sensorium  oriented to time, place and person   Relations cooperative   Appearance:  age appropriate   Motor Behavior:  within normal limits   Speech:  normal pitch and normal volume   Thought Process: goal directed   Thought Content free of delusions, free of hallucinations and not internally preoccupied    Suicidal ideations none   Homicidal ideations none   Mood:  stable   Affect:  flat   Memory recent  adequate   Memory remote:  adequate   Concentration:  impaired   Abstraction:  abstract   Insight:  fair   Reliability good   Judgment:  good            DIAGNOSIS AND IMPRESSION:        Axis I: mood d/o, andrea, panic  Axis II: Deferred  Axis III:           Past Medical History:   Diagnosis Date    Anxiety      Bipolar affective (HCC)      Depression      Hyperlipidemia       High Cholesterol    Hyperlipidemia      Major depression        Axis IV: Problems with primary support group, Problems related to social environment and Other psychosocial or environmental problems  Axis V:  61-70 mild symptoms        Client presents via doxy vv for 3rd encounter, reports depressive moods and hypomania, anger, easily irritated. May was tough month as she grieves her mother and had extra expenses for daughter's prom. Two of her kids graduate this month. Strongly encouraged cl to connect with psychiatry. Follow up within 3 weeks. Helena Gore (: 1980) is a 38 y.o. female, established patient, here for evaluation of the following chief complaint(s):   Follow-up (doxy, 3) and Psychotherapy       ASSESSMENT/PLAN:  Below is the assessment and plan developed based on review of pertinent history, labs, studies, and medications. 1. Bipolar depression ([facility] [address]  2. PTSD (post-traumatic stress disorder)  3. Generalized anxiety disorder with panic attacks      Return in about 3 weeks (around 6/27/2022). SUBJECTIVE/OBJECTIVE:  HPI    Review of Systems     No data recorded     Physical Exam        Sophia Duke, was evaluated through a synchronous (real-time) audio-video encounter. The patient (or guardian if applicable) is aware that this is a billable service, which includes applicable co-pays. This Virtual Visit was conducted with patient's (and/or legal guardian's) consent. The visit was conducted pursuant to the emergency declaration under the [address] [place] [address], [address] [place] [address] authority and the [employer] Act. Patient identification was verified, and a caregiver was present when appropriate. The patient was located at: Home: [email or web address]  The provider was located at: Home:        An electronic signature was used to authenticate this note.   -- Mo Fleming LCSW

## 2022-06-10 DIAGNOSIS — K21.9 GASTROESOPHAGEAL REFLUX DISEASE WITHOUT ESOPHAGITIS: ICD-10-CM

## 2022-06-10 RX ORDER — ONDANSETRON 4 MG/1
4 TABLET, FILM COATED ORAL
Qty: 20 TABLET | Refills: 0 | Status: SHIPPED | OUTPATIENT
Start: 2022-06-10 | End: 2022-10-20

## 2022-06-10 RX ORDER — OMEPRAZOLE 20 MG/1
20 CAPSULE, DELAYED RELEASE ORAL DAILY
Qty: 30 CAPSULE | Refills: 0 | Status: SHIPPED | OUTPATIENT
Start: 2022-06-10 | End: 2022-09-27 | Stop reason: SDUPTHER

## 2022-07-15 ENCOUNTER — OFFICE VISIT (OUTPATIENT)
Dept: INTERNAL MEDICINE CLINIC | Age: 42
End: 2022-07-15
Payer: COMMERCIAL

## 2022-07-15 VITALS
OXYGEN SATURATION: 97 % | BODY MASS INDEX: 28.07 KG/M2 | HEIGHT: 60 IN | WEIGHT: 143 LBS | TEMPERATURE: 98.6 F | SYSTOLIC BLOOD PRESSURE: 127 MMHG | RESPIRATION RATE: 18 BRPM | DIASTOLIC BLOOD PRESSURE: 80 MMHG | HEART RATE: 98 BPM

## 2022-07-15 DIAGNOSIS — Z02.9 ADMINISTRATIVE ENCOUNTER: ICD-10-CM

## 2022-07-15 DIAGNOSIS — M79.606 PAIN AND SWELLING OF LOWER EXTREMITY, UNSPECIFIED LATERALITY: Primary | ICD-10-CM

## 2022-07-15 DIAGNOSIS — M79.89 PAIN AND SWELLING OF LOWER EXTREMITY, UNSPECIFIED LATERALITY: Primary | ICD-10-CM

## 2022-07-15 PROCEDURE — 99214 OFFICE O/P EST MOD 30 MIN: CPT | Performed by: NURSE PRACTITIONER

## 2022-07-15 RX ORDER — DICLOFENAC SODIUM 75 MG/1
75 TABLET, DELAYED RELEASE ORAL
Qty: 60 TABLET | Refills: 3 | Status: SHIPPED | OUTPATIENT
Start: 2022-07-15 | End: 2022-10-20

## 2022-07-15 NOTE — LETTER
NOTIFICATION RETURN TO WORK / SCHOOL    7/15/2022 11:48 AM    [address] [id number]      To Whom It May Concern:    Marty Dumont is currently under the care of Daniela Rubin. She will return to work/school on: 7/15/22 with limitation to walking amount, walk no more 4 city blocks continuously. Avoid heavy lifting and bending. Avoid prolonged standing greater than 2 hours. Allow to sit and rest frequently, x 10 minutes every hour. If there are questions or concerns please have the patient contact our office.         Sincerely,      Rory Chairez NP

## 2022-07-15 NOTE — PROGRESS NOTES
Pt is here for   Chief Complaint   Patient presents with    Swelling     of the feet. . pt states that she's in a different department and she's doing more standing and walking, pt states started 1 month ago    Leg Pain     with swelling      1. Have you been to the ER, urgent care clinic since your last visit? Hospitalized since your last visit? No    2. Have you seen or consulted any other health care providers outside of the [address] [place] [address] since your last visit? Include any pap smears or colon screening.  No    Pain at this time is 10/10 feet and leg pain

## 2022-07-15 NOTE — PROGRESS NOTES
Marty Dumont (: 1980) is a 38 y.o. female, established patient, here for evaluation of the following chief complaint(s):  Swelling (of the feet. . pt states that she's in a different department and she's doing more standing and walking, pt states started 1 month ago) and Leg Pain (with swelling )       ASSESSMENT/PLAN:  Below is the assessment and plan developed based on review of pertinent history, physical exam, labs, studies, and medications. 1. Pain and swelling of lower extremity, unspecified laterality  -     diclofenac EC (VOLTAREN) 75 mg EC tablet; Take 1 Tablet by mouth two (2) times daily as needed for Pain. With food, Normal, Disp-60 Tablet, R-3  2. Administrative encounter      Return for keep 10/20 appt. call if pain not resolved with role change. Specific pt instructions until next visit: letter written, but advised Yennifer Cruz requires work accommodation form completion usually. Diclofenac ordered and advised [facility] can get xray and refer to PT. Subjective:     Pt presents with bilateral foot pain. Pain Scale: 10 - Worst pain ever/10. Started 1 month ago, after switching departments at work, now works as  with excessive walkint. . Associated with swelling and aching to foot and legs. Has had in the past, but resolved after a few days, this time it is NOT resolving. Denies fall, MVA, or work injury. Had trouble walking today into office due to pain and swelling.      Review of Systems  Constitutional: negative for fevers, chills, anorexia and weight loss  Respiratory:  negative for cough, hemoptysis, dyspnea, and wheezing  CV:   negative for chest pain, palpitations, and lower extremity edema  GI:   negative for nausea, vomiting, diarrhea, abdominal pain, and melena  Endo:               negative for polyuria,polydipsia,polyphagia, and heat intolerance  Genitourinary: negative for frequency, urgency, dysuria, retention, and hematuria  Integument:  negative for rash, ulcerations, and pruritus  Hematologic:  negative for easy bruising and bleeding  Musculoskel: negative for arthralgias, muscle weakness,and joint pain/swelling  Neurological:  negative for headaches, dizziness, vertigo,and memory/gait problems  Behavl/Psych: negative for feelings of anxiety, depression, suicide, and mood changes    Visit Vitals  /80 (BP 1 Location: Left upper arm, BP Patient Position: Sitting, BP Cuff Size: Large adult)   Pulse 98   Temp 98.6 °F (37 °C) (Temporal)   Resp 18   Ht 5' (1.524 m)   Wt 143 lb (64.9 kg)   LMP 05/26/2022 (Exact Date)   SpO2 97%   BMI 27.93 kg/m²       Wt Readings from Last 3 Encounters:   07/15/22 143 lb (64.9 kg)   05/19/22 155 lb (70.3 kg)   03/16/22 154 lb (69.9 kg)         Physical Exam:   General appearance - alert, well appearing, and in no distress. Mental status - A/O x 4,normal mood and affect. Chest -  Symmetric chest rise. No wheezing. No distress. Heart - Normal rate. Abdomen- Soft, round. Non-distended, NT. No pulsatile masses or hernias. Ext-  No clubbing or cyanosis. +SHAWNA foot swelling, L>R to outer aspect and dorsum. Skin-Warm and dry. No hyperpigmentation, ulcerations, or suspicious lesions. Neuro - Normal speech, no focal findings or movement disorder. Normal strength, antalgic gait, and muscle tone. An electronic signature was used to authenticate this note.   -- Piper Rodriguez NP

## 2022-07-15 NOTE — PATIENT INSTRUCTIONS
Foot Arthritis: Exercises  Introduction  Here are some examples of exercises for you to try. The exercises may be suggested for a condition or for rehabilitation. Start each exercise slowly. Ease off the exercises if you start to have pain. You will be told when to start these exercises and which ones will work best for you. How to do the exercises  Calf stretch (knees straight)    1. Place a book on the floor a few inches from a wall or countertop, and put the balls of your feet on it. Your heels should be on the floor. The book needs to be thick enough so that you can feel a gentle stretch in your calf. If you are not steady on your feet, hold on to a chair, counter, or wall while you do this stretch. 2. Keep your knees straight, and lean forward until you feel a stretch in your calf. 3. To get more stretch, add another book or use a thicker book, such as a phone book, a dictionary, or an encyclopedia. 4. Hold the stretch for at least 15 to 30 seconds. 5. Repeat 2 to 4 times. Calf stretch (knees bent)    1. Place a book on the floor a few inches from a wall or countertop, and put the balls of your feet on it. Your heels should be on the floor. The book needs to be thick enough so that you can feel a gentle stretch in your calf. If you are not steady on your feet, hold on to a chair, counter, or wall while you do this stretch. 2. Bend your knees, and lean forward until you feel a stretch in your calf. 3. To get more stretch, add another book or use a thicker book, such as a phone book, a dictionary, or an encyclopedia. 4. Hold the stretch for at least 15 to 30 seconds. 5. Repeat 2 to 4 times. Great toe extension stretch    1. Sit in a chair, and put your affected foot across your other knee. 2. Grasp your heel with one hand and then slowly pull your big toe back with your other hand. Pull your toe back toward your ankle until you feel a stretch along the bottom of your foot.   3. Hold the stretch for at least 15 to 30 seconds. 4. Repeat 2 to 4 times. 5. Switch feet and repeat steps 1 through 4, even if only one foot is sore. Great toe flexion stretch    1. Sit in a chair, and put your affected foot across your other knee. 2. Grasp your heel with one hand and then slowly push your big toe down with your other hand. Push your toe down and away from your ankle until you feel a stretch along the top of your foot. 3. Hold the stretch for at least 15 to 30 seconds. 4. Repeat 2 to 4 times. 5. Switch feet and repeat steps 1 through 4, even if only one foot is sore. Ankle alphabet    1. Sit in a chair with your feet flat on the floor. (You can also do this exercise lying on your back with your affected leg propped up on a pillow). 2. Lift the heel of your sore foot off the floor, and slowly trace the letters of the alphabet. 3. Switch feet and repeat steps 1 through 2, even if only one foot is sore. Resisted ankle dorsiflexion    1. Tie the ends of an exercise band together to form a loop. Attach one end of the loop to a secure object or shut a door on it to hold it in place. (Or you can have someone hold one end of the loop to provide resistance.)  2. While sitting on the floor or in a chair, loop the other end of the band over the top of your affected foot. 3. Keeping your knee and leg straight, slowly flex your foot to pull back on the exercise band, and then slowly relax. 4. Repeat 8 to 12 times. 5. Switch feet and repeat steps 1 through 4, even if only one foot is sore. Towel curl    1. While sitting, place your affected foot on a towel on the floor, and scrunch the towel toward you with your toes. 2. Then use your toes to push the towel away from you. 3. Repeat 8 to 12 times. 4. Switch feet and repeat steps 1 through 3, even if only one foot is sore. Resisted ankle eversion    1. Sit on the floor with your legs straight.   2. Hold both ends of an exercise band and loop the band around the outside of your affected foot. Then press your other foot against the band. 3. Keeping your leg straight, slowly push your affected foot outward against the band and away from your other foot without letting your leg rotate. Then slowly relax. 4. Repeat 8 to 12 times. 5. Switch feet and repeat steps 1 through 4, even if only one foot is sore. Resisted ankle inversion    1. Sit on the floor with your good leg crossed over your other leg. 2. Hold both ends of an exercise band and loop the band around the inside of your affected foot. Then press your other foot against the band. 3. Keeping your legs crossed, slowly push your affected foot against the band so that foot moves away from your other foot. Then slowly relax. 4. Repeat 8 to 12 times. 5. Switch feet and repeat steps 1 through 4, even if only one foot is sore. Follow-up care is a key part of your treatment and safety. Be sure to make and go to all appointments, and call your doctor if you are having problems. It's also a good idea to know your test results and keep a list of the medicines you take. Where can you learn more? Go to http://www.gray.com/  Enter K113 in the search box to learn more about \"Foot Arthritis: Exercises. \"  Current as of: July 1, 2021               Content Version: 13.2  © 2006-2022 [employer]. Care instructions adapted under license by [employer] (which disclaims liability or warranty for this information). If you have questions about a medical condition or this instruction, always ask your healthcare professional. [address] any warranty or liability for your use of this information.

## 2022-08-02 ENCOUNTER — OFFICE VISIT (OUTPATIENT)
Dept: INTERNAL MEDICINE CLINIC | Age: 42
End: 2022-08-02
Payer: COMMERCIAL

## 2022-08-02 VITALS
OXYGEN SATURATION: 98 % | WEIGHT: 151 LBS | HEART RATE: 100 BPM | RESPIRATION RATE: 18 BRPM | BODY MASS INDEX: 29.64 KG/M2 | SYSTOLIC BLOOD PRESSURE: 142 MMHG | HEIGHT: 60 IN | DIASTOLIC BLOOD PRESSURE: 104 MMHG | TEMPERATURE: 98 F

## 2022-08-02 DIAGNOSIS — M25.579 PAIN AND SWELLING OF ANKLE, UNSPECIFIED LATERALITY: ICD-10-CM

## 2022-08-02 DIAGNOSIS — M25.473 PAIN AND SWELLING OF ANKLE, UNSPECIFIED LATERALITY: ICD-10-CM

## 2022-08-02 DIAGNOSIS — M25.571 PAIN IN JOINTS OF BOTH FEET: ICD-10-CM

## 2022-08-02 DIAGNOSIS — M25.572 PAIN IN JOINTS OF BOTH FEET: ICD-10-CM

## 2022-08-02 DIAGNOSIS — N89.8 VAGINAL DISCHARGE: ICD-10-CM

## 2022-08-02 DIAGNOSIS — R35.0 FREQUENT URINATION: Primary | ICD-10-CM

## 2022-08-02 DIAGNOSIS — N30.01 ACUTE CYSTITIS WITH HEMATURIA: ICD-10-CM

## 2022-08-02 DIAGNOSIS — N89.8 VAGINA ITCHING: ICD-10-CM

## 2022-08-02 LAB
ALBUMIN SERPL-MCNC: 3.4 G/DL (ref 3.5–5)
ANION GAP SERPL CALC-SCNC: 7 MMOL/L (ref 5–15)
BILIRUB UR QL STRIP: NEGATIVE
BUN SERPL-MCNC: 15 MG/DL (ref 6–20)
BUN/CREAT SERPL: 18 (ref 12–20)
CALCIUM SERPL-MCNC: 9.7 MG/DL (ref 8.5–10.1)
CHLORIDE SERPL-SCNC: 109 MMOL/L (ref 97–108)
CO2 SERPL-SCNC: 24 MMOL/L (ref 21–32)
CREAT SERPL-MCNC: 0.84 MG/DL (ref 0.55–1.02)
ERYTHROCYTE [SEDIMENTATION RATE] IN BLOOD: 26 MM/HR (ref 0–20)
GLUCOSE SERPL-MCNC: 94 MG/DL (ref 65–100)
GLUCOSE UR-MCNC: NEGATIVE MG/DL
KETONES P FAST UR STRIP-MCNC: NEGATIVE MG/DL
PH UR STRIP: 7 [PH] (ref 4.6–8)
PHOSPHATE SERPL-MCNC: 3.1 MG/DL (ref 2.6–4.7)
POTASSIUM SERPL-SCNC: 3.8 MMOL/L (ref 3.5–5.1)
PROT UR QL STRIP: NEGATIVE
SODIUM SERPL-SCNC: 140 MMOL/L (ref 136–145)
SP GR UR STRIP: 1.02 (ref 1–1.03)
UA UROBILINOGEN AMB POC: NORMAL (ref 0.2–1)
URATE SERPL-MCNC: 4.1 MG/DL (ref 2.6–6)
URINALYSIS CLARITY POC: NORMAL
URINALYSIS COLOR POC: YELLOW
URINE BLOOD POC: NORMAL
URINE LEUKOCYTES POC: NORMAL
URINE NITRITES POC: NEGATIVE

## 2022-08-02 PROCEDURE — 99214 OFFICE O/P EST MOD 30 MIN: CPT | Performed by: NURSE PRACTITIONER

## 2022-08-02 PROCEDURE — 81001 URINALYSIS AUTO W/SCOPE: CPT | Performed by: NURSE PRACTITIONER

## 2022-08-02 RX ORDER — FLUCONAZOLE 150 MG/1
150 TABLET ORAL AS NEEDED
Qty: 2 TABLET | Refills: 0 | Status: SHIPPED | OUTPATIENT
Start: 2022-08-02 | End: 2022-09-28 | Stop reason: ALTCHOICE

## 2022-08-02 RX ORDER — IBUPROFEN 600 MG/1
TABLET ORAL
COMMUNITY
Start: 2022-07-14 | End: 2022-08-02 | Stop reason: ALTCHOICE

## 2022-08-02 RX ORDER — NITROFURANTOIN 25; 75 MG/1; MG/1
100 CAPSULE ORAL 2 TIMES DAILY
Qty: 10 CAPSULE | Refills: 0 | Status: SHIPPED | OUTPATIENT
Start: 2022-08-02 | End: 2022-08-07

## 2022-08-02 NOTE — PROGRESS NOTES
Identified pt with two pt identifiers(name and ). Reviewed record in preparation for visit and have obtained necessary documentation. Chief Complaint   Patient presents with    Shoulder Pain     Right shoulder pain with restricted movement and cannot Abduct it past 45 degrees without pain. Vitals:    22 1011 22 1015   BP: (!) 150/103 (!) 142/104   Pulse: (!) 105 100   Resp: 18    Temp: 98 °F (36.7 °C)    TempSrc: Temporal    SpO2: 98%    Weight: 151 lb (68.5 kg)    Height: 5' (1.524 m)    PainSc:   0 - No pain        Health Maintenance Due   Topic    Hepatitis C Screening     COVID-19 Vaccine (1)    Pneumococcal 0-64 years (1 - PCV)    Cervical cancer screen        1. \"Have you been to the ER, urgent care clinic since your last visit? Hospitalized since your last visit? \" No    2. \"Have you seen or consulted any other health care providers outside of the [address] [place] [address] since your last visit? \" No     3. For patients over 45: Has the patient had a colonoscopy? NA - based on age     If the patient is female:    4. For patients over 36: Has the patient had a mammogram? NA - based on age    11. For patients over 21: Has the patient had a pap smear? No    Current Outpatient Medications   Medication Instructions    albuterol (PROVENTIL HFA, VENTOLIN HFA, PROAIR HFA) 90 mcg/actuation inhaler TAKE 2 PUFFS EVERY 4 HOURS AS NEEDED FOR WHEEZING OR SHORTNESS OF BREATH (PERSISTENT COUGHING). alcohol swabs (Alcohol Pads) padm Use before checking blood sugar level to cleanse skin    Blood-Glucose Meter monitoring kit Check blood sugar daily. May substitute for insurance preferred meter    diclofenac EC (VOLTAREN) 75 mg, Oral, 2 TIMES DAILY AS NEEDED, With food    fluconazole (DIFLUCAN) 150 mg, Oral, AS NEEDED, May repeat taking 1 tab in 3 days if discharge remains.     glucose blood VI test strips (OneTouch Ultra Test) strip USE TO CHECK BLOOD GLUCOSE ONCE A DAY    ibuprofen (MOTRIN) 600 mg tablet TAKE 1 TABLET BY MOUTH THREE TIMES A DAY FOR 7 DAYS    lancets misc Check blood sugar 1 time daily. May substitute for insurance preferred lancets. OLANZapine (ZyPREXA) 10 mg tablet No dose, route, or frequency recorded. omeprazole (PRILOSEC) 20 mg, Oral, DAILY    ondansetron hcl (ZOFRAN) 4 mg, Oral, EVERY 8 HOURS AS NEEDED    Tri-Sprintec, 28, 0.18/0.215/0.25 mg-35 mcg (28) tab TAKE 1 TABLET BY MOUTH EVERY DAY FOR 28 DAYS       Allergies   Allergen Reactions    Amoxicillin Rash and Other (comments)     Vaginal itching    Flagyl [Metronidazole] Nausea and Vomiting     metrogel is okay.     Pcn [Penicillins] Itching     Vaginal itching       Immunization History   Administered Date(s) Administered    Influenza Vaccine [employer]) PF (>6 Mo Flulaval, Fluarix, and >3 Yrs Afluria, Fluzone 17293) 09/08/2017, 10/08/2018    Influenza Vaccine Intradermal PF 11/05/2014    PPD 06/20/2012    Tdap 02/02/2014       Past Medical History:   Diagnosis Date    Anxiety     Bipolar affective ([facility] [address]     Depression     Hyperlipidemia     High Cholesterol    Hyperlipidemia     Major depression

## 2022-08-02 NOTE — PROGRESS NOTES
Dennys Tijerina (: 1980) is a 38 y.o. female, established patient, here for evaluation of the following chief complaint(s):  Shoulder Pain (Right shoulder pain with restricted movement and cannot Abduct it past 45 degrees without pain. )       ASSESSMENT/PLAN:  Below is the assessment and plan developed based on review of pertinent history, physical exam, labs, studies, and medications. 1. Frequent urination  -     AMB POC URINALYSIS DIP STICK AUTO W/ MICRO  -     CT/NG/T.VAGINALIS AMPLIFICATION; Future  2. Vaginal discharge  -     AMB POC URINALYSIS DIP STICK AUTO W/ MICRO  -     CT/NG/T.VAGINALIS AMPLIFICATION; Future  3. Pain in joints of both feet  -     URIC ACID; Future  -     SED RATE (ESR); Future  -     CYCLIC CITRUL PEPTIDE AB, IGG; Future  -     EBER, DIRECT, W/REFLEX; Future  -     RENAL FUNCTION PANEL; Future  -     XR FOOT LT MIN 3 V; Future  -     XR FOOT RT MIN 3 V; Future  4. Pain and swelling of ankle, unspecified laterality  -     URIC ACID; Future  -     SED RATE (ESR); Future  -     CYCLIC CITRUL PEPTIDE AB, IGG; Future  -     EBER, DIRECT, W/REFLEX; Future  -     RENAL FUNCTION PANEL; Future  5. Vagina itching  -     fluconazole (Diflucan) 150 mg tablet; Take 1 Tablet by mouth as needed (vaginal discharge and itching). May repeat taking 1 tab in 3 days if discharge remains. , Normal, Disp-2 Tablet, R-0  6. Acute cystitis with hematuria  -     nitrofurantoin, macrocrystal-monohydrate, (Macrobid) 100 mg capsule; Take 1 Capsule by mouth two (2) times a day for 5 days. , Normal, Disp-10 Capsule, R-0      Return for keep appt as scheduled. Pt asked to complete follow by next visit: labs ordered since EBER not done in past for reassurance. Other labs to r/o gout, RA, and other cause. Xrays ordered for feet also to assess for OA. Deferred right shoulder imaging since NT. Treated for UTI with macrobid and diflucan thereafter. If vag dc persist, then may send msg for metrogel ordered. Deferred at this time. SUBJECTIVE/OBJECTIVE:  HPI    Pt here concerned for UTI and discharge. Odor of UTI for past few days, but pink tinged discharge since yesterday. Associated with urgency and mild discomfort. Denies flank pain, nausea, or abd pain. Primarily here today concerned for lupus. Having continued aching pain in legs and feet with right shoulder pain. Has swelling intermittently. Unsure if OA, bursitis, or lupus. Associate recently with similar symptoms and learned of lupus status, so more concerned. Review of Systems  Constitutional: negative for fevers, chills, anorexia and weight loss  Respiratory:  negative for cough, hemoptysis, dyspnea, and wheezing  CV:   negative for chest pain, palpitations, and lower extremity edema  GI:   negative for nausea, vomiting, diarrhea, abdominal pain, and melena  Endo:               negative for polyuria,polydipsia,polyphagia, and heat intolerance  Genitourinary: negative for frequency, urgency, dysuria, retention, and hematuria  Integument:  negative for rash, ulcerations, and pruritus  Hematologic:  negative for easy bruising and bleeding  Musculoskel: negative for arthralgias, muscle weakness,and joint pain/swelling  Neurological:  negative for headaches, dizziness, vertigo,and memory/gait problems  Behavl/Psych: negative for feelings of anxiety, depression, suicide, and mood changes    Visit Vitals  BP (!) 142/104 (BP 1 Location: Left upper arm, BP Patient Position: Sitting, BP Cuff Size: Small adult)   Pulse 100   Temp 98 °F (36.7 °C) (Temporal)   Resp 18   Ht 5' (1.524 m)   Wt 151 lb (68.5 kg)   SpO2 98%   BMI 29.49 kg/m²       Wt Readings from Last 3 Encounters:   08/02/22 151 lb (68.5 kg)   07/15/22 143 lb (64.9 kg)   05/19/22 155 lb (70.3 kg)     Physical Exam:   General appearance - alert, well appearing, and in no distress. Mental status - A/O x 4,mildly anxious mood and affect. Chest - CTA. Symmetric chest rise. No wheezing.  No distress. Heart - Normal rate & rhythm. Normal S1 & S2. No MGR. Abdomen- Soft, round. Non-distended, NT. No pulsatile masses or hernias. No CVAT. Ext-  No  clubbing or cyanosis. Left foot with trace edema to dorsal aspect. NT legs and calves. Skin-Warm and dry. No hyperpigmentation, ulcerations, or suspicious lesions. Neuro - Normal speech, no focal findings or movement disorder. Normal strength, gait, and muscle tone. Right Shoulder exam: FROM, but pain with internal rotations. Right shoulder: No erythema, edema, or bruising. Nontender to acromioclavicular joint. Nontender to subacromial bursa. No impingement signs. No glenohumeral laxity. Results for orders placed or performed in visit on 08/02/22   AMB POC URINALYSIS DIP STICK AUTO W/ MICRO   Result Value Ref Range    Color (UA POC) Yellow     Clarity (UA POC) Slightly Cloudy     Glucose (UA POC) Negative Negative    Bilirubin (UA POC) Negative Negative    Ketones (UA POC) Negative Negative    Specific gravity (UA POC) 1.025 1.001 - 1.035    Blood (UA POC) 2+ Negative    pH (UA POC) 7.0 4.6 - 8.0    Protein (UA POC) Negative Negative    Urobilinogen (UA POC) 0.2 mg/dL 0.2 - 1    Nitrites (UA POC) Negative Negative    Leukocyte esterase (UA POC) 3+ Negative           An electronic signature was used to authenticate this note.   -- Farhat Keating NP

## 2022-08-03 NOTE — PROGRESS NOTES
You have some inflammation, but no gout and your kidneys are funcitoning properly. Use of the anti-inflammatory is how we treat the inflammation. Your lupus/EBER lab is still pending with the RA lab. Be sure to get your xrays of your feet too please.

## 2022-08-04 LAB — ANA SER QL: NEGATIVE

## 2022-08-05 DIAGNOSIS — N76.0 ACUTE VAGINITIS: ICD-10-CM

## 2022-08-05 LAB — CCP IGA+IGG SERPL IA-ACNC: 4 UNITS (ref 0–19)

## 2022-08-05 RX ORDER — METRONIDAZOLE 7.5 MG/G
1 GEL VAGINAL
Qty: 25 G | Refills: 0 | Status: SHIPPED | OUTPATIENT
Start: 2022-08-05 | End: 2022-08-10

## 2022-08-05 NOTE — PROGRESS NOTES
NML/Stable labs, no changes. We can discuss at the next OV, if pt would like.        Thanks, RAFAEL Aguilar.

## 2022-09-04 LAB
BACTERIA SPEC CULT: NORMAL
CC UR VC: NORMAL
SERVICE CMNT-IMP: NORMAL

## 2022-09-27 ENCOUNTER — NURSE TRIAGE (OUTPATIENT)
Dept: OTHER | Facility: CLINIC | Age: 42
End: 2022-09-27

## 2022-09-27 ENCOUNTER — VIRTUAL VISIT (OUTPATIENT)
Dept: INTERNAL MEDICINE CLINIC | Age: 42
End: 2022-09-27

## 2022-09-27 DIAGNOSIS — K21.9 GASTROESOPHAGEAL REFLUX DISEASE WITHOUT ESOPHAGITIS: ICD-10-CM

## 2022-09-27 NOTE — TELEPHONE ENCOUNTER
Received call from Hung at [facility] with Red Flag Complaint. Subjective: Caller states \"For the last week I have had blood in my urine. \"     Current Symptoms: blood in urine    Onset: 1 week ago; worsening    Associated Symptoms: Pain with urination, frequency    Pain Severity: 6/10; sharp; constant    Temperature: Denies    What has been tried: had antibiotics that started with a \"c\" has been taking them since the weekend. - has been taking 4 a day. Recommended disposition: See in Office Today    Care advice provided, patient verbalizes understanding; denies any other questions or concerns; instructed to call back for any new or worsening symptoms. Patient/Caller agrees with recommended disposition; writer provided warm transfer to SJ Gupta at [facility] for appointment scheduling    Attention Provider: Thank you for allowing me to participate in the care of your patient. The patient was connected to triage in response to information provided to the [facility]. Please do not respond through this encounter as the response is not directed to a shared pool.     Reason for Disposition   Bad or foul-smelling urine    Protocols used: Urinary Symptoms-ADULT-[place]

## 2022-09-27 NOTE — TELEPHONE ENCOUNTER
Last visit 08/20/2022 CORTNEY Rodriguez   Next appointment 10/20/2022 CORTNEY Fowler   Previous refill encounter(s)   06/10/2022 Prilosec #30     For Pharmacy Admin Tracking Only    Intervention Detail: New Rx: 1, reason: Patient Preference  Time Spent (min): 5      Requested Prescriptions     Pending Prescriptions Disp Refills    omeprazole (PRILOSEC) 20 mg capsule 30 Capsule 0     Sig: Take 1 Capsule by mouth daily. HPI:  71 yo F bibems for acute resp failure.  Pt. has been reportedly sob and altered for 3 days.  Patient can not give further history due to condition.  Noted to be hypoxic  and blue colored.  No other complaints or inciting event.  No family at bedside now. Disoriented for last few days 4 days, sleeping, coughing with mucous and congestion, can't swallow pills.  Pt. has 24 hour HHA, lives with son. As per son increased secretions last few day, Increased lethargy.  Multiple visits in last month for UTI.  In ER noted to be hypoxic requiring intubation. Received 3 liter IV fluid Lac[place] 2.3.  ICU called for evaluation and management (2019 16:53)      24 hr events:      ## ROS:  [ ] unable to obtain  CONSTITUTIONAL: No fever, weight loss, or fatigue  EYES: No eye pain, visual disturbances, or discharge  ENMT:  No difficulty hearing, tinnitus, vertigo; No sinus or throat pain  NECK: No pain or stiffness  RESPIRATORY: No cough, wheezing, chills or hemoptysis; No shortness of breath  CARDIOVASCULAR: No chest pain, palpitations, dizziness, or leg swelling  GASTROINTESTINAL: No abdominal or epigastric pain. No nausea, vomiting, or hematemesis; No diarrhea or constipation. No melena or hematochezia.  GENITOURINARY: No dysuria, frequency, hematuria, or incontinence  NEUROLOGICAL: No headaches, memory loss, loss of strength, numbness, or tremors  SKIN: No itching, burning, rashes, or lesions   LYMPH NODES: No enlarged glands  ENDOCRINE: No heat or cold intolerance; No hair loss  MUSCULOSKELETAL: No joint pain or swelling; No muscle, back, or extremity pain  PSYCHIATRIC: No depression, anxiety, mood swings, or difficulty sleeping  HEME/LYMPH: No easy bruising, or bleeding gums  ALLERGY AND IMMUNOLOGIC: No hives or eczema    ## Labs:  CBC:                        11.2   7.70  )-----------( 150      ( 2019 04:17 )             37.2     Chem:      144  |  107  |  15  ----------------------------<  179<H>  3.9   |  33<H>  |  0.32<L>    Ca    8.4<L>      2019 04:17  Phos  2.5       Mg     2.3         TPro  5.6<L>  /  Alb  1.8<L>  /  TBili  0.3  /  DBili  x   /  AST  10<L>  /  ALT  8<L>  /  AlkPhos  80      Coags:          ## Imaging:    ## Medications:  azithromycin  IVPB 500 milliGRAM(s) IV Intermittent every 24 hours  ceFAZolin   IVPB 2000 milliGRAM(s) IV Intermittent every 8 hours    midodrine 10 milliGRAM(s) Oral every 8 hours  phenylephrine    Infusion 0.4 MICROgram(s)/kG/Min IV Continuous <Continuous>    albuterol/ipratropium for Nebulization 3 milliLiter(s) Nebulizer every 6 hours  sodium chloride 3%  Inhalation 3 milliLiter(s) Inhalation every 6 hours    dextrose 40% Gel 15 Gram(s) Oral once PRN  dextrose 50% Injectable 12.5 Gram(s) IV Push once  dextrose 50% Injectable 25 Gram(s) IV Push once  dextrose 50% Injectable 25 Gram(s) IV Push once  glucagon  Injectable 1 milliGRAM(s) IntraMuscular once PRN  insulin lispro (HumaLOG) corrective regimen sliding scale   SubCutaneous every 6 hours    enoxaparin Injectable 80 milliGRAM(s) SubCutaneous every 12 hours    pantoprazole   Suspension 40 milliGRAM(s) Oral daily  polyethylene glycol 3350 17 Gram(s) Oral daily    acetaminophen   Tablet .. 650 milliGRAM(s) Oral every 6 hours PRN  fentaNYL    Injectable 50 MICROGram(s) IV Push every 4 hours PRN  midazolam Injectable 1 milliGRAM(s) IV Push every 8 hours      ## Vitals:  T(C): 37.6 (19 @ 15:45), Max: 37.8 (19 @ 19:12)  HR: 102 (19 @ 18:30) (80 - 810)  BP: 129/74 (19 @ 18:30) (87/46 - 129/74)  BP(mean): 85 (19 @ 18:30) (55 - 85)  RR: 28 (19 @ 18:30) (17 - 36)  SpO2: 99% (19 @ 18:30) (92% - 100%)  Wt(kg): --  Vent: Mode: AC/ CMV (Assist Control/ Continuous Mandatory Ventilation), RR (machine): 18, RR (patient): 29, TV (machine): 400, FiO2: 35, PEEP: 5, PIP: 28  AB-29 @ 07: @ 07:00  --------------------------------------------------------  IN: 2872.5 mL / OUT: 1600 mL / NET: 1272.5 mL     @ 07: @ 19:01  --------------------------------------------------------  IN: 1688.9 mL / OUT: 900 mL / NET: 788.9 mL          ## P/E:  Gen: lying comfortably in bed in no apparent distress  HEENT: PERRL, EOMI  Resp: CTA B/L no c/r/w  CVS: S1S2 no m/r/g  Abd: soft NT/ND +BS  Ext: no c/c/e  Neuro: A&Ox3    CENTRAL LINE: [ ] YES [ ] NO  LOCATION:   DATE INSERTED:  REMOVE: [ ] YES [ ] NO      ASTUDILLO: [ ] YES [ ] NO    DATE INSERTED:  REMOVE:  [ ] YES [ ] NO      A-LINE:  [ ] YES [ ] NO  LOCATION:   DATE INSERTED:  REMOVE:  [ ] YES [ ] NO  EXPLAIN:    GLOBAL ISSUE/BEST PRACTICE:  Analgesia:  Sedation:  HOB elevation: yes  Stress ulcer prophylaxis:  VTE prophylaxis:  Oral Care:  Glycemic control:  Nutrition:    CODE STATUS: [ ] full code  [ ] DNR  [ ] DNI  [ ] [facility]  Goals of care discussion: [ ] yes

## 2022-09-27 NOTE — PROGRESS NOTES
Chief Complaint   Patient presents with    Urinary Frequency     Frequent urination, pain when urinating, and blood when wiping - has been using clindamycin that she got in March but is not helping      Pt denies pain at this time     1. \"Have you been to the ER, urgent care clinic since your last visit? Hospitalized since your last visit? \" No    2. \"Have you seen or consulted any other health care providers outside of the [address] [place] [address] since your last visit? \" No     3. For patients aged 39-70: Has the patient had a colonoscopy / FIT/ Cologuard? NA - based on age      If the patient is female:    4. For patients aged 41-77: Has the patient had a mammogram within the past 2 years? No      5. For patients aged 21-65: Has the patient had a pap smear?  No

## 2022-09-28 ENCOUNTER — VIRTUAL VISIT (OUTPATIENT)
Dept: INTERNAL MEDICINE CLINIC | Age: 42
End: 2022-09-28
Payer: COMMERCIAL

## 2022-09-28 DIAGNOSIS — R30.0 DYSURIA: Primary | ICD-10-CM

## 2022-09-28 DIAGNOSIS — B37.9 ANTIBIOTIC-INDUCED YEAST INFECTION: ICD-10-CM

## 2022-09-28 DIAGNOSIS — T36.95XA ANTIBIOTIC-INDUCED YEAST INFECTION: ICD-10-CM

## 2022-09-28 PROCEDURE — 99213 OFFICE O/P EST LOW 20 MIN: CPT | Performed by: NURSE PRACTITIONER

## 2022-09-28 RX ORDER — FLUCONAZOLE 150 MG/1
150 TABLET ORAL AS NEEDED
Qty: 2 TABLET | Refills: 0 | Status: SHIPPED | OUTPATIENT
Start: 2022-09-28 | End: 2022-10-20

## 2022-09-28 RX ORDER — NITROFURANTOIN 25; 75 MG/1; MG/1
100 CAPSULE ORAL 2 TIMES DAILY
Qty: 10 CAPSULE | Refills: 0 | Status: SHIPPED | OUTPATIENT
Start: 2022-09-28 | End: 2022-10-03

## 2022-09-28 RX ORDER — OMEPRAZOLE 20 MG/1
20 CAPSULE, DELAYED RELEASE ORAL DAILY
Qty: 30 CAPSULE | Refills: 0 | Status: SHIPPED | OUTPATIENT
Start: 2022-09-28

## 2022-09-28 NOTE — PROGRESS NOTES
Pt is here for   Chief Complaint   Patient presents with    Urinary Frequency     Frequent urination, pain when urinating, and blood when wiping - has been using clindamycin that she got in March but is not helping      1. Have you been to the ER, urgent care clinic since your last visit? Hospitalized since your last visit? No    2. Have you seen or consulted any other health care providers outside of the [address] [place] [address] since your last visit? Include any pap smears or colon screening.  No    Denies pain at this time

## 2022-09-28 NOTE — PROGRESS NOTES
Samy Oliveros is a 38 y.o. female established patient, here for evaluation of the following chief complaint(s):   Urinary Frequency (Frequent urination, pain when urinating, and blood when wiping - has been using clindamycin that she got in March but is not helping )          Assessment & Plan:   Diagnoses and all orders for this visit:    1. Dysuria  -     AMB POC URINALYSIS DIP STICK AUTO W/ MICRO  -     nitrofurantoin, macrocrystal-monohydrate, (Macrobid) 100 mg capsule; Take 1 Capsule by mouth two (2) times a day for 5 days. -     CULTURE, URINE; Future    2. Antibiotic-induced yeast infection  -     fluconazole (Diflucan) 150 mg tablet; Take 1 Tablet by mouth as needed (vaginal discharge and itching). May repeat taking 1 tab in 3 days if discharge remains. Specific pt instructions until next visit: asked to give urine sample TODAY, pt en route to office now to give. Will send urine culture. Otherwise macrobid x 5 days sent with diflucan as pt gets yeast following antibiotic use. Subjective:   Samy Oliveros is a 38 y.o. female who was seen for Urinary Frequency (Frequent urination, pain when urinating, and blood when wiping - has been using clindamycin that she got in March but is not helping )      Urinary Tract Infection:  Patient complains of dysuria, frequency, urgency, and blood noted with wiping. Onset ~ 1 week. Tried some left over clindamycin from earlier in the year for strep to no avail. Patient does not complain of lower abdominal ache, but intermittent lower back pain. Patient does not have a history of recurrent UTI. Patient does not have a history of pyelonephritis. No N/V or fever reported.        Patient Active Problem List    Diagnosis Date Noted    Vitamin D deficiency 11/17/2021    PTSD (post-traumatic stress disorder) 09/01/2021    Generalized anxiety disorder with panic attacks 09/01/2021    Multiple personality disorder ([facility] 09/01/2021    B[facility] depression ([facility] 09/01/2021    Gastroesophageal reflux disease without esophagitis 05/30/2018     Current Outpatient Medications   Medication Sig Dispense Refill    nitrofurantoin, macrocrystal-monohydrate, (Macrobid) 100 mg capsule Take 1 Capsule by mouth two (2) times a day for 5 days. 10 Capsule 0    fluconazole (Diflucan) 150 mg tablet Take 1 Tablet by mouth as needed (vaginal discharge and itching). May repeat taking 1 tab in 3 days if discharge remains. 2 Tablet 0    omeprazole (PRILOSEC) 20 mg capsule Take 1 Capsule by mouth daily. 30 Capsule 0    diclofenac EC (VOLTAREN) 75 mg EC tablet Take 1 Tablet by mouth two (2) times daily as needed for Pain. With food (Patient not taking: Reported on 8/2/2022) 60 Tablet 3    albuterol (PROVENTIL HFA, VENTOLIN HFA, PROAIR HFA) 90 mcg/actuation inhaler TAKE 2 PUFFS EVERY 4 HOURS AS NEEDED FOR WHEEZING OR SHORTNESS OF BREATH (PERSISTENT COUGHING). 6.7 Each 1    OLANZapine (ZyPREXA) 10 mg tablet       Tri-Sprintec, 28, 0.18/0.215/0.25 mg-35 mcg (28) tab TAKE 1 TABLET BY MOUTH EVERY DAY FOR 28 DAYS (Patient not taking: Reported on 8/2/2022)      ondansetron hcl (ZOFRAN) 4 mg tablet Take 1 Tablet by mouth every eight (8) hours as needed for Nausea or Vomiting. 20 Tablet 0    glucose blood VI test strips (OneTouch Ultra Test) strip USE TO CHECK BLOOD GLUCOSE ONCE A DAY (Patient not taking: No sig reported) 100 Strip 1    Blood-Glucose Meter monitoring kit Check blood sugar daily. May substitute for insurance preferred meter (Patient not taking: No sig reported) 1 Kit 0    lancets misc Check blood sugar 1 time daily. May substitute for insurance preferred lancets.  (Patient not taking: No sig reported) 30 Each 11    alcohol swabs (Alcohol Pads) padm Use before checking blood sugar level to cleanse skin (Patient not taking: No sig reported) 200 Pad 3     Allergies   Allergen Reactions    Amoxicillin Rash and Other (comments)     Vaginal itching    Flagyl [Metronidazole] Nausea and Vomiting     metrogel is okay. Pcn [Penicillins] Itching     Vaginal itching     Past Medical History:   Diagnosis Date    Anxiety     Bipolar affective ([address]     Depression     Hyperlipidemia     High Cholesterol    Hyperlipidemia     Major depression      Past Surgical History:   Procedure Laterality Date    HX GI      Colonoscopy    HX HERNIA REPAIR      as child       Review of Systems   Constitutional: Negative for fever and malaise/fatigue. Eyes: Negative for blurred vision. Respiratory: Negative for cough and shortness of breath. Cardiovascular: Negative for chest pain and leg swelling. Neurological: Negative for dizziness, weakness and headaches. Objective:   Vital Signs: (As obtained by patient/caregiver at home)  There were no vitals taken for this visit. Physical Exam:  General appearance - alert, well  appearing, and in no distress. Mental status - A/O x 4,normal mood and affect. Eyes- no periorbital edema, drainage, or irritation noted. Nose- no obvious drainage or swelling. Throat- no obvious swelling, goiter, or notable lymphadenopathy  Chest - Symmetric chest rise. No wheezing or coughing. No distress. Skin- normal skin tone noted. No hyperpigmentation or obvious deformities. No diaphoresis noted. No flushing. Neuro - Normal speech, no focal findings or movement disorder. Other pertinent observable physical exam findings:-        We discussed the expected course, resolution and complications of the diagnosis(es) in detail. Medication risks, benefits, costs, interactions, and alternatives were discussed as indicated. I advised her to contact the office if her condition worsens, changes or fails to improve as anticipated. She expressed understanding with the diagnosis(es) and plan. Maritza Allen, was evaluated through a synchronous (real-time) audio-video encounter.  The patient (or guardian if applicable) is aware that this is a billable service, which includes applicable co-pays. This Virtual Visit was conducted with patient's (and/or legal guardian's) consent. The visit was conducted pursuant to the emergency declaration under the [address] [place] [address] authority and the [place] Act. Patient identification was verified, and a caregiver was present when appropriate. The patient was located at: Home: [place]  The provider was located at: Home: [unfilled]   [place], [address] was used to authenticate this note.   -- Michelle Press, NP

## 2022-10-01 LAB
BACTERIA SPEC CULT: ABNORMAL
CC UR VC: ABNORMAL
SERVICE CMNT-IMP: ABNORMAL

## 2022-10-18 ENCOUNTER — TELEPHONE (OUTPATIENT)
Dept: INTERNAL MEDICINE CLINIC | Age: 42
End: 2022-10-18

## 2022-10-18 NOTE — TELEPHONE ENCOUNTER
Pt called today stating she did not go to work on yesterday because she had a sore throat, body ache and fever. She now needs a note for work.  She states her job wants her to have paperwork filled out for leave of absence  Pt # [phone]

## 2022-10-20 ENCOUNTER — OFFICE VISIT (OUTPATIENT)
Dept: INTERNAL MEDICINE CLINIC | Age: 42
End: 2022-10-20
Payer: COMMERCIAL

## 2022-10-20 VITALS
RESPIRATION RATE: 18 BRPM | HEART RATE: 82 BPM | TEMPERATURE: 98.5 F | DIASTOLIC BLOOD PRESSURE: 85 MMHG | WEIGHT: 150 LBS | HEIGHT: 60 IN | SYSTOLIC BLOOD PRESSURE: 116 MMHG | BODY MASS INDEX: 29.45 KG/M2 | OXYGEN SATURATION: 97 %

## 2022-10-20 DIAGNOSIS — E55.9 VITAMIN D DEFICIENCY: ICD-10-CM

## 2022-10-20 DIAGNOSIS — Z13.29 SCREENING FOR ENDOCRINE, NUTRITIONAL, METABOLIC AND IMMUNITY DISORDER: ICD-10-CM

## 2022-10-20 DIAGNOSIS — Z00.00 ADULT GENERAL MEDICAL EXAMINATION: Primary | ICD-10-CM

## 2022-10-20 DIAGNOSIS — Z13.228 SCREENING FOR ENDOCRINE, NUTRITIONAL, METABOLIC AND IMMUNITY DISORDER: ICD-10-CM

## 2022-10-20 DIAGNOSIS — Z12.11 SPECIAL SCREENING FOR MALIGNANT NEOPLASMS, COLON: ICD-10-CM

## 2022-10-20 DIAGNOSIS — F41.1 GENERALIZED ANXIETY DISORDER WITH PANIC ATTACKS: ICD-10-CM

## 2022-10-20 DIAGNOSIS — F41.0 GENERALIZED ANXIETY DISORDER WITH PANIC ATTACKS: ICD-10-CM

## 2022-10-20 DIAGNOSIS — F31.9 BIPOLAR DEPRESSION (HCC): ICD-10-CM

## 2022-10-20 DIAGNOSIS — Z98.890 S/P COLONOSCOPIC POLYPECTOMY: ICD-10-CM

## 2022-10-20 DIAGNOSIS — Z13.21 SCREENING FOR ENDOCRINE, NUTRITIONAL, METABOLIC AND IMMUNITY DISORDER: ICD-10-CM

## 2022-10-20 DIAGNOSIS — Z13.220 SCREENING FOR LIPID DISORDERS: ICD-10-CM

## 2022-10-20 DIAGNOSIS — F51.01 PRIMARY INSOMNIA: ICD-10-CM

## 2022-10-20 DIAGNOSIS — Z13.0 SCREENING FOR ENDOCRINE, NUTRITIONAL, METABOLIC AND IMMUNITY DISORDER: ICD-10-CM

## 2022-10-20 PROCEDURE — 99396 PREV VISIT EST AGE 40-64: CPT | Performed by: NURSE PRACTITIONER

## 2022-10-20 RX ORDER — ERGOCALCIFEROL 1.25 MG/1
50000 CAPSULE ORAL
Qty: 12 CAPSULE | Refills: 3 | Status: SHIPPED | OUTPATIENT
Start: 2022-10-20 | End: 2022-10-29

## 2022-10-20 RX ORDER — DOXEPIN HYDROCHLORIDE 6 MG/1
6 TABLET ORAL
Qty: 30 TABLET | Refills: 2 | Status: SHIPPED | OUTPATIENT
Start: 2022-10-20 | End: 2022-10-24 | Stop reason: RX

## 2022-10-20 NOTE — PROGRESS NOTES
Sweta Hansen is a 38 y.o. female presenting for annual checkup. Specific concerns today: needs work note to return today, but had cold like symptoms of sore throat, bodyaches, and low grade fever. No COVID exposure. Resolved by following day. Missed psych appt, but feeling more frustrated lately. ROS: Feeling well. No dyspnea or chest pain on exertion. MEZA, but continues to smoke. No abdominal pain, change in bowel habits, black or bloody stools. Last BM: today, [place]#4. Averages 7 BMs every 7 days. Averages drinking 2-3 bottles of water daily. No urinary tract or gynecologic/prostatic symptoms. No neurological complaints. Review of Systems  Constitutional: negative for fevers, chills, anorexia and weight loss  Eyes:   negative for visual disturbance, drainage, and irritation  ENT:   negative for tinnitus,sore throat,nasal congestion,ear pain,and hoarseness  Respiratory:  negative for cough, hemoptysis, dyspnea, and wheezing  CV:   negative for chest pain, palpitations, and lower extremity edema  GI:   negative for nausea, vomiting, diarrhea, abdominal pain, and melena  Endo:               negative for polyuria,polydipsia,polyphagia, and heat intolerance  Genitourinary: negative for frequency, urgency, dysuria, retention, and hematuria  Integument:  negative for rash, ulcerations, and pruritus  Hematologic:  negative for easy bruising and bleeding  Musculoskel: negative for arthralgias, muscle weakness,and joint pain/swelling  Neurological:  +HAs with increased salt intake. negative for dizziness, vertigo,and memory/gait problems  Behavl/Psych: will reschedule appt  with psych.  negative for feelings of anxiety, depression, suicide, and mood changes    Discussed ADVANCED DIRECTIVE:yes  Advanced Directive on File: yes    Dental exam in past 12 months: yes  Eye exam in past 12-24 months: no    Sexually Active: no; Safe sex practice by abstinence   Sleep: Averages 4 hours on work days and 8 hours on rest day sunday, no snoring, no h/o sleep apnea    Past Medical History:   Diagnosis Date    Anxiety     Bipolar affective ([address]     Depression     Hyperlipidemia     High Cholesterol    Hyperlipidemia     Major depression      Past Surgical History:   Procedure Laterality Date    HX GI      Colonoscopy    HX HERNIA REPAIR      as child     Social History     Socioeconomic History    Marital status: SINGLE   Tobacco Use    Smoking status: Some Days     Types: Cigars    Smokeless tobacco: Current   Vaping Use    Vaping Use: Never used   Substance and Sexual Activity    Alcohol use: Yes     Comment: occasional    Drug use: No    Sexual activity: Yes     Partners: Male     Birth control/protection: None     Family History   Problem Relation Age of Onset    Diabetes Mother     Depression Mother     Asthma Brother     Stroke Maternal Grandmother         aneurysm    Cancer Maternal Grandmother         kidney    Hypertension Maternal Grandmother     Cancer Paternal Grandmother         lung    Diabetes Brother     Bipolar Disorder Brother     Schizophrenia Brother     Other Brother         paranoia    Breast Cancer Other         maternal---age unknown    Breast Cancer Other         Maternal--age unknown     Current Outpatient Medications   Medication Sig Dispense Refill    ergocalciferol (ERGOCALCIFEROL) 1,250 mcg (50,000 unit) capsule Take 1 Capsule by mouth every seven (7) days. 12 Capsule 3    Doxepin 6 mg tab Take 6 mg by mouth nightly. Indications: difficulty sleeping 30 Tablet 2    omeprazole (PRILOSEC) 20 mg capsule Take 1 Capsule by mouth daily. 30 Capsule 0    albuterol (PROVENTIL HFA, VENTOLIN HFA, PROAIR HFA) 90 mcg/actuation inhaler TAKE 2 PUFFS EVERY 4 HOURS AS NEEDED FOR WHEEZING OR SHORTNESS OF BREATH (PERSISTENT COUGHING). 6.7 Each 1    OLANZapine (ZyPREXA) 10 mg tablet       fluconazole (Diflucan) 150 mg tablet Take 1 Tablet by mouth as needed (vaginal discharge and itching).  May repeat taking 1 tab in 3 days if discharge remains. (Patient not taking: Reported on 10/20/2022) 2 Tablet 0    diclofenac EC (VOLTAREN) 75 mg EC tablet Take 1 Tablet by mouth two (2) times daily as needed for Pain. With food (Patient not taking: No sig reported) 60 Tablet 3    Tri-Sprintec, 28, 0.18/0.215/0.25 mg-35 mcg (28) tab TAKE 1 TABLET BY MOUTH EVERY DAY FOR 28 DAYS (Patient not taking: No sig reported)      ondansetron hcl (ZOFRAN) 4 mg tablet Take 1 Tablet by mouth every eight (8) hours as needed for Nausea or Vomiting. (Patient not taking: Reported on 10/20/2022) 20 Tablet 0    glucose blood VI test strips (OneTouch Ultra Test) strip USE TO CHECK BLOOD GLUCOSE ONCE A DAY (Patient not taking: No sig reported) 100 Strip 1    Blood-Glucose Meter monitoring kit Check blood sugar daily. May substitute for insurance preferred meter (Patient not taking: No sig reported) 1 Kit 0    lancets misc Check blood sugar 1 time daily. May substitute for insurance preferred lancets. (Patient not taking: No sig reported) 30 Each 11    alcohol swabs (Alcohol Pads) padm Use before checking blood sugar level to cleanse skin (Patient not taking: No sig reported) 200 Pad 3     Allergies   Allergen Reactions    Amoxicillin Rash and Other (comments)     Vaginal itching    Flagyl [Metronidazole] Nausea and Vomiting     metrogel is okay. Pcn [Penicillins] Itching     Vaginal itching       Objective:  Visit Vitals  /85 (BP 1 Location: Right upper arm, BP Patient Position: Sitting, BP Cuff Size: Large adult)   Pulse 82   Temp 98.5 °F (36.9 °C) (Temporal)   Resp 18   Ht 5' (1.524 m)   Wt 150 lb (68 kg)   SpO2 97%   BMI 29.29 kg/m²     Wt Readings from Last 3 Encounters:   10/20/22 150 lb (68 kg)   08/02/22 151 lb (68.5 kg)   07/15/22 143 lb (64.9 kg)     Physical Exam:   General appearance - alert, well appearing, and in no distress. Mental status - A/O x 4, normal mood and affect. Head/Eyes- AT/NC.  STEVE, EOMI, corneas normal, no foreign bodies. Ears- TM intact bilaterally, no erythema or drainage. Nose- Septum midline, pink mucosa. Turbinates boggy and pale, no polyps or erythema. No sinus tenderness. Mouth/Throat - mucous membranes moist, pharynx normal without lesions. No tonsillar swelling or exudates. Neck -Supple ,normal CSP. FROM, non-tender. No adenopathy/thyromegaly. No JVD. Chest - CTA. Symmetric chest rise. No wheezing, rales or rhonchi. Heart - Normal rate, regular rhythm. Normal S1, S2. No MGR. Abdomen - Soft,non-distended. Normoactive BS in all quadrants. NT, no mass, rebound, or HSM   Ext- Radial, DP pulses, 2+ bilaterally. No pedal edema, clubbing, or cyanosis. Skin- Normal for ethnicity, warm, and dry. No hyperpigmentation, ulcerations, or suspicious lesions  Neuro - Normal speech, no focal findings. Normal strength and muscle tone. Coordination and gait normal.            Assessment/Plan:  Labs ordered. Sent for doxepin for insomnia, but asked pt to see psych again as planned. Counseling/Anticipatory guidance reviewed with patient: yes  Medication Side Effects and Warnings were discussed with patient: yes   Patient Labs were reviewed: yes  Patient Past Records were reviewed: yes  See orders below  Follow-up and Dispositions    Return in about 3 months (around 1/20/2023) for VV- lab rev, psych fu. ICD-10-CM ICD-9-CM    1. Adult general medical examination  Z00.00 V70.9 VITAMIN D, 25 HYDROXY      METABOLIC PANEL, BASIC      CBC W/O DIFF      LIPID PANEL      TSH 3RD GENERATION      VITAMIN D, 25 HYDROXY      2. Vitamin D deficiency  E55.9 268.9 ergocalciferol (ERGOCALCIFEROL) 1,250 mcg (50,000 unit) capsule      VITAMIN D, 25 HYDROXY      VITAMIN D, 25 HYDROXY      3. Bipolar depression ([facility] [address]  F31.9 296.50 VITAMIN D, 25 HYDROXY      TSH 3RD GENERATION      VITAMIN D, 25 HYDROXY      4. Generalized anxiety disorder with panic attacks  G81.7 769.01 METABOLIC PANEL, BASIC    D35.9 300.01 TSH 3RD GENERATION      5. Screening for endocrine, nutritional, metabolic and immunity disorder  Q83.22 Z12.42 METABOLIC PANEL, BASIC    D93.01  CBC W/O DIFF    Z13.228  TSH 3RD GENERATION    Z13.0        6. Screening for lipid disorders  Z13.220 V77.91 LIPID PANEL      7. Primary insomnia  F51.01 307.42 Doxepin 6 mg tab      8. Special screening for malignant neoplasms, colon  Z12.11 V76.51 REFERRAL TO GASTROENTEROLOGY      9. S/P colonoscopic polypectomy  Z98.890 V45.89         Orders Placed This Encounter    VITAMIN D, 25 HYDROXY     Standing Status:   Future     Number of Occurrences:   1     Standing Expiration Date:   3/27/4134    METABOLIC PANEL, BASIC    CBC W/O DIFF    LIPID PANEL    TSH 3RD GENERATION    [address]     Referral Priority:   Routine     Referral Type:   Consultation     Referral Reason:   Specialty Services Required     Referred to Provider:   Ryder Abraham MD     Number of Visits Requested:   1    ergocalciferol (ERGOCALCIFEROL) 1,250 mcg (50,000 unit) capsule     Sig: Take 1 Capsule by mouth every seven (7) days. Dispense:  12 Capsule     Refill:  3    Doxepin 6 mg tab     Sig: Take 6 mg by mouth nightly. Indications: difficulty sleeping     Dispense:  30 Tablet     Refill:  [address] expressed understanding of plan. An After Visit Summary was offered/printed and given to the patient.

## 2022-10-20 NOTE — PROGRESS NOTES
Pt is here for   Chief Complaint   Patient presents with    Physical     With labs     Letter for School/Work     To return to work, to have time excused for Monday, Tuesday and Wednesday, home covid test was negative. States that she only needs a note no paperwork to be completed     Medication Refill     Refill on vitamin D      1. Have you been to the ER, urgent care clinic since your last visit? Hospitalized since your last visit? No    2. Have you seen or consulted any other health care providers outside of the [address] [place] [address] since your last visit? Include any pap smears or colon screening.  No

## 2022-10-20 NOTE — LETTER
NOTIFICATION RETURN TO WORK / SCHOOL    10/20/2022 10:01 AM    [address] [id number]      To Whom It May Concern:    Alpa Avilez is currently under the care of Daniela Rubin. She will return to work/school on: 10/20/22, please excuse absence starting 10/17/22 through today (10/20/22). If there are questions or concerns please have the patient contact our office.         Sincerely,      Marleta Pallas, NP

## 2022-10-24 DIAGNOSIS — F51.01 PRIMARY INSOMNIA: Primary | ICD-10-CM

## 2022-10-24 RX ORDER — ZALEPLON 5 MG/1
5 CAPSULE ORAL
Qty: 30 CAPSULE | Refills: 2 | Status: SHIPPED | OUTPATIENT
Start: 2022-10-24

## 2022-10-28 DIAGNOSIS — E55.9 VITAMIN D DEFICIENCY: ICD-10-CM

## 2022-10-29 RX ORDER — ERGOCALCIFEROL 1.25 MG/1
50000 CAPSULE ORAL
Qty: 4 CAPSULE | Refills: 5 | Status: SHIPPED | OUTPATIENT
Start: 2022-10-29

## 2023-01-19 ENCOUNTER — NURSE TRIAGE (OUTPATIENT)
Dept: OTHER | Facility: CLINIC | Age: 43
End: 2023-01-19

## 2023-01-19 NOTE — TELEPHONE ENCOUNTER
Location of patient: [address] harding from [facility] at [facility] with Super. Subjective: Caller states \"I am congested and when I cough I feel pain on the left side of my back. \"     Current Symptoms: cough, congestion,     Small amount of blood in sputum last night    Shortness of breath during exercise    Onset: 4 days ago;       Pain Severity: 0/10; Temperature: denies     What has been tried: mucinex, decongestant, flonase     Denies - chest pain / wheeze    Recommended disposition: See in Office Today or Tomorrow    Care advice provided, patient verbalizes understanding; denies any other questions or concerns; instructed to call back for any new or worsening symptoms. Patient/Caller agrees with recommended disposition; writer provided warm transfer to [place] at [facility] for appointment scheduling    Attention Provider: Thank you for allowing me to participate in the care of your patient. The patient was connected to triage in response to information provided to the [facility]. Please do not respond through this encounter as the response is not directed to a shared pool.       Reason for Disposition   Patient wants to be seen    Protocols used: Cough-ADULT-OH

## 2023-01-23 ENCOUNTER — VIRTUAL VISIT (OUTPATIENT)
Dept: INTERNAL MEDICINE CLINIC | Age: 43
End: 2023-01-23
Payer: COMMERCIAL

## 2023-01-23 DIAGNOSIS — F44.81 MULTIPLE PERSONALITY DISORDER (HCC): ICD-10-CM

## 2023-01-23 DIAGNOSIS — F31.9 BIPOLAR DEPRESSION (HCC): ICD-10-CM

## 2023-01-23 DIAGNOSIS — J30.89 ENVIRONMENTAL AND SEASONAL ALLERGIES: Primary | ICD-10-CM

## 2023-01-23 DIAGNOSIS — J01.90 SUBACUTE SINUSITIS, UNSPECIFIED LOCATION: ICD-10-CM

## 2023-01-23 DIAGNOSIS — F51.01 PRIMARY INSOMNIA: ICD-10-CM

## 2023-01-23 PROCEDURE — 99213 OFFICE O/P EST LOW 20 MIN: CPT | Performed by: NURSE PRACTITIONER

## 2023-01-23 RX ORDER — MINERAL OIL
180 ENEMA (ML) RECTAL
Qty: 30 TABLET | Refills: 2 | Status: SHIPPED | OUTPATIENT
Start: 2023-01-23

## 2023-01-23 RX ORDER — FLUTICASONE PROPIONATE 50 MCG
2 SPRAY, SUSPENSION (ML) NASAL DAILY
Qty: 16 G | Refills: 0 | Status: SHIPPED | OUTPATIENT
Start: 2023-01-23

## 2023-01-23 RX ORDER — LUMATEPERONE 10.5 MG/1
CAPSULE ORAL
COMMUNITY
Start: 2023-01-16

## 2023-01-23 NOTE — PROGRESS NOTES
Maritza Allen is a 38 y.o. female established patient, here for evaluation of the following chief complaint(s):   Follow-up (3 months for lab review, psyche), Nasal Congestion ( congestion for a week,), and Back Pain          Assessment & Plan:   Diagnoses and all orders for this visit:    1. Environmental and seasonal allergies  -     fluticasone propionate (FLONASE) 50 mcg/actuation nasal spray; 2 Sprays by Both Nostrils route daily. -     fexofenadine (ALLEGRA) 180 mg tablet; Take 1 Tablet by mouth daily as needed for Allergies. 2. Subacute sinusitis, unspecified location    3. Bipolar depression ([facility]    4. Multiple personality disorder ([facility]    5. Primary insomnia              Follow-up and Dispositions    Return in about 2 months (around 3/23/2023) for VV-lab review, psych new med start, sinuses fu. Specific pt instructions until next visit: continue present plan, have labs drawn prior to ROV, call office if allergies/subacute sinusitis NOT improved. Subjective:   Maritza Allen is a 38 y.o. female who was seen for Follow-up (3 months for lab review, psyche), Nasal Congestion ( congestion for a week,), and Back Pain      Pt presents to f/u lab review and psych visit. Seen last week with med change due to SE zyprexa sedating effects. Continues with insomnia, partly due to work schedule and parental responsibilities. Taking meds as prescribed, but NOT dispensed sonata and will  [address] today. Denies side effects from medication. Feels congested, but the same otherwise. Pt presents with sinus congestion. Started 1 week ago. Associated with pleuritic pain with cough, nasal drainage, and HA. Tried mucinex sinus. Has had in the past. Denies fever or SOB. Pleuritic pain has resolved since calling for appt last week, no pain today.       Patient Active Problem List    Diagnosis Date Noted    Primary insomnia 01/23/2023    Environmental and seasonal allergies 01/23/2023    S/P colonoscopic polypectomy 10/20/2022    Vitamin D deficiency 11/17/2021    PTSD (post-traumatic stress disorder) 09/01/2021    Generalized anxiety disorder with panic attacks 09/01/2021    Multiple personality disorder ([facility] 09/01/2021    Bipolar depression ([facility] 09/01/2021    Gastroesophageal reflux disease without esophagitis 05/30/2018     Current Outpatient Medications   Medication Sig Dispense Refill    Caplyta 10.5 mg cap       fluticasone propionate (FLONASE) 50 mcg/actuation nasal spray 2 Sprays by Both Nostrils route daily. 16 g 0    fexofenadine (ALLEGRA) 180 mg tablet Take 1 Tablet by mouth daily as needed for Allergies. 30 Tablet 2    ergocalciferol (ERGOCALCIFEROL) 1,250 mcg (50,000 unit) capsule TAKE 1 CAPSULE BY MOUTH EVERY SEVEN (7) DAYS. 4 Capsule 5    zaleplon (SONATA) 5 mg capsule Take 1 Capsule by mouth nightly. Max Daily Amount: 5 mg. 30 Capsule 2    albuterol (PROVENTIL HFA, VENTOLIN HFA, PROAIR HFA) 90 mcg/actuation inhaler TAKE 2 PUFFS EVERY 4 HOURS AS NEEDED FOR WHEEZING OR SHORTNESS OF BREATH (PERSISTENT COUGHING). 6.7 Each 1    omeprazole (PRILOSEC) 20 mg capsule Take 1 Capsule by mouth daily. (Patient not taking: Reported on 1/23/2023) 30 Capsule 0     Allergies   Allergen Reactions    Amoxicillin Rash and Other (comments)     Vaginal itching    Flagyl [Metronidazole] Nausea and Vomiting     metrogel is okay. Pcn [Penicillins] Itching     Vaginal itching     Past Medical History:   Diagnosis Date    Anxiety     Bipolar affective ([facility]     Depression     Hyperlipidemia     High Cholesterol    Hyperlipidemia     Major depression      Past Surgical History:   Procedure Laterality Date    HX GI      Colonoscopy    HX HERNIA REPAIR      as child       Review of Systems   Constitutional: Negative for fever and malaise/fatigue. Eyes: Negative for blurred vision. Respiratory: Negative for cough and shortness of breath. Cardiovascular: Negative for chest pain and leg swelling.    Neurological: Negative for dizziness, weakness and headaches. Objective:   Vital Signs: (As obtained by patient/caregiver at home)  There were no vitals taken for this visit. Physical Exam:  General appearance - alert, well  appearing, and in no distress. Mental status - A/O x 4,normal mood and affect. Eyes- trace periorbital edema, drainage, or irritation noted. Nose- no obvious drainage or swelling. Throat- no obvious swelling, goiter, or notable lymphadenopathy  Chest - Symmetric chest rise. No wheezing or coughing. No distress. Skin- normal skin tone noted. No hyperpigmentation or obvious deformities. No diaphoresis noted. No flushing. Neuro - Normal speech, no focal findings or movement disorder. Other pertinent observable physical exam findings:-        We discussed the expected course, resolution and complications of the diagnosis(es) in detail. Medication risks, benefits, costs, interactions, and alternatives were discussed as indicated. I advised her to contact the office if her condition worsens, changes or fails to improve as anticipated. She expressed understanding with the diagnosis(es) and plan. Rosanna Melendez, was evaluated through a synchronous (real-time) audio-video encounter. The patient (or guardian if applicable) is aware that this is a billable service, which includes applicable co-pays. This Virtual Visit was conducted with patient's (and/or legal guardian's) consent. The visit was conducted pursuant to the emergency declaration under the [facility], [address] [place] [address] authority and the [employer] General Act. Patient identification was verified, and a caregiver was present when appropriate. The patient was located at: Home: [place]  The provider was located at: Home: [place]      An [address] [facility] was used to authenticate this note.   -- Bernie Murillo NP

## 2023-01-23 NOTE — PROGRESS NOTES
Pt Is here for   Chief Complaint   Patient presents with    Follow-up     3 months for lab review, psyche    Nasal Congestion      congestion for a week,    Back Pain     1. Have you been to the ER, urgent care clinic since your last visit? Hospitalized since your last visit? No    2. Have you seen or consulted any other health care providers outside of the [address] [place] [address] since your last visit? Include any pap smears or colon screening.  No

## 2023-06-08 RX ORDER — FLUCONAZOLE 150 MG/1
150 TABLET ORAL DAILY
Qty: 2 TABLET | Refills: 0 | Status: SHIPPED | OUTPATIENT
Start: 2023-06-08

## 2023-06-21 ENCOUNTER — TELEMEDICINE (OUTPATIENT)
Facility: CLINIC | Age: 43
End: 2023-06-21
Payer: COMMERCIAL

## 2023-06-21 DIAGNOSIS — F31.9 BIPOLAR DEPRESSION (HCC): Primary | ICD-10-CM

## 2023-06-21 DIAGNOSIS — F44.81 MULTIPLE PERSONALITY DISORDER (HCC): ICD-10-CM

## 2023-06-21 DIAGNOSIS — F43.10 PTSD (POST-TRAUMATIC STRESS DISORDER): ICD-10-CM

## 2023-06-21 PROCEDURE — 90832 PSYTX W PT 30 MINUTES: CPT | Performed by: SOCIAL WORKER
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
## 2023-07-06 ENCOUNTER — OFFICE VISIT (OUTPATIENT)
Facility: CLINIC | Age: 43
End: 2023-07-06
Payer: COMMERCIAL

## 2023-07-06 VITALS
HEART RATE: 90 BPM | OXYGEN SATURATION: 100 % | WEIGHT: 156 LBS | DIASTOLIC BLOOD PRESSURE: 97 MMHG | TEMPERATURE: 98.6 F | SYSTOLIC BLOOD PRESSURE: 128 MMHG | RESPIRATION RATE: 17 BRPM | BODY MASS INDEX: 30.63 KG/M2 | HEIGHT: 60 IN

## 2023-07-06 DIAGNOSIS — M54.12 CERVICAL RADICULOPATHY: ICD-10-CM

## 2023-07-06 DIAGNOSIS — E55.9 VITAMIN D DEFICIENCY: ICD-10-CM

## 2023-07-06 DIAGNOSIS — R30.0 DYSURIA: ICD-10-CM

## 2023-07-06 DIAGNOSIS — N89.8 VAGINAL DISCHARGE: ICD-10-CM

## 2023-07-06 DIAGNOSIS — N30.01 ACUTE CYSTITIS WITH HEMATURIA: Primary | ICD-10-CM

## 2023-07-06 LAB
BILIRUBIN, URINE, POC: NEGATIVE
BLOOD URINE, POC: NORMAL
GLUCOSE URINE, POC: NEGATIVE
KETONES, URINE, POC: NEGATIVE
LEUKOCYTE ESTERASE, URINE, POC: NEGATIVE
NITRITE, URINE, POC: NEGATIVE
PH, URINE, POC: 5.5 (ref 4.6–8)
PROTEIN,URINE, POC: NEGATIVE
SPECIFIC GRAVITY, URINE, POC: 1.02 (ref 1–1.03)
URINALYSIS CLARITY, POC: NORMAL
URINALYSIS COLOR, POC: NORMAL
UROBILINOGEN, POC: NORMAL

## 2023-07-06 PROCEDURE — 99214 OFFICE O/P EST MOD 30 MIN: CPT | Performed by: NURSE PRACTITIONER

## 2023-07-06 PROCEDURE — 81001 URINALYSIS AUTO W/SCOPE: CPT | Performed by: NURSE PRACTITIONER

## 2023-07-06 RX ORDER — NITROFURANTOIN 25; 75 MG/1; MG/1
100 CAPSULE ORAL 2 TIMES DAILY
Qty: 10 CAPSULE | Refills: 0 | Status: SHIPPED | OUTPATIENT
Start: 2023-07-06 | End: 2023-07-11

## 2023-07-06 RX ORDER — FLUCONAZOLE 150 MG/1
150 TABLET ORAL DAILY
Qty: 2 TABLET | Refills: 0 | Status: SHIPPED | OUTPATIENT
Start: 2023-07-06

## 2023-07-06 RX ORDER — NAPROXEN 500 MG/1
500 TABLET ORAL 2 TIMES DAILY PRN
Qty: 60 TABLET | Refills: 0 | Status: SHIPPED | OUTPATIENT
Start: 2023-07-06

## 2023-07-06 RX ORDER — ERGOCALCIFEROL 1.25 MG/1
50000 CAPSULE ORAL WEEKLY
Qty: 12 CAPSULE | Refills: 3 | Status: SHIPPED | OUTPATIENT
Start: 2023-07-06

## 2023-07-06 SDOH — ECONOMIC STABILITY: FOOD INSECURITY: WITHIN THE PAST 12 MONTHS, THE FOOD YOU BOUGHT JUST DIDN'T LAST AND YOU DIDN'T HAVE MONEY TO GET MORE.: NEVER TRUE

## 2023-07-06 SDOH — ECONOMIC STABILITY: FOOD INSECURITY: WITHIN THE PAST 12 MONTHS, YOU WORRIED THAT YOUR FOOD WOULD RUN OUT BEFORE YOU GOT MONEY TO BUY MORE.: NEVER TRUE

## 2023-07-06 SDOH — ECONOMIC STABILITY: HOUSING INSECURITY
IN THE LAST 12 MONTHS, WAS THERE A TIME WHEN YOU DID NOT HAVE A STEADY PLACE TO SLEEP OR SLEPT IN A SHELTER (INCLUDING NOW)?: NO

## 2023-07-06 SDOH — ECONOMIC STABILITY: INCOME INSECURITY: HOW HARD IS IT FOR YOU TO PAY FOR THE VERY BASICS LIKE FOOD, HOUSING, MEDICAL CARE, AND HEATING?: NOT HARD AT ALL

## 2023-07-06 NOTE — PROGRESS NOTES
Uli Romero 1980 is a 37 y.o. female, Established patient, here for evaluation of the following chief complaint(s):  Vaginal Discharge (Creamy white discharge with no odor, and lower abdomen pains ) and Arm Pain (Bilateral arm pain, pt states with some weakness and muscle pain. Kimberly Chambers )      ASSESSMENT/PLAN:  Below is the assessment and plan developed based on review of pertinent history, physical exam, labs, studies, and medications. 1. Acute cystitis with hematuria  New, culture ordered. Macrobid ordered  - AMB POC URINALYSIS DIP STICK AUTO W/ MICRO  - Culture, Urine; Future  - nitrofurantoin, macrocrystal-monohydrate, (MACROBID) 100 MG capsule; Take 1 capsule by mouth 2 times daily for 5 days  Dispense: 10 capsule; Refill: 0  - fluconazole (DIFLUCAN) 150 MG tablet; Take 1 tablet by mouth daily May repeat taking 1 tab in 3 days if discharge remains. Dispense: 2 tablet; Refill: 0    2. Dysuria  See # 1  - AMB POC URINALYSIS DIP STICK AUTO W/ MICRO  - Culture, Urine; Future    3. Vitamin D deficiency  Stable, continue Vit D.   - vitamin D (ERGOCALCIFEROL) 1.25 MG (70682 UT) CAPS capsule; Take 1 capsule by mouth once a week  Dispense: 12 capsule; Refill: 3    4. Cervical radiculopathy  Stable, CSP ordered and use of NSAIDs advised. INI will refer to PT or ORTHO  - XR CERVICAL SPINE (4-5 VIEWS); Future  - naproxen (NAPROSYN) 500 MG tablet; Take 1 tablet by mouth 2 times daily as needed for Pain With food  Dispense: 60 tablet; Refill: 0    5. Vaginal discharge  New, swab ordered. - Nuswab Vaginitis Plus (VG+); Future                Pt asked to complete follow by next visit: call if any problems, continue taking meds as prescribed. SUBJECTIVE/OBJECTIVE:  HPI    Urinary Tract Infection:  Patient complains of dysuria. Associated with creamy white discharge. Onset was a few days ago (~ 1 wk), gradually worsening since that time. Patient does complain of lower abdominal ache. No abdominal pain.  Lat intercourse,

## 2023-07-07 DIAGNOSIS — N89.8 VAGINAL DISCHARGE: ICD-10-CM

## 2023-07-07 DIAGNOSIS — N30.01 ACUTE CYSTITIS WITH HEMATURIA: ICD-10-CM

## 2023-07-07 DIAGNOSIS — R30.0 DYSURIA: ICD-10-CM

## 2023-07-08 LAB
A VAGINAE DNA VAG QL NAA+PROBE: ABNORMAL SCORE
BACTERIA SPEC CULT: NORMAL
BVAB2 DNA VAG QL NAA+PROBE: ABNORMAL SCORE
C ALBICANS DNA VAG QL NAA+PROBE: NEGATIVE
C GLABRATA DNA VAG QL NAA+PROBE: NEGATIVE
C TRACH RRNA SPEC QL NAA+PROBE: NEGATIVE
CC UR VC: NORMAL
MEGA1 DNA VAG QL NAA+PROBE: ABNORMAL SCORE
N GONORRHOEA RRNA SPEC QL NAA+PROBE: NEGATIVE
SERVICE CMNT-IMP: NORMAL
SPECIMEN SOURCE: ABNORMAL
T VAGINALIS RRNA SPEC QL NAA+PROBE: NEGATIVE

## 2023-07-10 RX ORDER — METRONIDAZOLE 7.5 MG/G
1 GEL VAGINAL DAILY
Qty: 70 G | Refills: 0 | Status: SHIPPED | OUTPATIENT
Start: 2023-07-10 | End: 2023-07-15

## 2023-07-17 DIAGNOSIS — N30.01 ACUTE CYSTITIS WITH HEMATURIA: ICD-10-CM

## 2023-07-18 RX ORDER — FLUCONAZOLE 150 MG/1
150 TABLET ORAL DAILY
Qty: 2 TABLET | Refills: 0 | Status: SHIPPED | OUTPATIENT
Start: 2023-07-18

## 2023-07-20 RX ORDER — FLUCONAZOLE 150 MG/1
150 TABLET ORAL DAILY
Qty: 2 TABLET | Refills: 0 | OUTPATIENT
Start: 2023-07-20

## 2023-07-27 ENCOUNTER — TELEMEDICINE (OUTPATIENT)
Facility: CLINIC | Age: 43
End: 2023-07-27
Payer: COMMERCIAL

## 2023-07-27 DIAGNOSIS — F44.81 MULTIPLE PERSONALITY DISORDER (HCC): ICD-10-CM

## 2023-07-27 DIAGNOSIS — F31.9 BIPOLAR DEPRESSION (HCC): Primary | ICD-10-CM

## 2023-07-27 DIAGNOSIS — F43.10 PTSD (POST-TRAUMATIC STRESS DISORDER): ICD-10-CM

## 2023-07-27 DIAGNOSIS — F41.0 GENERALIZED ANXIETY DISORDER WITH PANIC ATTACKS: ICD-10-CM

## 2023-07-27 DIAGNOSIS — F41.1 GENERALIZED ANXIETY DISORDER WITH PANIC ATTACKS: ICD-10-CM

## 2023-07-27 PROCEDURE — 90837 PSYTX W PT 60 MINUTES: CPT | Performed by: SOCIAL WORKER
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
## 2023-08-02 NOTE — PROGRESS NOTES
Chief Complaint   Patient presents with    Irregular Menses     Pt states trying to conceive, but cycles have be irregular since summer 2017. Pt was referred by her PCP to get medication to regular her cycles. Pt complains of a \"cloudy\" discharge with an odor and right-sided lower abdominal pain, which started this past weekend. Clofazimine Pregnancy And Lactation Text: This medication is Pregnancy Category C and isn't considered safe during pregnancy. It is excreted in breast milk.

## 2023-08-09 ENCOUNTER — TELEMEDICINE (OUTPATIENT)
Facility: CLINIC | Age: 43
End: 2023-08-09
Payer: COMMERCIAL

## 2023-08-09 DIAGNOSIS — F51.01 PRIMARY INSOMNIA: ICD-10-CM

## 2023-08-09 DIAGNOSIS — F43.10 PTSD (POST-TRAUMATIC STRESS DISORDER): ICD-10-CM

## 2023-08-09 DIAGNOSIS — F31.9 BIPOLAR DEPRESSION (HCC): Primary | ICD-10-CM

## 2023-08-09 DIAGNOSIS — F41.0 GENERALIZED ANXIETY DISORDER WITH PANIC ATTACKS: ICD-10-CM

## 2023-08-09 DIAGNOSIS — F41.1 GENERALIZED ANXIETY DISORDER WITH PANIC ATTACKS: ICD-10-CM

## 2023-08-09 PROBLEM — F44.81 MULTIPLE PERSONALITY DISORDER (HCC): Status: RESOLVED | Noted: 2021-09-01 | Resolved: 2023-08-09

## 2023-08-09 PROCEDURE — 90832 PSYTX W PT 30 MINUTES: CPT | Performed by: SOCIAL WORKER
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
## 2023-08-22 ENCOUNTER — TELEPHONE (OUTPATIENT)
Facility: CLINIC | Age: 43
End: 2023-08-22

## 2023-08-22 DIAGNOSIS — N30.01 ACUTE CYSTITIS WITH HEMATURIA: ICD-10-CM

## 2023-08-22 RX ORDER — FLUCONAZOLE 150 MG/1
150 TABLET ORAL DAILY
Qty: 2 TABLET | Refills: 0 | Status: SHIPPED | OUTPATIENT
Start: 2023-08-22

## 2023-08-22 RX ORDER — FLUCONAZOLE 150 MG/1
150 TABLET ORAL DAILY
Qty: 2 TABLET | Refills: 0 | Status: SHIPPED | OUTPATIENT
Start: 2023-08-22 | End: 2023-08-22 | Stop reason: SDUPTHER

## 2023-08-22 NOTE — TELEPHONE ENCOUNTER
Pt is requesting rx for yeast infection to be sent to [facility] pharmacy on [facility]  Pt # [phone]

## 2023-08-24 ENCOUNTER — TELEMEDICINE (OUTPATIENT)
Facility: CLINIC | Age: 43
End: 2023-08-24
Payer: COMMERCIAL

## 2023-08-24 DIAGNOSIS — F43.10 PTSD (POST-TRAUMATIC STRESS DISORDER): ICD-10-CM

## 2023-08-24 DIAGNOSIS — F31.9 BIPOLAR DEPRESSION (HCC): Primary | ICD-10-CM

## 2023-08-24 DIAGNOSIS — F41.1 GENERALIZED ANXIETY DISORDER WITH PANIC ATTACKS: ICD-10-CM

## 2023-08-24 DIAGNOSIS — F41.0 GENERALIZED ANXIETY DISORDER WITH PANIC ATTACKS: ICD-10-CM

## 2023-08-24 PROCEDURE — 90832 PSYTX W PT 30 MINUTES: CPT | Performed by: SOCIAL WORKER

## 2023-08-24 NOTE — PROGRESS NOTES
disorder)  3. Generalized anxiety disorder with panic attacks    Return in about 1 week (around 8/31/2023). Avelina Sarmiento, was evaluated through a synchronous (real-time) audio-video encounter. The patient (or guardian if applicable) is aware that this is a billable service, which includes applicable co-pays. This Virtual Visit was conducted with patient's (and/or legal guardian's) consent. Patient identification was verified, and a caregiver was present when appropriate. The patient was located at Home: [address] [place] [address]  Provider was located at Home ([address] [facility]): [place]! Confirm you are appropriately licensed, registered, or certified to deliver care in the state where the patient is located as indicated above. If you are not or unsure, please re-schedule the visit.     Are you appropriately licensed in the patient's state?: Yes         --Cody Trevino LCSW

## 2023-09-25 ENCOUNTER — TELEMEDICINE (OUTPATIENT)
Facility: CLINIC | Age: 43
End: 2023-09-25
Payer: COMMERCIAL

## 2023-09-25 DIAGNOSIS — F41.0 GENERALIZED ANXIETY DISORDER WITH PANIC ATTACKS: ICD-10-CM

## 2023-09-25 DIAGNOSIS — F31.9 BIPOLAR DEPRESSION (HCC): Primary | ICD-10-CM

## 2023-09-25 DIAGNOSIS — F41.1 GENERALIZED ANXIETY DISORDER WITH PANIC ATTACKS: ICD-10-CM

## 2023-09-25 DIAGNOSIS — F43.10 PTSD (POST-TRAUMATIC STRESS DISORDER): ICD-10-CM

## 2023-09-25 DIAGNOSIS — F51.01 PRIMARY INSOMNIA: ICD-10-CM

## 2023-09-25 PROCEDURE — 90832 PSYTX W PT 30 MINUTES: CPT | Performed by: SOCIAL WORKER
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
## 2023-12-28 ENCOUNTER — OFFICE VISIT (OUTPATIENT)
Facility: CLINIC | Age: 43
End: 2023-12-28
Payer: COMMERCIAL

## 2023-12-28 VITALS
DIASTOLIC BLOOD PRESSURE: 90 MMHG | HEIGHT: 60 IN | TEMPERATURE: 97.4 F | OXYGEN SATURATION: 100 % | WEIGHT: 156.4 LBS | RESPIRATION RATE: 16 BRPM | BODY MASS INDEX: 30.7 KG/M2 | HEART RATE: 74 BPM | SYSTOLIC BLOOD PRESSURE: 138 MMHG

## 2023-12-28 DIAGNOSIS — R53.82 CHRONIC FATIGUE: Primary | ICD-10-CM

## 2023-12-28 DIAGNOSIS — J45.41 MODERATE PERSISTENT REACTIVE AIRWAY DISEASE WITH ACUTE EXACERBATION: ICD-10-CM

## 2023-12-28 DIAGNOSIS — E55.9 VITAMIN D DEFICIENCY: ICD-10-CM

## 2023-12-28 DIAGNOSIS — R35.0 URINARY FREQUENCY: ICD-10-CM

## 2023-12-28 DIAGNOSIS — R53.82 CHRONIC FATIGUE: ICD-10-CM

## 2023-12-28 DIAGNOSIS — N89.8 VAGINAL DISCHARGE: ICD-10-CM

## 2023-12-28 LAB
25(OH)D3 SERPL-MCNC: 34.4 NG/ML (ref 30–100)
ALBUMIN, URINE, POC: 150 MG/L
ALBUMIN/GLOB SERPL: 1 (ref 1.1–2.2)
ALP SERPL-CCNC: 86 U/L (ref 45–117)
ALT SERPL-CCNC: 26 U/L (ref 12–78)
ANION GAP SERPL CALC-SCNC: 4 MMOL/L (ref 5–15)
AST SERPL-CCNC: 17 U/L (ref 15–37)
BASOPHILS # BLD: 0 K/UL (ref 0–0.1)
BASOPHILS NFR BLD: 0 % (ref 0–1)
BILIRUB SERPL-MCNC: 0.4 MG/DL (ref 0.2–1)
BILIRUBIN, URINE, POC: NEGATIVE
BLOOD URINE, POC: NEGATIVE
BUN SERPL-MCNC: 16 MG/DL (ref 6–20)
BUN/CREAT SERPL: 17 (ref 12–20)
CALCIUM SERPL-MCNC: 9.3 MG/DL (ref 8.5–10.1)
CHLORIDE SERPL-SCNC: 108 MMOL/L (ref 97–108)
CO2 SERPL-SCNC: 27 MMOL/L (ref 21–32)
CREAT SERPL-MCNC: 0.96 MG/DL (ref 0.55–1.02)
CREATININE, URINE, POC: 300 MG/DL
DIFFERENTIAL METHOD BLD: ABNORMAL
EOSINOPHIL # BLD: 0 K/UL (ref 0–0.4)
EOSINOPHIL NFR BLD: 1 % (ref 0–7)
ERYTHROCYTE [DISTWIDTH] IN BLOOD BY AUTOMATED COUNT: 11.9 % (ref 11.5–14.5)
EST. AVERAGE GLUCOSE BLD GHB EST-MCNC: 108 MG/DL
GLOBULIN SER CALC-MCNC: 3.5 G/DL (ref 2–4)
GLUCOSE SERPL-MCNC: 92 MG/DL (ref 65–100)
GLUCOSE URINE, POC: NEGATIVE
HBA1C MFR BLD: 5.4 % (ref 4–5.6)
HCT VFR BLD AUTO: 35.5 % (ref 35–47)
HGB BLD-MCNC: 11.9 G/DL (ref 11.5–16)
IMM GRANULOCYTES # BLD AUTO: 0 K/UL (ref 0–0.04)
IMM GRANULOCYTES NFR BLD AUTO: 0 % (ref 0–0.5)
KETONES, URINE, POC: NEGATIVE
LEUKOCYTE ESTERASE, URINE, POC: NEGATIVE
LYMPHOCYTES # BLD: 2.5 K/UL (ref 0.8–3.5)
LYMPHOCYTES NFR BLD: 39 % (ref 12–49)
MCH RBC QN AUTO: 33.4 PG (ref 26–34)
MCHC RBC AUTO-ENTMCNC: 33.5 G/DL (ref 30–36.5)
MCV RBC AUTO: 99.7 FL (ref 80–99)
MICROALB/CREAT RATIO, POC: ABNORMAL MG/G
MONOCYTES # BLD: 0.4 K/UL (ref 0–1)
MONOCYTES NFR BLD: 7 % (ref 5–13)
NEUTS SEG # BLD: 3.5 K/UL (ref 1.8–8)
NEUTS SEG NFR BLD: 53 % (ref 32–75)
NITRITE, URINE, POC: NEGATIVE
NRBC # BLD: 0 K/UL (ref 0–0.01)
NRBC BLD-RTO: 0 PER 100 WBC
PH, URINE, POC: 6 (ref 4.6–8)
PLATELET # BLD AUTO: 260 K/UL (ref 150–400)
PMV BLD AUTO: 9.6 FL (ref 8.9–12.9)
POTASSIUM SERPL-SCNC: 3.8 MMOL/L (ref 3.5–5.1)
PROT SERPL-MCNC: 7.1 G/DL (ref 6.4–8.2)
PROTEIN,URINE, POC: ABNORMAL
RBC # BLD AUTO: 3.56 M/UL (ref 3.8–5.2)
SODIUM SERPL-SCNC: 139 MMOL/L (ref 136–145)
SPECIFIC GRAVITY, URINE, POC: 1.02 (ref 1–1.03)
TSH SERPL DL<=0.05 MIU/L-ACNC: 1.05 UIU/ML (ref 0.36–3.74)
URINALYSIS CLARITY, POC: CLEAR
URINALYSIS COLOR, POC: YELLOW
UROBILINOGEN, POC: ABNORMAL
VIT B12 SERPL-MCNC: 354 PG/ML (ref 193–986)
WBC # BLD AUTO: 6.6 K/UL (ref 3.6–11)

## 2023-12-28 PROCEDURE — 82044 UR ALBUMIN SEMIQUANTITATIVE: CPT | Performed by: NURSE PRACTITIONER

## 2023-12-28 PROCEDURE — 81003 URINALYSIS AUTO W/O SCOPE: CPT | Performed by: NURSE PRACTITIONER

## 2023-12-28 PROCEDURE — 99214 OFFICE O/P EST MOD 30 MIN: CPT | Performed by: NURSE PRACTITIONER

## 2023-12-28 RX ORDER — ALBUTEROL SULFATE 90 UG/1
AEROSOL, METERED RESPIRATORY (INHALATION)
Qty: 18 G | Refills: 1 | Status: SHIPPED | OUTPATIENT
Start: 2023-12-28

## 2023-12-28 RX ORDER — ERGOCALCIFEROL 1.25 MG/1
50000 CAPSULE ORAL WEEKLY
Qty: 12 CAPSULE | Refills: 3 | Status: CANCELLED | OUTPATIENT
Start: 2023-12-28

## 2023-12-28 RX ORDER — PREDNISONE 20 MG/1
20 TABLET ORAL DAILY
Qty: 5 TABLET | Refills: 0 | Status: SHIPPED | OUTPATIENT
Start: 2023-12-28 | End: 2024-01-02

## 2023-12-28 NOTE — PROGRESS NOTES
Christian Oleary 1980 is a 37 y.o. female, Established patient, here for evaluation of the following chief complaint(s):  Follow-up (Vaginal discharge/ frequent urination/ possible UTI)      ASSESSMENT/PLAN:  Below is the assessment and plan developed based on review of pertinent history, physical exam, labs, studies, and medications. 1. Chronic fatigue  Worse, labs to monitor. Agreed with pt to resume MH meds and request Vit D supplement since order for 1 yr supply sent in July 2023. - Vitamin B12; Future  - Vitamin D 25 Hydroxy; Future  - TSH; Future  - Hemoglobin A1C; Future  - Comprehensive Metabolic Panel; Future  - CBC with Auto Differential; Future    2. Moderate persistent reactive airway disease with acute exacerbation  Worse, refilled ROB. Encouraged smoking cessation. PFTs ordered also and prednisone given to help exacerbation. Discussed different treatment approaches for COPD vs asthma.  - albuterol sulfate HFA (PROVENTIL;VENTOLIN;PROAIR) 108 (90 Base) MCG/ACT inhaler; TAKE 2 PUFFS EVERY 4 HOURS AS NEEDED FOR WHEEZING OR SHORTNESS OF BREATH (PERSISTENT COUGHING). Dispense: 18 g; Refill: 1  - Full PFT Study With Bronchodilator; Future  - predniSONE (DELTASONE) 20 MG tablet; Take 1 tablet by mouth daily for 5 days  Dispense: 5 tablet; Refill: 0    3. Urinary frequency  New, UA normal. Discussed with pt possible reaction to increased water intake and caffeine use for energy boost. Encouraged pt to defer use of caffeine and start kegels. - AMB POC URINE, MICROALBUMIN, SEMIQUANT (3 RESULTS)  - AMB POC URINALYSIS DIP STICK AUTO W/O MICRO    4. Vaginal discharge  Recurrent, intermittent. Held on treating today pending results.   - Nuswab Vaginitis Plus (VG+); Future    5. Vitamin D deficiency  Stable, monitor. Continue/resume oral supplementation.   - Vitamin D 25 Hydroxy; Future            Follow-up and Dispositions    Return for VV 4 wk- ROB/PFT, lab rev, urin freq fu.          Pt asked to complete

## 2023-12-29 DIAGNOSIS — N89.8 VAGINAL DISCHARGE: ICD-10-CM

## 2023-12-29 DIAGNOSIS — K21.9 GASTRO-ESOPHAGEAL REFLUX DISEASE WITHOUT ESOPHAGITIS: ICD-10-CM

## 2023-12-29 RX ORDER — OMEPRAZOLE 20 MG/1
CAPSULE, DELAYED RELEASE ORAL DAILY
Qty: 30 CAPSULE | Refills: 0 | Status: SHIPPED | OUTPATIENT
Start: 2023-12-29

## 2023-12-29 NOTE — TELEPHONE ENCOUNTER
Last appointment: 12//28/2023 NP Susy Quiros   Next appointment: Nothing scheduled   Previous refill encounter(s):   09/28/2022 Prilosec #30     For Pharmacy Admin Tracking Only    Program: Medication Refill  Intervention Detail: New Rx: 1, reason: Patient Preference  Time Spent (min): 5      Requested Prescriptions     Pending Prescriptions Disp Refills    omeprazole (PRILOSEC) 20 MG delayed release capsule [Pharmacy Med Name: OMEPRAZOLE DR 20 MG CAPSULE] 30 capsule 0     Sig: TAKE 1 CAPSULE BY MOUTH EVERY DAY

## 2024-01-02 DIAGNOSIS — N76.0 BV (BACTERIAL VAGINOSIS): Primary | ICD-10-CM

## 2024-01-02 DIAGNOSIS — B37.9 ANTIBIOTIC-INDUCED YEAST INFECTION: ICD-10-CM

## 2024-01-02 DIAGNOSIS — B96.89 BV (BACTERIAL VAGINOSIS): Primary | ICD-10-CM

## 2024-01-02 DIAGNOSIS — T36.95XA ANTIBIOTIC-INDUCED YEAST INFECTION: ICD-10-CM

## 2024-01-02 LAB
A VAGINAE DNA VAG QL NAA+PROBE: ABNORMAL SCORE
BVAB2 DNA VAG QL NAA+PROBE: ABNORMAL SCORE
C ALBICANS DNA VAG QL NAA+PROBE: NEGATIVE
C GLABRATA DNA VAG QL NAA+PROBE: NEGATIVE
C TRACH RRNA SPEC QL NAA+PROBE: NEGATIVE
MEGA1 DNA VAG QL NAA+PROBE: ABNORMAL SCORE
N GONORRHOEA RRNA SPEC QL NAA+PROBE: NEGATIVE
SPECIMEN SOURCE: ABNORMAL
T VAGINALIS RRNA SPEC QL NAA+PROBE: NEGATIVE

## 2024-01-02 RX ORDER — FLUCONAZOLE 150 MG/1
150 TABLET ORAL DAILY
Qty: 2 TABLET | Refills: 0 | Status: SHIPPED | OUTPATIENT
Start: 2024-01-02 | End: 2024-01-03 | Stop reason: SDUPTHER

## 2024-01-02 RX ORDER — METRONIDAZOLE 7.5 MG/G
1 GEL VAGINAL DAILY
Qty: 70 G | Refills: 0 | Status: SHIPPED | OUTPATIENT
Start: 2024-01-02 | End: 2024-01-03 | Stop reason: SDUPTHER

## 2024-01-03 ENCOUNTER — TELEPHONE (OUTPATIENT)
Facility: CLINIC | Age: 44
End: 2024-01-03

## 2024-01-03 DIAGNOSIS — T36.95XA ANTIBIOTIC-INDUCED YEAST INFECTION: ICD-10-CM

## 2024-01-03 DIAGNOSIS — B96.89 BV (BACTERIAL VAGINOSIS): ICD-10-CM

## 2024-01-03 DIAGNOSIS — B37.9 ANTIBIOTIC-INDUCED YEAST INFECTION: ICD-10-CM

## 2024-01-03 DIAGNOSIS — N76.0 BV (BACTERIAL VAGINOSIS): ICD-10-CM

## 2024-01-03 RX ORDER — METRONIDAZOLE 7.5 MG/G
1 GEL VAGINAL DAILY
Qty: 70 G | Refills: 0 | Status: SHIPPED | OUTPATIENT
Start: 2024-01-03 | End: 2024-01-08

## 2024-01-03 RX ORDER — FLUCONAZOLE 150 MG/1
150 TABLET ORAL DAILY
Qty: 2 TABLET | Refills: 0 | Status: SHIPPED | OUTPATIENT
Start: 2024-01-03

## 2024-01-03 NOTE — TELEPHONE ENCOUNTER
Patient has switch to [facility] no longer [facility]to [facility], please resend        fluconazole (DIFLUCAN) 150 MG tablet     metroNIDAZOLE (METROGEL) 0.75 % vaginal ge

## 2024-01-29 DIAGNOSIS — K21.9 GASTRO-ESOPHAGEAL REFLUX DISEASE WITHOUT ESOPHAGITIS: ICD-10-CM

## 2024-01-29 RX ORDER — OMEPRAZOLE 20 MG/1
20 CAPSULE, DELAYED RELEASE ORAL DAILY
Qty: 90 CAPSULE | Refills: 0 | Status: SHIPPED | OUTPATIENT
Start: 2024-01-29

## 2024-01-29 NOTE — TELEPHONE ENCOUNTER
Last appointment: 12/28/2023 GUTIERREZ Calvin   Next appointment: 01/31/2024 GUTIERREZ Calvin   Previous refill encounter(s):   12/29/2023 Prilosec #30     For Pharmacy Admin Tracking Only    Program: Medication Refill  Intervention Detail: New Rx: 1, reason: Patient Preference  Time Spent (min): 5    Requested Prescriptions     Pending Prescriptions Disp Refills    omeprazole (PRILOSEC) 20 MG delayed release capsule 90 capsule 0     Sig: Take 1 capsule by mouth daily

## 2024-01-31 ENCOUNTER — TELEMEDICINE (OUTPATIENT)
Facility: CLINIC | Age: 44
End: 2024-01-31

## 2024-01-31 DIAGNOSIS — M25.475 BILATERAL SWELLING OF FEET AND ANKLES: ICD-10-CM

## 2024-01-31 DIAGNOSIS — M25.473 PAIN AND SWELLING OF ANKLE, UNSPECIFIED LATERALITY: ICD-10-CM

## 2024-01-31 DIAGNOSIS — M25.471 BILATERAL SWELLING OF FEET AND ANKLES: ICD-10-CM

## 2024-01-31 DIAGNOSIS — R25.2 LEG CRAMPING: ICD-10-CM

## 2024-01-31 DIAGNOSIS — J45.41 MODERATE PERSISTENT REACTIVE AIRWAY DISEASE WITH ACUTE EXACERBATION: Primary | ICD-10-CM

## 2024-01-31 DIAGNOSIS — E55.9 VITAMIN D DEFICIENCY: ICD-10-CM

## 2024-01-31 DIAGNOSIS — M25.474 BILATERAL SWELLING OF FEET AND ANKLES: ICD-10-CM

## 2024-01-31 DIAGNOSIS — M25.472 BILATERAL SWELLING OF FEET AND ANKLES: ICD-10-CM

## 2024-01-31 DIAGNOSIS — R35.0 URINARY FREQUENCY: ICD-10-CM

## 2024-01-31 DIAGNOSIS — M25.579 PAIN AND SWELLING OF ANKLE, UNSPECIFIED LATERALITY: ICD-10-CM

## 2024-01-31 DIAGNOSIS — Z02.9 ADMINISTRATIVE ENCOUNTER: ICD-10-CM

## 2024-01-31 RX ORDER — OLANZAPINE 10 MG/1
10 TABLET ORAL NIGHTLY
COMMUNITY
Start: 2023-12-13

## 2024-01-31 NOTE — PROGRESS NOTES
Rm    Chief Complaint   Patient presents with    Discuss Labs    Frequent/Recurrent UTI        There were no vitals taken for this visit.     1. Have you been to the ER, urgent care clinic since your last visit?  Hospitalized since your last visit? no    2. Have you seen or consulted any other health care providers outside of the [facility] since your last visit?  Include any pap smears or colon screening.  no    Health Maintenance Due   Topic Date Due    Hepatitis B vaccine (1 of 3 - 3-dose series) Never done    COVID-19 Vaccine (1) Never done    Pneumococcal 0-64 years Vaccine (1 - PCV) Never done    Hepatitis C screen  Never done    Cervical cancer screen  06/01/2022    Flu vaccine (1) 08/01/2023    Colorectal Cancer Screen  11/11/2023    Depression Monitoring  01/23/2024             No data to display

## 2024-01-31 NOTE — PROGRESS NOTES
Bernie Cronin is a 43 y.o. female Established patient, here for evaluation of the following chief complaint(s):   Discuss Labs and Frequent/Recurrent UTI          Assessment & Plan:   Below is the assessment and plan developed based on review of pertinent history, physical exam, labs, studies, and medications.    1. Moderate persistent reactive airway disease with acute exacerbation  Improved, no further need for ROB. Still asked to get PFTs so if she flares again, can direct inhaler selection. Given # to scheduling today during session.     2. Urinary frequency  Resolved.     3. Vitamin D deficiency  Resolved, continue oral supplementation.    4. 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 Would like work accommodation. Recalls being told she had RA in the past, however when labs reviewed, negative CCP and J[facility]NIOR noted with 8/2022 collection. Pt notified she does not have RA. May need imaging and PT if work accommodation not helpful for symptoms.    5. Pain and swelling of ankle, unspecified laterality  See #4. May consider imaging at [facility] or [facility] sooner if pain worse.     6. Leg cramping  New, use of magnesium or eating pumpkin seeds advised to help. Work accommodations also for less walking, slower pace, and frequent breaks.    7. Administrative encounter  See #4-6.      On this date 1/31/2024 I have spent 30 minutes reviewing previous notes, test results and face to face with the patient discussing the diagnosis and importance of compliance with the treatment plan as well as documenting on the day of the visit.    Follow-up and Dispositions    Return in about 11 months (around 12/27/2024) for Annual labs for Vit. D, allx, RAD.         Specific pt instructions until next visit: call if any problems    Subjective:   Bernie Cronin is a 43 y.o. female who was seen for Discuss Labs and Frequent/Recurrent UTI      Pt presents to f/u ROB, lab rev, and urinary

## 2024-01-31 NOTE — PATIENT INSTRUCTIONS
Call [phone] to schedule your pulmonary function test.     Try pumpkin seeds or MAGNESIUM for your muscle cramping.

## 2024-04-25 ENCOUNTER — TELEPHONE (OUTPATIENT)
Facility: CLINIC | Age: 44
End: 2024-04-25

## 2024-04-25 NOTE — TELEPHONE ENCOUNTER
----- Message from Tanna Crabtree sent at 4/24/2024  3:17 PM EDT -----  Subject: Message to Provider    QUESTIONS  Information for Provider? Patient has a boil on her face and she wants to   know if [facility]CP[facility] is able to lancet it and drain it or does she   have to do to the ER to have this done. Please reach out to the patient to   discuss.   ---------------------------------------------------------------------------  --------------  CALL BACK INFO  [phone]; OK to leave message on voicemail  ---------------------------------------------------------------------------  --------------  SCRIPT ANSWERS  Relationship to Patient? Self  (Is the patient requesting to see the provider for a procedure?)? Yes

## 2024-05-03 DIAGNOSIS — B37.9 ANTIBIOTIC-INDUCED YEAST INFECTION: ICD-10-CM

## 2024-05-03 DIAGNOSIS — T36.95XA ANTIBIOTIC-INDUCED YEAST INFECTION: ICD-10-CM

## 2024-05-06 RX ORDER — FLUCONAZOLE 150 MG/1
150 TABLET ORAL DAILY
Qty: 2 TABLET | Refills: 0 | Status: SHIPPED | OUTPATIENT
Start: 2024-05-06

## 2024-07-16 DIAGNOSIS — T36.95XA ANTIBIOTIC-INDUCED YEAST INFECTION: ICD-10-CM

## 2024-07-16 DIAGNOSIS — J45.41 MODERATE PERSISTENT REACTIVE AIRWAY DISEASE WITH ACUTE EXACERBATION: ICD-10-CM

## 2024-07-16 DIAGNOSIS — N76.0 BV (BACTERIAL VAGINOSIS): Primary | ICD-10-CM

## 2024-07-16 DIAGNOSIS — N89.8 VAGINAL DISCHARGE: ICD-10-CM

## 2024-07-16 DIAGNOSIS — B37.9 ANTIBIOTIC-INDUCED YEAST INFECTION: ICD-10-CM

## 2024-07-16 DIAGNOSIS — B96.89 BV (BACTERIAL VAGINOSIS): Primary | ICD-10-CM

## 2024-07-16 RX ORDER — FLUCONAZOLE 150 MG/1
150 TABLET ORAL DAILY
Qty: 2 TABLET | Refills: 0 | Status: CANCELLED | OUTPATIENT
Start: 2024-07-16

## 2024-07-16 RX ORDER — METRONIDAZOLE 7.5 MG/G
1 GEL VAGINAL DAILY
Qty: 70 G | Refills: 0 | Status: SHIPPED | OUTPATIENT
Start: 2024-07-16 | End: 2024-07-21

## 2024-07-16 RX ORDER — FLUCONAZOLE 150 MG/1
150 TABLET ORAL DAILY
Qty: 2 TABLET | Refills: 0 | Status: SHIPPED | OUTPATIENT
Start: 2024-07-16

## 2024-07-16 RX ORDER — ALBUTEROL SULFATE 90 UG/1
AEROSOL, METERED RESPIRATORY (INHALATION)
Qty: 18 G | Refills: 1 | Status: SHIPPED | OUTPATIENT
Start: 2024-07-16

## 2024-09-09 DIAGNOSIS — N89.8 VAGINAL DISCHARGE: ICD-10-CM

## 2024-09-09 RX ORDER — FLUCONAZOLE 150 MG/1
150 TABLET ORAL DAILY
Qty: 2 TABLET | Refills: 0 | Status: SHIPPED | OUTPATIENT
Start: 2024-09-09 | End: 2024-09-10 | Stop reason: SDUPTHER

## 2024-09-10 DIAGNOSIS — N89.8 VAGINAL DISCHARGE: ICD-10-CM

## 2024-09-10 RX ORDER — FLUCONAZOLE 150 MG/1
150 TABLET ORAL DAILY
Qty: 2 TABLET | Refills: 0 | Status: SHIPPED | OUTPATIENT
Start: 2024-09-10

## 2024-11-03 DIAGNOSIS — E55.9 VITAMIN D DEFICIENCY: ICD-10-CM

## 2024-11-03 DIAGNOSIS — K21.9 GASTRO-ESOPHAGEAL REFLUX DISEASE WITHOUT ESOPHAGITIS: ICD-10-CM

## 2024-11-04 RX ORDER — ERGOCALCIFEROL 1.25 MG/1
50000 CAPSULE, LIQUID FILLED ORAL WEEKLY
Qty: 12 CAPSULE | Refills: 3 | Status: SHIPPED | OUTPATIENT
Start: 2024-11-04

## 2025-01-15 ENCOUNTER — TELEMEDICINE (OUTPATIENT)
Facility: CLINIC | Age: 45
End: 2025-01-15

## 2025-01-15 DIAGNOSIS — B96.89 BV (BACTERIAL VAGINOSIS): ICD-10-CM

## 2025-01-15 DIAGNOSIS — B37.31 VAGINA, CANDIDIASIS: ICD-10-CM

## 2025-01-15 DIAGNOSIS — N76.0 BV (BACTERIAL VAGINOSIS): ICD-10-CM

## 2025-01-15 DIAGNOSIS — N89.8 VAGINAL DISCHARGE: Primary | ICD-10-CM

## 2025-01-15 RX ORDER — METRONIDAZOLE 7.5 MG/G
1 GEL VAGINAL DAILY
Qty: 70 G | Refills: 0 | Status: SHIPPED | OUTPATIENT
Start: 2025-01-15 | End: 2025-01-20

## 2025-01-15 RX ORDER — FLUCONAZOLE 150 MG/1
150 TABLET ORAL DAILY
Qty: 2 TABLET | Refills: 0 | Status: SHIPPED | OUTPATIENT
Start: 2025-01-15

## 2025-01-15 SDOH — ECONOMIC STABILITY: FOOD INSECURITY: WITHIN THE PAST 12 MONTHS, THE FOOD YOU BOUGHT JUST DIDN'T LAST AND YOU DIDN'T HAVE MONEY TO GET MORE.: NEVER TRUE

## 2025-01-15 SDOH — ECONOMIC STABILITY: FOOD INSECURITY: WITHIN THE PAST 12 MONTHS, YOU WORRIED THAT YOUR FOOD WOULD RUN OUT BEFORE YOU GOT MONEY TO BUY MORE.: NEVER TRUE

## 2025-01-15 NOTE — PROGRESS NOTES
Pt is here for   Chief Complaint   Patient presents with    Vaginal Discharge     Pt states milky discharge with itching      \"Have you been to the ER, urgent care clinic since your last visit?  Hospitalized since your last visit?\"    NO    “Have you seen or consulted any other health care providers outside our system since your last visit?”    NO    Have you had a mammogram?”   NO    Date of last Mammogram: 5/20/2020      “Have you had a pap smear?”    NO    Date of last Cervical Cancer screen (HPV or PAP): 6/1/2017       “Have you had a colorectal cancer screening such as a colonoscopy/FIT/Cologuard?    NO    Date of last Colonoscopy: 11/11/2013  No cologuard on file  No FIT/FOBT on file   No flexible sigmoidoscopy on file           
disorder (HCC) 9/1/2021    PTSD (post-traumatic stress disorder)      Past Surgical History:   Procedure Laterality Date    GI      Colonoscopy    HERNIA REPAIR      as child       Review of Systems   Constitutional: Negative for fever and malaise/fatigue.   Eyes: Negative for blurred vision.   Respiratory: Negative for cough and shortness of breath.    Cardiovascular: Negative for chest pain and leg swelling.   Neurological: Negative for dizziness, weakness and headaches.     Objective:   Vital Signs: (As obtained by patient/caregiver at home)      1/15/2025     9:54 AM   Patient-Reported Vitals   Patient-Reported Weight 153   Patient-Reported Height 5’0          Physical Exam:  General appearance - alert, well  appearing, and in no distress.  Mental status - A/O x 4,normal mood and affect.   Eyes- No periorbital edema, drainage, or irritation noted.   Nose- no obvious drainage or swelling.  Throat- no obvious swelling, goiter, or notable lymphadenopathy  Chest - Symmetric chest rise. No wheezing or coughing. No distress.  Skin- normal skin tone noted. No hyperpigmentation or obvious deformities. No diaphoresis noted. No flushing.  Neuro - Normal speech, no focal findings or movement disorder.     Other pertinent observable physical exam findings:-    We discussed the expected course, resolution and complications of the diagnosis(es) in detail.  Medication risks, benefits, costs, interactions, and alternatives were discussed as indicated.  I advised her to contact the office if her condition worsens, changes or fails to improve as anticipated. She expressed understanding with the diagnosis(es) and plan.     Bernie Cronin, was evaluated through a synchronous (real-time) audio-video encounter. The patient (or guardian if applicable) is aware that this is a billable service, which includes applicable co-pays. This Virtual Visit was conducted with patient's (and/or legal guardian's) consent. Patient identification

## 2025-01-15 NOTE — PATIENT INSTRUCTIONS
Good clean LOVE and Healthy HooHoo, both should be available to order online. The first one maybe in stores like [employer], or retail pharmacies.     Try taking PROBIOTICS 10 billion units daily for GI health.  For vaginal health, 2-4 billion daily.

## 2025-01-17 DIAGNOSIS — E55.9 VITAMIN D DEFICIENCY: ICD-10-CM

## 2025-01-17 NOTE — TELEPHONE ENCOUNTER
Pt requested refill(s) via [email or web address]     Duplicate request: Too soon   11/04/2024 Vitamin D2 #12 with 3 refills (1 every week) was sent to [id number].     For Pharmacy Admin Tracking Only    Program: Medication Refill  Intervention Detail: Discontinued Rx: 1, reason: Duplicate Therapy  Time Spent (min): 5    Requested Prescriptions     Pending Prescriptions Disp Refills    vitamin D (ERGOCALCIFEROL) 1.25 MG (76410 UT) CAPS capsule 12 capsule 3     Sig: Take 1 capsule by mouth once a week

## 2025-01-18 DIAGNOSIS — N89.8 VAGINAL DISCHARGE: ICD-10-CM

## 2025-01-20 RX ORDER — ERGOCALCIFEROL 1.25 MG/1
50000 CAPSULE, LIQUID FILLED ORAL WEEKLY
Qty: 12 CAPSULE | Refills: 3 | OUTPATIENT
Start: 2025-01-20
